# Patient Record
Sex: FEMALE | Race: WHITE | NOT HISPANIC OR LATINO | Employment: OTHER | URBAN - METROPOLITAN AREA
[De-identification: names, ages, dates, MRNs, and addresses within clinical notes are randomized per-mention and may not be internally consistent; named-entity substitution may affect disease eponyms.]

---

## 2017-01-12 ENCOUNTER — ALLSCRIPTS OFFICE VISIT (OUTPATIENT)
Dept: OTHER | Facility: OTHER | Age: 72
End: 2017-01-12

## 2017-01-27 ENCOUNTER — ALLSCRIPTS OFFICE VISIT (OUTPATIENT)
Dept: OTHER | Facility: OTHER | Age: 72
End: 2017-01-27

## 2017-01-27 LAB
FLUAV AG SPEC QL IA: POSITIVE
INFLUENZA B AG (HISTORICAL): NEGATIVE

## 2017-05-07 DIAGNOSIS — Z12.31 ENCOUNTER FOR SCREENING MAMMOGRAM FOR MALIGNANT NEOPLASM OF BREAST: ICD-10-CM

## 2017-05-07 DIAGNOSIS — I10 ESSENTIAL (PRIMARY) HYPERTENSION: ICD-10-CM

## 2017-05-07 DIAGNOSIS — E11.9 TYPE 2 DIABETES MELLITUS WITHOUT COMPLICATIONS (HCC): ICD-10-CM

## 2017-05-17 ENCOUNTER — ALLSCRIPTS OFFICE VISIT (OUTPATIENT)
Dept: OTHER | Facility: OTHER | Age: 72
End: 2017-05-17

## 2017-05-23 ENCOUNTER — GENERIC CONVERSION - ENCOUNTER (OUTPATIENT)
Dept: OTHER | Facility: OTHER | Age: 72
End: 2017-05-23

## 2017-06-06 ENCOUNTER — ALLSCRIPTS OFFICE VISIT (OUTPATIENT)
Dept: OTHER | Facility: OTHER | Age: 72
End: 2017-06-06

## 2017-09-08 ENCOUNTER — GENERIC CONVERSION - ENCOUNTER (OUTPATIENT)
Dept: OTHER | Facility: OTHER | Age: 72
End: 2017-09-08

## 2017-09-21 ENCOUNTER — HOSPITAL ENCOUNTER (EMERGENCY)
Facility: HOSPITAL | Age: 72
Discharge: HOME | End: 2017-09-21
Admitting: EMERGENCY MEDICINE
Payer: COMMERCIAL

## 2017-09-21 VITALS
SYSTOLIC BLOOD PRESSURE: 184 MMHG | TEMPERATURE: 98.3 F | HEART RATE: 85 BPM | RESPIRATION RATE: 16 BRPM | OXYGEN SATURATION: 98 % | DIASTOLIC BLOOD PRESSURE: 67 MMHG | WEIGHT: 150 LBS

## 2017-09-21 DIAGNOSIS — S61.219A LACERATION OF FINGER OF RIGHT HAND, INITIAL ENCOUNTER: Primary | ICD-10-CM

## 2017-09-21 PROCEDURE — 90471 IMMUNIZATION ADMIN: CPT

## 2017-09-21 PROCEDURE — 99282 EMERGENCY DEPT VISIT SF MDM: CPT

## 2017-09-21 PROCEDURE — 90715 TDAP VACCINE 7 YRS/> IM: CPT | Performed by: PHYSICIAN ASSISTANT

## 2017-09-21 RX ORDER — CEPHALEXIN 500 MG/1
500 CAPSULE ORAL EVERY 6 HOURS SCHEDULED
Qty: 39 CAPSULE | Refills: 0 | Status: SHIPPED | OUTPATIENT
Start: 2017-09-21 | End: 2017-09-21

## 2017-09-21 RX ORDER — LIDOCAINE HYDROCHLORIDE 10 MG/ML
10 INJECTION, SOLUTION EPIDURAL; INFILTRATION; INTRACAUDAL; PERINEURAL ONCE
Status: COMPLETED | OUTPATIENT
Start: 2017-09-21 | End: 2017-09-21

## 2017-09-21 RX ORDER — CEPHALEXIN 250 MG/1
500 CAPSULE ORAL ONCE
Status: COMPLETED | OUTPATIENT
Start: 2017-09-21 | End: 2017-09-21

## 2017-09-21 RX ORDER — CEPHALEXIN 500 MG/1
500 CAPSULE ORAL 4 TIMES DAILY
Qty: 39 CAPSULE | Refills: 0 | Status: SHIPPED | OUTPATIENT
Start: 2017-09-21 | End: 2017-10-01

## 2017-09-21 RX ORDER — BACITRACIN, NEOMYCIN, POLYMYXIN B 400; 3.5; 5 [USP'U]/G; MG/G; [USP'U]/G
1 OINTMENT TOPICAL ONCE
Status: COMPLETED | OUTPATIENT
Start: 2017-09-21 | End: 2017-09-21

## 2017-09-21 RX ADMIN — TETANUS TOXOID, REDUCED DIPHTHERIA TOXOID AND ACELLULAR PERTUSSIS VACCINE, ADSORBED 0.5 ML: 5; 2.5; 8; 8; 2.5 SUSPENSION INTRAMUSCULAR at 18:17

## 2017-09-21 RX ADMIN — LIDOCAINE HYDROCHLORIDE 10 ML: 10 INJECTION, SOLUTION EPIDURAL; INFILTRATION; INTRACAUDAL; PERINEURAL at 18:01

## 2017-09-21 RX ADMIN — CEPHALEXIN 500 MG: 250 CAPSULE ORAL at 19:23

## 2017-09-21 RX ADMIN — BACITRACIN ZINC, NEOMYCIN, POLYMYXIN B 1 SMALL APPLICATION: 400; 3.5; 5 OINTMENT TOPICAL at 18:02

## 2017-10-02 ENCOUNTER — ALLSCRIPTS OFFICE VISIT (OUTPATIENT)
Dept: OTHER | Facility: OTHER | Age: 72
End: 2017-10-02
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
## 2017-11-09 DIAGNOSIS — E11.9 TYPE 2 DIABETES MELLITUS WITHOUT COMPLICATIONS (HCC): ICD-10-CM

## 2017-11-09 DIAGNOSIS — I10 ESSENTIAL (PRIMARY) HYPERTENSION: ICD-10-CM

## 2017-11-09 DIAGNOSIS — C19 MALIGNANT NEOPLASM OF RECTOSIGMOID JUNCTION (HCC): ICD-10-CM

## 2017-11-09 DIAGNOSIS — K21.9 GASTRO-ESOPHAGEAL REFLUX DISEASE WITHOUT ESOPHAGITIS: ICD-10-CM

## 2017-11-09 DIAGNOSIS — D18.02 HEMANGIOMA OF INTRACRANIAL STRUCTURES (HCC): ICD-10-CM

## 2017-11-10 ENCOUNTER — ALLSCRIPTS OFFICE VISIT (OUTPATIENT)
Dept: OTHER | Facility: OTHER | Age: 72
End: 2017-11-10
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
## 2017-11-21 ENCOUNTER — GENERIC CONVERSION - ENCOUNTER (OUTPATIENT)
Dept: OTHER | Facility: OTHER | Age: 72
End: 2017-11-21
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
## 2018-01-11 NOTE — RESULT NOTES
Verified Results  * DXA BONE DENSITY SPINE HIP AND PELVIS 12Sep2016 03:11PM Gloria Drilling Order Number: [id number]    - Patient Instructions: To schedule this appointment, please contact Central Scheduling at [phone]   Order Number: [id number]    - Patient Instructions: To schedule this appointment, please contact Central Scheduling at [phone]  Test Name Result Flag Reference   DXA BONE DENSITY SPINE HIP AND PELVIS (Report)     CENTRAL DXA SCAN     CLINICAL HISTORY:  66year old post-menopausal  female risk factors include postmenopausal, estrogen deficiency  Personal history breast cancer 1989 with chemotherapy  Colon cancer 2001  Pancreatic cancer 2010  TECHNIQUE: Bone densitometry was performed using a [employer] W bone densitometer  Regions of interest appear properly placed  There are no obvious fractures or other confounding variables which could limit the study  L1 is excluded from    evaluation due to the presence of degenerative or osteoarthritic changes  COMPARISON: Baseline     RESULTS:    LUMBAR SPINE: L2-L4:   BMD 0 889 gm/cm2   T-score below normal, -1 7   Z-score 0 6     LEFT TOTAL HIP:   BMD 0 777 gm/cm2   T-score below normal, -1 4   Z-score 0 3     LEFT FEMORAL NECK:   BMD 0 682 gm/cm2   T-score below normal, -1 5   Z-score 0 4     The Frax score in this patient identifies the risk of a major osteoporotic fracture in the next 10 years at 11% and a hip fracture risk of 1 8%  IMPRESSION:   1  Based on the [facility] classification, this study identifies moderate osteopenia at the spine and total hip and femoral neck areas and the patient is considered at low risk for fracture  2  A daily intake of calcium of at least 1200 mg and vitamin D, 800-1000 IU, as well as weight bearing and muscle strengthening exercise, fall prevention and avoidance of tobacco and excessive alcohol intake as basic preventive measures are recommended       3  Repeat DXA scan on the same equipment in 18-24 months as clinically indicated  WHO CLASSIFICATION:   Normal (a T-score of -1 0 or higher)   Low bone mineral density (a T-score of less than -1 0 but higher than -2 5)   Osteoporosis (a T-score of -2 5 or less)   Severe osteoporosis (a T-score of -2 5 or less with a fragility fracture)      Thank you for allowing us the opportunity to participate in your patient care  The expanded DEXA report will no longer be arriving in your mail  If you desire to view the full report please contact [address] [place] [address] or access the PACS system         Workstation performed: [id number]     Signed by:   Rachele Arellano MD   9/13/16

## 2018-01-12 VITALS
TEMPERATURE: 103.6 F | SYSTOLIC BLOOD PRESSURE: 132 MMHG | DIASTOLIC BLOOD PRESSURE: 84 MMHG | RESPIRATION RATE: 16 BRPM | BODY MASS INDEX: 26.73 KG/M2 | OXYGEN SATURATION: 95 % | HEART RATE: 102 BPM | HEIGHT: 64 IN | WEIGHT: 156.56 LBS

## 2018-01-13 VITALS
DIASTOLIC BLOOD PRESSURE: 64 MMHG | WEIGHT: 150.19 LBS | RESPIRATION RATE: 17 BRPM | OXYGEN SATURATION: 97 % | SYSTOLIC BLOOD PRESSURE: 118 MMHG | HEIGHT: 64 IN | BODY MASS INDEX: 25.64 KG/M2 | TEMPERATURE: 96.9 F | HEART RATE: 73 BPM

## 2018-01-13 VITALS
DIASTOLIC BLOOD PRESSURE: 78 MMHG | HEART RATE: 74 BPM | TEMPERATURE: 98.6 F | WEIGHT: 155.25 LBS | RESPIRATION RATE: 16 BRPM | SYSTOLIC BLOOD PRESSURE: 128 MMHG | OXYGEN SATURATION: 98 % | BODY MASS INDEX: 26.5 KG/M2 | HEIGHT: 64 IN

## 2018-01-13 VITALS
DIASTOLIC BLOOD PRESSURE: 72 MMHG | BODY MASS INDEX: 24.07 KG/M2 | HEART RATE: 102 BPM | OXYGEN SATURATION: 99 % | TEMPERATURE: 98.5 F | SYSTOLIC BLOOD PRESSURE: 128 MMHG | HEIGHT: 65 IN | WEIGHT: 144.44 LBS | RESPIRATION RATE: 17 BRPM

## 2018-01-14 VITALS
DIASTOLIC BLOOD PRESSURE: 68 MMHG | HEIGHT: 64 IN | WEIGHT: 160.19 LBS | TEMPERATURE: 98.6 F | RESPIRATION RATE: 16 BRPM | SYSTOLIC BLOOD PRESSURE: 124 MMHG | BODY MASS INDEX: 27.35 KG/M2 | OXYGEN SATURATION: 96 % | HEART RATE: 91 BPM

## 2018-01-14 VITALS
HEIGHT: 64 IN | HEART RATE: 69 BPM | OXYGEN SATURATION: 98 % | TEMPERATURE: 99 F | BODY MASS INDEX: 26.46 KG/M2 | DIASTOLIC BLOOD PRESSURE: 62 MMHG | SYSTOLIC BLOOD PRESSURE: 110 MMHG | WEIGHT: 155 LBS

## 2018-02-13 NOTE — MISCELLANEOUS
Message  Message Free Text Note Form: Patient called last night around 10:30 p m  stating that she was scratched by her cat  She is concerned because she had breast cancer and does not have lymph nodes and is concerned about infection  Plan    1  Doxycycline Hyclate 100 MG Oral Capsule; Take 1 capsule twice daily    Discussion/Summary  Discussion Summary:   Doxycycline course to be called in to [facility] in [place] where she is visiting her daughter currently  Task out to a call in the prescription by medical assistant and call patient to notify her once that has been done  She was advised to wash the wound twice daily and monitor it  If it becomes swollen she may apply ice  If at any time she is concerned she needs to seek medical attention at an urgent care or ER in [place]        Signatures   Electronically signed by : Mitchell Holland, [facility]; Nov 21 2017  7:54AM EST                       (Author)

## 2018-03-09 DIAGNOSIS — R52 ACUTE PAIN: Primary | ICD-10-CM

## 2018-03-09 DIAGNOSIS — C19 CARCINOMA OF RECTOSIGMOID JUNCTION (HCC): ICD-10-CM

## 2018-03-09 DIAGNOSIS — C25.9 MALIGNANT NEOPLASM OF PANCREAS, UNSPECIFIED LOCATION OF MALIGNANCY (HCC): ICD-10-CM

## 2018-03-09 RX ORDER — OXYCODONE HYDROCHLORIDE 5 MG/1
CAPSULE ORAL
COMMUNITY
Start: 2014-02-11 | End: 2018-03-09 | Stop reason: SDUPTHER

## 2018-03-09 RX ORDER — OXYCODONE HYDROCHLORIDE 5 MG/1
5 CAPSULE ORAL 2 TIMES DAILY PRN
Qty: 180 CAPSULE | Refills: 0 | Status: SHIPPED | OUTPATIENT
Start: 2018-03-09 | End: 2018-08-01 | Stop reason: SDUPTHER

## 2018-03-15 DIAGNOSIS — E11.9 TYPE 2 DIABETES MELLITUS WITHOUT COMPLICATION, WITH LONG-TERM CURRENT USE OF INSULIN (HCC): Primary | ICD-10-CM

## 2018-03-15 DIAGNOSIS — Z79.4 TYPE 2 DIABETES MELLITUS WITHOUT COMPLICATION, WITH LONG-TERM CURRENT USE OF INSULIN (HCC): Primary | ICD-10-CM

## 2018-03-20 ENCOUNTER — TELEPHONE (OUTPATIENT)
Dept: FAMILY MEDICINE CLINIC | Facility: CLINIC | Age: 73
End: 2018-03-20

## 2018-03-20 NOTE — TELEPHONE ENCOUNTER
Pharmacist needed a diagnosis code for pt'a rx oxycodone I read exactly under the mediation that Dr Solis approved what the diagnosis association were and I also confirmed them with the most recent office note which was  An office visit from 11/10/17

## 2018-04-17 ENCOUNTER — DOCUMENTATION (OUTPATIENT)
Dept: FAMILY MEDICINE CLINIC | Facility: CLINIC | Age: 73
End: 2018-04-17

## 2018-04-17 ENCOUNTER — TELEPHONE (OUTPATIENT)
Dept: FAMILY MEDICINE CLINIC | Facility: CLINIC | Age: 73
End: 2018-04-17

## 2018-04-17 DIAGNOSIS — Z79.4 TYPE 2 DIABETES MELLITUS WITH COMPLICATION, WITH LONG-TERM CURRENT USE OF INSULIN (HCC): Primary | ICD-10-CM

## 2018-04-17 DIAGNOSIS — E11.8 TYPE 2 DIABETES MELLITUS WITH COMPLICATION, WITH LONG-TERM CURRENT USE OF INSULIN (HCC): Primary | ICD-10-CM

## 2018-04-17 NOTE — TELEPHONE ENCOUNTER
I SPOKE TO PT ABOUT THE LANTUS  SHE WOULD LIKE YOU TO CALL HER  SHE WANTS TO STAY ON THIS MED  SHE WOULD LIKE TO KNOW IF THERE IS ANYTHING YOU CAN Do  I TRIED TO EXPLAIN HER FORMULARY MUST HAVE CHANGED FOR THE NEW YEAR  I ALSO TOLD HER IF THERE IS ANY REASON SHE CAN'T CHANGE MEDS WE TRY AN APPEAL  PLEASE CALL Pt    THANKS

## 2018-04-18 NOTE — TELEPHONE ENCOUNTER
I spoke with patient  She is okay with switching from Lantus to [place]    Will send Rx to [facility]

## 2018-05-03 ENCOUNTER — OFFICE VISIT (OUTPATIENT)
Dept: FAMILY MEDICINE CLINIC | Facility: CLINIC | Age: 73
End: 2018-05-03
Payer: COMMERCIAL

## 2018-05-03 VITALS
OXYGEN SATURATION: 98 % | BODY MASS INDEX: 24.5 KG/M2 | DIASTOLIC BLOOD PRESSURE: 70 MMHG | HEART RATE: 73 BPM | TEMPERATURE: 100 F | SYSTOLIC BLOOD PRESSURE: 130 MMHG | WEIGHT: 147.2 LBS

## 2018-05-03 DIAGNOSIS — J01.00 ACUTE NON-RECURRENT MAXILLARY SINUSITIS: Primary | ICD-10-CM

## 2018-05-03 PROCEDURE — 99213 OFFICE O/P EST LOW 20 MIN: CPT | Performed by: FAMILY MEDICINE

## 2018-05-03 RX ORDER — HYDROCHLOROTHIAZIDE 12.5 MG/1
12.5 TABLET ORAL
COMMUNITY
End: 2018-05-11 | Stop reason: ALTCHOICE

## 2018-05-03 RX ORDER — METOPROLOL TARTRATE 100 MG/1
100 TABLET ORAL
COMMUNITY
Start: 2014-10-28 | End: 2018-05-03 | Stop reason: SDUPTHER

## 2018-05-03 RX ORDER — OXYCODONE HYDROCHLORIDE 5 MG/1
5 CAPSULE ORAL EVERY 4 HOURS PRN
COMMUNITY
End: 2018-05-03 | Stop reason: SDUPTHER

## 2018-05-03 RX ORDER — HYDROXYZINE HYDROCHLORIDE 25 MG/1
1 TABLET, FILM COATED ORAL 3 TIMES DAILY PRN
COMMUNITY
Start: 2017-10-06 | End: 2018-11-25

## 2018-05-03 RX ORDER — RANITIDINE 150 MG/1
150 TABLET ORAL
COMMUNITY
End: 2019-02-04 | Stop reason: ALTCHOICE

## 2018-05-03 RX ORDER — LISINOPRIL 40 MG/1
40 TABLET ORAL
COMMUNITY
Start: 2014-10-28 | End: 2019-02-04 | Stop reason: ALTCHOICE

## 2018-05-03 RX ORDER — DOXYCYCLINE HYCLATE 100 MG/1
100 CAPSULE ORAL EVERY 12 HOURS SCHEDULED
Qty: 20 CAPSULE | Refills: 0 | Status: SHIPPED | OUTPATIENT
Start: 2018-05-03 | End: 2018-05-11 | Stop reason: ALTCHOICE

## 2018-05-03 RX ORDER — LISINOPRIL 20 MG/1
20 TABLET ORAL
COMMUNITY
End: 2019-01-10 | Stop reason: SDUPTHER

## 2018-05-03 RX ORDER — OXYCODONE AND ACETAMINOPHEN TABLETS 5; 300 MG/1; MG/1
5 TABLET ORAL
COMMUNITY
Start: 2014-10-28 | End: 2018-08-01 | Stop reason: SDUPTHER

## 2018-05-03 RX ORDER — METOPROLOL SUCCINATE 100 MG/1
TABLET, EXTENDED RELEASE ORAL
COMMUNITY
Start: 2012-04-23 | End: 2019-01-10 | Stop reason: SDUPTHER

## 2018-05-03 RX ORDER — HYDROCHLOROTHIAZIDE 25 MG/1
25 TABLET ORAL
COMMUNITY
Start: 2014-03-24 | End: 2019-02-04 | Stop reason: ALTCHOICE

## 2018-05-03 RX ORDER — METOPROLOL SUCCINATE 25 MG/1
25 TABLET, EXTENDED RELEASE ORAL
COMMUNITY
End: 2018-05-11 | Stop reason: ALTCHOICE

## 2018-05-03 RX ORDER — NICOTINE POLACRILEX 4 MG/1
20 GUM, CHEWING ORAL
COMMUNITY
Start: 2014-03-24 | End: 2018-05-11 | Stop reason: ALTCHOICE

## 2018-05-03 RX ORDER — DOXYCYCLINE HYCLATE 100 MG/1
1 CAPSULE ORAL 2 TIMES DAILY
COMMUNITY
Start: 2014-11-17 | End: 2018-05-11 | Stop reason: ALTCHOICE

## 2018-05-03 RX ORDER — INSULIN GLARGINE 100 [IU]/ML
100 INJECTION, SOLUTION SUBCUTANEOUS
COMMUNITY
Start: 2014-10-28 | End: 2018-05-11 | Stop reason: ALTCHOICE

## 2018-05-03 NOTE — PROGRESS NOTES
Assessment/Plan:         Diagnoses and all orders for this visit:    Acute non-recurrent maxillary sinusitis  Comments:  Rx given for doxycycline  Call further problems possible need for Medrol Dosepak if symptoms persist  Orders:  -     doxycycline hyclate (VIBRAMYCIN) 100 mg capsule; Take 1 capsule (100 mg total) by mouth every 12 (twelve) hours for 10 days          Subjective:      Patient ID: Arline Ly is a 75 y o  female  4 day hx of sinus congestion/pressure  tmax 100        The following portions of the patient's history were reviewed and updated as appropriate: allergies, current medications, past family history, past medical history, past social history, past surgical history and problem list     Review of Systems   Constitutional: Negative for chills and fever  HENT: Positive for congestion and postnasal drip  Respiratory: Positive for cough  Negative for shortness of breath  Cardiovascular: Negative  Objective:      /70 (BP Location: Left arm, Patient Position: Sitting)   Pulse 73   Temp 100 °F (37 8 °C) (Tympanic)   Wt 66 8 kg (147 lb 3 2 oz)   LMP  (Exact Date)   SpO2 98%   BMI 24 50 kg/m²          Physical Exam   Constitutional: She appears well-developed and well-nourished  HENT:   Turbinates inflamed   Neck: Normal range of motion  Neck supple     Pulmonary/Chest: Effort normal and breath sounds normal

## 2018-05-08 LAB
ALP SERPL-CCNC: 138 U/L (ref 46–116)
ALT SERPL W P-5'-P-CCNC: 24 U/L (ref 12–78)
AST SERPL W P-5'-P-CCNC: 16 U/L (ref 5–45)
BILIRUB SERPL-MCNC: 0.3 MG/DL
BUN SERPL-MCNC: 36 MG/DL (ref 5–25)
CHOLEST SERPL-MCNC: 95 MG/DL (ref 50–200)
CREAT SERPL-MCNC: 0.83 MG/DL (ref 0.6–1.3)
GLUCOSE SERPL-MCNC: 90 MG/DL
HBA1C MFR BLD: 5.4 % (ref 4.2–6.3)
HDLC SERPL-MCNC: 33 MG/DL (ref 40–60)
LDLC SERPL DIRECT ASSAY-MCNC: 31 MG/DL
LDLC/HDLC SERPL: 2.88 {RATIO}
POTASSIUM SERPL-SCNC: 4.4 MMOL/L (ref 3.5–5.3)
SODIUM SERPL-SCNC: 141 MMOL/L (ref 136–145)
TRIGL SERPL-MCNC: 155 MG/DL (ref ?–150)
TSH SERPL DL<=0.05 MIU/L-ACNC: 0.62 UIU/ML (ref 0.34–4.82)

## 2018-05-10 LAB
CREAT ?TM UR-SCNC: 38.3 UMOL/L
MICROALBUMIN UR-MCNC: 0.9 MG/L (ref 0–20)
PROT SERPL-MCNC: 5.8 G/DL (ref 6.4–8.2)

## 2018-05-11 ENCOUNTER — OFFICE VISIT (OUTPATIENT)
Dept: FAMILY MEDICINE CLINIC | Facility: CLINIC | Age: 73
End: 2018-05-11
Payer: COMMERCIAL

## 2018-05-11 VITALS
OXYGEN SATURATION: 97 % | SYSTOLIC BLOOD PRESSURE: 122 MMHG | HEART RATE: 56 BPM | BODY MASS INDEX: 24.58 KG/M2 | TEMPERATURE: 98.4 F | DIASTOLIC BLOOD PRESSURE: 64 MMHG | WEIGHT: 147.7 LBS

## 2018-05-11 DIAGNOSIS — I10 BENIGN ESSENTIAL HYPERTENSION: ICD-10-CM

## 2018-05-11 DIAGNOSIS — Z79.4 TYPE 2 DIABETES MELLITUS WITH COMPLICATION, WITH LONG-TERM CURRENT USE OF INSULIN (HCC): Primary | ICD-10-CM

## 2018-05-11 DIAGNOSIS — E11.8 TYPE 2 DIABETES MELLITUS WITH COMPLICATION, WITH LONG-TERM CURRENT USE OF INSULIN (HCC): Primary | ICD-10-CM

## 2018-05-11 DIAGNOSIS — C19 CARCINOMA OF RECTOSIGMOID JUNCTION (HCC): ICD-10-CM

## 2018-05-11 DIAGNOSIS — K21.9 GASTROESOPHAGEAL REFLUX DISEASE WITHOUT ESOPHAGITIS: ICD-10-CM

## 2018-05-11 PROCEDURE — 3725F SCREEN DEPRESSION PERFORMED: CPT | Performed by: FAMILY MEDICINE

## 2018-05-11 PROCEDURE — 3074F SYST BP LT 130 MM HG: CPT | Performed by: FAMILY MEDICINE

## 2018-05-11 PROCEDURE — 99214 OFFICE O/P EST MOD 30 MIN: CPT | Performed by: FAMILY MEDICINE

## 2018-05-11 PROCEDURE — 3078F DIAST BP <80 MM HG: CPT | Performed by: FAMILY MEDICINE

## 2018-05-11 NOTE — ASSESSMENT & PLAN NOTE
Stable without signs of recurrence    Continue yearly follow up at [facility]

## 2018-05-11 NOTE — ASSESSMENT & PLAN NOTE
Stable without signs of recurrence    Continue yearly follow up at [facility]

## 2018-05-11 NOTE — ASSESSMENT & PLAN NOTE
A1c 5 4%, was 6 0%  Home sugar readings have been good  Patient recently changed Lantus to [place] and has noticed that she is had to reduce her dosage due to some he hypoglycemia  Current receive a dose is 22 units daily along with NovoLog sliding scale  Recommend discontinue metformin  Continue to monitor home sugars  And call if significant hypoglycemia occurs    Will continue to monitor

## 2018-05-11 NOTE — PROGRESS NOTES
Assessment/Plan:    Benign essential hypertension   Controlled on lisinopril 20, metoprolol 50, and hydrochlorothiazide 25    Pancreatic cancer (HCC)   Stable without signs of recurrence  Continue yearly follow up at [facility])   Stable without signs of recurrence  Continue yearly follow up at [address] [facility] of head of pancreas ([facility] [address] )   Stable without signs of recurrence  Continue yearly follow up at [address] [facility] of rectosigmoid junction ([facility] )   Stable without signs of recurrence  Continue yearly follow up at [facility]    Acid reflux   Doing well on OTC ranitidine 150 mg at HS without breakthrough symptoms    Type 2 diabetes mellitus (HCC)   A1c 5 4%, was 6 0%  Home sugar readings have been good  Patient recently changed Lantus to [place] and has noticed that she is had to reduce her dosage due to some he hypoglycemia  Current receive a dose is 22 units daily along with NovoLog sliding scale  Recommend discontinue metformin  Continue to monitor home sugars  And call if significant hypoglycemia occurs  Will continue to monitor       Diagnoses and all orders for this visit:    Type 2 diabetes mellitus with complication, with long-term current use of insulin (HCC)  -     CBC and differential; Future  -     Comprehensive metabolic panel; Future  -     HEMOGLOBIN A1C W/ EAG ESTIMATION; Future  -     Lipid Panel with Direct LDL reflex; Future    Benign essential hypertension  -     CBC and differential; Future  -     Comprehensive metabolic panel; Future  -     HEMOGLOBIN A1C W/ EAG ESTIMATION; Future  -     Lipid Panel with Direct LDL reflex; Future    Gastroesophageal reflux disease without esophagitis    Carcinoma of rectosigmoid junction (HCC)      6 months, AWV,  Fasting blood work at [facility] prior     Subjective:      Patient ID: Adrienne Lujan is a 75 y o  female  Patient presents for recheck of chronic medical problems today    Overall she feels well without any complaints  She is switched to Lantus to [place] and doing well  She still uses NovoLog for sliding scale  Doing well on blood pressure medications without side effects  Doing well on OTC Zantac for GERD  Patient had labs drawn on May 8th        The following portions of the patient's history were reviewed and updated as appropriate: allergies, current medications, past family history, past medical history, past social history, past surgical history and problem list     Review of Systems   Respiratory: Negative  Cardiovascular: Negative  Gastrointestinal: Negative  Genitourinary: Negative  Objective:      /64 (BP Location: Left arm, Patient Position: Sitting)   Pulse 56   Temp 98 4 °F (36 9 °C) (Tympanic)   Wt 67 kg (147 lb 11 2 oz)   SpO2 97%   BMI 24 58 kg/m²          Physical Exam   Cardiovascular: Normal rate, regular rhythm, normal heart sounds and intact distal pulses  Carotids: no bruits  Ext: no edema   Pulmonary/Chest: Effort normal  No respiratory distress  She has no wheezes  She has no rales  Psychiatric: She has a normal mood and affect   Her behavior is normal  Thought content normal

## 2018-06-27 ENCOUNTER — TELEPHONE (OUTPATIENT)
Dept: FAMILY MEDICINE CLINIC | Facility: CLINIC | Age: 73
End: 2018-06-27

## 2018-06-27 NOTE — TELEPHONE ENCOUNTER
Pt would like if you could call her back she would like to speak to you regarding meds and her diabetes

## 2018-06-28 NOTE — TELEPHONE ENCOUNTER
I spoke w/ pt  She had a question regarding tresiba and lantus  Pt may convert on a unit/unit conversion

## 2018-08-01 ENCOUNTER — TELEPHONE (OUTPATIENT)
Dept: FAMILY MEDICINE CLINIC | Facility: CLINIC | Age: 73
End: 2018-08-01

## 2018-08-01 DIAGNOSIS — R52 ACUTE PAIN: ICD-10-CM

## 2018-08-01 DIAGNOSIS — M19.90 ARTHRITIS: Primary | ICD-10-CM

## 2018-08-01 DIAGNOSIS — C19 CARCINOMA OF RECTOSIGMOID JUNCTION (HCC): ICD-10-CM

## 2018-08-01 DIAGNOSIS — C25.9 MALIGNANT NEOPLASM OF PANCREAS, UNSPECIFIED LOCATION OF MALIGNANCY (HCC): ICD-10-CM

## 2018-08-01 DIAGNOSIS — G89.29 OTHER CHRONIC PAIN: Primary | ICD-10-CM

## 2018-08-01 RX ORDER — OXYCODONE AND ACETAMINOPHEN TABLETS 5; 300 MG/1; MG/1
1 TABLET ORAL 2 TIMES DAILY
Qty: 180 TABLET | Refills: 0 | Status: SHIPPED | OUTPATIENT
Start: 2018-08-01 | End: 2018-08-01 | Stop reason: SDUPTHER

## 2018-08-01 RX ORDER — OXYCODONE HYDROCHLORIDE 5 MG/1
5 CAPSULE ORAL 2 TIMES DAILY PRN
Qty: 180 CAPSULE | Refills: 0 | Status: SHIPPED | OUTPATIENT
Start: 2018-08-01 | End: 2018-08-01 | Stop reason: SDUPTHER

## 2018-08-01 NOTE — TELEPHONE ENCOUNTER
Jarvis Ochoa called from [address] [facility]  They received Pt's rx for OxyCodone Capsules  She said they don't have capsules only tablets  Also, Pt has requested her scripts be sent to [facility] not [facility]      Please send new Rx script to [facility]   Original script with capsules should be ok since now going to [facility]

## 2018-08-01 NOTE — TELEPHONE ENCOUNTER
Patient called - RX was sent to [facility], patient would like it sent to [address] [place] [address]

## 2018-08-01 NOTE — TELEPHONE ENCOUNTER
Pharmacy called stating that the Oxycodone acetaminophen 5-300 is $1800 00 so they recommend that you dispense 5-235 or just plain Oxycodone- they also stated that she has been on the Oxycodone before so they need a new diagnostic code

## 2018-08-02 DIAGNOSIS — C25.9 MALIGNANT NEOPLASM OF PANCREAS, UNSPECIFIED LOCATION OF MALIGNANCY (HCC): ICD-10-CM

## 2018-08-02 DIAGNOSIS — R52 ACUTE PAIN: ICD-10-CM

## 2018-08-02 DIAGNOSIS — C19 CARCINOMA OF RECTOSIGMOID JUNCTION (HCC): ICD-10-CM

## 2018-08-02 RX ORDER — OXYCODONE HYDROCHLORIDE 5 MG/1
5 CAPSULE ORAL 2 TIMES DAILY PRN
Qty: 180 CAPSULE | Refills: 0 | Status: SHIPPED | OUTPATIENT
Start: 2018-08-02 | End: 2018-08-02 | Stop reason: SDUPTHER

## 2018-08-02 RX ORDER — OXYCODONE HYDROCHLORIDE 5 MG/1
5 CAPSULE ORAL 2 TIMES DAILY PRN
Qty: 180 CAPSULE | Refills: 0 | Status: SHIPPED | OUTPATIENT
Start: 2018-08-02 | End: 2018-08-06 | Stop reason: SDUPTHER

## 2018-08-06 DIAGNOSIS — C25.9 MALIGNANT NEOPLASM OF PANCREAS, UNSPECIFIED LOCATION OF MALIGNANCY (HCC): ICD-10-CM

## 2018-08-06 DIAGNOSIS — C19 CARCINOMA OF RECTOSIGMOID JUNCTION (HCC): ICD-10-CM

## 2018-08-06 DIAGNOSIS — R52 ACUTE PAIN: ICD-10-CM

## 2018-08-06 RX ORDER — OXYCODONE HYDROCHLORIDE 5 MG/1
5 CAPSULE ORAL 2 TIMES DAILY PRN
Qty: 180 CAPSULE | Refills: 0 | Status: SHIPPED | OUTPATIENT
Start: 2018-08-06 | End: 2018-10-29 | Stop reason: SDUPTHER

## 2018-08-06 NOTE — TELEPHONE ENCOUNTER
Pt called stating she just got off the phone with [facility] and we sent the wrong oxyCodone to the Pharmacy  Informed pt it appears that we tried to send the crorrect rx over right after the wrong one went through, however the script didn't go through to thepharmacy  Informed her we would resend over to them      Please resend   oxyCodone 5mg Capsules  Take 2 times daily PRN for moderate pain  Qty 180 capsules    To [facility] [address] [facility]

## 2018-08-14 DIAGNOSIS — C25.9 MALIGNANT NEOPLASM OF PANCREAS, UNSPECIFIED LOCATION OF MALIGNANCY (HCC): Primary | ICD-10-CM

## 2018-08-14 NOTE — TELEPHONE ENCOUNTER
Pt called requesting a refill to be sent to [facility]    Please check dosing instructions    Pt will like a call when this is approved

## 2018-10-15 DIAGNOSIS — C25.9 MALIGNANT NEOPLASM OF PANCREAS, UNSPECIFIED LOCATION OF MALIGNANCY (HCC): ICD-10-CM

## 2018-10-15 RX ORDER — PANCRELIPASE 24000; 76000; 120000 [USP'U]/1; [USP'U]/1; [USP'U]/1
CAPSULE, DELAYED RELEASE PELLETS ORAL
Qty: 180 CAPSULE | Refills: 0 | Status: SHIPPED | OUTPATIENT
Start: 2018-10-15 | End: 2018-10-29 | Stop reason: SDUPTHER

## 2018-10-29 DIAGNOSIS — C25.9 MALIGNANT NEOPLASM OF PANCREAS, UNSPECIFIED LOCATION OF MALIGNANCY (HCC): ICD-10-CM

## 2018-10-29 DIAGNOSIS — R52 ACUTE PAIN: ICD-10-CM

## 2018-10-29 DIAGNOSIS — C19 CARCINOMA OF RECTOSIGMOID JUNCTION (HCC): ICD-10-CM

## 2018-10-29 RX ORDER — OXYCODONE HYDROCHLORIDE 5 MG/1
5 CAPSULE ORAL 2 TIMES DAILY PRN
Qty: 180 CAPSULE | Refills: 0 | Status: SHIPPED | OUTPATIENT
Start: 2018-10-29 | End: 2018-10-31 | Stop reason: SDUPTHER

## 2018-10-30 RX ORDER — PANCRELIPASE 24000; 76000; 120000 [USP'U]/1; [USP'U]/1; [USP'U]/1
CAPSULE, DELAYED RELEASE PELLETS ORAL
Qty: 1080 CAPSULE | Refills: 1 | Status: SHIPPED | OUTPATIENT
Start: 2018-10-30 | End: 2019-03-02

## 2018-10-31 DIAGNOSIS — C19 CARCINOMA OF RECTOSIGMOID JUNCTION (HCC): ICD-10-CM

## 2018-10-31 DIAGNOSIS — R52 ACUTE PAIN: ICD-10-CM

## 2018-10-31 DIAGNOSIS — C25.9 MALIGNANT NEOPLASM OF PANCREAS, UNSPECIFIED LOCATION OF MALIGNANCY (HCC): ICD-10-CM

## 2018-10-31 RX ORDER — OXYCODONE HYDROCHLORIDE 5 MG/1
5 CAPSULE ORAL 2 TIMES DAILY PRN
Qty: 180 CAPSULE | Refills: 0 | Status: SHIPPED | OUTPATIENT
Start: 2018-10-31 | End: 2019-01-25 | Stop reason: SDUPTHER

## 2018-10-31 NOTE — TELEPHONE ENCOUNTER
Pt called asking about her oxycodone refill we sent it to [facility] and she would like it sent to [facility] [address]    Oxycodone 5mg cap  Take 1 cap by mouth 2 times a day as needed for moderate pain  [address]    If we can pls get this there today she has access to a car today

## 2018-11-13 DIAGNOSIS — E11.8 TYPE 2 DIABETES MELLITUS WITH COMPLICATION, WITH LONG-TERM CURRENT USE OF INSULIN (HCC): ICD-10-CM

## 2018-11-13 DIAGNOSIS — Z79.4 TYPE 2 DIABETES MELLITUS WITH COMPLICATION, WITH LONG-TERM CURRENT USE OF INSULIN (HCC): ICD-10-CM

## 2018-11-13 LAB
CREAT ?TM UR-SCNC: 48.5 UMOL/L
HBA1C MFR BLD HPLC: 5.7 %
MICROALBUMIN UR-MCNC: 1.8 MG/L (ref 0–20)
MICROALBUMIN/CREAT UR: 37.1 MG/G{CREAT}

## 2018-11-13 PROCEDURE — 3060F POS MICROALBUMINURIA REV: CPT | Performed by: FAMILY MEDICINE

## 2018-11-15 ENCOUNTER — OFFICE VISIT (OUTPATIENT)
Dept: FAMILY MEDICINE CLINIC | Facility: CLINIC | Age: 73
End: 2018-11-15
Payer: COMMERCIAL

## 2018-11-15 VITALS
WEIGHT: 149.1 LBS | DIASTOLIC BLOOD PRESSURE: 60 MMHG | BODY MASS INDEX: 24.81 KG/M2 | TEMPERATURE: 98.6 F | HEART RATE: 90 BPM | SYSTOLIC BLOOD PRESSURE: 140 MMHG | OXYGEN SATURATION: 99 % | RESPIRATION RATE: 16 BRPM

## 2018-11-15 DIAGNOSIS — K21.9 GASTROESOPHAGEAL REFLUX DISEASE WITHOUT ESOPHAGITIS: ICD-10-CM

## 2018-11-15 DIAGNOSIS — C25.0 CARCINOMA OF HEAD OF PANCREAS (HCC): ICD-10-CM

## 2018-11-15 DIAGNOSIS — E11.8 TYPE 2 DIABETES MELLITUS WITH COMPLICATION, WITH LONG-TERM CURRENT USE OF INSULIN (HCC): ICD-10-CM

## 2018-11-15 DIAGNOSIS — Z00.00 ENCOUNTER FOR MEDICARE ANNUAL WELLNESS EXAM: Primary | ICD-10-CM

## 2018-11-15 DIAGNOSIS — Z79.4 TYPE 2 DIABETES MELLITUS WITH COMPLICATION, WITH LONG-TERM CURRENT USE OF INSULIN (HCC): ICD-10-CM

## 2018-11-15 DIAGNOSIS — C50.911 BILATERAL MALIGNANT NEOPLASM OF BREAST IN FEMALE, UNSPECIFIED ESTROGEN RECEPTOR STATUS, UNSPECIFIED SITE OF BREAST (HCC): ICD-10-CM

## 2018-11-15 DIAGNOSIS — I10 BENIGN ESSENTIAL HYPERTENSION: ICD-10-CM

## 2018-11-15 DIAGNOSIS — C50.912 BILATERAL MALIGNANT NEOPLASM OF BREAST IN FEMALE, UNSPECIFIED ESTROGEN RECEPTOR STATUS, UNSPECIFIED SITE OF BREAST (HCC): ICD-10-CM

## 2018-11-15 DIAGNOSIS — C19 CARCINOMA OF RECTOSIGMOID JUNCTION (HCC): ICD-10-CM

## 2018-11-15 LAB
LEFT EYE IMAGE QUALITY: NORMAL
RIGHT EYE DIABETIC RETINOPATHY: NORMAL
RIGHT EYE IMAGE QUALITY: NORMAL
SEVERITY (EYE EXAM): NORMAL

## 2018-11-15 PROCEDURE — 4040F PNEUMOC VAC/ADMIN/RCVD: CPT | Performed by: FAMILY MEDICINE

## 2018-11-15 PROCEDURE — 1170F FXNL STATUS ASSESSED: CPT | Performed by: FAMILY MEDICINE

## 2018-11-15 PROCEDURE — 1125F AMNT PAIN NOTED PAIN PRSNT: CPT | Performed by: FAMILY MEDICINE

## 2018-11-15 PROCEDURE — 3072F LOW RISK FOR RETINOPATHY: CPT | Performed by: FAMILY MEDICINE

## 2018-11-15 PROCEDURE — 99214 OFFICE O/P EST MOD 30 MIN: CPT | Performed by: FAMILY MEDICINE

## 2018-11-15 PROCEDURE — G0439 PPPS, SUBSEQ VISIT: HCPCS | Performed by: FAMILY MEDICINE

## 2018-11-15 PROCEDURE — 92250 FUNDUS PHOTOGRAPHY W/I&R: CPT | Performed by: FAMILY MEDICINE

## 2018-11-15 NOTE — PROGRESS NOTES
Assessment and Plan:  Problem List Items Addressed This Visit     Benign essential hypertension    Type 2 diabetes mellitus [facility])        Health Maintenance Due   Topic Date Due    SLP PLAN OF CARE  1945    Fall Risk  03/25/2009    Urinary Incontinence Screening  03/25/2009    DM Eye Exam  08/16/2011    Diabetic Foot Exam  01/12/2018    INFLUENZA VACCINE  07/01/2018    HEMOGLOBIN A1C  11/08/2018      MEDICARE WELLNESS VISIT  PATIENT DOES NOT HAVE A LIVING WILL AND HEALTHCARE POWER OF   THESE WERE DISCUSSED TODAY  DEPRESSION SCREEN, FALL RISK, URINARY INCONTINENCE SCREENS WERE NEGATIVE  PATIENT REFUSES AGE APPROPRIATE VACCINATIONS EXCEPT FOR PNEUMONIA VACCINE SERIES  PATIENT IS CURRENT WITH COLONOSCOPY, DEXA, MAMMOGRAPHY      HPI:  Patient Active Problem List   Diagnosis    Benign essential hypertension    Vitamin D deficiency    Type 2 diabetes mellitus ([facility] )    Breast cancer ([facility] )    Carcinoma of head of pancreas ([facility] )    Carcinoma of rectosigmoid junction (HCC)    Acid reflux    Arthritis     Past Medical History:   Diagnosis Date    Cancer [facility])     Breast; Last Assessed: 8/29/2016    Carpal tunnel syndrome     unspecified laterality;  Onset: 4/23/2012    Cellulitis     Last Assessesd: 8/30/2012    Diabetes mellitus out of control ([facility] )     Last Assessed: 11/3/2014    Fatigue     Last Assessed: 2/11/014    Fracture of toe     Last Assessed: 11/17/2014    Ovarian cancer ([facility] )     Last Assessed: 1/7/2016    Rectal cancer [facility])      Past Surgical History:   Procedure Laterality Date    COLON SURGERY      MASTECTOMY      bilateral    PANCREATECTOMY  2010    Proximal Subtotal (whipple procedure)     Family History   Problem Relation Age of Onset    Melanoma Mother     Stroke Father      History   Smoking Status    Never Smoker   Smokeless Tobacco    Never Used     History   Alcohol Use No      History   Drug Use No         Current Outpatient Prescriptions   Medication Sig Dispense Refill    CREON 22666-99638 units TAKE 3 WITH BREAKFAST, 3 WITH LUNCH, 3 WITH DINNER AND 3 WITH SNACKS 1080 capsule 1    Glucose Blood (ONETOUCH ULTRA BLUE VI) by In Vitro route      hydrochlorothiazide (HYDRODIURIL) 25 mg tablet Take 25 mg by mouth      hydrOXYzine HCL (ATARAX) 25 mg tablet Take 1 tablet by mouth 3 (three) times a day as needed      Insulin Aspart (NOVOLOG SC) Inject under the skin      insulin degludec (TRESIBA) 100 units/mL injection pen Inject 30 Units under the skin daily 5 pen 2    lisinopril (ZESTRIL) 20 mg tablet Take 20 mg by mouth      lisinopril (ZESTRIL) 40 mg tablet Take 40 mg by mouth      metoprolol succinate (TOPROL-XL) 100 mg 24 hr tablet Take by mouth      oxyCODONE (OXY-IR) 5 MG capsule Take 1 capsule (5 mg total) by mouth 2 (two) times a day as needed for moderate pain Max Daily Amount: 10 mg 180 capsule 0    Pancrelipase, Lip-Prot-Amyl, (CREON PO) Take by mouth      ranitidine (ZANTAC) 150 mg tablet Take 150 mg by mouth       No current facility-administered medications for this visit  Allergies   Allergen Reactions    Gadolinium Derivatives Anaphylaxis     MRI dye- hot flashes/ flushing    Iodine Anaphylaxis     ? ??SHORTNESS OF BREATH    Acetaminophen-Codeine Edema    Aspirin Hives     RASH    Cephalexin      Annotation - [id number]: rash    Nickel Hives    Penicillins Hives    Sulfa Antibiotics Hives    Sulfamethoxazole-Trimethoprim Diarrhea     Immunization History   Administered Date(s) Administered    Pneumococcal Conjugate 13-Valent 11/23/2015    Pneumococcal Polysaccharide PPV23 11/10/2014    Td (adult), adsorbed 10/05/2010    Tdap 09/29/2014, 09/21/2017       Patient Care Team:  Stew Bautista DO as PCP - General    Medicare Screening Tests and Risk Assessments:  Duyen Hall is here for her Subsequent Wellness visit  Health Risk Assessment:  Patient rates overall health as good  Patient feels that their physical health rating is Same  Eyesight was rated as Same  Hearing was rated as Same  Patient feels that their emotional and mental health rating is Same  Pain experienced by patient in the last 7 days has been None  Patient states that she has experienced no weight loss or gain in last 6 months  Emotional/Mental Health:  Patient has been feeling nervous/anxious  PHQ-9 Depression Screening:    Frequency of the following problems over the past two weeks:      1  Little interest or pleasure in doing things: 0 - not at all      2  Feeling down, depressed, or hopeless: 0 - not at all  PHQ-2 Score: 0          Broken Bones/Falls: Fall Risk Assessment:    In the past year, patient has experienced: No history of falling in past year          Bladder/Bowel:  Patient has not leaked urine accidently in the last six months  Patient reports loss of bowel control  Immunizations:  Patient has not had a flu vaccination within the last year  Patient has received a pneumonia shot  Patient has not received a shingles shot  Patient has received tetanus/diphtheria shot  Home Safety:  Patient has trouble with stairs inside or outside of their home  Patient currently reports that there are no safety hazards present in home, working smoke alarms, no working carbon monoxide detectors  Preventative Screenings:   No breast cancer screening performed, colon cancer screen completed, cholesterol screen completed, no glaucoma eye exam completed    Nutrition:  Current diet: Diabetic, Low Saturated Fat, Low Carb and No Added Salt with servings of the following:    Medications:  Patient is currently taking over-the-counter supplements  Patient is able to manage medications  Lifestyle Choices:  Patient reports no tobacco use  Patient has not smoked or used tobacco in the past   Patient reports no alcohol use  Patient drives a vehicle  Patient wears seat belt      Current level of exercise of physical activity described by patient as: no          Activities of Daily Living:  Can get out of bed by his or her self, able to dress self, able to make own meals, unable to do own shopping, able to bathe self, can do own laundry/housekeeping, can manage own money, pay bills and track expenses    Previous Hospitalizations:  No hospitalization or ED visit in past 12 months        Advanced Directives:  Patient has not decided on power of   Patient has not completed advanced directive  Preventative Screening/Counseling:      Cardiovascular:      General: Risks and Benefits Discussed          Diabetes:      General: Risks and Benefits Discussed          Colorectal Cancer:      General: Risks and Benefits Discussed          Breast Cancer:      General: Risks and Benefits Discussed          Cervical Cancer:      General: Risks and Benefits Discussed          Osteoporosis:      General: Risks and Benefits Discussed          AAA:      General: Risks and Benefits Discussed          Glaucoma:      General: Risks and Benefits Discussed          HIV:      General: Risks and Benefits Discussed          Hepatitis C:      General: Risks and Benefits Discussed        Advanced Directives:   Patient has no living will for healthcare, Information on ACP and/or AD not provided  End of life assessment reviewed with patient  Immunizations:      Influenza: Risks & Benefits Discussed and Patient Declines      Pneumococcal: Risks & Benefits Discussed and Lifetime Vaccine Completed      Shingrix: Risks & Benefits Discussed      TD: Risks & Benefits Discussed      Other Preventative Counseling (Non-Medicare):  Nutrition Counseling          No exam data present    Physical Exam:  Review of Systems   Gastrointestinal: Positive for bowel incontinence  Psychiatric/Behavioral: The patient is not nervous/anxious          Vitals:    11/15/18 1124   BP: 140/60   BP Location: Left arm   Patient Position: Sitting   Cuff Size: Adult   Pulse: 90   Resp: 16   Temp: 98 6 °F (37 °C)   TempSrc: Tympanic   SpO2: 99%   Weight: 67 6 kg (149 lb 1 6 oz)   Body mass index is 24 81 kg/m²      Physical Exam

## 2018-11-15 NOTE — ASSESSMENT & PLAN NOTE
Status post bilateral mastectomies in 1988     Patient was released by her oncologist and breast surgeon

## 2018-11-15 NOTE — PROGRESS NOTES
Assessment/Plan:    Benign essential hypertension   Controlled on lisinopril 20, hydrochlorothiazide 25, and metoprolol 50    Breast cancer (HCC)   Status post bilateral mastectomies in 1988  Patient was released by her oncologist and breast surgeon    Carcinoma of head of pancreas ([facility] )   Stage IIB  Attempted Whipple  Has been stable  Still being followed at [address] [place] [address] of rectosigmoid junction [facility])   Status post resection  No evidence recurrence  Still sees call rectal surgeon regularly/yearly (Dr Jesika Enrique)    Type 2 diabetes mellitus ([place] [address] )  Lab Results   Component Value Date    HGBA1C 5 7 11/13/2018       No results for input(s): POCGLU in the last 72 hours  Blood Sugar Average: Last 72 hrs:   A1c 5 7%  Doing well on Tresiba   And NovoLog Sliding scale, 10-26 units daily  No hypoglycemia  IRIS exam today       Diagnoses and all orders for this visit:    Encounter for Medicare annual wellness exam    Type 2 diabetes mellitus with complication, with long-term current use of insulin ([facility])  -     Microalbumin / creatinine urine ratio  -     CBC and differential  -     Comprehensive metabolic panel  -     HEMOGLOBIN A1C W/ EAG ESTIMATION  -     Lipid Panel with Direct LDL reflex  -     POCT diabetic eye exam  -     Comprehensive metabolic panel; Future  -     Hemoglobin A1C; Future  -     Lipid Panel with Direct LDL reflex; Future  -     TSH, 3rd generation with Free T4 reflex;  Future    Benign essential hypertension  -     CBC and differential  -     Comprehensive metabolic panel  -     HEMOGLOBIN A1C W/ EAG ESTIMATION  -     Lipid Panel with Direct LDL reflex  -     CBC and differential; Future    Bilateral malignant neoplasm of breast in female, unspecified estrogen receptor status, unspecified site of breast ([place] [address] )    Carcinoma of head of pancreas (HCC)    Carcinoma of rectosigmoid junction (HCC)    Gastroesophageal reflux disease without esophagitis      PT REFUSES FLU SHOT AND SHINGRIX  CURRENT W/ PNEUMO    6 MOS, FBW PRIOR [facility] [place]    Subjective:      Patient ID: Abdelrahman Brewster is a 75 y o  female  Patient presents for recheck of chronic medical problems today  Overall she is feeling well  Doing well on current insulin without hypoglycemia  Doing well on blood pressure medications without side effects  Still see specialist at [facility] for pancreatic cancer  Patient had labs done on November 13th        The following portions of the patient's history were reviewed and updated as appropriate: allergies, current medications, past family history, past medical history, past social history, past surgical history and problem list     Review of Systems   Respiratory: Negative  Cardiovascular: Negative  Gastrointestinal: Negative  Genitourinary: Negative  Objective:      /60 (BP Location: Left arm, Patient Position: Sitting, Cuff Size: Adult)   Pulse 90   Temp 98 6 °F (37 °C) (Tympanic)   Resp 16   Wt 67 6 kg (149 lb 1 6 oz)   SpO2 99%   BMI 24 81 kg/m²          Physical Exam   Cardiovascular: Normal rate, regular rhythm, normal heart sounds and intact distal pulses  Carotids: no bruits  Ext: no edema   Pulmonary/Chest: Effort normal  No respiratory distress  She has no wheezes  She has no rales  Psychiatric: She has a normal mood and affect   Her behavior is normal  Thought content normal

## 2018-11-15 NOTE — ASSESSMENT & PLAN NOTE
Lab Results   Component Value Date    HGBA1C 5 7 11/13/2018       No results for input(s): POCGLU in the last 72 hours  Blood Sugar Average: Last 72 hrs:   A1c 5 7%  Doing well on Tresiba   And NovoLog Sliding scale, 10-26 units daily  No hypoglycemia   IRIS exam today

## 2018-11-15 NOTE — ASSESSMENT & PLAN NOTE
Status post resection  No evidence recurrence    Still sees call rectal surgeon regularly/yearly (Dr Benedicto Villa)

## 2018-11-25 ENCOUNTER — HOSPITAL ENCOUNTER (EMERGENCY)
Facility: HOSPITAL | Age: 73
Discharge: HOME | End: 2018-11-25
Attending: EMERGENCY MEDICINE
Payer: COMMERCIAL

## 2018-11-25 VITALS
TEMPERATURE: 99.3 F | SYSTOLIC BLOOD PRESSURE: 140 MMHG | HEART RATE: 68 BPM | WEIGHT: 147 LBS | BODY MASS INDEX: 24.46 KG/M2 | DIASTOLIC BLOOD PRESSURE: 62 MMHG | OXYGEN SATURATION: 98 % | RESPIRATION RATE: 18 BRPM

## 2018-11-25 DIAGNOSIS — K62.5 RECTAL BLEEDING: Primary | ICD-10-CM

## 2018-11-25 LAB
ABO GROUP BLD: NORMAL
ALBUMIN SERPL BCP-MCNC: 3 G/DL (ref 3.5–5)
ALP SERPL-CCNC: 278 U/L (ref 46–116)
ALT SERPL W P-5'-P-CCNC: 48 U/L (ref 12–78)
ANION GAP SERPL CALCULATED.3IONS-SCNC: 13 MMOL/L (ref 4–13)
APTT PPP: 33 SECONDS (ref 26–38)
AST SERPL W P-5'-P-CCNC: 34 U/L (ref 5–45)
BASOPHILS # BLD AUTO: 0.02 THOUSANDS/ΜL (ref 0–0.1)
BASOPHILS NFR BLD AUTO: 0 % (ref 0–1)
BILIRUB SERPL-MCNC: 0.33 MG/DL (ref 0.2–1)
BLD GP AB SCN SERPL QL: NEGATIVE
BUN SERPL-MCNC: 35 MG/DL (ref 5–25)
CALCIUM SERPL-MCNC: 8.8 MG/DL (ref 8.3–10.1)
CHLORIDE SERPL-SCNC: 109 MMOL/L (ref 100–108)
CO2 SERPL-SCNC: 21 MMOL/L (ref 21–32)
CREAT SERPL-MCNC: 1.05 MG/DL (ref 0.6–1.3)
EOSINOPHIL # BLD AUTO: 0.08 THOUSAND/ΜL (ref 0–0.61)
EOSINOPHIL NFR BLD AUTO: 1 % (ref 0–6)
ERYTHROCYTE [DISTWIDTH] IN BLOOD BY AUTOMATED COUNT: 15.3 % (ref 11.6–15.1)
GFR SERPL CREATININE-BSD FRML MDRD: 52 ML/MIN/1.73SQ M
GLUCOSE SERPL-MCNC: 142 MG/DL (ref 65–140)
GLUCOSE SERPL-MCNC: 92 MG/DL (ref 65–140)
HCT VFR BLD AUTO: 32.5 % (ref 34.8–46.1)
HGB BLD-MCNC: 9.8 G/DL (ref 11.5–15.4)
IMM GRANULOCYTES # BLD AUTO: 0.03 THOUSAND/UL (ref 0–0.2)
IMM GRANULOCYTES NFR BLD AUTO: 0 % (ref 0–2)
INR PPP: 1.19 (ref 0.86–1.17)
LYMPHOCYTES # BLD AUTO: 1.08 THOUSANDS/ΜL (ref 0.6–4.47)
LYMPHOCYTES NFR BLD AUTO: 11 % (ref 14–44)
MCH RBC QN AUTO: 29.3 PG (ref 26.8–34.3)
MCHC RBC AUTO-ENTMCNC: 30.2 G/DL (ref 31.4–37.4)
MCV RBC AUTO: 97 FL (ref 82–98)
MONOCYTES # BLD AUTO: 0.59 THOUSAND/ΜL (ref 0.17–1.22)
MONOCYTES NFR BLD AUTO: 6 % (ref 4–12)
NEUTROPHILS # BLD AUTO: 8.06 THOUSANDS/ΜL (ref 1.85–7.62)
NEUTS SEG NFR BLD AUTO: 82 % (ref 43–75)
NRBC BLD AUTO-RTO: 0 /100 WBCS
PLATELET # BLD AUTO: 231 THOUSANDS/UL (ref 149–390)
PMV BLD AUTO: 13.2 FL (ref 8.9–12.7)
POTASSIUM SERPL-SCNC: 3.7 MMOL/L (ref 3.5–5.3)
PROT SERPL-MCNC: 6.5 G/DL (ref 6.4–8.2)
PROTHROMBIN TIME: 15.2 SECONDS (ref 11.8–14.2)
RBC # BLD AUTO: 3.34 MILLION/UL (ref 3.81–5.12)
RH BLD: POSITIVE
SODIUM SERPL-SCNC: 143 MMOL/L (ref 136–145)
SPECIMEN EXPIRATION DATE: NORMAL
WBC # BLD AUTO: 9.86 THOUSAND/UL (ref 4.31–10.16)

## 2018-11-25 PROCEDURE — 86900 BLOOD TYPING SEROLOGIC ABO: CPT | Performed by: EMERGENCY MEDICINE

## 2018-11-25 PROCEDURE — 82948 REAGENT STRIP/BLOOD GLUCOSE: CPT

## 2018-11-25 PROCEDURE — 80053 COMPREHEN METABOLIC PANEL: CPT | Performed by: EMERGENCY MEDICINE

## 2018-11-25 PROCEDURE — 36415 COLL VENOUS BLD VENIPUNCTURE: CPT | Performed by: EMERGENCY MEDICINE

## 2018-11-25 PROCEDURE — 85610 PROTHROMBIN TIME: CPT | Performed by: EMERGENCY MEDICINE

## 2018-11-25 PROCEDURE — 86901 BLOOD TYPING SEROLOGIC RH(D): CPT | Performed by: EMERGENCY MEDICINE

## 2018-11-25 PROCEDURE — 86850 RBC ANTIBODY SCREEN: CPT | Performed by: EMERGENCY MEDICINE

## 2018-11-25 PROCEDURE — 85025 COMPLETE CBC W/AUTO DIFF WBC: CPT | Performed by: EMERGENCY MEDICINE

## 2018-11-25 PROCEDURE — 85730 THROMBOPLASTIN TIME PARTIAL: CPT | Performed by: EMERGENCY MEDICINE

## 2018-11-25 PROCEDURE — 99285 EMERGENCY DEPT VISIT HI MDM: CPT

## 2018-11-25 NOTE — ED PROVIDER NOTES
History  Chief Complaint   Patient presents with    Rectal Bleeding     Patient reports dark blood with bowel movements  +frequent bowel movements  Started last night  Denies abdominal pain  69-year-old female presents for evaluation of approximately 24 hours of nonpainful rectal bleeding  The patient has had bloody loose stools  The patient is concerned that she may have recurrent cancer given her history of colon and pancreatic cancer  The patient denies any associated chest pain, pressure, shortness of breath  The patient denies any abdominal pain, nausea, vomiting, diarrhea  Prior to Admission Medications   Prescriptions Last Dose Informant Patient Reported? Taking? CREON 97611-67102 units   No Yes   Sig: TAKE 3 WITH BREAKFAST, 3 WITH LUNCH, 3 WITH DINNER AND 3 WITH SNACKS   Glucose Blood (ONETOUCH ULTRA BLUE VI)   Yes No   Sig: by In Vitro route   Insulin Aspart (NOVOLOG SC)   Yes Yes   Sig: Inject under the skin   Pancrelipase, Lip-Prot-Amyl, (CREON PO)   Yes Yes   Sig: Take by mouth   hydrochlorothiazide (HYDRODIURIL) 25 mg tablet 11/25/2018 at Unknown time  Yes Yes   Sig: Take 25 mg by mouth   insulin degludec (TRESIBA) 100 units/mL injection pen   No Yes   Sig: Inject 30 Units under the skin daily   lisinopril (ZESTRIL) 20 mg tablet 11/25/2018 at Unknown time  Yes Yes   Sig: Take 20 mg by mouth   lisinopril (ZESTRIL) 40 mg tablet   Yes No   Sig: Take 40 mg by mouth   metoprolol succinate (TOPROL-XL) 100 mg 24 hr tablet   Yes Yes   Sig: Take by mouth   oxyCODONE (OXY-IR) 5 MG capsule   No Yes   Sig: Take 1 capsule (5 mg total) by mouth 2 (two) times a day as needed for moderate pain Max Daily Amount: 10 mg   ranitidine (ZANTAC) 150 mg tablet   Yes Yes   Sig: Take 150 mg by mouth      Facility-Administered Medications: None       Past Medical History:   Diagnosis Date    Cancer [facility])     Breast; Last Assessed: 8/29/2016    Carpal tunnel syndrome     unspecified laterality;  Onset: 4/23/2012    Cellulitis     Last Assessesd: 8/30/2012    Diabetes mellitus out of control ([facility] )     Last Assessed: 11/3/2014    Fatigue     Last Assessed: 2/11/014    Fracture of toe     Last Assessed: 11/17/2014    Ovarian cancer ([facility] )     Last Assessed: 1/7/2016    Rectal cancer [facility])        Past Surgical History:   Procedure Laterality Date    COLON SURGERY      MASTECTOMY      bilateral    PANCREATECTOMY  2010    Proximal Subtotal (whipple procedure)       Family History   Problem Relation Age of Onset    Melanoma Mother     Stroke Father      I have reviewed and agree with the history as documented  Social History   Substance Use Topics    Smoking status: Never Smoker    Smokeless tobacco: Never Used    Alcohol use No        Review of Systems   Constitutional: Negative for chills, fatigue and fever  HENT: Negative for sore throat  Eyes: Negative for visual disturbance  Respiratory: Negative for shortness of breath  Cardiovascular: Negative for chest pain  Gastrointestinal: Positive for blood in stool  Negative for abdominal pain, diarrhea, nausea and vomiting  Genitourinary: Negative for difficulty urinating, dysuria and pelvic pain  Musculoskeletal: Negative for back pain  Skin: Negative for rash  Neurological: Negative for syncope, weakness and headaches  All other systems reviewed and are negative  Physical Exam  Physical Exam   Constitutional: She is oriented to person, place, and time  She appears well-developed and well-nourished  No distress  HENT:   Head: Normocephalic and atraumatic  Right Ear: External ear normal    Left Ear: External ear normal    Eyes: Pupils are equal, round, and reactive to light  Conjunctivae and EOM are normal  No scleral icterus  Neck: Normal range of motion  Cardiovascular: Normal rate, regular rhythm and normal heart sounds  Pulmonary/Chest: Effort normal and breath sounds normal  No respiratory distress     Abdominal: Soft  Bowel sounds are increased  There is no tenderness  There is no rebound and no guarding  Genitourinary: Rectal exam shows guaiac positive stool  Musculoskeletal: Normal range of motion  She exhibits no edema  Neurological: She is alert and oriented to person, place, and time  Skin: Skin is warm and dry  No rash noted  Psychiatric: She has a normal mood and affect  Nursing note and vitals reviewed  Vital Signs  ED Triage Vitals [11/25/18 1115]   Temperature Pulse Respirations Blood Pressure SpO2   99 3 °F (37 4 °C) 79 16 142/78 99 %      Temp Source Heart Rate Source Patient Position - Orthostatic VS BP Location FiO2 (%)   Temporal Monitor Sitting Right arm --      Pain Score       No Pain           Vitals:    11/25/18 1115 11/25/18 1330   BP: 142/78 140/62   Pulse: 79 68   Patient Position - Orthostatic VS: Sitting Lying       Visual Acuity      ED Medications  Medications - No data to display    Diagnostic Studies  Results Reviewed     Procedure Component Value Units Date/Time    Comprehensive metabolic panel [[id number]]  (Abnormal) Collected:  11/25/18 1318    Lab Status:  Final result Specimen:  Blood from Arm, Left Updated:  11/25/18 1348     Sodium 143 mmol/L      Potassium 3 7 mmol/L      Chloride 109 (H) mmol/L      CO2 21 mmol/L      ANION GAP 13 mmol/L      BUN 35 (H) mg/dL      Creatinine 1 05 mg/dL      Glucose 92 mg/dL      Calcium 8 8 mg/dL      AST 34 U/L      ALT 48 U/L      Alkaline Phosphatase 278 (H) U/L      Total Protein 6 5 g/dL      Albumin 3 0 (L) g/dL      Total Bilirubin 0 33 mg/dL      eGFR 52 ml/min/1 73sq m     Narrative:         National Kidney Disease Education Program recommendations are as follows:  GFR calculation is accurate only with a steady state creatinine  Chronic Kidney disease less than 60 ml/min/1 73 sq  meters  Kidney failure less than 15 ml/min/1 73 sq  meters      Protime-INR [[id number]]  (Abnormal) Collected:  11/25/18 1318    Lab Status:  Final result Specimen:  Blood from Arm, Left Updated:  11/25/18 1348     Protime 15 2 (H) seconds      INR 1 19 (H)    APTT [[id number]]  (Normal) Collected:  11/25/18 1318    Lab Status:  Final result Specimen:  Blood from Arm, Left Updated:  11/25/18 1348     PTT 33 seconds     CBC and differential [[id number]]  (Abnormal) Collected:  11/25/18 1318    Lab Status:  Final result Specimen:  Blood from Arm, Left Updated:  11/25/18 1328     WBC 9 86 Thousand/uL      RBC 3 34 (L) Million/uL      Hemoglobin 9 8 (L) g/dL      Hematocrit 32 5 (L) %      MCV 97 fL      MCH 29 3 pg      MCHC 30 2 (L) g/dL      RDW 15 3 (H) %      MPV 13 2 (H) fL      Platelets 662 Thousands/uL      nRBC 0 /100 WBCs      Neutrophils Relative 82 (H) %      Immat GRANS % 0 %      Lymphocytes Relative 11 (L) %      Monocytes Relative 6 %      Eosinophils Relative 1 %      Basophils Relative 0 %      Neutrophils Absolute 8 06 (H) Thousands/µL      Immature Grans Absolute 0 03 Thousand/uL      Lymphocytes Absolute 1 08 Thousands/µL      Monocytes Absolute 0 59 Thousand/µL      Eosinophils Absolute 0 08 Thousand/µL      Basophils Absolute 0 02 Thousands/µL     Fingerstick Glucose (POCT) [[id number]]  (Abnormal) Collected:  11/25/18 1324    Lab Status:  Final result Updated:  11/25/18 1328     POC Glucose 142 (H) mg/dl                  No orders to display              Procedures  Procedures       Phone Contacts  ED Phone Contact    ED Course  ED Course as of Nov 25 1436   Sun Nov 25, 2018   1424 Patient is stable upon re-evaluation  I discussed the results of the laboratories and the need for close outpatient follow-up  The patient understands the need to call colorectal tomorrow  She is also aware and understanding of the return precautions regarding increased bleeding, abdominal pain, fevers, or vomiting                                  MDM  Number of Diagnoses or Management Options  Rectal bleeding: new and requires workup  Diagnosis management comments: 79-year-old female with history of multiple cancers presents for evaluation of rectal bleeding  The patient is hemodynamically stable, afebrile, and without pain  She is in no distress upon my examination her symptoms been ongoing for approximately 24 hours  I discussed the results of the laboratories with a mildly worsened hemoglobin  I feel the patient is stable for discharge with close outpatient follow-up  The patient is concerned about the possibility of cancer as stated that this may be a possibility however her 1st best step be to follow up and have a colonoscopy  The patient (and any family present) verbalized understanding of the discharge instructions and warnings that would necessitate return to the Emergency Department  All questions were answered prior to discharge  Amount and/or Complexity of Data Reviewed  Clinical lab tests: ordered and reviewed  Review and summarize past medical records: yes [facility] records reviewed)      CritCare Time    Disposition  Final diagnoses:   Rectal bleeding     Time reflects when diagnosis was documented in both MDM as applicable and the Disposition within this note     Time User Action Codes Description Comment    11/25/2018  2:26 PM Viktor Solorzano Add [K62 5] Rectal bleeding       ED Disposition     ED Disposition Condition Comment    Discharge  Donnamarie Host discharge to home/self care  Condition at discharge: Stable        Follow-up Information     Follow up With Specialties Details Why Contact Info Additional Information    Silvestre Chakraborty MD Colon and Rectal Surgery Schedule an appointment as soon as possible for a visit For further evaluation [phone] Emergency Department Emergency Medicine Go to For further evaluation, If symptoms worsen [address] [facility] ED, [address] , \[facility] [place] [facility]\"", [place], [address]          Patient's Medications   Discharge Prescriptions    No medications on file     No discharge procedures on file      ED Provider  Electronically Signed by           Nuria Fernández DO  11/25/18 1028

## 2018-11-25 NOTE — DISCHARGE INSTRUCTIONS
Rectal Bleeding   WHAT YOU NEED TO KNOW:   Rectal bleeding can be caused by constipation, hemorrhoids, or anal fissures  It may also be caused by polyps, tumors, or medical conditions, such as colitis or diverticulitis  DISCHARGE INSTRUCTIONS:   Medicines:   · Pain medicine: You may be given medicine to take away or decrease pain  Do not wait until the pain is severe before you take your medicine  · Iron supplement:  Iron helps your body make more red blood cells  · Steroids: This medicine decreases inflammation in your rectum  It may be applied as a cream, ointment, or lotion  · Take your medicine as directed  Contact your healthcare provider if you think your medicine is not helping or if you have side effects  Tell him of her if you are allergic to any medicine  Keep a list of the medicines, vitamins, and herbs you take  Include the amounts, and when and why you take them  Bring the list or the pill bottles to follow-up visits  Carry your medicine list with you in case of an emergency  Follow up with your healthcare provider as directed:  Write down your questions so you remember to ask them during your visits  Drink liquids as directed:  Ask your healthcare provider how much liquid to drink each day and which liquids are best for you  This will help prevent dehydration and constipation  Contact your healthcare provider if:   · You have a fever  · Your rectal bleeding stopped for a time, but has started again  · You have nausea  · You have cold, sweaty, pale skin  · You have changes in your bowel movements, such as diarrhea  · You have questions or concerns about your condition or care  Return to the emergency department if:   · You are breathing faster than usual     · You are dizzy, lightheaded, or feel faint  · You are confused or cannot think clearly  · You urinate less than usual or not at all  · Your rectal bleeding is constant or heavy      · You have severe abdominal pain or cramping  © 2017 [employer] Information is for End User's use only and may not be sold, redistributed or otherwise used for commercial purposes  All illustrations and images included in CareNotes® are the copyrighted property of [employer]  or [employer]  The above information is an  only  It is not intended as medical advice for individual conditions or treatments  Talk to your doctor, nurse or pharmacist before following any medical regimen to see if it is safe and effective for you  Gastrointestinal Bleeding   WHAT YOU NEED TO KNOW:   Gastrointestinal (GI) bleeding may occur in any part of your digestive tract  This includes your esophagus, stomach, intestines, rectum, or anus  Bleeding may be mild to severe  Your bleeding may begin suddenly, or start slowly and last for a longer period of time  Bleeding that lasts for a longer period of time is called chronic GI bleeding  DISCHARGE INSTRUCTIONS:   Call 911 for any of the following:   · You have shortness of breath or trouble breathing  · You faint or lose consciousness  · You have chest pain  Return to the emergency department if:   · You feel dizzy or are too weak to stand  · Your heart is beating faster than usual      · You vomit blood, or your vomit looks like coffee grounds  · You have blood in your bowel movement  · You have abdominal pain or swelling  Contact your healthcare provider if:   · You have bowel movements that are tarry or black  · You have nausea or are vomiting  · You have heartburn  · You have questions or concerns about your condition or care  Activity:  Rest as directed  Ask when you can return to your usual activities, such as work  Slowly do more each day  Nutrition:  Ask if you need to be on a special diet  A special diet can help treat GI conditions and prevent problems such as GI bleeding   Eat small meals more often while your digestive system heals  Avoid or limit caffeine and spicy foods  Also avoid foods that cause heartburn, nausea, or diarrhea  Prevent GI bleeding:   · Manage GI conditions as directed  Examples of GI conditions include gastroesophageal reflux, peptic ulcer disease, and ulcerative colitis  Take all medicines for these conditions as directed  · Limit or do not take NSAIDs  Ask your healthcare provider if it is safe for you to take NSAIDs  NSAIDs can increase your risk for ulcers and GI bleeding  · Do not drink alcohol  Alcohol can cause ulcers and esophageal varices  Esophageal varices are swollen blood vessels in your esophagus  Over time the blood vessels become weak and may bleed  · Do not smoke  Nicotine and other chemicals in cigarettes and cigars can increase your risk for ulcers  Ask your healthcare provider for information if you currently smoke and need help to quit  E-cigarettes or smokeless tobacco still contain nicotine  Talk to your healthcare provider before you use these products  Follow up with your healthcare provider as directed: You may need to return for a colonoscopy, endoscopy, or other tests  These tests can make sure you do not have more bleeding  Write down your questions so you remember to ask them during your visits  © 2017 [address] [facility] Information is for End User's use only and may not be sold, redistributed or otherwise used for commercial purposes  All illustrations and images included in CareNotes® are the copyrighted property of A [employer] M [employer]  or [employer]  The above information is an  only  It is not intended as medical advice for individual conditions or treatments  Talk to your doctor, nurse or pharmacist before following any medical regimen to see if it is safe and effective for you

## 2018-11-25 NOTE — ED NOTES
Patient ambulated to the bathroom with cane and minimal assistance        Steven Randall, EDITH  11/25/18 3692

## 2018-11-26 ENCOUNTER — TELEPHONE (OUTPATIENT)
Dept: FAMILY MEDICINE CLINIC | Facility: CLINIC | Age: 73
End: 2018-11-26

## 2018-11-26 DIAGNOSIS — K64.8 OTHER HEMORRHOIDS: Primary | ICD-10-CM

## 2018-11-26 NOTE — TELEPHONE ENCOUNTER
Patient was seen in emergency room yesterday for bleeding hemorrhoids  She will be seeing surgeon next week  She is requesting Rx to help with inflammation  Will give Rx for Proctocort b i d    Keep follow-up with

## 2018-12-10 ENCOUNTER — TELEPHONE (OUTPATIENT)
Dept: FAMILY MEDICINE CLINIC | Facility: CLINIC | Age: 73
End: 2018-12-10

## 2018-12-10 NOTE — TELEPHONE ENCOUNTER
Garrett Fermin from [address], called stating she has faxed twice a form for Diabetic Instructions for the day before of her  surgery and the morning of procedure, Patient is having her surgery on 12/18/18  She needs to know what the instructions are please fax to [phone]    Please advise

## 2018-12-10 NOTE — TELEPHONE ENCOUNTER
Will fax back instructions  Recommend cutting Tresiba dosage in half on day prior and day of procedure    She can continue her same sliding scale dosage of NovoLog

## 2018-12-10 NOTE — TELEPHONE ENCOUNTER
Called Katie back from [facility], left her a message to call us  She faxed me a request for you to look at, I put it in your follow up folder

## 2018-12-11 ENCOUNTER — TELEPHONE (OUTPATIENT)
Dept: FAMILY MEDICINE CLINIC | Facility: CLINIC | Age: 73
End: 2018-12-11

## 2018-12-11 NOTE — TELEPHONE ENCOUNTER
Patient called the office, she is requesting a call from you  Patient has a couple question in regard to her colonoscopy  Patient would appreciate a call back from you  Patient is aware you will not be in the office until tomorrow 12/12

## 2018-12-12 NOTE — TELEPHONE ENCOUNTER
I spoke with patient to confirm her colonoscopy insulin instructions Veverly Starch 1/2 dose day prior to procedure and day of procedure, then resume normal dosing )    She is to  continue her normal sliding scale dosing of NovoLog

## 2019-01-07 ENCOUNTER — TELEPHONE (OUTPATIENT)
Dept: FAMILY MEDICINE CLINIC | Facility: CLINIC | Age: 74
End: 2019-01-07

## 2019-01-07 DIAGNOSIS — R26.9 GAIT ABNORMALITY: ICD-10-CM

## 2019-01-07 DIAGNOSIS — R19.5 LOOSE STOOLS: Primary | ICD-10-CM

## 2019-01-07 DIAGNOSIS — R60.0 LOCALIZED EDEMA: ICD-10-CM

## 2019-01-07 RX ORDER — FUROSEMIDE 20 MG/1
TABLET ORAL
Qty: 30 TABLET | Refills: 2 | Status: SHIPPED | OUTPATIENT
Start: 2019-01-07 | End: 2019-01-16 | Stop reason: SDUPTHER

## 2019-01-07 NOTE — TELEPHONE ENCOUNTER
Pt called into the office and asked if you would please give her a call at your convenience  She would like to discuss 3 issues with you, but did not go into detail with me about what  Thank you!

## 2019-01-10 DIAGNOSIS — I10 ESSENTIAL HYPERTENSION: Primary | ICD-10-CM

## 2019-01-10 RX ORDER — METOPROLOL SUCCINATE 100 MG/1
100 TABLET, EXTENDED RELEASE ORAL DAILY
Qty: 90 TABLET | Refills: 1 | Status: SHIPPED | OUTPATIENT
Start: 2019-01-10 | End: 2019-03-02

## 2019-01-10 RX ORDER — LISINOPRIL 20 MG/1
20 TABLET ORAL DAILY
Qty: 90 TABLET | Refills: 1 | Status: SHIPPED | OUTPATIENT
Start: 2019-01-10 | End: 2019-03-02

## 2019-01-14 ENCOUNTER — TELEPHONE (OUTPATIENT)
Dept: FAMILY MEDICINE CLINIC | Facility: CLINIC | Age: 74
End: 2019-01-14

## 2019-01-14 NOTE — TELEPHONE ENCOUNTER
Nola Greene called from [facility] stating that she needs a different diagnosis code for the walker prescription, she states that they can not use [facility])  Please advice      Please fax new order to [phone]  Phone# [phone]

## 2019-01-15 DIAGNOSIS — R29.818 DIFFICULTY BALANCING: Primary | ICD-10-CM

## 2019-01-16 ENCOUNTER — TELEPHONE (OUTPATIENT)
Dept: FAMILY MEDICINE CLINIC | Facility: CLINIC | Age: 74
End: 2019-01-16

## 2019-01-16 DIAGNOSIS — R60.0 LOCALIZED EDEMA: Primary | ICD-10-CM

## 2019-01-16 RX ORDER — FUROSEMIDE 40 MG/1
TABLET ORAL
Qty: 30 TABLET | Refills: 2 | Status: SHIPPED | OUTPATIENT
Start: 2019-01-16 | End: 2019-01-25 | Stop reason: SDUPTHER

## 2019-01-16 NOTE — TELEPHONE ENCOUNTER
I spoke with patient she is noticing considerable improvement in her edema with 40 mg of furosemide  She would like a refill of the 40 mg tablets [facility]  She has a follow-up appointment scheduled for next week

## 2019-01-16 NOTE — TELEPHONE ENCOUNTER
Pt called requesting a call from Dr Дмитрий Rodriguez to discuss some questions she has about lasix  She stated she never took it before and would like to ask some questions before taking it     Pt can be reached at [phone]

## 2019-01-25 ENCOUNTER — OFFICE VISIT (OUTPATIENT)
Dept: FAMILY MEDICINE CLINIC | Facility: CLINIC | Age: 74
End: 2019-01-25
Payer: COMMERCIAL

## 2019-01-25 VITALS
SYSTOLIC BLOOD PRESSURE: 130 MMHG | WEIGHT: 159.7 LBS | HEIGHT: 62 IN | BODY MASS INDEX: 29.39 KG/M2 | TEMPERATURE: 98.4 F | OXYGEN SATURATION: 98 % | DIASTOLIC BLOOD PRESSURE: 58 MMHG | HEART RATE: 85 BPM | RESPIRATION RATE: 17 BRPM

## 2019-01-25 DIAGNOSIS — R52 ACUTE PAIN: ICD-10-CM

## 2019-01-25 DIAGNOSIS — C25.9 MALIGNANT NEOPLASM OF PANCREAS, UNSPECIFIED LOCATION OF MALIGNANCY (HCC): ICD-10-CM

## 2019-01-25 DIAGNOSIS — C19 CARCINOMA OF RECTOSIGMOID JUNCTION (HCC): ICD-10-CM

## 2019-01-25 DIAGNOSIS — R60.0 LOCALIZED EDEMA: Primary | ICD-10-CM

## 2019-01-25 DIAGNOSIS — C25.0 CARCINOMA OF HEAD OF PANCREAS (HCC): ICD-10-CM

## 2019-01-25 PROCEDURE — 99214 OFFICE O/P EST MOD 30 MIN: CPT | Performed by: FAMILY MEDICINE

## 2019-01-25 RX ORDER — FUROSEMIDE 40 MG/1
TABLET ORAL
Qty: 90 TABLET | Refills: 1 | Status: SHIPPED | OUTPATIENT
Start: 2019-01-25 | End: 2019-03-02

## 2019-01-25 RX ORDER — POTASSIUM CHLORIDE 750 MG/1
20 CAPSULE, EXTENDED RELEASE ORAL DAILY
Qty: 180 CAPSULE | Refills: 1 | Status: SHIPPED | OUTPATIENT
Start: 2019-01-25 | End: 2019-03-02

## 2019-01-25 RX ORDER — OXYCODONE HYDROCHLORIDE 5 MG/1
5 CAPSULE ORAL 2 TIMES DAILY PRN
Qty: 180 CAPSULE | Refills: 0 | Status: SHIPPED | OUTPATIENT
Start: 2019-01-25 | End: 2019-03-02

## 2019-01-25 RX ORDER — METOLAZONE 5 MG/1
TABLET ORAL
Qty: 15 TABLET | Refills: 2 | Status: SHIPPED | OUTPATIENT
Start: 2019-01-25 | End: 2019-03-02

## 2019-01-25 NOTE — ASSESSMENT & PLAN NOTE
Patient presents for recheck of ongoing lower extremity edema  She has been taking furosemide 40 mg x1 week with minimal improvement  She denies any chest pain or shortness of breath  Edema is from the knees down  Denies any redness or fevers  Her examination today reveals 2+ pretibial edema bilaterally  No erythema, warmth, redness  No fevers  No calf tenderness  Distal pulses intact  Recommend continue furosemide 40 mg daily  Will add Zaroxolyn 5 mg every other day  Rx given for compression stockings  Rx given for potassium supplement  Rx given for chest x-ray  Patient instructed to elevate lower extremities when possible and reduce sodium intake  Will call with results    If symptoms persist, will check echocardiogram

## 2019-01-25 NOTE — PATIENT INSTRUCTIONS
1) will add another water pill to take every other day  2) continue same dosage of furosemide 40 mg daily  3) will add rx potassium pill (2 tabs daily)  4) rx given for compression stockings  5) elevate lower extremities when possible  6) will send for chest xray

## 2019-01-25 NOTE — PROGRESS NOTES
[facility]      NAME: Faraz Reed  AGE: 75 y o  SEX: female  : 1945   MRN: [id number]    DATE: 2019  TIME: 12:12 PM    Assessment and Plan     Problem List Items Addressed This Visit     Carcinoma of head of pancreas ([address] )    Carcinoma of rectosigmoid junction (HCC)    Relevant Medications    oxyCODONE (OXY-IR) 5 MG capsule    Localized edema - Primary     Patient presents for recheck of ongoing lower extremity edema  She has been taking furosemide 40 mg x1 week with minimal improvement  She denies any chest pain or shortness of breath  Edema is from the knees down  Denies any redness or fevers  Her examination today reveals 2+ pretibial edema bilaterally  No erythema, warmth, redness  No fevers  No calf tenderness  Distal pulses intact  Recommend continue furosemide 40 mg daily  Will add Zaroxolyn 5 mg every other day  Rx given for compression stockings  Rx given for potassium supplement  Rx given for chest x-ray  Patient instructed to elevate lower extremities when possible and reduce sodium intake  Will call with results  If symptoms persist, will check echocardiogram           Relevant Medications    metolazone (ZAROXOLYN) 5 mg tablet    potassium chloride (MICRO-K) 10 MEQ CR capsule    furosemide (LASIX) 40 mg tablet    Other Relevant Orders    XR chest pa & lateral    Compression Stocking      Other Visit Diagnoses     Acute pain        Relevant Medications    oxyCODONE (OXY-IR) 5 MG capsule    Malignant neoplasm of pancreas, unspecified location of malignancy (HCC)        Relevant Medications    oxyCODONE (OXY-IR) 5 MG capsule              Return to office in:  Keep next regularly scheduled appointment    Chief Complaint     Chief Complaint   Patient presents with    Leg Swelling     x4wks    Foot Swelling     x4wks       History of Present Illness     Patient presents for recheck of ongoing lower extremity edema    She has been taking furosemide 40 mg x1 week with minimal improvement  She denies any chest pain or shortness of breath  Edema is from the knees down  Denies any redness or fevers  The following portions of the patient's history were reviewed and updated as appropriate: allergies, current medications, past family history, past medical history, past social history, past surgical history and problem list     Review of Systems   Review of Systems   Respiratory: Negative  Cardiovascular: Positive for leg swelling  Negative for chest pain and palpitations  Gastrointestinal: Negative  Genitourinary: Negative  Active Problem List     Patient Active Problem List   Diagnosis    Benign essential hypertension    Vitamin D deficiency    Type 2 diabetes mellitus ([facility] )    Breast cancer ([facility] )    Carcinoma of head of pancreas ([facility] )    Carcinoma of rectosigmoid junction (HCC)    Acid reflux    Arthritis    Other hemorrhoids    Loose stools    Gait abnormality    Localized edema       Objective   /58 (BP Location: Left arm, Patient Position: Sitting, Cuff Size: Standard)   Pulse 85   Temp 98 4 °F (36 9 °C) (Tympanic)   Resp 17   Ht 5' 2 4" (1 585 m)   Wt 72 4 kg (159 lb 11 2 oz)   SpO2 98%   BMI 28 84 kg/m²     Physical Exam   Cardiovascular: Normal rate, regular rhythm, normal heart sounds and intact distal pulses  Carotids: no bruits  Ext: no edema   Pulmonary/Chest: Effort normal  No respiratory distress  She has no wheezes  She has no rales  Musculoskeletal: She exhibits edema  Psychiatric: She has a normal mood and affect   Her behavior is normal  Thought content normal        Pertinent Laboratory/Diagnostic Studies:  Recent labs    Current Medications     Current Outpatient Prescriptions:     CREON 27063-60407 units, TAKE 3 WITH BREAKFAST, 3 WITH LUNCH, 3 WITH DINNER AND 3 WITH SNACKS, Disp: 1080 capsule, Rfl: 1    furosemide (LASIX) 40 mg tablet, 1 Tab daily p r n  edema, Disp: 90 tablet, Rfl: 1   Glucose Blood (ONETOUCH ULTRA BLUE VI), by In Vitro route, Disp: , Rfl:     Insulin Aspart (NOVOLOG SC), Inject under the skin, Disp: , Rfl:     insulin degludec (TRESIBA) 100 units/mL injection pen, Inject 30 Units under the skin daily, Disp: 5 pen, Rfl: 2    lisinopril (ZESTRIL) 20 mg tablet, Take 1 tablet (20 mg total) by mouth daily, Disp: 90 tablet, Rfl: 1    lisinopril (ZESTRIL) 40 mg tablet, Take 40 mg by mouth, Disp: , Rfl:     metoprolol succinate (TOPROL-XL) 100 mg 24 hr tablet, Take 1 tablet (100 mg total) by mouth daily, Disp: 90 tablet, Rfl: 1    oxyCODONE (OXY-IR) 5 MG capsule, Take 1 capsule (5 mg total) by mouth 2 (two) times a day as needed for moderate pain Max Daily Amount: 10 mg, Disp: 180 capsule, Rfl: 0    hydrochlorothiazide (HYDRODIURIL) 25 mg tablet, Take 25 mg by mouth, Disp: , Rfl:     hydrocortisone (CORTIFOAM) 10 % rectal foam, Insert 1 applicator into the rectum 2 (two) times a day (Patient not taking: Reported on 1/25/2019 ), Disp: 15 g, Rfl: 2    metolazone (ZAROXOLYN) 5 mg tablet, Take 1 tab qod, Disp: 15 tablet, Rfl: 2    potassium chloride (MICRO-K) 10 MEQ CR capsule, Take 2 capsules (20 mEq total) by mouth daily, Disp: 180 capsule, Rfl: 1    ranitidine (ZANTAC) 150 mg tablet, Take 150 mg by mouth, Disp: , Rfl:     Health Maintenance     Health Maintenance   Topic Date Due    SLP PLAN OF CARE  1945    Diabetic Foot Exam  02/25/2019 (Originally 1/12/2018)    INFLUENZA VACCINE  01/25/2020 (Originally 7/1/2018)    HEMOGLOBIN A1C  05/13/2019    URINE MICROALBUMIN  11/13/2019    Fall Risk  11/15/2019    Depression Screening PHQ  11/15/2019    Urinary Incontinence Screening  11/15/2019    Medicare Annual Wellness Visit (AWV)  11/15/2019    DM Eye Exam  11/15/2019    DTaP,Tdap,and Td Vaccines (3 - Td) 09/21/2027    CRC Screening: Colonoscopy  12/18/2028    Pneumococcal PPSV23/PCV13 65+ Years / High and Highest Risk  Completed     Immunization History Administered Date(s) Administered    Pneumococcal Conjugate 13-Valent 11/23/2015    Pneumococcal Polysaccharide PPV23 11/10/2014    Td (adult), adsorbed 10/05/2010    Tdap 09/29/2014, 09/21/2017       DO Rowdy Me[facility]

## 2019-01-30 ENCOUNTER — TELEPHONE (OUTPATIENT)
Dept: FAMILY MEDICINE CLINIC | Facility: CLINIC | Age: 74
End: 2019-01-30

## 2019-01-30 NOTE — TELEPHONE ENCOUNTER
Pt wants to talk to you in regards to lasix and edema, she is aware you are out of the office till 2/4/19   Call [phone]

## 2019-02-04 ENCOUNTER — APPOINTMENT (EMERGENCY)
Dept: RADIOLOGY | Facility: HOSPITAL | Age: 74
DRG: 871 | End: 2019-02-04
Payer: COMMERCIAL

## 2019-02-04 ENCOUNTER — APPOINTMENT (INPATIENT)
Dept: CT IMAGING | Facility: HOSPITAL | Age: 74
DRG: 871 | End: 2019-02-04
Payer: COMMERCIAL

## 2019-02-04 ENCOUNTER — TELEPHONE (OUTPATIENT)
Dept: FAMILY MEDICINE CLINIC | Facility: CLINIC | Age: 74
End: 2019-02-04

## 2019-02-04 ENCOUNTER — HOSPITAL ENCOUNTER (INPATIENT)
Facility: HOSPITAL | Age: 74
LOS: 16 days | Discharge: NURSING, REHAB OR LONG-TERM CARE | DRG: 871 | End: 2019-02-20
Attending: EMERGENCY MEDICINE | Admitting: INTERNAL MEDICINE
Payer: COMMERCIAL

## 2019-02-04 DIAGNOSIS — N17.9 ACUTE KIDNEY INJURY (HCC): ICD-10-CM

## 2019-02-04 DIAGNOSIS — D64.9 ANEMIA: ICD-10-CM

## 2019-02-04 DIAGNOSIS — M19.90 ARTHRITIS: ICD-10-CM

## 2019-02-04 DIAGNOSIS — K75.0 LIVER ABSCESS: ICD-10-CM

## 2019-02-04 DIAGNOSIS — C25.0 CARCINOMA OF HEAD OF PANCREAS (HCC): ICD-10-CM

## 2019-02-04 DIAGNOSIS — A41.9 SEPSIS (HCC): Primary | ICD-10-CM

## 2019-02-04 PROBLEM — D72.829 LEUKOCYTOSIS: Status: ACTIVE | Noted: 2019-02-04

## 2019-02-04 PROBLEM — E87.2 LACTIC ACIDOSIS: Status: ACTIVE | Noted: 2019-02-04

## 2019-02-04 LAB
ABO GROUP BLD: NORMAL
ALBUMIN SERPL BCP-MCNC: 2.5 G/DL (ref 3.5–5)
ALP SERPL-CCNC: 557 U/L (ref 46–116)
ALT SERPL W P-5'-P-CCNC: 70 U/L (ref 12–78)
ANION GAP SERPL CALCULATED.3IONS-SCNC: 16 MMOL/L (ref 4–13)
ANISOCYTOSIS BLD QL SMEAR: PRESENT
APTT PPP: 36 SECONDS (ref 26–38)
AST SERPL W P-5'-P-CCNC: 102 U/L (ref 5–45)
BASOPHILS # BLD MANUAL: 0 THOUSAND/UL (ref 0–0.1)
BASOPHILS NFR MAR MANUAL: 0 % (ref 0–1)
BILIRUB SERPL-MCNC: 0.56 MG/DL (ref 0.2–1)
BLD GP AB SCN SERPL QL: NEGATIVE
BUN SERPL-MCNC: 45 MG/DL (ref 5–25)
CALCIUM SERPL-MCNC: 8.6 MG/DL (ref 8.3–10.1)
CHLORIDE SERPL-SCNC: 100 MMOL/L (ref 100–108)
CO2 SERPL-SCNC: 17 MMOL/L (ref 21–32)
CREAT SERPL-MCNC: 1.78 MG/DL (ref 0.6–1.3)
EOSINOPHIL # BLD MANUAL: 0 THOUSAND/UL (ref 0–0.4)
EOSINOPHIL NFR BLD MANUAL: 0 % (ref 0–6)
ERYTHROCYTE [DISTWIDTH] IN BLOOD BY AUTOMATED COUNT: 20.2 % (ref 11.6–15.1)
GFR SERPL CREATININE-BSD FRML MDRD: 28 ML/MIN/1.73SQ M
GLUCOSE SERPL-MCNC: 62 MG/DL (ref 65–140)
GLUCOSE SERPL-MCNC: 68 MG/DL (ref 65–140)
HCT VFR BLD AUTO: 25 % (ref 34.8–46.1)
HGB BLD-MCNC: 7.2 G/DL (ref 11.5–15.4)
HYPERCHROMIA BLD QL SMEAR: PRESENT
INR PPP: 1.67 (ref 0.86–1.17)
LACTATE SERPL-SCNC: 4.5 MMOL/L (ref 0.5–2)
LACTATE SERPL-SCNC: 6.8 MMOL/L (ref 0.5–2)
LYMPHOCYTES # BLD AUTO: 0.44 THOUSAND/UL (ref 0.6–4.47)
LYMPHOCYTES # BLD AUTO: 2 % (ref 14–44)
MCH RBC QN AUTO: 22.4 PG (ref 26.8–34.3)
MCHC RBC AUTO-ENTMCNC: 28.8 G/DL (ref 31.4–37.4)
MCV RBC AUTO: 78 FL (ref 82–98)
MONOCYTES # BLD AUTO: 0.22 THOUSAND/UL (ref 0–1.22)
MONOCYTES NFR BLD: 1 % (ref 4–12)
NEUTROPHILS # BLD MANUAL: 21.24 THOUSAND/UL (ref 1.85–7.62)
NEUTS BAND NFR BLD MANUAL: 11 % (ref 0–8)
NEUTS SEG NFR BLD AUTO: 86 % (ref 43–75)
NRBC BLD AUTO-RTO: 0 /100 WBCS
PLATELET # BLD AUTO: 318 THOUSANDS/UL (ref 149–390)
PLATELET BLD QL SMEAR: ADEQUATE
PMV BLD AUTO: 11.6 FL (ref 8.9–12.7)
POIKILOCYTOSIS BLD QL SMEAR: PRESENT
POTASSIUM SERPL-SCNC: 4.2 MMOL/L (ref 3.5–5.3)
PROCALCITONIN SERPL-MCNC: 32.74 NG/ML
PROT SERPL-MCNC: 6.1 G/DL (ref 6.4–8.2)
PROTHROMBIN TIME: 19.8 SECONDS (ref 11.8–14.2)
RBC # BLD AUTO: 3.22 MILLION/UL (ref 3.81–5.12)
RH BLD: POSITIVE
SODIUM SERPL-SCNC: 133 MMOL/L (ref 136–145)
SPECIMEN EXPIRATION DATE: NORMAL
TOTAL CELLS COUNTED SPEC: 100
WBC # BLD AUTO: 21.9 THOUSAND/UL (ref 4.31–10.16)

## 2019-02-04 PROCEDURE — 85007 BL SMEAR W/DIFF WBC COUNT: CPT

## 2019-02-04 PROCEDURE — 96374 THER/PROPH/DIAG INJ IV PUSH: CPT

## 2019-02-04 PROCEDURE — 86923 COMPATIBILITY TEST ELECTRIC: CPT

## 2019-02-04 PROCEDURE — 87040 BLOOD CULTURE FOR BACTERIA: CPT

## 2019-02-04 PROCEDURE — 87077 CULTURE AEROBIC IDENTIFY: CPT

## 2019-02-04 PROCEDURE — 93005 ELECTROCARDIOGRAM TRACING: CPT

## 2019-02-04 PROCEDURE — 84145 PROCALCITONIN (PCT): CPT | Performed by: EMERGENCY MEDICINE

## 2019-02-04 PROCEDURE — 36415 COLL VENOUS BLD VENIPUNCTURE: CPT

## 2019-02-04 PROCEDURE — 86900 BLOOD TYPING SEROLOGIC ABO: CPT | Performed by: EMERGENCY MEDICINE

## 2019-02-04 PROCEDURE — 85610 PROTHROMBIN TIME: CPT

## 2019-02-04 PROCEDURE — 86901 BLOOD TYPING SEROLOGIC RH(D): CPT | Performed by: EMERGENCY MEDICINE

## 2019-02-04 PROCEDURE — 80053 COMPREHEN METABOLIC PANEL: CPT

## 2019-02-04 PROCEDURE — 86850 RBC ANTIBODY SCREEN: CPT | Performed by: EMERGENCY MEDICINE

## 2019-02-04 PROCEDURE — 96361 HYDRATE IV INFUSION ADD-ON: CPT

## 2019-02-04 PROCEDURE — 87147 CULTURE TYPE IMMUNOLOGIC: CPT

## 2019-02-04 PROCEDURE — 96375 TX/PRO/DX INJ NEW DRUG ADDON: CPT

## 2019-02-04 PROCEDURE — 71045 X-RAY EXAM CHEST 1 VIEW: CPT

## 2019-02-04 PROCEDURE — 74176 CT ABD & PELVIS W/O CONTRAST: CPT

## 2019-02-04 PROCEDURE — 85027 COMPLETE CBC AUTOMATED: CPT

## 2019-02-04 PROCEDURE — 83605 ASSAY OF LACTIC ACID: CPT

## 2019-02-04 PROCEDURE — 99285 EMERGENCY DEPT VISIT HI MDM: CPT

## 2019-02-04 PROCEDURE — 85730 THROMBOPLASTIN TIME PARTIAL: CPT

## 2019-02-04 PROCEDURE — 87186 SC STD MICRODIL/AGAR DIL: CPT

## 2019-02-04 PROCEDURE — 82948 REAGENT STRIP/BLOOD GLUCOSE: CPT

## 2019-02-04 RX ORDER — DEXTROSE MONOHYDRATE 25 G/50ML
25 INJECTION, SOLUTION INTRAVENOUS ONCE
Status: COMPLETED | OUTPATIENT
Start: 2019-02-04 | End: 2019-02-04

## 2019-02-04 RX ORDER — VANCOMYCIN HYDROCHLORIDE 1 G/200ML
15 INJECTION, SOLUTION INTRAVENOUS ONCE
Status: COMPLETED | OUTPATIENT
Start: 2019-02-04 | End: 2019-02-05

## 2019-02-04 RX ADMIN — SODIUM CHLORIDE 1000 ML: 0.9 INJECTION, SOLUTION INTRAVENOUS at 21:33

## 2019-02-04 RX ADMIN — IOHEXOL 50 ML: 240 INJECTION, SOLUTION INTRATHECAL; INTRAVASCULAR; INTRAVENOUS; ORAL at 23:23

## 2019-02-04 RX ADMIN — AZTREONAM 2000 MG: 1 INJECTION, POWDER, LYOPHILIZED, FOR SOLUTION INTRAMUSCULAR; INTRAVENOUS at 21:59

## 2019-02-04 RX ADMIN — SODIUM CHLORIDE 1000 ML: 0.9 INJECTION, SOLUTION INTRAVENOUS at 21:30

## 2019-02-04 RX ADMIN — VANCOMYCIN HYDROCHLORIDE 1000 MG: 1 INJECTION, SOLUTION INTRAVENOUS at 23:54

## 2019-02-04 RX ADMIN — DEXTROSE MONOHYDRATE 25 ML: 25 INJECTION, SOLUTION INTRAVENOUS at 21:02

## 2019-02-04 RX ADMIN — SODIUM CHLORIDE 1000 ML: 0.9 INJECTION, SOLUTION INTRAVENOUS at 21:28

## 2019-02-04 NOTE — TELEPHONE ENCOUNTER
MARY, Patient's son called the office  He states that patient had a low blood sugar attack and he gave her a banana  He wanted to schedule patient for an appt, she is scheduled for tomorrow 2/5/2019  He also states that patient refused to take her blood sugar until after she eat the banana  I did explain to [place] sign to look for if patient has another low blood sugar attack which include: Fatigue, Pale skin, anxiety, sweating, hunger, irritability, nausea, vomiting  I did advise adriana if anything gets worse I would recommend ER

## 2019-02-04 NOTE — TELEPHONE ENCOUNTER
I spoke with patient  She states that her edema has improved since adding Zaroxolyn 5 mg every other day (to furosemide 40)  She is also taking daily potassium supplement  She can attempt to slowly wean down off med as long as edema is stable    Call further problems

## 2019-02-05 PROBLEM — R18.8 ASCITES: Status: ACTIVE | Noted: 2019-02-05

## 2019-02-05 PROBLEM — N12 PYELONEPHRITIS: Status: ACTIVE | Noted: 2019-02-05

## 2019-02-05 LAB
ALBUMIN SERPL BCP-MCNC: 2 G/DL (ref 3.5–5)
ALP SERPL-CCNC: 387 U/L (ref 46–116)
ALT SERPL W P-5'-P-CCNC: 149 U/L (ref 12–78)
ANION GAP SERPL CALCULATED.3IONS-SCNC: 13 MMOL/L (ref 4–13)
ANISOCYTOSIS BLD QL SMEAR: PRESENT
AST SERPL W P-5'-P-CCNC: 266 U/L (ref 5–45)
BACTERIA UR QL AUTO: ABNORMAL /HPF
BASOPHILS # BLD MANUAL: 0 THOUSAND/UL (ref 0–0.1)
BASOPHILS NFR MAR MANUAL: 0 % (ref 0–1)
BILIRUB SERPL-MCNC: 0.52 MG/DL (ref 0.2–1)
BILIRUB UR QL STRIP: NEGATIVE
BUN SERPL-MCNC: 46 MG/DL (ref 5–25)
BURR CELLS BLD QL SMEAR: PRESENT
CA-I BLD-SCNC: 1.02 MMOL/L (ref 1.12–1.32)
CALCIUM SERPL-MCNC: 7.4 MG/DL (ref 8.3–10.1)
CHLORIDE SERPL-SCNC: 102 MMOL/L (ref 100–108)
CLARITY UR: ABNORMAL
CO2 SERPL-SCNC: 17 MMOL/L (ref 21–32)
COARSE GRAN CASTS URNS QL MICRO: ABNORMAL /LPF
COLOR UR: YELLOW
CORTIS SERPL-MCNC: 24.2 UG/DL
CREAT SERPL-MCNC: 1.87 MG/DL (ref 0.6–1.3)
DACRYOCYTES BLD QL SMEAR: PRESENT
EOSINOPHIL # BLD MANUAL: 0 THOUSAND/UL (ref 0–0.4)
EOSINOPHIL NFR BLD MANUAL: 0 % (ref 0–6)
ERYTHROCYTE [DISTWIDTH] IN BLOOD BY AUTOMATED COUNT: 20.5 % (ref 11.6–15.1)
FERRITIN SERPL-MCNC: 56 NG/ML (ref 8–388)
FOLATE SERPL-MCNC: 12.2 NG/ML (ref 3.1–17.5)
GFR SERPL CREATININE-BSD FRML MDRD: 26 ML/MIN/1.73SQ M
GLUCOSE SERPL-MCNC: 130 MG/DL (ref 65–140)
GLUCOSE SERPL-MCNC: 154 MG/DL (ref 65–140)
GLUCOSE SERPL-MCNC: 205 MG/DL (ref 65–140)
GLUCOSE SERPL-MCNC: 238 MG/DL (ref 65–140)
GLUCOSE SERPL-MCNC: 50 MG/DL (ref 65–140)
GLUCOSE SERPL-MCNC: 70 MG/DL (ref 65–140)
GLUCOSE SERPL-MCNC: 76 MG/DL (ref 65–140)
GLUCOSE SERPL-MCNC: 76 MG/DL (ref 65–140)
GLUCOSE UR STRIP-MCNC: NEGATIVE MG/DL
HCT VFR BLD AUTO: 19.9 % (ref 34.8–46.1)
HEMOCCULT STL QL: POSITIVE
HGB BLD-MCNC: 5.9 G/DL (ref 11.5–15.4)
HGB BLD-MCNC: 9.6 G/DL (ref 11.5–15.4)
HGB UR QL STRIP.AUTO: ABNORMAL
HYPERCHROMIA BLD QL SMEAR: PRESENT
IRON SERPL-MCNC: 9 UG/DL (ref 50–170)
KETONES UR STRIP-MCNC: ABNORMAL MG/DL
LDH SERPL-CCNC: 351 U/L (ref 81–234)
LEUKOCYTE ESTERASE UR QL STRIP: NEGATIVE
LYMPHOCYTES # BLD AUTO: 0 % (ref 14–44)
LYMPHOCYTES # BLD AUTO: 0 THOUSAND/UL (ref 0.6–4.47)
MAGNESIUM SERPL-MCNC: 1.8 MG/DL (ref 1.6–2.6)
MCH RBC QN AUTO: 22.9 PG (ref 26.8–34.3)
MCHC RBC AUTO-ENTMCNC: 29.6 G/DL (ref 31.4–37.4)
MCV RBC AUTO: 77 FL (ref 82–98)
METAMYELOCYTES NFR BLD MANUAL: 3 % (ref 0–1)
MONOCYTES # BLD AUTO: 0.47 THOUSAND/UL (ref 0–1.22)
MONOCYTES NFR BLD: 1 % (ref 4–12)
NEUTROPHILS # BLD MANUAL: 45.43 THOUSAND/UL (ref 1.85–7.62)
NEUTS BAND NFR BLD MANUAL: 42 % (ref 0–8)
NEUTS SEG NFR BLD AUTO: 54 % (ref 43–75)
NITRITE UR QL STRIP: NEGATIVE
NON-SQ EPI CELLS URNS QL MICRO: ABNORMAL /HPF
NRBC BLD AUTO-RTO: 0 /100 WBCS
OTHER CASTS: ABNORMAL
OVALOCYTES BLD QL SMEAR: PRESENT
PH UR STRIP.AUTO: 5 [PH] (ref 4.5–8)
PLATELET # BLD AUTO: 189 THOUSANDS/UL (ref 149–390)
PLATELET # BLD AUTO: 220 THOUSANDS/UL (ref 149–390)
PLATELET BLD QL SMEAR: ADEQUATE
POIKILOCYTOSIS BLD QL SMEAR: PRESENT
POLYCHROMASIA BLD QL SMEAR: PRESENT
POTASSIUM SERPL-SCNC: 3.5 MMOL/L (ref 3.5–5.3)
PROCALCITONIN SERPL-MCNC: 177.98 NG/ML
PROT SERPL-MCNC: 5.1 G/DL (ref 6.4–8.2)
PROT UR STRIP-MCNC: ABNORMAL MG/DL
RBC # BLD AUTO: 2.58 MILLION/UL (ref 3.81–5.12)
RBC #/AREA URNS AUTO: ABNORMAL /HPF
SODIUM SERPL-SCNC: 132 MMOL/L (ref 136–145)
SP GR UR STRIP.AUTO: 1.02 (ref 1–1.03)
TOTAL CELLS COUNTED SPEC: 100
UROBILINOGEN UR QL STRIP.AUTO: 0.2 E.U./DL
VIT B12 SERPL-MCNC: 615 PG/ML (ref 100–900)
WBC # BLD AUTO: 47.32 THOUSAND/UL (ref 4.31–10.16)
WBC #/AREA URNS AUTO: ABNORMAL /HPF

## 2019-02-05 PROCEDURE — 82533 TOTAL CORTISOL: CPT | Performed by: NURSE PRACTITIONER

## 2019-02-05 PROCEDURE — 80053 COMPREHEN METABOLIC PANEL: CPT | Performed by: NURSE PRACTITIONER

## 2019-02-05 PROCEDURE — 83540 ASSAY OF IRON: CPT | Performed by: NURSE PRACTITIONER

## 2019-02-05 PROCEDURE — 85007 BL SMEAR W/DIFF WBC COUNT: CPT | Performed by: NURSE PRACTITIONER

## 2019-02-05 PROCEDURE — 82948 REAGENT STRIP/BLOOD GLUCOSE: CPT

## 2019-02-05 PROCEDURE — 87493 C DIFF AMPLIFIED PROBE: CPT | Performed by: NURSE PRACTITIONER

## 2019-02-05 PROCEDURE — P9021 RED BLOOD CELLS UNIT: HCPCS

## 2019-02-05 PROCEDURE — 87086 URINE CULTURE/COLONY COUNT: CPT | Performed by: INTERNAL MEDICINE

## 2019-02-05 PROCEDURE — 83615 LACTATE (LD) (LDH) ENZYME: CPT | Performed by: NURSE PRACTITIONER

## 2019-02-05 PROCEDURE — 30233N1 TRANSFUSION OF NONAUTOLOGOUS RED BLOOD CELLS INTO PERIPHERAL VEIN, PERCUTANEOUS APPROACH: ICD-10-PCS | Performed by: FAMILY MEDICINE

## 2019-02-05 PROCEDURE — 83735 ASSAY OF MAGNESIUM: CPT | Performed by: NURSE PRACTITIONER

## 2019-02-05 PROCEDURE — 85049 AUTOMATED PLATELET COUNT: CPT | Performed by: NURSE PRACTITIONER

## 2019-02-05 PROCEDURE — 82728 ASSAY OF FERRITIN: CPT | Performed by: NURSE PRACTITIONER

## 2019-02-05 PROCEDURE — 82272 OCCULT BLD FECES 1-3 TESTS: CPT | Performed by: NURSE PRACTITIONER

## 2019-02-05 PROCEDURE — 82330 ASSAY OF CALCIUM: CPT | Performed by: NURSE PRACTITIONER

## 2019-02-05 PROCEDURE — 82746 ASSAY OF FOLIC ACID SERUM: CPT | Performed by: NURSE PRACTITIONER

## 2019-02-05 PROCEDURE — 99223 1ST HOSP IP/OBS HIGH 75: CPT | Performed by: INTERNAL MEDICINE

## 2019-02-05 PROCEDURE — 82607 VITAMIN B-12: CPT | Performed by: NURSE PRACTITIONER

## 2019-02-05 PROCEDURE — 87505 NFCT AGENT DETECTION GI: CPT | Performed by: INTERNAL MEDICINE

## 2019-02-05 PROCEDURE — 85027 COMPLETE CBC AUTOMATED: CPT | Performed by: NURSE PRACTITIONER

## 2019-02-05 PROCEDURE — 85018 HEMOGLOBIN: CPT | Performed by: NURSE PRACTITIONER

## 2019-02-05 PROCEDURE — 84145 PROCALCITONIN (PCT): CPT | Performed by: NURSE PRACTITIONER

## 2019-02-05 PROCEDURE — 81001 URINALYSIS AUTO W/SCOPE: CPT

## 2019-02-05 PROCEDURE — 99291 CRITICAL CARE FIRST HOUR: CPT | Performed by: INTERNAL MEDICINE

## 2019-02-05 PROCEDURE — P9016 RBC LEUKOCYTES REDUCED: HCPCS

## 2019-02-05 RX ORDER — OXYCODONE HYDROCHLORIDE 5 MG/1
5 TABLET ORAL EVERY 4 HOURS PRN
Status: DISCONTINUED | OUTPATIENT
Start: 2019-02-05 | End: 2019-02-20 | Stop reason: HOSPADM

## 2019-02-05 RX ORDER — METRONIDAZOLE 500 MG/1
500 TABLET ORAL EVERY 8 HOURS SCHEDULED
Status: DISCONTINUED | OUTPATIENT
Start: 2019-02-05 | End: 2019-02-07

## 2019-02-05 RX ORDER — VANCOMYCIN HYDROCHLORIDE 1 G/200ML
15 INJECTION, SOLUTION INTRAVENOUS EVERY 24 HOURS
Status: DISCONTINUED | OUTPATIENT
Start: 2019-02-06 | End: 2019-02-14

## 2019-02-05 RX ORDER — FUROSEMIDE 10 MG/ML
40 INJECTION INTRAMUSCULAR; INTRAVENOUS ONCE
Status: COMPLETED | OUTPATIENT
Start: 2019-02-05 | End: 2019-02-05

## 2019-02-05 RX ORDER — CEFEPIME HYDROCHLORIDE 1 G/1
1000 INJECTION, POWDER, FOR SOLUTION INTRAMUSCULAR; INTRAVENOUS EVERY 24 HOURS
Status: DISCONTINUED | OUTPATIENT
Start: 2019-02-05 | End: 2019-02-05

## 2019-02-05 RX ORDER — HEPARIN SODIUM 5000 [USP'U]/ML
5000 INJECTION, SOLUTION INTRAVENOUS; SUBCUTANEOUS EVERY 8 HOURS SCHEDULED
Status: DISCONTINUED | OUTPATIENT
Start: 2019-02-05 | End: 2019-02-20 | Stop reason: HOSPADM

## 2019-02-05 RX ORDER — ACETAMINOPHEN 325 MG/1
650 TABLET ORAL 4 TIMES DAILY PRN
Status: DISCONTINUED | OUTPATIENT
Start: 2019-02-05 | End: 2019-02-20 | Stop reason: HOSPADM

## 2019-02-05 RX ADMIN — PANCRELIPASE 24000 UNITS: 24000; 76000; 120000 CAPSULE, DELAYED RELEASE PELLETS ORAL at 12:45

## 2019-02-05 RX ADMIN — FUROSEMIDE 40 MG: 10 INJECTION, SOLUTION INTRAMUSCULAR; INTRAVENOUS at 16:58

## 2019-02-05 RX ADMIN — CEFEPIME HYDROCHLORIDE 1000 MG: 1 INJECTION, POWDER, FOR SOLUTION INTRAMUSCULAR; INTRAVENOUS at 01:35

## 2019-02-05 RX ADMIN — HEPARIN SODIUM 5000 UNITS: 5000 INJECTION INTRAVENOUS; SUBCUTANEOUS at 01:35

## 2019-02-05 RX ADMIN — METRONIDAZOLE 500 MG: 500 INJECTION, SOLUTION INTRAVENOUS at 13:36

## 2019-02-05 RX ADMIN — ACETAMINOPHEN 650 MG: 325 TABLET ORAL at 03:39

## 2019-02-05 RX ADMIN — HEPARIN SODIUM 5000 UNITS: 5000 INJECTION INTRAVENOUS; SUBCUTANEOUS at 13:37

## 2019-02-05 RX ADMIN — VANCOMYCIN HYDROCHLORIDE 1000 MG: 1 INJECTION, SOLUTION INTRAVENOUS at 23:58

## 2019-02-05 RX ADMIN — CEFEPIME HYDROCHLORIDE 1000 MG: 1 INJECTION, POWDER, FOR SOLUTION INTRAMUSCULAR; INTRAVENOUS at 16:31

## 2019-02-05 RX ADMIN — METRONIDAZOLE 500 MG: 500 TABLET ORAL at 19:06

## 2019-02-05 RX ADMIN — PANCRELIPASE 24000 UNITS: 24000; 76000; 120000 CAPSULE, DELAYED RELEASE PELLETS ORAL at 18:13

## 2019-02-05 RX ADMIN — HEPARIN SODIUM 5000 UNITS: 5000 INJECTION INTRAVENOUS; SUBCUTANEOUS at 22:58

## 2019-02-05 NOTE — SEPSIS NOTE
Sepsis Note   Baltazar Herrera 75 y o  female MRN: [id number]  Unit/Bed#: ED 18 Encounter: [id number]            Initial Sepsis Screening     Row Name 02/04/19 2120 02/04/19 2102             Is the patient's history suggestive of a new or worsening infection?  (!)  Yes (Proceed)  -RM       Suspected source of infection   suspect infection, source unknown  -RM       Are two or more of the following signs & symptoms of infection both present and new to the patient?  (!)  Yes (Proceed)  -RM       Indicate SIRS criteria   Hyperthemia > 38 3C (100 9F); Leukocytosis (WBC > 71105 IJL)  -RM       If the answer is yes to both questions, suspicion of sepsis is present  Bettyjane Merlin         If severe sepsis is present AND tissue hypoperfusion perists in the hour after fluid resuscitation or lactate > 4, the patient meets criteria for SEPTIC SHOCK           Are any of the following organ dysfunction criteria present within 6 hours of suspected infection and SIRS criteria that are NOT considered to be chronic conditions?  (!)  Yes  -RM       Organ dysfunction Lactate > 2 0 mmol/L  -RM         Date of presentation of severe sepsis 02/04/19  -RM         Time of presentation of severe sepsis 2120  -RM         Tissue hypoperfusion persists in the hour after crystalloid fluid administration, evidenced, by either:           Was hypotension present within one hour of the conclusion of crystalloid fluid administration?   Kaylee Keenan       Date of presentation of septic shock           Time of presentation of septic shock             User Key  (r) = Recorded By, (t) = Taken By, (c) = Cosigned By    [address] Name Provider Type    Leno Bourgeois MD Physician

## 2019-02-05 NOTE — H&P
History & Physical Exam - Critical Care   Wendy Todd 75 y o  female MRN: [id number]  Unit/Bed#: [facility] Encounter: [id number]      Assessment/Plan:  1  Sepsis, etiology not entirely known, possibly related to pyelonephritis  · Given the patient's tachycardia and metabolic derangements she will be admitted to the step-down unit for evaluation and monitoring  This will likely require greater than a 2 midnight stay therefore the patient will be placed in inpatient status  · Will continue the patient on gentle fluid resuscitation  · We will continue the patient on cefepime pending her culture results  · We will obtain a ultrasound of the left kidney to evaluate for thrombus however she may require percutaneous drainage of the kidney  2  Lactic acidosis, likely secondary to 1  · This improved with fluid resuscitation  · We will continue with gentle fluid resuscitation as noted above  It should be noted that the patient has a congestive heart failure history and was having some respiratory difficulty in the emergency department  We will need to monitor her respiratory status closely now that she has received her fluid boluses  3  Acute respiratory failure likely multifactorial related to 1  And 2    · Will continue her on nasal cannula oxygen for now with a goal to maintain her saturations greater than 90%  4  Left hydronephrosis with some fat stranding, possibly related to pyelonephritis  · Will continue antibiotics as noted above  She may need to have a percutaneous drainage of the kidney  5  Anemia, etiology not entirely known possibly related to recurrent cancer, chronic disease or acute blood loss  · We will heme test her stools  · We will order iron studies to evaluate for iron deficiency  · Will monitor her hemoglobin closely with a goal to transfuse for hemoglobins less than 7 or hemodynamic instability  6   Extensive cancer history including breast, pancreatic, colon, with possible recurrent disease  · She may require an MRI to fully evaluate some of her intra-abdominal findings particularly the lesion in the liver  · May consider adding tumor markers once her acute issues have resolved  7  Acute kidney injury on unknown chronic kidney disease status  · Will monitor her renal indices and urine output closely     Critical Care Time:   Documented critical care time excludes any procedures documented elsewhere  It also excludes any family updates    _____________________________________________________________________      HPI:    Tian Pineda is a 75 y o  female who presents with a complaint of not feeling well  The patient states that she developed lower extremity edema in her legs in December of 2018  She did not seek care however she has been in contact with her family position who told her this does occur for in patients  She is on Lasix as well as Zaroxolyn as an outpatient which she has been taking  She then developed worsening symptoms on Monday which included generalized weakness and an inability to perform her normal daily activities  She called her family physician who advised her to maintain her current medication regimen and that there was nothing more to be done  Today she was so weak that she was unable to ambulate and had to be carried down the stairs  When she arrived in the emergency department she was found to be tachycardic and with a low-grade temperature of a 100 9°  Additionally she was found to have a lactic acid of 6 8  The patient denies fever or chills at home  She denies cough or cold symptoms  She denies nausea or vomiting  She denies constipation  She states that she has had chronic diarrhea for more than a year  She denies any urinary symptoms  The patient states that she has an extensive cancer history which was last treated 9 years ago    She has breast cancer for which she has had bilateral mastectomies, colon cancer and pancreatic cancer in which she had a Whipple procedure done       Review of Systems:    Full 12 point review of systems was performed  Aside from what was mentioned in the HPI, it is otherwise negative  Historical Information   Past Medical History:   Diagnosis Date    Cancer [facility])     Breast; Last Assessed: 8/29/2016    Carpal tunnel syndrome     unspecified laterality; Onset: 4/23/2012    Cellulitis     Last Assessesd: 8/30/2012    Diabetes mellitus out of control ([place] [id number] )     Last Assessed: 11/3/2014    Fatigue     Last Assessed: 2/11/014    Fracture of toe     Last Assessed: 11/17/2014    Ovarian cancer ([place] [id number] )     Last Assessed: 1/7/2016    Rectal cancer [facility])      Past Surgical History:   Procedure Laterality Date    COLON SURGERY      MASTECTOMY      bilateral    PANCREATECTOMY  2010    Proximal Subtotal (whipple procedure)     Social History   History   Alcohol Use No     History   Drug Use No     History   Smoking Status    Never Smoker   Smokeless Tobacco    Never Used       Family History:   Family History   Problem Relation Age of Onset    Melanoma Mother     Stroke Father        Medications:  Pertinent medications were reviewed    Current Facility-Administered Medications:  vancomycin 15 mg/kg Intravenous Once Todd Conklin MD Last Rate: 1,000 mg (02/04/19 3943)         Allergies   Allergen Reactions    Gadolinium Derivatives Anaphylaxis     MRI dye- hot flashes/ flushing    Iodine Anaphylaxis     ? ??SHORTNESS OF BREATH    Acetaminophen-Codeine Edema    Aspirin Hives     RASH    Cephalexin      Annotation - [id number]: rash    Nickel Hives    Penicillins Hives    Sulfa Antibiotics Hives    Sulfamethoxazole-Trimethoprim Diarrhea         Vitals:   /53   Pulse (!) 118   Temp (!) 100 9 °F (38 3 °C) (Oral)   Resp (!) 25   Wt 69 3 kg (152 lb 12 5 oz)   SpO2 97%   BMI 27 59 kg/m²   Body mass index is 27 59 kg/m²    SpO2: 97 %,   SpO2 Activity: At Rest,   O2 Device: Nasal cannula      Intake/Output Summary (Last 24 hours) at 02/05/19 0021  Last data filed at 02/04/19 2354   Gross per 24 hour   Intake          3138 33 ml   Output                0 ml   Net          3138 33 ml     Invasive Devices     Peripheral Intravenous Line            Peripheral IV 02/04/19 Left Antecubital less than 1 day                Physical Exam:  Gen:  Awake and alert  HEENT:  Pupils are equal round and reactive to light  The oropharynx is dry and without erythema  Neck:  Supple negative for lymphadenopathy  Chest:  Diminished with some crackles in the bases bilaterally  Cor:  Tachycardic but regular  Abd:  Obese, soft, there are multiple incisions, there is some abdominal tenderness in the mid abdomen  Ext:  There is chronic lower extremity edema bilaterally  Neuro:  Awake and alert, able to move her extremities but weak  Skin:  Warm and dry      Diagnostic Data:  Lab: I have personally reviewed pertinent lab results  ,   CBC:    Results from last 7 days  Lab Units 02/04/19 2027   WBC Thousand/uL 21 90*   HEMOGLOBIN g/dL 7 2*   HEMATOCRIT % 25 0*   PLATELETS Thousands/uL 318      CMP: Lab Results   Component Value Date    SODIUM 133 (L) 02/04/2019    K 4 2 02/04/2019     02/04/2019    CO2 17 (L) 02/04/2019    BUN 45 (H) 02/04/2019    CREATININE 1 78 (H) 02/04/2019    CALCIUM 8 6 02/04/2019     (H) 02/04/2019    ALT 70 02/04/2019    ALKPHOS 557 (H) 02/04/2019    EGFR 28 02/04/2019   ,   PT/INR:   Lab Results   Component Value Date    INR 1 67 (H) 02/04/2019   ,   Magnesium: No components found for: MAG,  Phosphorous: No results found for: PHOS    ABG: No results found for: PHART, DRL1GSW, PO2ART, EBK7FVC, B3YDZPHL, BEART, SOURCE,     Microbiology:      Imaging: I have personally reviewed the pertinent imaging studies on the PACS system  CT scan of the abdomen and pelvis was performed and reveals the following  1    Hepatic lesion, bilateral adrenal nodules and retroperitoneal lymphadenopathy, compatible with metastatic disease  Suboptimal evaluation in the absence of intravenous contrast   2   Postsurgical change from Whipple procedure  Suggestion of soft tissue density in the peripancreatic and right retroperitoneal region could represent lymphadenopathy or tumor  3   Enlarged left kidney and perinephric fat stranding, without evidence of obstructive uropathy  Possible pyelonephritis or renal vein thrombosis  Ultrasound may be helpful for further evaluation  4   Small hiatal hernia  Contrast distends the proximal small bowel  Remainder of the small bowel and colon are not well visualized  Bilateral small ventral abdominal hernias containing portions of bowel loops  Significant amount stool in the sigmoid   colon may indicate constipation  Follow-up exam in one to 2 hours, when contrast has reached the distal colon, may be helpful  4   Mild to moderate ascites  5   If intravenous contrast is contraindicated, MRI of the abdomen and pelvis may be helpful  Cardiac/EKG/telemetry/Echo:             VTE Prophylaxis: Heparin    Code Status: No Order    IsadorESE Christiansen    Portions of the record may have been created with voice recognition software  Occasional wrong word or "sound a like" substitutions may have occurred due to the inherent limitations of voice recognition software  Read the chart carefully and recognize, using context, where substitutions have occurred

## 2019-02-05 NOTE — CONSULTS
Consultation - Infectious Disease   Jacob Vizcarra 75 y o  female MRN: [id number]  Unit/Bed#: ICU 15 Encounter: [id number]      IMPRESSION & RECOMMENDATIONS:   1  Severe sepsis-POA  Fever, leukocytosis, tachycardia, lactic acidosis  Appears to be secondary to polymicrobial bacteremia of non defined source  She remains quite ill, however fortunately she has remained hemodynamically stable despite the systemic illness  She seems to be tolerating the antibiotics without difficulty  The white cell count is rising, and the procalcitonin level remains quite high   -continue vancomycin for now at current dose  -pharmacy consult for vancomycin trough manage  -increase the cefepime to 1 g IV q 12 hours to make sure Pseudomonas equivalent dosing is available and dose adjusted for renal failure   -will change the Flagyl to 500 mg p o  Q 8 hours as the patient is not hypotensive or requiring vasopressor support  -recheck procalcitonin level tomorrow a m   -follow up cultures and sensitivities and adjust antibiotics as needed  -monitor CBC with diff and creatinine  -supportive care     2  Polymicrobial bacteremia-suspect intra-abdominal or enteric source  No definitive source has been appreciated despite extensive imaging although CT of the abdomen and pelvis does suggest the possibility of pyelonephritis  Urinalysis is nondiagnostic although was collected after antibiotics were already given  The patient remains hemodynamically stable despite the bacteremia     -antibiotics as above  -follow up ID and sensitivities and adjust antibiotics as needed  -recheck blood cultures x2 sets tomorrow a m  To confirm clearance  -no additional ID workup for now    3  Acute kidney injury-suspect this is a pre renal issue  Perhaps sepsis is playing a role    The renal function has modestly worsened since admission   -dose adjusted antibiotics as above  -monitor creatinine clearance of further dose adjust antibiotics as needed  -volume management  -no additional ID workup for now    4  Anemia-quite severe  Elevated LDH suggest the possibility of hemolysis  No definitive evidence of acute blood loss  -workup as per the primary  -transfusion support  -monitor CBC with diff      HISTORY OF PRESENT ILLNESS:  Reason for Consult:  Sepsis  HPI: Marta Maria is a 75y o  year old female with a remote history of pancreatic carcinoma status post Whipple procedure, the well as a history of breast and colon cancer who I am asked to assist with management of sepsis  Patient has been struggling with ongoing lower extremity edema over the last few months  Her symptoms seemed to worsen significantly at the beginning of last week and she developed worsening generalized weakness  Symptoms became so severe by the time the day of admission yesterday she was unable to ambulate and had to be carried down the stairs  She was brought to the emergency department for further evaluation and found to be tachycardic and febrile  She was also found to have a lactic acidosis  Because of these findings, the patient had blood cultures obtained and was started on vancomycin and cefepime and admitted to the intensive care unit for further management  The patient has baseline chronic diarrhea for more than a year with no change in her recent symptoms  She denies any nausea or vomiting  She denies having outpatient fever chills or sweats, denies any cough or shortness of breath although when she did come to the hospital she felt short of breath  She denies any chest pain or abdominal pain, denies any dysuria hematuria, denies any new rash or skin lesions, denies any new joint or muscle pains  The patient has undergone IV fluid resuscitation with her blood pressure remaining stable off vasopressor support  Her lactate level has also been decreasing    Today because the rising white count and concern about intra-abdominal infection, the patient had Flagyl added to her antibiotic regimen  REVIEW OF SYSTEMS:  A complete 12 point system-based review of systems is negative other than that noted in the HPI  PAST MEDICAL HISTORY:  Past Medical History:   Diagnosis Date    Cancer [facility])     Breast; Last Assessed: 2016    Carpal tunnel syndrome     unspecified laterality; Onset: 2012    Cellulitis     Last Assessesd: 2012    [facility] mellitus out of control ([facility] )     Last Assessed: 11/3/2014    Fatigue     Last Assessed:     Fracture of toe     Last Assessed: 2014    Ovarian cancer ([facility] )     Last Assessed: 2016    Rectal cancer [facility])      Past Surgical History:   Procedure Laterality Date    COLON SURGERY      MASTECTOMY      bilateral    PANCREATECTOMY  2010    Proximal Subtotal (whipple procedure)       FAMILY HISTORY:  Non-contributory    SOCIAL HISTORY:  Social History   History   Alcohol Use No     History   Drug Use No     History   Smoking Status    Never Smoker   Smokeless Tobacco    Never Used       ALLERGIES:  Allergies   Allergen Reactions    Gadolinium Derivatives Anaphylaxis     MRI dye- hot flashes/ flushing    Iodine Anaphylaxis     ? ??SHORTNESS OF BREATH    Acetaminophen-Codeine Edema    Aspirin Hives     RASH    Cephalexin      [facility] - [id number]: rash    Nickel Hives    Penicillins Hives    Sulfa Antibiotics Hives    Sulfamethoxazole-Trimethoprim Diarrhea       MEDICATIONS:  All current active medications have been reviewed    Antibiotics:  Vancomycin, cefepime, Flagyl 2    PHYSICAL EXAM:  Temp:  [97 5 °F (36 4 °C)-101 5 °F (38 6 °C)] 97 5 °F (36 4 °C)  HR:  [] 78  Resp:  [16-30] 17  BP: ()/(38-59) 87/47  SpO2:  [91 %-98 %] 95 %  Temp (24hrs), Av 8 °F (37 1 °C), Min:97 5 °F (36 4 °C), Max:101 5 °F (38 6 °C)  Current: Temperature: 97 5 °F (36 4 °C)    Intake/Output Summary (Last 24 hours) at 19 1431  Last data filed at 19 1406   Gross per 24 hour   Intake          4078 33 ml Output              125 ml   Net          3953 33 ml       General Appearance:  Appearing well, nontoxic, and in no distress   Head:  Normocephalic, without obvious abnormality, atraumatic   Eyes:  Conjunctiva pale and sclera anicteric, both eyes   Nose: Nares normal, mucosa normal, no drainage   Throat: Oropharynx dry without lesions   Neck: Supple, symmetrical, no adenopathy, no tenderness/mass/nodules   Back:   Symmetric, no curvature, ROM normal, no CVA tenderness   Lungs:   Clear to auscultation bilaterally, respirations unlabored   Chest Wall:  No tenderness or deformity   Heart:  RRR; no murmur, rub or gallop   Abdomen:   Soft, non-tender, non-distended, positive bowel sounds    Extremities: No cyanosis, clubbing or edema   Skin: No rashes or lesions  No draining wounds noted  Lymph nodes: Cervical, supraclavicular nodes normal   Neurologic: Alert and oriented times 3, extremity strength 5/5 and symmetric       LABS, IMAGING, & OTHER STUDIES:  Lab Results:  I have personally reviewed pertinent labs  Results from last 7 days  Lab Units 02/05/19  0504 02/04/19 2027   WBC Thousand/uL 47 32* 21 90*   HEMOGLOBIN g/dL 5 9* 7 2*   PLATELETS Thousands/uL 220 318       Results from last 7 days  Lab Units 02/05/19  0504 02/04/19 2027   SODIUM mmol/L 132* 133*   POTASSIUM mmol/L 3 5 4 2   CHLORIDE mmol/L 102 100   CO2 mmol/L 17* 17*   BUN mg/dL 46* 45*   CREATININE mg/dL 1 87* 1 78*   EGFR ml/min/1 73sq m 26 28   CALCIUM mg/dL 7 4* 8 6   AST U/L 266* 102*   ALT U/L 149* 70   ALK PHOS U/L 387* 557*       Results from last 7 days  Lab Units 02/04/19 2029 02/04/19 2027   GRAM STAIN RESULT  Gram negative rods  Gram positive cocci in pairs Gram negative rods       Imaging Studies:   I have personally reviewed pertinent imaging study reports and images in PACS      CT abdomen pelvis-1   Hepatic lesion, bilateral adrenal nodules and retroperitoneal lymphadenopathy, compatible with metastatic disease   Suboptimal evaluation in the absence of intravenous contrast   2   Postsurgical change from Whipple procedure   Suggestion of soft tissue density in the peripancreatic and right retroperitoneal region could represent lymphadenopathy or tumor  3   Enlarged left kidney and perinephric fat stranding, without evidence of obstructive uropathy   Possible pyelonephritis or renal vein thrombosis   Ultrasound may be helpful for further evaluation  4   Small hiatal hernia   Contrast distends the proximal small bowel   Remainder of the small bowel and colon are not well visualized   Bilateral small ventral abdominal hernias containing portions of bowel loops   Significant amount stool in the sigmoid   colon may indicate constipation   Follow-up exam in one to 2 hours, when contrast has reached the distal colon, may be helpful  4   Mild to moderate ascites  5   If intravenous contrast is contraindicated, MRI of the abdomen and pelvis may be helpful

## 2019-02-05 NOTE — UTILIZATION REVIEW
Initial Clinical Review    Admission: Date/Time/Statement: 2/4/19 @ 2200   Orders Placed This Encounter   Procedures    Inpatient Admission (expected length of stay for this patient Order details is greater than two midnights)     Standing Status:   Standing     Number of Occurrences:   1     Order Specific Question:   Admitting Physician     Answer:   Zeenat Jorgensen [[id number]]     Order Specific Question:   Level of Care     Answer:   Level 1 Stepdown [13]     Order Specific Question:   Estimated length of stay     Answer:   More than 2 Midnights     Order Specific Question:   Certification     Answer:   I certify that inpatient services are medically necessary for this patient for a duration of greater than two midnights  See H&P and MD Progress Notes for additional information about the patient's course of treatment  ED: Date/Time/Mode of Arrival:   ED Arrival Information     Expected Arrival Acuity Means of Arrival Escorted By Service Admission Type    - 2/4/2019 20:16 Emergent Wheelchair Family Member Critical Care/ICU Emergency    Arrival Complaint    weakness        Chief Complaint:   Chief Complaint   Patient presents with    Weakness - Generalized     pt reports feeling weak starting at 4pm today, pt reports her legs feel weak, denies numbness and tingling, pt reports she felt like her BS was low, checked it and it was 100, pt alert and oriented x3     History of Illness: Sabrina Kamara is a 75 y o  female who presents with a complaint of not feeling well  The patient states that she developed lower extremity edema in her legs in December of 2018  She did not seek care however she has been in contact with her family position who told her this does occur for in patients  She is on Lasix as well as Zaroxolyn as an outpatient which she has been taking  She then developed worsening symptoms on Monday which included generalized weakness and an inability to perform her normal daily activities    She called her family physician who advised her to maintain her current medication regimen and that there was nothing more to be done  Today she was so weak that she was unable to ambulate and had to be carried down the stairs  When she arrived in the emergency department she was found to be tachycardic and with a low-grade temperature of a 100 9°  Additionally she was found to have a lactic acid of 6 8  The patient denies fever or chills at home  She denies cough or cold symptoms  She denies nausea or vomiting  She denies constipation  She states that she has had chronic diarrhea for more than a year  She denies any urinary symptoms  The patient states that she has an extensive cancer history which was last treated 9 years ago  She has breast cancer for which she has had bilateral mastectomies, colon cancer and pancreatic cancer in which she had a Whipple procedure done       ED Vital Signs:   ED Triage Vitals   Temperature Pulse Respirations Blood Pressure SpO2   02/04/19 2025 02/04/19 2025 02/04/19 2025 02/04/19 2025 02/04/19 2025   (!) 100 9 °F (38 3 °C) (!) 114 (!) 26 110/57 96 %      Temp Source Heart Rate Source Patient Position - Orthostatic VS BP Location FiO2 (%)   02/04/19 2025 02/04/19 2025 02/04/19 2025 02/04/19 2025 --   Oral Monitor Lying Right arm       Pain Score       02/04/19 2019       No Pain        Wt Readings from Last 1 Encounters:   02/05/19 69 6 kg (153 lb 7 oz)     Vital Signs (abnormal):      Temp  Max  101 5  RR  Sustained   >  22  HR  Sustained   >  100    Pertinent Labs/Diagnostic Test Results:   Lactic  Acid     6 8  WBC   21 90  H/H  7 2/25   (  2/4)           5 9/19 9  (  2/5)  PT  19 8      INR  1 67  Na  133  CO2  17  AG  16  BUN/Creat     45/1 78  AST   102  Alk phos    557  Albumin   2 5  Ct  Abd/pelvis:    Hepatic lesion, bilateral adrenal nodules and retroperitoneal lymphadenopathy, compatible with metastatic disease   Suboptimal evaluation in the absence of intravenous contrast   2   Postsurgical change from Whipple procedure   Suggestion of soft tissue density in the peripancreatic and right retroperitoneal region could represent lymphadenopathy or tumor  3   Enlarged left kidney and perinephric fat stranding, without evidence of obstructive uropathy   Possible pyelonephritis or renal vein thrombosis   Ultrasound may be helpful for further evaluation  4   Small hiatal hernia   Contrast distends the proximal small bowel   Remainder of the small bowel and colon are not well visualized   Bilateral small ventral abdominal hernias containing portions of bowel loops   Significant amount stool in the sigmoid   colon may indicate constipation   Follow-up exam in one to 2 hours, when contrast has reached the distal colon, may be helpful  4   Mild to moderate ascites    CXR:  NAD    ED Treatment:   Medication Administration from 02/04/2019 2009 to 02/05/2019 0023       Date/Time Order Dose Route Action Action by Comments     02/04/2019 2102 dextrose 50 % IV solution 25 mL 25 mL Intravenous Given Giancarlo Dickey RN      02/04/2019 2354 sodium chloride 0 9 % bolus 1,000 mL 0 mL Intravenous Stopped Marcello Lehman RN      02/04/2019 2128 sodium chloride 0 9 % bolus 1,000 mL 1,000 mL Intravenous [address] Sotero Maradiaga RN      02/04/2019 2354 sodium chloride 0 9 % bolus 1,000 mL 0 mL Intravenous Stopped Marcello Lehman RN      02/04/2019 2130 sodium chloride 0 9 % bolus 1,000 mL 1,000 mL Intravenous [address] Sotero Maradiaga, EDITH      02/04/2019 2354 sodium chloride 0 9 % bolus 1,000 mL 0 mL Intravenous Stopped Marcello Lehman RN      02/04/2019 2133 sodium chloride 0 9 % bolus 1,000 mL 1,000 mL Intravenous [address] Sotero Maradiaga RN      02/04/2019 2354 vancomycin (VANCOCIN) IVPB (premix) 1,000 mg 1,000 mg Intravenous [address] Marcello Lehman, [address] [place] [address]      02/04/2019 2322 aztreonam (AZACTAM) 2,000 mg in sodium chloride 0 9 % 100 mL IVPB 0 mg Intravenous Kaylee CID Nav Lewis RN      02/04/2019 2159 aztreonam (AZACTAM) 2,000 mg in sodium chloride 0 9 % 100 mL IVPB 2,000 mg Intravenous [address] [facility] [place]      02/04/2019 9167 iohexol (OMNIPAQUE) 240 MG/ML solution 50 mL 50 mL Oral Given Milagro Barnhart         Past Medical/Surgical History: Active Ambulatory Problems     Diagnosis Date Noted    Benign essential hypertension 04/27/2012    Vitamin D deficiency 03/23/2015    Type 2 diabetes mellitus ([facility] ) 04/27/2012    Breast cancer ([facility] ) 05/02/2013    Car[facility] pancreas [facility] 05/02/2013    Carcinoma of rectosigmoid junction ([facility] ) 04/27/2012    Acid reflux 03/23/2015    Arthritis 08/01/2018    Other hemorrhoids 11/26/2018    Loose stools 01/07/2019    Gait abnormality 01/07/2019    Localized edema 01/07/2019     Resolved Ambulatory Problems     Diagnosis Date Noted    Pancreatic cancer ([facility] ) 08/25/2016     Past Medical History:   Diagnosis Date    Cancer [facility])     Carpal tunnel syndrome     Cellulitis     Diabetes mellitus out of control ([place] [address] )     Fatigue     Fracture of toe     Ovarian cancer ([facility] )     Rectal cancer ([facility] )      Admitting Diagnosis: Weakness [R53 1]  Anemia [D64 9]  Acute kidney injury ([place] [address] ) [N17 9]  Sepsis ([place] [address] ) [A41 9]     1  Assessment/Plan: Sepsis, etiology not entirely known, possibly related to pyelonephritis  · Given the patient's tachycardia and metabolic derangements she will be admitted to the step-down unit for evaluation and monitoring  This will likely require greater than a 2 midnight stay therefore the patient will be placed in inpatient status  · Will continue the patient on gentle fluid resuscitation  · We will continue the patient on cefepime pending her culture results  · We will obtain a ultrasound of the left kidney to evaluate for thrombus however she may require percutaneous drainage of the kidney  2  Lactic acidosis, likely secondary to 1  · This improved with fluid resuscitation    · We will continue with gentle fluid resuscitation as noted above  It should be noted that the patient has a congestive heart failure history and was having some respiratory difficulty in the emergency department  We will need to monitor her respiratory status closely now that she has received her fluid boluses  3  Acute respiratory failure likely multifactorial related to 1  And 2    · Will continue her on nasal cannula oxygen for now with a goal to maintain her saturations greater than 90%  4  Left hydronephrosis with some fat stranding, possibly related to pyelonephritis  ? Will continue antibiotics as noted above  She may need to have a percutaneous drainage of the kidney  5  Anemia, etiology not entirely known possibly related to recurrent cancer, chronic disease or acute blood loss  · We will heme test her stools  · We will order iron studies to evaluate for iron deficiency  · Will monitor her hemoglobin closely with a goal to transfuse for hemoglobins less than 7 or hemodynamic instability  6  Extensive cancer history including breast, pancreatic, colon, with possible recurrent disease  ? She may require an MRI to fully evaluate some of her intra-abdominal findings particularly the lesion in the liver  May consider adding tumor markers once her acute issues have resolved  7  Acute kidney injury on unknown chronic kidney disease status  ?  Will monitor her renal indices and urine output closely       Admission Orders:   IP   2/4  @   2200  Scheduled Meds:   Current Facility-Administered Medications:  acetaminophen 650 mg Oral 4x Daily PRN Azzie Lucio, CRNP    cefepime 1,000 mg Intravenous Q24H Azzie Lucio, CRNP Last Rate: Stopped (02/05/19 0301)   heparin (porcine) 5,000 Units Subcutaneous [facility] Azzie Lucio, CRNP    oxyCODONE 5 mg Oral Q4H PRN Azzie Lucio, CRNP    pancrelipase (Lip-Prot-Amyl) 24,000 Units Oral TID With Meals Azzie Lucio, CRNP    [START ON 2/6/2019] vancomycin 15 mg/kg Intravenous Q24H [facility]      Continuous Infusions:    PRN Meds:   acetaminophen    oxyCODONE     Neuro  Checks  Q 4 hrs  2  U  PRBC  O2  2L      Network Utilization Review Department  Phone: [phone]; Fax [phone]  [email or web address]  ATTENTION: Please call with any questions or concerns to [phone]  and carefully listen to the prompts so that you are directed to the right person  Send all requests for admission clinical reviews, approved or denied determinations and any other requests to fax [phone]   All voicemails are confidential

## 2019-02-05 NOTE — ED NOTES
Patient attempted to provide urine sample, unsuccessful  Spoke with Dr Ree Henry, start antibiotics without urine sample  Primary RN, Joselin Matias, made aware        Rhona Amor RN  02/04/19 1663

## 2019-02-05 NOTE — ED PROVIDER NOTES
History  Chief Complaint   Patient presents with    Weakness - Generalized     pt reports feeling weak starting at 4pm today, pt reports her legs feel weak, denies numbness and tingling, pt reports she felt like her BS was low, checked it and it was 100, pt alert and oriented x3     76 yo female with IDDM, CHF, past h/o pancreatitic cancer, S/P Whipple, brought to ED by her son with c/o weakness today that she describes as "I can't walk", she denies an abrupt onset  She is not otherwise c/o pain, N/V, shortness of breath, nor was she aware of being febrile  She c/o feeling very thirsty, wants ice chips  History provided by:  Patient and relative      Prior to Admission Medications   Prescriptions Last Dose Informant Patient Reported? Taking?    CREON 38157-49228 units 2019 at Unknown time Self No Yes   Sig: TAKE 3 WITH BREAKFAST, 3 WITH LUNCH, 3 WITH DINNER AND 3 WITH SNACKS   Patient taking differently: 2 a day   Glucose Blood (ONETOUCH ULTRA BLUE VI) 2019 at Unknown time  Yes Yes   Sig: by In Vitro route   Insulin Aspart (NOVOLOG SC) 2019 at Unknown time  Yes Yes   Sig: Inject under the skin   furosemide (LASIX) 40 mg tablet 2019 at Unknown time  No Yes   Si Tab daily p r n  edema   insulin degludec (TRESIBA) 100 units/mL injection pen 2019 at Unknown time  No Yes   Sig: Inject 30 Units under the skin daily   lisinopril (ZESTRIL) 20 mg tablet 2019 at Unknown time  No Yes   Sig: Take 1 tablet (20 mg total) by mouth daily   metolazone (ZAROXOLYN) 5 mg tablet Unknown at Unknown time  No No   Sig: Take 1 tab qod   metoprolol succinate (TOPROL-XL) 100 mg 24 hr tablet 2019 at Unknown time  No Yes   Sig: Take 1 tablet (100 mg total) by mouth daily   oxyCODONE (OXY-IR) 5 MG capsule 2019 at Unknown time  No Yes   Sig: Take 1 capsule (5 mg total) by mouth 2 (two) times a day as needed for moderate pain Max Daily Amount: 10 mg   potassium chloride (MICRO-K) 10 MEQ CR capsule 2/5/2019 at Unknown time  No Yes   Sig: Take 2 capsules (20 mEq total) by mouth daily      Facility-Administered Medications: None       Past Medical History:   Diagnosis Date    Cancer [facility])     Breast; Last Assessed: 8/29/2016    Carpal tunnel syndrome     unspecified laterality; Onset: 4/23/2012    Cellulitis     Last Assessesd: 8/30/2012    Diabetes mellitus out of control ([facility] )     Last Assessed: 11/3/2014    Fatigue     Last Assessed: 2/11/014    Fracture of toe     Last Assessed: 11/17/2014    Ovarian cancer ([facility] )     Last Assessed: 1/7/2016    Rectal cancer [facility])        Past Surgical History:   Procedure Laterality Date    COLON SURGERY      MASTECTOMY      bilateral    PANCREATECTOMY  2010    Proximal Subtotal (whipple procedure)       Family History   Problem Relation Age of Onset    Melanoma Mother     Stroke Father      I have reviewed and agree with the history as documented  Social History   Substance Use Topics    Smoking status: Never Smoker    Smokeless tobacco: Never Used    Alcohol use No        Review of Systems    Physical Exam  Physical Exam   Constitutional: She appears listless  She has a sickly appearance  Frail, listless appearing   HENT:   Head: Normocephalic and atraumatic  Right Ear: External ear normal    Left Ear: External ear normal    Eyes: Pupils are equal, round, and reactive to light  Cardiovascular: Regular rhythm  Tachycardia present  Exam reveals no decreased pulses  Pulmonary/Chest: Effort normal and breath sounds normal    Abdominal: Soft  There is no tenderness  Musculoskeletal:        Right lower leg: She exhibits edema  Left lower leg: She exhibits edema  Neurological: She appears listless  Skin: Capillary refill takes more than 3 seconds  Nursing note and vitals reviewed        Vital Signs  ED Triage Vitals   Temperature Pulse Respirations Blood Pressure SpO2   02/04/19 2025 02/04/19 2025 02/04/19 2025 02/04/19 2025 02/04/19 2025   (!) 100 9 °F (38 3 °C) (!) 114 (!) 26 110/57 96 %      Temp Source Heart Rate Source Patient Position - Orthostatic VS BP Location FiO2 (%)   02/04/19 2025 02/04/19 2025 02/04/19 2025 02/04/19 2025 --   Oral Monitor Lying Right arm       Pain Score       02/04/19 2019       No Pain           Vitals:    02/07/19 0900 02/07/19 1000 02/07/19 1100 02/07/19 1200   BP: 113/56 108/55 91/55 (!) 86/53   Pulse: 86 82 80 76   Patient Position - Orthostatic VS:           Visual Acuity  Visual Acuity      Most Recent Value   L Pupil Size (mm)  4   R Pupil Size (mm)  4   L Pupil Shape  Round   R Pupil Shape  Round          ED Medications  Medications   oxyCODONE (ROXICODONE) IR tablet 5 mg (5 mg Oral Given 2/7/19 0116)   heparin (porcine) subcutaneous injection 5,000 Units (5,000 Units Subcutaneous Given 2/7/19 0627)   acetaminophen (TYLENOL) tablet 650 mg (650 mg Oral Given 2/5/19 0339)   vancomycin (VANCOCIN) IVPB (premix) 1,000 mg (1,000 mg Intravenous New Bag 2/7/19 0020)   pancrelipase (Lip-Prot-Amyl) (CREON) delayed release capsule 48,000 Units (48,000 Units Oral Given 2/7/19 0750)   ferrous sulfate tablet 325 mg (325 mg Oral Given 2/7/19 0630)   insulin lispro (HumaLOG) 100 units/mL subcutaneous injection 5 Units (5 Units Subcutaneous Given 2/7/19 0749)   insulin glargine (LANTUS) subcutaneous injection 25 Units 0 25 mL (25 Units Subcutaneous Given 2/6/19 2210)   insulin lispro (HumaLOG) 100 units/mL subcutaneous injection 4-20 Units (4 Units Subcutaneous Given 2/7/19 0749)   cefTRIAXone (ROCEPHIN) 2,000 mg in dextrose 5 % 50 mL IVPB (2,000 mg Intravenous New Bag 2/7/19 1247)   dextrose 50 % IV solution 25 mL (25 mL Intravenous Given 2/4/19 2102)   sodium chloride 0 9 % bolus 1,000 mL (0 mL Intravenous Stopped 2/4/19 2354)     Followed by   sodium chloride 0 9 % bolus 1,000 mL (0 mL Intravenous Stopped 2/4/19 2354)     Followed by   sodium chloride 0 9 % bolus 1,000 mL (0 mL Intravenous Stopped 2/4/19 2354) vancomycin (VANCOCIN) IVPB (premix) 1,000 mg (0 mg Intravenous Stopped 2/5/19 0101)     And   aztreonam (AZACTAM) 2,000 mg in sodium chloride 0 9 % 100 mL IVPB (0 mg Intravenous Stopped 2/4/19 2322)   iohexol (OMNIPAQUE) 240 MG/ML solution 50 mL (50 mL Oral Given 2/4/19 2323)   furosemide (LASIX) injection 40 mg (40 mg Intravenous Given 2/5/19 1658)   potassium chloride (K-DUR,KLOR-CON) CR tablet 40 mEq (40 mEq Oral Given 2/6/19 1026)   furosemide (LASIX) injection 80 mg (20 mg Intravenous Given 2/6/19 1030)   insulin lispro (HumaLOG) 100 units/mL subcutaneous injection 4-20 Units (12 Units Subcutaneous Given 2/6/19 1838)       Diagnostic Studies  Results Reviewed     Procedure Component Value Units Date/Time    Blood culture #1 [[id number]]  (Abnormal)  (Susceptibility) Collected:  02/04/19 2029    Lab Status:  Final result Specimen:  Blood from Arm, Left Updated:  02/07/19 1301     Blood Culture Enterococcus faecalis (A)      Escherichia coli (A)      Klebsiella oxytoca (A)     Gram Stain Result Gram negative rods      Gram positive cocci in pairs    Susceptibility      Enterococcus faecalis     EDWIN    Ampicillin ($$) <2 00 ug/ml Susceptible    Gentamicin Synergy <500 ug/ml Susceptible    Streptomycin Synergy <1,000 ug/ml Susceptible    Vancomycin ($) 1 00 ug/ml Susceptible    ZID Performed Yes                 Susceptibility      Escherichia coli     EDWIN    Amoxicillin + Clavulanate <4/2 ug/ml Susceptible    Ampicillin ($$) >16 00 ug/ml Resistant    Ampicillin + Sulbactam ($) 16/8 ug/ml Intermediate    Aztreonam ($$$)  <4 ug/ml Susceptible    Cefazolin ($) <2 00 ug/ml Susceptible    Ciprofloxacin ($)  <1 00 ug/ml Susceptible    Ertapenem ($$$) <0 5 ug/ml Susceptible    Gentamicin ($$) <1 ug/ml Susceptible    Levofloxacin ($) <0 25 ug/ml Susceptible    Tetracycline <4 ug/ml Susceptible    Tobramycin ($) <1 ug/ml Susceptible    Trimethoprim + Sulfamethoxazole ($$$) <2/38 ug/ml Susceptible    ZID Performed Yes Susceptibility      Klebsiella oxytoca     EDWIN    ZID Performed Yes                    Blood culture #2 [[id number]]  (Abnormal)  (Susceptibility) Collected:  02/04/19 2027    Lab Status:  Final result Specimen:  Blood from Hand, Left Updated:  02/07/19 0830     Blood Culture Escherichia coli (A)      Klebsiella oxytoca (A)     Gram Stain Result Gram negative rods    Susceptibility      Escherichia coli     EDWIN    ZID Performed Yes                Susceptibility      Klebsiella oxytoca     EDWIN    Amoxicillin + Clavulanate <4/2 ug/ml Susceptible    Ampicillin ($$) >16 00 ug/ml Resistant    Ampicillin + Sulbactam ($) 8/4 ug/ml Susceptible    Aztreonam ($$$)  <4 ug/ml Susceptible    Cefazolin ($) >16 00 ug/ml Resistant    Cefuroxime ($$) <4 ug/ml Susceptible    Ciprofloxacin ($)  <1 00 ug/ml Susceptible    Gentamicin ($$) <1 ug/ml Susceptible    Levofloxacin ($) <0 25 ug/ml Susceptible    Tetracycline <4 ug/ml Susceptible    Tobramycin ($) <1 ug/ml Susceptible    Trimethoprim + Sulfamethoxazole ($$$) <2/38 ug/ml Susceptible    ZID Performed Yes                     UA w Reflex to Microscopic w Reflex to Culture [[id number]]  (Abnormal) Collected:  02/05/19 1037    Lab Status:  Final result Specimen:  Urine from Urine, Straight Cath Updated:  02/05/19 1054     Color, UA Yellow     Clarity, UA Slightly Cloudy     Specific Gravity, UA 1 025     pH, UA 5 0     Leukocytes, UA Negative     Nitrite, UA Negative     Protein,  (2+) (A) mg/dl      Glucose, UA Negative mg/dl      Ketones, UA Trace (A) mg/dl      Urobilinogen, UA 0 2 E U /dl      Bilirubin, UA Negative     Blood, UA Moderate (A)    Procalcitonin [[id number]]  (Abnormal) Collected:  02/04/19 2120    Lab Status:  Final result Specimen:  Blood from Arm, Left Updated:  02/04/19 2316     Procalcitonin 32 74 (H) ng/ml     Lactic Acid x2 [[id number]]  (Abnormal) Collected:  02/04/19 2228    Lab Status:  Final result Specimen:  Blood from Arm, Left Updated: 02/04/19 2314     LACTIC ACID 4 5 (HH) mmol/L     Narrative:         Result may be elevated if tourniquet was used during collection  CBC and differential [[id number]]  (Abnormal) Collected:  02/04/19 2027    Lab Status:  Final result Specimen:  Blood from Arm, Left Updated:  02/04/19 2122     WBC 21 90 (H) Thousand/uL      RBC 3 22 (L) Million/uL      Hemoglobin 7 2 (L) g/dL      Hematocrit 25 0 (L) %      MCV 78 (L) fL      MCH 22 4 (L) pg      MCHC 28 8 (L) g/dL      RDW 20 2 (H) %      MPV 11 6 fL      Platelets 548 Thousands/uL      nRBC 0 /100 WBCs     Narrative: This is an appended report  These results have been appended to a previously verified report  Lactic Acid x2 [[id number]]  (Abnormal) Collected:  02/04/19 2027    Lab Status:  Final result Specimen:  Blood from Arm, Left Updated:  02/04/19 2118     LACTIC ACID 6 8 (HH) mmol/L     Narrative:         Result may be elevated if tourniquet was used during collection      APTT [[id number]]  (Normal) Collected:  02/04/19 2027    Lab Status:  Final result Specimen:  Blood from Arm, Left Updated:  02/04/19 2112     PTT 36 seconds     Protime-INR [[id number]]  (Abnormal) Collected:  02/04/19 2027    Lab Status:  Final result Specimen:  Blood from Arm, Left Updated:  02/04/19 2112     Protime 19 8 (H) seconds      INR 1 67 (H)    Comprehensive metabolic panel [[id number]]  (Abnormal) Collected:  02/04/19 2027    Lab Status:  Final result Specimen:  Blood from Arm, Left Updated:  02/04/19 2108     Sodium 133 (L) mmol/L      Potassium 4 2 mmol/L      Chloride 100 mmol/L      CO2 17 (L) mmol/L      ANION GAP 16 (H) mmol/L      BUN 45 (H) mg/dL      Creatinine 1 78 (H) mg/dL      Glucose 68 mg/dL      Calcium 8 6 mg/dL       (H) U/L      ALT 70 U/L      Alkaline Phosphatase 557 (H) U/L      Total Protein 6 1 (L) g/dL      Albumin 2 5 (L) g/dL      Total Bilirubin 0 56 mg/dL      eGFR 28 ml/min/1 73sq m     Narrative:         National Kidney Disease Education Program recommendations are as follows:  GFR calculation is accurate only with a steady state creatinine  Chronic Kidney disease less than 60 ml/min/1 73 sq  meters  Kidney failure less than 15 ml/min/1 73 sq  meters  Fingerstick Glucose (POCT) [[id number]]  (Abnormal) Collected:  02/04/19 2022    Lab Status:  Final result Updated:  02/04/19 2022     POC Glucose 62 (L) mg/dl                  XR chest 1 view portable   Final Result by Darren Osullivan MD (02/05 7882)      No acute cardiopulmonary disease  Workstation performed: [id number]         CT abdomen pelvis wo contrast   Final Result by Lorin Shaver MD (02/05 0014)         1  Hepatic lesion, bilateral adrenal nodules and retroperitoneal lymphadenopathy, compatible with metastatic disease  Suboptimal evaluation in the absence of intravenous contrast    2   Postsurgical change from Whipple procedure  Suggestion of soft tissue density in the peripancreatic and right retroperitoneal region could represent lymphadenopathy or tumor  3   Enlarged left kidney and perinephric fat stranding, without evidence of obstructive uropathy  Possible pyelonephritis or renal vein thrombosis  Ultrasound may be helpful for further evaluation  4   Small hiatal hernia  Contrast distends the proximal small bowel  Remainder of the small bowel and colon are not well visualized  Bilateral small ventral abdominal hernias containing portions of bowel loops  Significant amount stool in the sigmoid    colon may indicate constipation  Follow-up exam in one to 2 hours, when contrast has reached the distal colon, may be helpful  4   Mild to moderate ascites  5   If intravenous contrast is contraindicated, MRI of the abdomen and pelvis may be helpful                 Workstation performed: [id number]         US right upper quadrant    (Results Pending)              Procedures  ECG 12 Lead Documentation  Date/Time: 2/4/2019 9:48 PM  Performed by: Collette Cowden  Authorized by: Collette Cowden     Rate:     ECG rate:  114  Rhythm:     Rhythm: atrial fibrillation    CriticalCare Time  Performed by: Collette Cowden  Authorized by: Collette Cowden     Critical care provider statement:     Critical care time (minutes):  30    Critical care time was exclusive of:  Separately billable procedures and treating other patients and teaching time    Critical care was necessary to treat or prevent imminent or life-threatening deterioration of the following conditions:  Sepsis    Critical care was time spent personally by me on the following activities:  Blood draw for specimens, obtaining history from patient or surrogate, development of treatment plan with patient or surrogate, discussions with consultants, evaluation of patient's response to treatment, examination of patient, interpretation of cardiac output measurements, ordering and performing treatments and interventions, ordering and review of laboratory studies, ordering and review of radiographic studies, re-evaluation of patient's condition and review of old charts    I assumed direction of critical care for this patient from another provider in my specialty: no             Phone Contacts  ED Phone Contact    ED Course  ED Course as of Feb 07 1327   [facility] Feb 04, 2019   2201 No infiltrate XR chest 1 view portable   2201 Creatinine: (!) 1 78   2201 Hemoglobin: (!) 7 2                         Initial Sepsis Screening     Row Name 02/04/19 2120 02/04/19 2102             Is the patient's history suggestive of a new or worsening infection?  (!)  Yes (Proceed)  -RM       Suspected source of infection   suspect infection, source unknown  -RM       Are two or more of the following signs & symptoms of infection both present and new to the patient?  (!)  Yes (Proceed)  -RM       Indicate SIRS criteria   Hyperthemia > 38 3C (100 9F); Leukocytosis (WBC > 83707 IJL)  -RM       If the answer is yes to both questions, suspicion of sepsis is present  [de-identified]         If severe sepsis is present AND tissue hypoperfusion perists in the hour after fluid resuscitation or lactate > 4, the patient meets criteria for SEPTIC SHOCK           Are any of the following organ dysfunction criteria present within 6 hours of suspected infection and SIRS criteria that are NOT considered to be chronic conditions?  (!)  Yes  -RM       Organ dysfunction Lactate > 2 0 mmol/L  -RM         Date of presentation of severe sepsis 02/04/19  -RM         Time of presentation of severe sepsis 2120  -RM         Tissue hypoperfusion persists in the hour after crystalloid fluid administration, evidenced, by either:           Was hypotension present within one hour of the conclusion of crystalloid fluid administration?   Mickie Felty       Date of presentation of septic shock           Time of presentation of septic shock             User Key  (r) = Recorded By, (t) = Taken By, (c) = Cosigned By    [address] Name Provider Type    Sherri Bustos MD Physician           Default Flowsheet Data (last 720 hours)      Sepsis Reassess     Row Name 02/04/19 2340                   Repeat Volume Status and Tissue Perfusion Assessment Performed    Repeat Volume Status and Tissue Perfusion Assessment Performed Yes  -RM           Volume Status and Tissue Perfusion Post Fluid Resuscitation * Must Document All *    Vital Signs Reviewed (HR, RR, BP, T) Yes  -RM        Shock Index Reviewed Yes  -RM        Arterial Oxygen Saturation Reviewed (POx, SaO2 or SpO2)          Cardio (!)  Tachycardia  -RM        Pulmonary Normal effort  -RM        Capillary Refill Brisk  -RM        Peripheral Pulses Radial  -RM        Peripheral Pulse +2  -RM        Skin Pale  -RM        Urine output assessed Decreased  -RM           *OR*   Intensive Monitoring- Must Document One of the Following Four *:    Vital Signs Reviewed Yes  -RM        * Central Venous Pressure (CVP or RAP)   * Central Venous Oxygen (SVO2, ScvO2 or Oxygen saturation via central catheter)          * Bedside Cardiovascular US in IVC diameter and % collapse          * Passive Leg Raise OR Crystalloid Challenge            User Key  (r) = Recorded By, (t) = Taken By, (c) = Cosigned By    Initials Name Provider Type    Loco Chase MD Physician                MDM    Disposition  Final diagnoses:   Sepsis ([facility] [place] [address] )   Anemia   Acute kidney injury ([facility] [place] [address] )     Time reflects when diagnosis was documented in both MDM as applicable and the Disposition within this note     Time User Action Codes Description Comment    2/4/2019  9:56 PM Asia Abdulaziz [A41 [id number]] Sepsis ([facility] [place] [address] )     2/4/2019  9:56 PM Lee Don Add [[id number]] Anemia     2/4/2019  9:56 PM Lee Don Add [N17 [id number]] Acute kidney injury [facility])       ED Disposition     ED Disposition Condition Date/Time Comment    Admit  Mon Feb 4, 2019  9:59 PM Case was discussed with Kiet Carcamo and the patient's admission status was agreed to be Admission Status: inpatient status to the service of Dr Debbie Quevedo          Follow-up Information    None         Current Discharge Medication List      CONTINUE these medications which have NOT CHANGED    Details   CREON 52091-52693 units TAKE 3 WITH BREAKFAST, 3 WITH LUNCH, 3 WITH DINNER AND 3 WITH SNACKS  Qty: 1080 capsule, Refills: 1    Associated Diagnoses: Malignant neoplasm of pancreas, unspecified location of malignancy ([facility])      furosemide (LASIX) 40 mg tablet 1 Tab daily p r n  edema  Qty: 90 tablet, Refills: 1    Associated Diagnoses: Localized edema      Glucose Blood (ONETOUCH ULTRA BLUE VI) by In Vitro route      Insulin Aspart (NOVOLOG SC) Inject under the skin      insulin degludec (TRESIBA) 100 units/mL injection pen Inject 30 Units under the skin daily  Qty: 5 pen, Refills: 2    Associated Diagnoses: Type 2 diabetes mellitus with complication, with long-term current use of insulin ([facility])      lisinopril (ZESTRIL) 20 mg tablet Take 1 tablet (20 mg total) by mouth daily  Qty: 90 tablet, Refills: 1    Associated Diagnoses: Essential hypertension      metoprolol succinate (TOPROL-XL) 100 mg 24 hr tablet Take 1 tablet (100 mg total) by mouth daily  Qty: 90 tablet, Refills: 1    Associated Diagnoses: Essential hypertension      oxyCODONE (OXY-IR) 5 MG capsule Take 1 capsule (5 mg total) by mouth 2 (two) times a day as needed for moderate pain Max Daily Amount: 10 mg  Qty: 180 capsule, Refills: 0    Associated Diagnoses: Acute pain; Carcinoma of rectosigmoid junction ([address] ); Malignant neoplasm of pancreas, unspecified location of malignancy ([facility])      potassium chloride (MICRO-K) 10 MEQ CR capsule Take 2 capsules (20 mEq total) by mouth daily  Qty: 180 capsule, Refills: 1    Associated Diagnoses: Localized edema      metolazone (ZAROXOLYN) 5 mg tablet Take 1 tab qod  Qty: 15 tablet, Refills: 2    Associated Diagnoses: Localized edema           No discharge procedures on file      ED Provider  Electronically Signed by           Jie Rivas MD  02/07/19 2503

## 2019-02-06 ENCOUNTER — APPOINTMENT (INPATIENT)
Dept: NON INVASIVE DIAGNOSTICS | Facility: HOSPITAL | Age: 74
DRG: 871 | End: 2019-02-06
Payer: COMMERCIAL

## 2019-02-06 LAB
ABO GROUP BLD BPU: NORMAL
ABO GROUP BLD BPU: NORMAL
ALBUMIN SERPL BCP-MCNC: 1.7 G/DL (ref 3.5–5)
ALP SERPL-CCNC: 276 U/L (ref 46–116)
ALT SERPL W P-5'-P-CCNC: 164 U/L (ref 12–78)
ANION GAP SERPL CALCULATED.3IONS-SCNC: 13 MMOL/L (ref 4–13)
ANISOCYTOSIS BLD QL SMEAR: PRESENT
AST SERPL W P-5'-P-CCNC: 204 U/L (ref 5–45)
BACTERIA UR CULT: NORMAL
BASE EXCESS BLDA CALC-SCNC: -8 MMOL/L
BASOPHILS # BLD MANUAL: 0 THOUSAND/UL (ref 0–0.1)
BASOPHILS NFR MAR MANUAL: 0 % (ref 0–1)
BILIRUB SERPL-MCNC: 0.59 MG/DL (ref 0.2–1)
BPU ID: NORMAL
BPU ID: NORMAL
BUN SERPL-MCNC: 65 MG/DL (ref 5–25)
BURR CELLS BLD QL SMEAR: PRESENT
C DIFF TOX GENS STL QL NAA+PROBE: NORMAL
CALCIUM SERPL-MCNC: 7.4 MG/DL (ref 8.3–10.1)
CAMPYLOBACTER DNA SPEC NAA+PROBE: NORMAL
CHLORIDE SERPL-SCNC: 99 MMOL/L (ref 100–108)
CO2 SERPL-SCNC: 16 MMOL/L (ref 21–32)
CREAT SERPL-MCNC: 2.33 MG/DL (ref 0.6–1.3)
EOSINOPHIL # BLD MANUAL: 0 THOUSAND/UL (ref 0–0.4)
EOSINOPHIL NFR BLD MANUAL: 0 % (ref 0–6)
ERYTHROCYTE [DISTWIDTH] IN BLOOD BY AUTOMATED COUNT: 21.2 % (ref 11.6–15.1)
GFR SERPL CREATININE-BSD FRML MDRD: 20 ML/MIN/1.73SQ M
GLUCOSE SERPL-MCNC: 188 MG/DL (ref 65–140)
GLUCOSE SERPL-MCNC: 195 MG/DL (ref 65–140)
GLUCOSE SERPL-MCNC: 195 MG/DL (ref 65–140)
GLUCOSE SERPL-MCNC: 210 MG/DL (ref 65–140)
GLUCOSE SERPL-MCNC: 225 MG/DL (ref 65–140)
GLUCOSE SERPL-MCNC: 245 MG/DL (ref 65–140)
GLUCOSE SERPL-MCNC: 254 MG/DL (ref 65–140)
GLUCOSE SERPL-MCNC: 290 MG/DL (ref 65–140)
GLUCOSE SERPL-MCNC: 299 MG/DL (ref 65–140)
GLUCOSE SERPL-MCNC: 343 MG/DL (ref 65–140)
HCO3 BLDA-SCNC: 15.4 MMOL/L (ref 22–28)
HCT VFR BLD AUTO: 24.7 % (ref 34.8–46.1)
HGB BLD-MCNC: 7.8 G/DL (ref 11.5–15.4)
LYMPHOCYTES # BLD AUTO: 0.88 THOUSAND/UL (ref 0.6–4.47)
LYMPHOCYTES # BLD AUTO: 2 % (ref 14–44)
MCH RBC QN AUTO: 24.8 PG (ref 26.8–34.3)
MCHC RBC AUTO-ENTMCNC: 31.6 G/DL (ref 31.4–37.4)
MCV RBC AUTO: 79 FL (ref 82–98)
MONOCYTES # BLD AUTO: 1.77 THOUSAND/UL (ref 0–1.22)
MONOCYTES NFR BLD: 4 % (ref 4–12)
NEUTROPHILS # BLD MANUAL: 41.56 THOUSAND/UL (ref 1.85–7.62)
NEUTS BAND NFR BLD MANUAL: 36 % (ref 0–8)
NEUTS SEG NFR BLD AUTO: 58 % (ref 43–75)
NON VENT ROOM AIR: 21 %
NRBC BLD AUTO-RTO: 0 /100 WBCS
O2 CT BLDA-SCNC: 12.2 ML/DL (ref 16–23)
OVALOCYTES BLD QL SMEAR: PRESENT
OXYHGB MFR BLDA: 94 % (ref 94–97)
PCO2 BLDA: 24.7 MM HG (ref 36–44)
PH BLDA: 7.41 [PH] (ref 7.35–7.45)
PLATELET # BLD AUTO: 178 THOUSANDS/UL (ref 149–390)
PLATELET BLD QL SMEAR: ADEQUATE
PO2 BLDA: 100 MM HG (ref 75–129)
POTASSIUM SERPL-SCNC: 3.5 MMOL/L (ref 3.5–5.3)
PROCALCITONIN SERPL-MCNC: 229.57 NG/ML
PROT SERPL-MCNC: 4.9 G/DL (ref 6.4–8.2)
RBC # BLD AUTO: 3.14 MILLION/UL (ref 3.81–5.12)
SALMONELLA DNA SPEC QL NAA+PROBE: NORMAL
SHIGA TOXIN STX GENE SPEC NAA+PROBE: NORMAL
SHIGELLA DNA SPEC QL NAA+PROBE: NORMAL
SODIUM SERPL-SCNC: 128 MMOL/L (ref 136–145)
SPECIMEN SOURCE: ABNORMAL
TOTAL CELLS COUNTED SPEC: 100
UNIT DISPENSE STATUS: NORMAL
UNIT DISPENSE STATUS: NORMAL
UNIT PRODUCT CODE: NORMAL
UNIT PRODUCT CODE: NORMAL
UNIT RH: NORMAL
UNIT RH: NORMAL
WBC # BLD AUTO: 44.21 THOUSAND/UL (ref 4.31–10.16)

## 2019-02-06 PROCEDURE — 97163 PT EVAL HIGH COMPLEX 45 MIN: CPT

## 2019-02-06 PROCEDURE — 93306 TTE W/DOPPLER COMPLETE: CPT

## 2019-02-06 PROCEDURE — 84145 PROCALCITONIN (PCT): CPT | Performed by: NURSE PRACTITIONER

## 2019-02-06 PROCEDURE — G8987 SELF CARE CURRENT STATUS: HCPCS

## 2019-02-06 PROCEDURE — 80053 COMPREHEN METABOLIC PANEL: CPT | Performed by: NURSE PRACTITIONER

## 2019-02-06 PROCEDURE — 36600 WITHDRAWAL OF ARTERIAL BLOOD: CPT

## 2019-02-06 PROCEDURE — 87040 BLOOD CULTURE FOR BACTERIA: CPT | Performed by: INTERNAL MEDICINE

## 2019-02-06 PROCEDURE — 97167 OT EVAL HIGH COMPLEX 60 MIN: CPT

## 2019-02-06 PROCEDURE — 85027 COMPLETE CBC AUTOMATED: CPT | Performed by: NURSE PRACTITIONER

## 2019-02-06 PROCEDURE — 93306 TTE W/DOPPLER COMPLETE: CPT | Performed by: INTERNAL MEDICINE

## 2019-02-06 PROCEDURE — 99233 SBSQ HOSP IP/OBS HIGH 50: CPT | Performed by: INTERNAL MEDICINE

## 2019-02-06 PROCEDURE — G8988 SELF CARE GOAL STATUS: HCPCS

## 2019-02-06 PROCEDURE — 82948 REAGENT STRIP/BLOOD GLUCOSE: CPT

## 2019-02-06 PROCEDURE — 82805 BLOOD GASES W/O2 SATURATION: CPT | Performed by: NURSE PRACTITIONER

## 2019-02-06 PROCEDURE — G8979 MOBILITY GOAL STATUS: HCPCS

## 2019-02-06 PROCEDURE — 85007 BL SMEAR W/DIFF WBC COUNT: CPT | Performed by: NURSE PRACTITIONER

## 2019-02-06 PROCEDURE — G8978 MOBILITY CURRENT STATUS: HCPCS

## 2019-02-06 RX ORDER — FUROSEMIDE 10 MG/ML
60 INJECTION INTRAMUSCULAR; INTRAVENOUS ONCE
Status: DISCONTINUED | OUTPATIENT
Start: 2019-02-06 | End: 2019-02-06

## 2019-02-06 RX ORDER — INSULIN GLARGINE 100 [IU]/ML
25 INJECTION, SOLUTION SUBCUTANEOUS
Status: DISCONTINUED | OUTPATIENT
Start: 2019-02-06 | End: 2019-02-17

## 2019-02-06 RX ORDER — FUROSEMIDE 10 MG/ML
80 INJECTION INTRAMUSCULAR; INTRAVENOUS ONCE
Status: COMPLETED | OUTPATIENT
Start: 2019-02-06 | End: 2019-02-06

## 2019-02-06 RX ORDER — FERROUS SULFATE 325(65) MG
325 TABLET ORAL
Status: DISCONTINUED | OUTPATIENT
Start: 2019-02-06 | End: 2019-02-20 | Stop reason: HOSPADM

## 2019-02-06 RX ORDER — POTASSIUM CHLORIDE 20 MEQ/1
40 TABLET, EXTENDED RELEASE ORAL ONCE
Status: COMPLETED | OUTPATIENT
Start: 2019-02-06 | End: 2019-02-06

## 2019-02-06 RX ADMIN — INSULIN GLARGINE 25 UNITS: 100 INJECTION, SOLUTION SUBCUTANEOUS at 22:10

## 2019-02-06 RX ADMIN — POTASSIUM CHLORIDE 40 MEQ: 1500 TABLET, EXTENDED RELEASE ORAL at 10:26

## 2019-02-06 RX ADMIN — HEPARIN SODIUM 5000 UNITS: 5000 INJECTION INTRAVENOUS; SUBCUTANEOUS at 06:08

## 2019-02-06 RX ADMIN — PANCRELIPASE 48000 UNITS: 24000; 76000; 120000 CAPSULE, DELAYED RELEASE PELLETS ORAL at 09:01

## 2019-02-06 RX ADMIN — CEFEPIME HYDROCHLORIDE 1000 MG: 1 INJECTION, POWDER, FOR SOLUTION INTRAMUSCULAR; INTRAVENOUS at 16:32

## 2019-02-06 RX ADMIN — INSULIN LISPRO 5 UNITS: 100 INJECTION, SOLUTION INTRAVENOUS; SUBCUTANEOUS at 13:45

## 2019-02-06 RX ADMIN — HEPARIN SODIUM 5000 UNITS: 5000 INJECTION INTRAVENOUS; SUBCUTANEOUS at 22:09

## 2019-02-06 RX ADMIN — METRONIDAZOLE 500 MG: 500 TABLET ORAL at 13:43

## 2019-02-06 RX ADMIN — FUROSEMIDE 20 MG: 10 INJECTION, SOLUTION INTRAMUSCULAR; INTRAVENOUS at 10:30

## 2019-02-06 RX ADMIN — CEFEPIME HYDROCHLORIDE 1000 MG: 1 INJECTION, POWDER, FOR SOLUTION INTRAMUSCULAR; INTRAVENOUS at 03:18

## 2019-02-06 RX ADMIN — INSULIN LISPRO 5 UNITS: 100 INJECTION, SOLUTION INTRAVENOUS; SUBCUTANEOUS at 18:38

## 2019-02-06 RX ADMIN — OXYCODONE HYDROCHLORIDE 5 MG: 5 TABLET ORAL at 20:42

## 2019-02-06 RX ADMIN — INSULIN LISPRO 1 UNITS: 100 INJECTION, SOLUTION INTRAVENOUS; SUBCUTANEOUS at 09:00

## 2019-02-06 RX ADMIN — PANCRELIPASE 48000 UNITS: 24000; 76000; 120000 CAPSULE, DELAYED RELEASE PELLETS ORAL at 13:31

## 2019-02-06 RX ADMIN — METRONIDAZOLE 500 MG: 500 TABLET ORAL at 06:08

## 2019-02-06 RX ADMIN — INSULIN LISPRO 5 UNITS: 100 INJECTION, SOLUTION INTRAVENOUS; SUBCUTANEOUS at 13:32

## 2019-02-06 RX ADMIN — FUROSEMIDE 60 MG: 10 INJECTION, SOLUTION INTRAMUSCULAR; INTRAVENOUS at 10:19

## 2019-02-06 RX ADMIN — HEPARIN SODIUM 5000 UNITS: 5000 INJECTION INTRAVENOUS; SUBCUTANEOUS at 13:45

## 2019-02-06 RX ADMIN — METRONIDAZOLE 500 MG: 500 TABLET ORAL at 22:09

## 2019-02-06 RX ADMIN — PANCRELIPASE 48000 UNITS: 24000; 76000; 120000 CAPSULE, DELAYED RELEASE PELLETS ORAL at 18:11

## 2019-02-06 RX ADMIN — FERROUS SULFATE TAB 325 MG (65 MG ELEMENTAL FE) 325 MG: 325 (65 FE) TAB at 10:26

## 2019-02-06 NOTE — PHYSICAL THERAPY NOTE
PT EVALUATION  Time-In: 1216  Time-Out: 1235  Total Time: 19 minutes    75 y o     [id number]    Weakness [R53 1]  Anemia [D64 9]  Acute kidney injury ([place] [address] ) [N17 9]  Sepsis ([place] [address] ) [A41 9]    Length of Stay: 2    Past Medical History:   Diagnosis Date    Cancer [facility])     Breast; Last Assessed: 8/29/2016    Carpal tunnel syndrome     unspecified laterality; Onset: 4/23/2012    Cellulitis     Last Assessesd: 8/30/2012    Diabetes mellitus out of control ([place] [address] )     Last Assessed: 11/3/2014    Fatigue     Last Assessed: 2/11/014    Fracture of toe     Last Assessed: 11/17/2014    Ovarian cancer ([place] [address] )     Last Assessed: 1/7/2016    Rectal cancer [facility])          Past Surgical History:   Procedure Laterality Date    COLON SURGERY      MASTECTOMY      bilateral    PANCREATECTOMY  2010    Proximal Subtotal (whipple procedure)      02/06/19 1235   Note Type   Note type Eval only   Pain Assessment   Pain Assessment No/denies pain   Pain Score No Pain   Home Living   Type of [address] [place] [address] Two level; Able to live on main level with bedroom/bathroom;Stairs to enter with rails  (2 MAU w/ railing)   Bathroom Shower/Tub Walk-in shower   Bathroom Equipment Built-in shower seat;Grab bars in shower;Grab bars around toilet   [address]  (rollator)   Prior Function   Level of [place] Independent with ADLs and functional mobility   Lives With Son   Receives Help From Family   ADL Assistance Independent   IADLs Independent   Falls in the last 6 months 0   Vocational Retired  (retired bilingual psycho therapist)   Comments Pt ambulates with rollator, (+)  just to doctors appointments, (I) IADLS, (I) ADLS   Restrictions/Precautions   Weight Bearing Precautions Per Order No   Other Precautions Fall Risk;Telemetry;Multiple lines;Limb alert   General   Family/Caregiver Present Yes  (son)   Cognition   Overall Cognitive Status WFL   Arousal/Participation Alert   Orientation Level Oriented X4   Memory Within functional limits   Following Commands Follows one step commands without difficulty   RUE Assessment   RUE Assessment WFL  (see OT eval for details)   LUE Assessment   LUE Assessment WFL  (see OT eval for details)   RLE Assessment   RLE Assessment WFL  (3+/5)   LLE Assessment   LLE Assessment WFL  (3+/5)   Coordination   Movements are Fluid and Coordinated 1   Sensation WFL   Light Touch   RLE Light Touch Grossly intact  (noted edema in LE)   LLE Light Touch Grossly intact  (noted edema in BLE)   Proprioception   RLE Proprioception Grossly intact   LLE Proprioception Grossly Intact   Bed Mobility   Additional Comments Pt received sitting OOB in chair upon arrival; bed mobility not observed  Transfers   Sit to Stand 4  Minimal assistance   Additional items Assist x 2;Armrests; Increased time required;Verbal cues   Stand to Sit 4  Minimal assistance   Additional items Assist x 2; Increased time required;Verbal cues;Armrests   Additional Comments Verbal cueing for hand placement, safety, and technique  Ambulation/Elevation   Gait pattern Short stride; Forward Flexion;Decreased foot clearance   Gait Assistance 4  Minimal assist   Additional items Assist x 2; Tactile cues; Verbal cues   Assistive Device Rolling walker   Distance Pt ambulated 4ft forwards and backwards  Pt provided with cueing for sequencing, safety, and step length  Pt noted to be unsteady and ?intermittent leg buckling)  Balance   Static Sitting Good   Dynamic Sitting Fair +   Static Standing Fair   Dynamic Standing Fair -   Ambulatory Fair -   Endurance Deficit   Endurance Deficit Yes   Endurance Deficit Description fatigue and deconditioning   Activity Tolerance   Activity Tolerance Patient limited by fatigue   Medical Staff [facility], OT   Nurse Made Aware yes; EDITH Tam   Assessment   Prognosis Fair   Problem List Decreased strength;Decreased endurance; Impaired balance;Decreased mobility; Decreased safety awareness   Assessment PT consult received and evaluation complete  Pt is 75 y o  Female admitted from home w/ son for pyelonephritis  Pt agreeable to participate w/ therapy and identified by 2 patient identifiers: name and birth date  Per Dr Mamie Fu and Juan hilliard for activity with patient  Precautions include: fall risk, multiple lines and telemetry  Pt presents sitting in bedside chair with B SCDs donned and son present  PTA pt was independent w/ all functional mobility w/ rollator, lived in multi-level home and had 2 MAU w/ railing (son is going to set patient up with first floor setup following this admission), independent ADLS, and  independent w/ IADLS, yes driving, no recent falls 0, and retired  Pt presents w/ RLE MMT 3+/5, LLE MMT 3+/5, sit<>stand minAx1 w/ verbal cueing, gait training minAx2 w/ RW and verbal cueing, and denies pain just reports fatigue  At end of PT evaluation pt left sitting in bedside chair w/ lines intact, son present, all needs in reach, call bell and phone in hand, and RN aware of patient status  Pt would benefit from continued skilled PT for deficits in strength, balance, locomotion, stair negotiation, functional endurance, functional mobility and safety awareness with mobility At this time recommend d/c short term rehab   History: co-morbidities, fall risk, mult-level home, MAU and multiple lines Exam: strength, balance, locomotion, stair negotiation, functional endurance, functional mobility and safety awareness with mobility Barthel Index:50 Modified [place]:4Clinical: unstable/unpredictable: fall risk, multiple lines, ongoing management of medical status, decreased safety awareness, use of AD and 2 person assist Complexity: high   Barriers to Discharge Inaccessible home environment   Barriers to Discharge Comments 2 MAU   Goals   Patient Goals "to get stronger and be able to walk"   LTG Expiration Date 02/20/19   Long Term Goal #1 In 14 days pt will demonstrate: bed mobility mod (I) to promote OOB, sit<>stand and functional transfers mod (I) w/ RW to promote return to walking, gait training 150ft mod (I) w/ RW to promote return to ambulation/gait, 2 steps mod (I) w/ railing for home entrance, improve BLE by 1/2 grade strength to optimize functional mobility, improve balance by 1 grade to decrease fall risk, improve activity tolerance to >45 minutes w/o rest to optimize participation with therapy to progress towards IP PT goals and pt goals, pt and family education on PT risk, role, benefits, POC, goals, and recommendations to optimize patient outcomes, patient functional, optimize LOS and promote discharge to least restrictive environment  Treatment Day 0   Plan   Treatment/Interventions Functional transfer training;LE strengthening/ROM; Therapeutic exercise; Endurance training;Patient/family training;Equipment eval/education; Bed mobility;Gait training;Spoke to nursing;OT;Family   PT Frequency (4-5x/wk)   Recommendation   Recommendation Short-term skilled PT   Equipment Recommended Walker   PT - OK to Discharge Yes  (yes to rehab)   Modified [place] Scale   Modified Leigh Scale 4   Barthel Index   Feeding 10   Bathing 0   Grooming Score 5   Dressing Score 5   Bladder Score 10   Bowels Score 10   Toilet Use Score 5   Transfers (Bed/Chair) Score 5   Mobility (Level Surface) Score 0   Stairs Score 0   Barthel Index Score 50       Nelly Mendoza, PT ,DPT

## 2019-02-06 NOTE — SOCIAL WORK
CM met with the patient and her son to do a general SW assessment  The patient lives in a two story home with her son  He plans to set up a first floor set up for her at discharge  She uses a cane or RW for ambulatory needs  The patients son transports to all appointments  Hx of VNA after her wipple surgery about 9yrs ago  No hx of STR needs  Son works part time jobs and is not always home with the patient  The [facility] for rx needs  PCP is Brynn Montoya Cm following for discharge needs

## 2019-02-06 NOTE — PLAN OF CARE
Problem: OCCUPATIONAL THERAPY ADULT  Goal: Performs self-care activities at highest level of function for planned discharge setting  See evaluation for individualized goals  Treatment Interventions: ADL retraining, Functional transfer training, UE strengthening/ROM, Endurance training, Patient/family training, Equipment evaluation/education, Compensatory technique education          See flowsheet documentation for full assessment, interventions and recommendations  Limitation: Decreased ADL status, Decreased UE strength, Decreased endurance  Prognosis: Good  Assessment: Pt is a 72y/o female admitted to the hospital 2* symptoms of "not feeling well", generalized weakness, and inability to walk  Pt noted with lactic acidosis, severe sepsis, anemia, and possible pyelonephritis  PTA pt states independence with all aspects of her ADLs, transfers, ambulation--with RW--limited distances; +, +home alone  During inital eval, pt demonstrated deficits with her functional balance, functional mobility, ADL status, activity tolerance(currently fair=15-20mins), b/l UE strength, and transfers safety  Pt would benefit from continued OT tx for the above deficits  3-5xwk/1-2wks        OT Discharge Recommendation: Short Term Rehab

## 2019-02-06 NOTE — Clinical Note
Pt will demonstrate proper walker/transfer safety 100% of the time  Pt will demonstrate g/g- balance with all functional activities  Pt will demonstrate mod I with their UE and LE bathing/dresssing  Pt will tolerate continued cognitive/home-safety assessment and appropriate d/c recommendations will be provided  Pt will demonstrate mod I with their bed mobility to facilitate EOB ADLs  Pt will demonstrate mod I with their sit-stand transfers to assist with completion of their LE dressing  Pt will independently demonstrate knowledge and application of proper energy conservation techniques 100% of the time  Pt will demonstrate improved b/l UE strength by 1/2 MM grade to assist with ADLs/transfers  Pt will independently verbalize 2-3 potential fall risks/transfer safety hazards and their appropriate compensation techniques  Pt will independently demonstrate knowledge and application of proper THP's 100% of the time  Pt will demonstrate improved activity tolerance to good(20-30mins) and standing tolerance to 3-5mins to assist with ADLs  Pt will independently verbalize 2-3 personal cognitive deficits and their appropriate compensation techniques  Pt will independently demonstrate knowledge and application of proper energy conservation techniques 100% of the time  Pt will independently demonstrate knowledge and application of proper body mechanics/back safety 100% of the time

## 2019-02-06 NOTE — PROGRESS NOTES
Progress Note - Critical Care   Jamia Vasquez 75 y o  female MRN: [id number]  Unit/Bed#: ICU 15 Encounter: [id number]    Assessment/Plan:  1  Severe sepsis with polymicrobial bacteremia, most likely from a gastrointestinal tract infection  · Antibiotics per Infectious Disease recommendations, currently on cefepime, Vanco, Flagyl, day 3  · Repeat procalcitonin level is pending  · Will continue to follow her abdominal exam closely  2  Acute hypoxic respiratory failure likely multifactorial related to her chronic heart failure as well as 1    · Continue oxygen therapy for now with a goal to maintain her saturations greater than 90%  · Encouragement for cough, deep breathing and incentive spirometer  3  Severe anemia status post blood transfusion, possibly related to iron deficiency and chronic disease  · Will continue to monitor the patient's hemoglobin closely  · Threshold for transfusion will be hemoglobin of less than 7  4  Acute kidney injury  · Will continue to monitor her urine output and renal indices closely  5  Extensive he answer history including breath, pancreatic, colon with possible recurrent disease  · She will need outpatient follow-up to determine if she has recurrent disease given her extensive lymphadenopathy and the lesions on the liver that were identified on her recent CT scan  6  Left enlarged kidney with possible pyelonephritis  · Antibiotics as noted above    Critical Care Time:   Documented critical care time excludes any procedures documented elsewhere   It also excludes any family updates    _____________________________________________________________________    HPI/24hr events:   Feels better this morning    Medications:    Current Facility-Administered Medications:  acetaminophen 650 mg Oral 4x Daily PRN ESE Lynch    cefepime 1,000 mg Intravenous Q12H Shaan Howe MD Last Rate: Stopped (02/06/19 0401)   heparin (porcine) 5,000 Units Subcutaneous [facility] ESE Lynch insulin lispro 1-6 Units Subcutaneous Q6H [facility] ESE Ross    metroNIDAZOLE 500 mg Oral [facility] Angel Ramirez MD    oxyCODONE 5 mg Oral Q4H PRN ESE Ross    pancrelipase (Lip-Prot-Amyl) 24,000 Units Oral TID With Meals ESE Ross    vancomycin 15 mg/kg Intravenous Q24H ESE Ross Last Rate: Stopped (02/06/19 0036)              Physical exam:  Vitals: Body mass index is 26 3 kg/m²  Blood pressure 90/50, pulse 64, temperature 98 °F (36 7 °C), temperature source Oral, resp  rate 20, height 5' 5" (1 651 m), weight 71 7 kg (158 lb 1 1 oz), SpO2 98 % ,  Temp  Min: 97 5 °F (36 4 °C)  Max: 101 5 °F (38 6 °C)  IBW: 57 kg    SpO2: 98 %  SpO2 Activity: At Rest  O2 Device: Nasal cannula (On for pt's comfort, per pt request )      Intake/Output Summary (Last 24 hours) at 02/06/19 0750  Last data filed at 02/06/19 0401   Gross per 24 hour   Intake             1240 ml   Output              325 ml   Net              915 ml       Invasive/non-invasive ventilation settings:   Respiratory    Lab Data (Last 4 hours)    None         O2/Vent Data (Last 4 hours)    None              Invasive Devices     Peripheral Intravenous Line            Peripheral IV 02/05/19 Left Wrist less than 1 day    Peripheral IV 02/05/19 Left;Upper Arm less than 1 day                  Physical Exam:  Gen:  Awake and alert  HEENT:  Pupils are equal round and reactive to light  The oropharynx is dry but without erythema  Neck:  Supple negative for lymphadenopathy  Chest:  Essentially clear to auscultation bilaterally  Cor:  Regular rate and rhythm  Abd:  Soft and nontender  Ext:  There is edema in her bilateral lower extremities  Neuro:  Awake and alert, able to move her extremities without difficulty  Skin:  Warm and dry      Diagnostic Data:  Lab: I have personally reviewed pertinent lab results     CBC:     Results from last 7 days  Lab Units 02/06/19  0602 02/05/19  1619 02/05/19  0504 02/04/19 2027   WBC Thousand/uL 44 21*  --  47 32* 21 90*   HEMOGLOBIN g/dL 7 8* 9 6* 5 9* 7 2*   HEMATOCRIT % 24 7*  --  19 9* 25 0*   PLATELETS Thousands/uL 178 189 220 318       CMP:     Results from last 7 days  Lab Units 02/06/19  0602 02/05/19  0504 02/04/19 2027   SODIUM mmol/L 128* 132* 133*   POTASSIUM mmol/L 3 5 3 5 4 2   CHLORIDE mmol/L 99* 102 100   CO2 mmol/L 16* 17* 17*   BUN mg/dL 65* 46* 45*   CREATININE mg/dL 2 33* 1 87* 1 78*   CALCIUM mg/dL 7 4* 7 4* 8 6   ALK PHOS U/L 276* 387* 557*   ALT U/L 164* 149* 70   AST U/L 204* 266* 102*     PT/INR:   No results found for: PT, INR,   Magnesium:     Results from last 7 days  Lab Units 02/05/19  0504   MAGNESIUM mg/dL 1 8     Phosphorous:       Microbiology:    Results from last 7 days  Lab Units 02/05/19  1418 02/04/19 2029 02/04/19 2027   GRAM STAIN RESULT   --  Gram negative rods  Gram positive cocci in pairs Gram negative rods   C DIFF TOXIN B  NEGATIVE for C difficle toxin by PCR    --   --        Imaging:      Cardiac lab/EKG/telemetry/ECHO:       VTE Prophylaxis:  Heparin  Code Status: Level 1 - Full Code    ESE Patel    Portions of the record may have been created with voice recognition software  Occasional wrong word or "sound a like" substitutions may have occurred due to the inherent limitations of voice recognition software  Read the chart carefully and recognize, using context, where substitutions have occurred

## 2019-02-06 NOTE — PROGRESS NOTES
Vancomycin Assessment    Christin Galvan is a 75 y o  female with a PMH significant for breast cancer, rectal cancer, ovarian cancer, pancreatic cancer s/p Whipple procedure, and diabetes mellitus who is currently receiving vancomycin 1000 mg q24h for enterococcal bacteremia  She presented to the emergency department on the evening of 2/4/19 with profound weakness and inability to ambulate  At that time, she was found to be febrile with a temperature of 100 9 F and have a lactic acidosis with a lactate of 6 8  Her blood cultures are now positive for E  coli and Enterococcus  Sensitivities are pending  Relevant clinical data and objective history reviewed:  Creatinine   Date Value Ref Range Status   02/06/2019 2 33 (H) 0 60 - 1 30 mg/dL Final     Comment:     Standardized to IDMS reference method   02/05/2019 1 87 (H) 0 60 - 1 30 mg/dL Final     Comment:     Standardized to IDMS reference method   02/04/2019 1 78 (H) 0 60 - 1 30 mg/dL Final     Comment:     Standardized to IDMS reference method     /51   Pulse 78   Temp 98 °F (36 7 °C) (Oral)   Resp 20   Ht 5' 5" (1 651 m)   Wt 71 7 kg (158 lb 1 1 oz)   SpO2 96%   BMI 26 30 kg/m²   I/O last 3 completed shifts: In: 4628 3 [P O :240; Blood:700; IV Piggyback:3688 3]  Out: 325 [Urine:325]  Lab Results   Component Value Date/Time    BUN 65 (H) 02/06/2019 06:02 AM    WBC 44 21 (HH) 02/06/2019 06:02 AM    HGB 7 8 (L) 02/06/2019 06:02 AM    HCT 24 7 (L) 02/06/2019 06:02 AM    MCV 79 (L) 02/06/2019 06:02 AM     02/06/2019 06:02 AM     Temp Readings from Last 3 Encounters:   02/06/19 98 °F (36 7 °C) (Oral)   01/25/19 98 4 °F (36 9 °C) (Tympanic)   11/25/18 99 3 °F (37 4 °C) (Temporal)     Vancomycin Days of Therapy: 3    Assessment/Plan  The patient is currently on vancomycin utilizing scheduled dosing based on actual body weight  Baseline risks associated with therapy include: dehydration and ANNE     The patient is currently receiving 1000 mg q24h and is clinically appropriate and dose will be continued  Pharmacy will also follow closely for s/sx of nephrotoxicity, infusion reactions and appropriateness of therapy  BMP and CBC will be ordered per protocol  The patient has not been urinating on her own requiring multiple straight caths  She had a significant increase in Scr this morning, so a dose of Lasix was administered this morning  The patient still did not urinate following this dose, so a Mendoza catheter was placed this afternoon, with an output of 500 ml upon insertion  Plan for trough as patient approaches steady state, prior to the 4th  dose at approximately 2330 on 2/7/19 with a target trough of 15-20 due to infection severity  Pharmacy will continue to follow the patients culture results and clinical progress daily      Lissy Marques, PharmD, [address] [facility] and Internal Medicine Clinical Pharmacist

## 2019-02-06 NOTE — OCCUPATIONAL THERAPY NOTE
OccupationalTherapy Evaluation(time=3093-3386)     Patient Name: Jacob Vizcarra  HVEKC'L Date: 2/6/2019  Problem List  Patient Active Problem List   Diagnosis    Benign essential hypertension    Vitamin D deficiency    Type 2 diabetes mellitus ([place] [address] )    Breast cancer ([place] [address] )    Carcinoma of head of pancreas ([place] [address] )    Carcinoma of rectosigmoid junction (HCC)    Acid reflux    Arthritis    Other hemorrhoids    Loose stools    Gait abnormality    Localized edema    Sepsis ([place] [address] )    ANNE (acute kidney injury) ([place] [address] )    Lactic acidosis    Anemia    Leukocytosis    Pyelonephritis    Ascites     Past Medical History  Past Medical History:   Diagnosis Date    Cancer [facility])     Breast; Last Assessed: 8/29/2016    Carpal tunnel syndrome     unspecified laterality; Onset: 4/23/2012    Cellulitis     Last Assessesd: 8/30/2012    Diabetes mellitus out of control ([place] [address] )     Last Assessed: 11/3/2014    Fatigue     Last Assessed: 2/11/014    Fracture of toe     Last Assessed: 11/17/2014    Ovarian cancer ([place] [address] )     Last Assessed: 1/7/2016    Rectal cancer [facility])      Past Surgical History  Past Surgical History:   Procedure Laterality Date    COLON SURGERY      MASTECTOMY      bilateral    PANCREATECTOMY  2010    Proximal Subtotal (whipple procedure)         02/06/19 1236   Note Type   Note type Eval only   Restrictions/Precautions   Other Precautions Fall Risk;Pain;Telemetry;Multiple lines   Pain Assessment   Pain Assessment No/denies pain   Home Living   Type of Home House   Home Layout Multi-level; Able to live on main level with bedroom/bathroom  (2 griffin with railing)   [address] chair   Home Equipment Walker;Cane   Prior Function   Lives With [address] in the last 6 months 0   Lifestyle   Autonomy PTA pt states independence with all aspects of her ADLs, transfers, ambulation--with RW--limited distances; +, +home alone   Reciprocal Relationships 2 children   Service to Others retired bilingual psychotherapist   Intrinsic Gratification watching [address] ([place]) [address] "I am feeling better today "   ADL   Where Assessed Chair   Eating Assistance 5  Supervision/Setup   Grooming Assistance 5  Supervision/Setup   [address] 4  Minimal Assistance   LB Bathing Assistance 4  Minimal Assistance   UB Dressing Assistance 4  [address] 3  Moderate Assistance   Transfers   Sit to Stand 4  Minimal assistance   Additional items Assist x 2; Increased time required;Verbal cues   Stand to Sit 4  Minimal assistance   Additional items Assist x 2; Increased time required;Verbal cues   Functional Mobility   Functional Mobility 4  Minimal assistance   Additional Comments x2   Additional items Rolling walker   Balance   Static Sitting Good   Dynamic Sitting Fair +   Static Standing Fair   Dynamic Standing Fair -   Activity Tolerance   Activity Tolerance Patient limited by fatigue   Medical Staff Made Aware nsg, P T     RUE Assessment   RUE Assessment WFL   RUE Strength   RUE Overall Strength Within Functional Limits - able to perform ADL tasks with strength  (4/5 throughout)   LUE Assessment   LUE Assessment WFL   LUE Strength   LUE Overall Strength Within Functional Limits - able to perform ADL tasks with strength  (4/5 throughout)   Hand Function   Gross Motor Coordination Functional   Fine Motor Coordination Functional   Sensation   Light Touch No apparent deficits   Proprioception   Proprioception No apparent deficits   Vision-Basic Assessment   Current Vision Wears glasses all the time   Vision - Complex Assessment   Acuity Able to read clock/calendar on wall without difficulty   Perception   Inattention/Neglect Appears intact   Cognition   Overall Cognitive Status WFL   Arousal/Participation Alert   Attention Within functional limits   Orientation Level Oriented X4   Memory Within functional limits   Following Commands Follows all commands and directions without difficulty   Assessment   Limitation Decreased ADL status; Decreased UE strength;Decreased endurance   Prognosis Good   Assessment Pt is a 74y/o female admitted to the hospital 2* symptoms of "not feeling well", generalized weakness, and inability to walk  Pt noted with lactic acidosis, severe sepsis, anemia, and possible pyelonephritis  PTA pt states independence with all aspects of her ADLs, transfers, ambulation--with RW--limited distances; +, +home alone  During inital eval, pt demonstrated deficits with her functional balance, functional mobility, ADL status, activity tolerance(currently fair=15-20mins), b/l UE strength, and transfers safety  Pt would benefit from continued OT tx for the above deficits  3-5xwk/1-2wks  Goals   Patient Goals "to be able to get stronger "   STG Time Frame 3-5   Short Term Goal #1 Pt will demonstrate improved activity tolerance to good(20-30mins) and standing tolerance to 3-5mins to assist with ADLs  Short Term Goal #2 Pt will demonstrate mod I with their sit-stand transfers to assist with completion of their LE dressing  Short Term Goal  Pt will demonstrate proper walker/transfer safety 100% of the time  LTG Time Frame (5-10days)   Long Term Goal #1 Pt will demonstrate g/g- balance with all functional activities  Long Term Goal #2 Pt will demonstrate mod I with their UE and LE bathing/dresssing  Long Term Goal Pt will demonstrate improved b/l UE strength by 1/2 MM grade to assist with ADLs/transfers  Plan   Treatment Interventions ADL retraining;Functional transfer training;UE strengthening/ROM; Endurance training;Patient/family training;Equipment evaluation/education; Compensatory technique education   Goal Expiration Date 02/17/19   Treatment Day 0   OT Frequency 3-5x/wk   Recommendation   OT Discharge Recommendation Short Term Rehab   Barthel Index   Feeding 10   Bathing 0   Grooming Score 5   Dressing Score 5   Bladder Score 10   Bowels Score 10   Toilet Use Score 5   Transfers (Bed/Chair) Score 10   Mobility (Level Surface) Score 0   Stairs Score 0   Barthel Index Score 55   Stef Hoang, OT

## 2019-02-06 NOTE — PROGRESS NOTES
Progress Note - Infectious Disease   Wendy Todd 75 y o  female MRN: [id number]  Unit/Bed#: ICU 15 Encounter: [id number]      Impression/Plan:  1  Severe sepsis-POA  Fever, leukocytosis, tachycardia, lactic acidosis  Appears to be secondary to polymicrobial bacteremia of non defined source  She remains quite ill, however fortunately she has remained hemodynamically stable despite the systemic illness  She seems to be tolerating the antibiotics without difficulty  The white cell count is rising, and the procalcitonin level remains quite high   -continue vancomycin for now at current dose  -pharmacy consult for vancomycin trough manage  -continue cefepime Flagyl for now  -recheck procalcitonin level   -follow up cultures and sensitivities and adjust antibiotics as needed  -monitor CBC with diff and creatinine  -if liver function tests remain elevated, recommend right upper quadrant ultrasound   -supportive care     2  Polymicrobial bacteremia-suspect intra-abdominal or enteric source  No definitive source has been appreciated despite extensive imaging although CT of the abdomen and pelvis does suggest the possibility of pyelonephritis  Urinalysis is nondiagnostic although was collected after antibiotics were already given  The patient remains hemodynamically stable despite the bacteremia     -antibiotics as above  -follow up ID and sensitivities and adjust antibiotics as needed  -follow up repeat blood cultures  -check echocardiogram  -follow-up urine culture  -no additional ID workup for now     3  Acute kidney injury-suspect this is a pre renal issue  Perhaps sepsis is playing a role  The renal function has continued to worsen   -dose adjusted antibiotics as above  -recheck creatinine clearance and if the GFR gets below 20, decrease the cefepime to 1 g IV Q 24 hr  -volume management  -no additional ID workup for now     4  Anemia-quite severe  Elevated LDH suggest the possibility of hemolysis    No definitive evidence of acute blood loss  -workup as per the primary  -transfusion support  -monitor CBC with diff      5  Liver function test abnormalities-possibly secondary to sepsis  Possibly another etiology  CT the abdomen and pelvis does not reveal a definitive source  -recheck LFTs  -if the liver function test remain elevated, recommend right upper quadrant ultrasound  Antibiotics:  Vancomycin 3  Cefepime 3  Flagyl 3    Subjective:  Patient has no fever, chills, sweats; no nausea, vomiting, diarrhea; she has a poor appetite; no cough, shortness of breath; no pain  No new symptoms  No dysuria  Objective:  Vitals:  Temp:  [97 5 °F (36 4 °C)-99 6 °F (37 6 °C)] 98 °F (36 7 °C)  HR:  [] 78  Resp:  [16-28] 20  BP: ()/(34-72) 104/51  SpO2:  [95 %-98 %] 96 %  Temp (24hrs), Av 4 °F (36 9 °C), Min:97 5 °F (36 4 °C), Max:99 6 °F (37 6 °C)  Current: Temperature: 98 °F (36 7 °C)    Physical Exam:   General Appearance:  Alert, interactive, nontoxic, no acute distress  Throat: Oropharynx dry without lesions  Lungs:   Clear to auscultation bilaterally; no wheezes, rhonchi or rales; respirations unlabored   Heart:  RRR; no murmur, rub or gallop   Abdomen:   Soft, non-tender, non-distended, positive bowel sounds  Extremities: No clubbing, cyanosis or edema   Skin: No new rashes or lesions  No draining wounds noted         Labs, Imaging, & Other studies:   All pertinent labs and imaging studies were personally reviewed    Results from last 7 days  Lab Units 19  0602 19  1619 19  0504 19  2027   WBC Thousand/uL 44 21*  --  47 32* 21 90*   HEMOGLOBIN g/dL 7 8* 9 6* 5 9* 7 2*   PLATELETS Thousands/uL 178 189 220 318       Results from last 7 days  Lab Units 19  0602 19  0504 19  2027   SODIUM mmol/L 128* 132* 133*   POTASSIUM mmol/L 3 5 3 5 4 2   CHLORIDE mmol/L 99* 102 100   CO2 mmol/L 16* 17* 17*   BUN mg/dL 65* 46* 45*   CREATININE mg/dL 2 33* 1 87* 1 78*   EGFR ml/min/1 73sq m 20 26 28   CALCIUM mg/dL 7 4* 7 4* 8 6   AST U/L 204* 266* 102*   ALT U/L 164* 149* 70   ALK PHOS U/L 276* 387* 557*       Results from last 7 days  Lab Units 02/05/19  1418 02/04/19 2029 02/04/19 2027   BLOOD CULTURE   --  Enterococcus*  Gram Negative Rashard resembling Escherichia coli*  --    GRAM STAIN RESULT   --  Gram negative rods  Gram positive cocci in pairs Gram negative rods   C DIFF TOXIN B  NEGATIVE for C difficle toxin by PCR    --   --

## 2019-02-07 ENCOUNTER — APPOINTMENT (INPATIENT)
Dept: ULTRASOUND IMAGING | Facility: HOSPITAL | Age: 74
DRG: 871 | End: 2019-02-07
Payer: COMMERCIAL

## 2019-02-07 LAB
ALBUMIN SERPL BCP-MCNC: 1.7 G/DL (ref 3.5–5)
ALP SERPL-CCNC: 276 U/L (ref 46–116)
ALT SERPL W P-5'-P-CCNC: 122 U/L (ref 12–78)
ANION GAP SERPL CALCULATED.3IONS-SCNC: 10 MMOL/L (ref 4–13)
AST SERPL W P-5'-P-CCNC: 72 U/L (ref 5–45)
BACTERIA BLD CULT: ABNORMAL
BILIRUB SERPL-MCNC: 0.45 MG/DL (ref 0.2–1)
BUN SERPL-MCNC: 77 MG/DL (ref 5–25)
CALCIUM SERPL-MCNC: 7.8 MG/DL (ref 8.3–10.1)
CHLORIDE SERPL-SCNC: 104 MMOL/L (ref 100–108)
CO2 SERPL-SCNC: 16 MMOL/L (ref 21–32)
CREAT SERPL-MCNC: 1.92 MG/DL (ref 0.6–1.3)
ERYTHROCYTE [DISTWIDTH] IN BLOOD BY AUTOMATED COUNT: 21.2 % (ref 11.6–15.1)
GFR SERPL CREATININE-BSD FRML MDRD: 25 ML/MIN/1.73SQ M
GLUCOSE SERPL-MCNC: 140 MG/DL (ref 65–140)
GLUCOSE SERPL-MCNC: 151 MG/DL (ref 65–140)
GLUCOSE SERPL-MCNC: 176 MG/DL (ref 65–140)
GLUCOSE SERPL-MCNC: 180 MG/DL (ref 65–140)
GLUCOSE SERPL-MCNC: 186 MG/DL (ref 65–140)
GLUCOSE SERPL-MCNC: 193 MG/DL (ref 65–140)
GLUCOSE SERPL-MCNC: 195 MG/DL (ref 65–140)
GLUCOSE SERPL-MCNC: 196 MG/DL (ref 65–140)
GLUCOSE SERPL-MCNC: 231 MG/DL (ref 65–140)
GLUCOSE SERPL-MCNC: 248 MG/DL (ref 65–140)
GRAM STN SPEC: ABNORMAL
HCT VFR BLD AUTO: 25.8 % (ref 34.8–46.1)
HGB BLD-MCNC: 8.2 G/DL (ref 11.5–15.4)
MCH RBC QN AUTO: 24.6 PG (ref 26.8–34.3)
MCHC RBC AUTO-ENTMCNC: 31.8 G/DL (ref 31.4–37.4)
MCV RBC AUTO: 77 FL (ref 82–98)
PLATELET # BLD AUTO: 208 THOUSANDS/UL (ref 149–390)
POTASSIUM SERPL-SCNC: 3.7 MMOL/L (ref 3.5–5.3)
PROCALCITONIN SERPL-MCNC: 132.14 NG/ML
PROT SERPL-MCNC: 5 G/DL (ref 6.4–8.2)
RBC # BLD AUTO: 3.34 MILLION/UL (ref 3.81–5.12)
SODIUM SERPL-SCNC: 130 MMOL/L (ref 136–145)
WBC # BLD AUTO: 26.51 THOUSAND/UL (ref 4.31–10.16)

## 2019-02-07 PROCEDURE — 99233 SBSQ HOSP IP/OBS HIGH 50: CPT | Performed by: INTERNAL MEDICINE

## 2019-02-07 PROCEDURE — 82948 REAGENT STRIP/BLOOD GLUCOSE: CPT

## 2019-02-07 PROCEDURE — 80053 COMPREHEN METABOLIC PANEL: CPT | Performed by: INTERNAL MEDICINE

## 2019-02-07 PROCEDURE — 76705 ECHO EXAM OF ABDOMEN: CPT

## 2019-02-07 PROCEDURE — 80202 ASSAY OF VANCOMYCIN: CPT | Performed by: NURSE PRACTITIONER

## 2019-02-07 PROCEDURE — 84145 PROCALCITONIN (PCT): CPT | Performed by: INTERNAL MEDICINE

## 2019-02-07 PROCEDURE — 85027 COMPLETE CBC AUTOMATED: CPT | Performed by: NURSE PRACTITIONER

## 2019-02-07 RX ADMIN — INSULIN LISPRO 4 UNITS: 100 INJECTION, SOLUTION INTRAVENOUS; SUBCUTANEOUS at 15:05

## 2019-02-07 RX ADMIN — VANCOMYCIN HYDROCHLORIDE 1000 MG: 1 INJECTION, SOLUTION INTRAVENOUS at 00:20

## 2019-02-07 RX ADMIN — HEPARIN SODIUM 5000 UNITS: 5000 INJECTION INTRAVENOUS; SUBCUTANEOUS at 21:27

## 2019-02-07 RX ADMIN — OXYCODONE HYDROCHLORIDE 5 MG: 5 TABLET ORAL at 16:15

## 2019-02-07 RX ADMIN — INSULIN GLARGINE 25 UNITS: 100 INJECTION, SOLUTION SUBCUTANEOUS at 22:11

## 2019-02-07 RX ADMIN — OXYCODONE HYDROCHLORIDE 5 MG: 5 TABLET ORAL at 01:16

## 2019-02-07 RX ADMIN — HEPARIN SODIUM 5000 UNITS: 5000 INJECTION INTRAVENOUS; SUBCUTANEOUS at 06:27

## 2019-02-07 RX ADMIN — FERROUS SULFATE TAB 325 MG (65 MG ELEMENTAL FE) 325 MG: 325 (65 FE) TAB at 06:30

## 2019-02-07 RX ADMIN — METRONIDAZOLE 500 MG: 500 TABLET ORAL at 06:27

## 2019-02-07 RX ADMIN — INSULIN LISPRO 4 UNITS: 100 INJECTION, SOLUTION INTRAVENOUS; SUBCUTANEOUS at 07:49

## 2019-02-07 RX ADMIN — PANCRELIPASE 48000 UNITS: 24000; 76000; 120000 CAPSULE, DELAYED RELEASE PELLETS ORAL at 07:50

## 2019-02-07 RX ADMIN — CEFEPIME HYDROCHLORIDE 1000 MG: 1 INJECTION, POWDER, FOR SOLUTION INTRAMUSCULAR; INTRAVENOUS at 04:31

## 2019-02-07 RX ADMIN — PANCRELIPASE 48000 UNITS: 24000; 76000; 120000 CAPSULE, DELAYED RELEASE PELLETS ORAL at 15:06

## 2019-02-07 RX ADMIN — CEFTRIAXONE SODIUM 2000 MG: 10 INJECTION, POWDER, FOR SOLUTION INTRAVENOUS at 12:47

## 2019-02-07 RX ADMIN — PANCRELIPASE 48000 UNITS: 24000; 76000; 120000 CAPSULE, DELAYED RELEASE PELLETS ORAL at 22:11

## 2019-02-07 RX ADMIN — INSULIN LISPRO 5 UNITS: 100 INJECTION, SOLUTION INTRAVENOUS; SUBCUTANEOUS at 15:05

## 2019-02-07 RX ADMIN — HEPARIN SODIUM 5000 UNITS: 5000 INJECTION INTRAVENOUS; SUBCUTANEOUS at 15:06

## 2019-02-07 RX ADMIN — INSULIN LISPRO 5 UNITS: 100 INJECTION, SOLUTION INTRAVENOUS; SUBCUTANEOUS at 07:49

## 2019-02-07 NOTE — PROGRESS NOTES
Progress Note - Critical Care   Cara Lopez 75 y o  female MRN: [id number]  Unit/Bed#: ICU 15 Encounter: [id number]    Assessment/Plan:  1  Severe sepsis secondary to polymicrobial bacteremia with likely abdominal or enteric source  · Initial blood cultures preliminarily positive for Enterococcus and E coli  Awaiting sensitivities  Repeat blood cultures are pending  Infectious Disease is following  Continue antibiotics per their recommendation  · Hemodynamics remain stable  · Trend WBCs and procalcitonin  · Consider Abd U/S  2  Anemia, multifactorial secondary to iron deficiency, critical illness and chronic disease  · Hemoglobin initially improved to 9 6 after transfusion of 2 units packed red blood cells then was down to 7 8 yesterday and remained stable overnight  Hgb is 8 2 this am   · Continue daily iron supplementation  3  ANNE  · Urine output has improved, now averaging 30-40mL/hr  Creatinine is improved this am   Continue to trend renal indices and monitor intake and output  4  Extensive cancer history including breast, pancreatic and colon with concern for recurrent disease  · CT scan from this admission shows extensive lymphadenopathy and liver lesions  Will need to follow up as an outpatient  5  DM Type II  · Blood sugar has been elevated  Continue Lantus/SSI regimen  May need to increase coverage algorithm      _____________________________________________________________________    HPI/24hr events:   Afebrile  No acute events overnight      Medications:    Current Facility-Administered Medications:  acetaminophen 650 mg Oral 4x Daily PRN ESE [place]    cefepime 1,000 mg Intravenous Q12H Raysa Montez MD Last Rate: Stopped (02/07/19 0501)   ferrous sulfate 325 mg Oral Daily With Breakfast ESE Gale    heparin (porcine) 5,000 Units Subcutaneous Q8H [facility] ESE Gracia    insulin glargine 25 Units Subcutaneous HS ESE Gale    insulin lispro 4-20 Units Subcutaneous TID AC Shala K Meggan, ESE    insulin lispro 5 Units Subcutaneous TID With Meals ESE [place]    metroNIDAZOLE 500 mg Oral Q8H [facility] Henry Paez MD    oxyCODONE 5 mg Oral Q4H PRN Tasia Session, CRNP    pancrelipase (Lip-Prot-Amyl) 48,000 Units Oral TID With Meals ESE [place]    vancomycin 15 mg/kg Intravenous Q24H Tasia Session, CRNP Last Rate: 1,000 mg (02/07/19 0020)              Physical exam:  Vitals: Body mass index is 25 31 kg/m²  Blood pressure (!) 92/47, pulse 82, temperature (!) 97 4 °F (36 3 °C), temperature source Temporal, resp  rate (!) 25, height 5' 5" (1 651 m), weight 69 kg (152 lb 1 9 oz), SpO2 96 %  ,  Temp  Min: 97 4 °F (36 3 °C)  Max: 101 5 °F (38 6 °C)  IBW: 57 kg    SpO2: 96 %  SpO2 Activity: At Rest  O2 Device: None (Room air)      Intake/Output Summary (Last 24 hours) at 02/07/19 0658  Last data filed at 02/07/19 0501   Gross per 24 hour   Intake             1430 ml   Output             1425 ml   Net                5 ml       Invasive/non-invasive ventilation settings:   Respiratory    Lab Data (Last 4 hours)    None         O2/Vent Data (Last 4 hours)    None              Invasive Devices     Peripheral Intravenous Line            Peripheral IV 02/05/19 Left Wrist 1 day    Peripheral IV 02/05/19 Left;Upper Arm 1 day          Drain            Urethral Catheter Temperature probe 16 Fr  less than 1 day                  Physical Exam:  Gen:  Sleeping but easily arousable, appropriate, no acute distress  HEENT:  Atraumatic, normocephalic, extraocular movements intact, pupils 3 mm equal and reactive, oropharynx clear  Neck:  Supple, trachea midline, no JVD, no lymphadenopathy  Chest:  Lungs slightly diminished otherwise clear to auscultation  Cor:  Single S1/S2, no murmurs, rubs, gallops, sinus arrhythmia  Abd:  Soft, nontender, nondistended some mild intermittent left upper quadrant pain  Ext:  Trace bilateral lower extremity edema, no clubbing or cyanosis  Neuro:  Oriented x3, cranial nerves 2-12 grossly intact, no focal deficits  Skin:  Warm, dry      Diagnostic Data:  Lab: I have personally reviewed pertinent lab results  CBC:     Results from last 7 days  Lab Units 02/07/19  0624 02/06/19  0602 02/05/19  1619 02/05/19  0504   WBC Thousand/uL 26 51* 44 21*  --  47 32*   HEMOGLOBIN g/dL 8 2* 7 8* 9 6* 5 9*   HEMATOCRIT % 25 8* 24 7*  --  19 9*   PLATELETS Thousands/uL 208 178 189 220       CMP:     Results from last 7 days  Lab Units 02/07/19  0603 02/06/19  0602 02/05/19  0504   SODIUM mmol/L 130* 128* 132*   POTASSIUM mmol/L 3 7 3 5 3 5   CHLORIDE mmol/L 104 99* 102   CO2 mmol/L 16* 16* 17*   BUN mg/dL 77* 65* 46*   CREATININE mg/dL 1 92* 2 33* 1 87*   CALCIUM mg/dL 7 8* 7 4* 7 4*   ALK PHOS U/L 276* 276* 387*   ALT U/L 122* 164* 149*   AST U/L 72* 204* 266*     PT/INR:   No results found for: PT, INR,   Magnesium:     Results from last 7 days  Lab Units 02/05/19  0504   MAGNESIUM mg/dL 1 8     Phosphorous:       Microbiology:    Results from last 7 days  Lab Units 02/05/19  1418 02/05/19  1037 02/04/19 2029 02/04/19 2027   BLOOD CULTURE   --   --  Enterococcus faecalis*  Escherichia coli*  --    GRAM STAIN RESULT   --   --  Gram negative rods  Gram positive cocci in pairs Gram negative rods   URINE CULTURE   --  No Growth <1000 cfu/mL  --   --    C DIFF TOXIN B  NEGATIVE for C difficle toxin by PCR    --   --   --        Imaging:  No new imaging    Cardiac lab/EKG/telemetry/ECHO:   Sinus arrhythmia on telemetry    VTE Prophylaxis:  Heparin, SCDs    Code Status: Level 1 - Full Code    Virginia Mars Spatzer, CRNP    Portions of the record may have been created with voice recognition software  Occasional wrong word or "sound a like" substitutions may have occurred due to the inherent limitations of voice recognition software  Read the chart carefully and recognize, using context, where substitutions have occurred

## 2019-02-07 NOTE — PROGRESS NOTES
Progress Note - ICU Transfer to SD/MS [facility]/MS   Jamia Bastian 75 y o  female MRN: [id number]  [address]   Unit/Bed#: ICU 62 Encounter: [id number]    Code Status: Level 1 - Full Code  POA:    Referring Physician:  Dr Ortiz Person  Accepting Physician:  Dr Rinku Robb  _____________________________________________________________________    Reason for ICU/SD admission:  Sepsis    History of Present Illness:  75year old female with a past medical history significant for breast cancer with bilateral mastectomies, colon cancer and pancreatic cancer in which she had a Whipple procedure  She presented to the emergency department with complaints of not feeling well  She complained of generalized weakness and the inability to perform activities  In the ER was found to be tachycardic with a temperature of 100 9 and lactic acid of 6 8  Due to this a sepsis alert was called  She was found to be anemic on presentation, acute kidney injury, and possible hydronephrosis  Summary of clinical course:  She was admitted to the stepdown unit for closer monitoring  During her hospitalization stay she was found to be bacteremic with E  Coli, Enterococcus, and Klebsiella and started on IV antibiotics  Due to her bacteremia ID was consulted for further management  She was noted to be anemic and received 2 units of PRBC with an improvement of hemoglobin to 8 2 with no active signs of bleeding  Had an occult blood that was positive on admission but has since resolved  From a respiratory status patient has been stable, for a period of time required 2 liters nasal cannula for her own comfort  The patient's symptoms continued to improve except her creatinine continued to worsen and had decreased urine output  She was given 80 mg of IV lasix and had improvement in urine output  She does take PRN Lasix at home but not on a daily basis    Urine output has been adequate    ID ordered ultrasound of abdomen due to elevated LFT's  Will need follow up with abnormal CT findings and lymphadenopathy with oncologist  Need to discuss findings with sergio rodriguez whether they are knew or not  Consultants: Infectious disease  _____________________________________________________________________    Diagnostic Data:  CBC:    Results from last 7 days  Lab Units 02/07/19  0624   WBC Thousand/uL 26 51*   HEMOGLOBIN g/dL 8 2*   HEMATOCRIT % 25 8*   PLATELETS Thousands/uL 208      CMP: Lab Results   Component Value Date    SODIUM 130 (L) 02/07/2019    K 3 7 02/07/2019     02/07/2019    CO2 16 (L) 02/07/2019    BUN 77 (H) 02/07/2019    CREATININE 1 92 (H) 02/07/2019    CALCIUM 7 8 (L) 02/07/2019    AST 72 (H) 02/07/2019     (H) 02/07/2019    ALKPHOS 276 (H) 02/07/2019    EGFR 25 02/07/2019   ,   PT/INR: No results found for: PT, INR,   Magnesium: No components found for: MAG,  Phosphorous: No results found for: PHOS    ABG: No results found for: PHART, FYR0WFD, PO2ART, YNW5FDY, W8WOVFNA, BEART, SOURCE,     Microbiology:    Results from last 7 days  Lab Units 02/06/19  0608 02/06/19  0603 02/05/19  1418 02/05/19  1037 02/04/19 2029 02/04/19 2027   BLOOD CULTURE  No Growth at 24 hrs  No Growth at 24 hrs   --   --  Enterococcus faecalis*  Escherichia coli*  Klebsiella oxytoca* Escherichia coli*  Klebsiella oxytoca*   GRAM STAIN RESULT   --   --   --   --  Gram negative rods  Gram positive cocci in pairs Gram negative rods   URINE CULTURE   --   --   --  No Growth <1000 cfu/mL  --   --    C DIFF TOXIN B   --   --  NEGATIVE for C difficle toxin by PCR    --   --   --        Imaging: I have personally reviewed the pertinent imaging studies on the PACS system  No new imaging    Cardiac/EKG/telemetry/Echo:       NSR  _____________________________________________________________________    Recent or scheduled procedures: Abdominal ultrasound pending    Outstanding/pending diagnostics:       Mobilization Plan:  Activity as tolerated, PT    Home medications that are not reordered and reason why:  Metoprolol due to BP in the 100's and Lasix    Spoke with Dr Phi Li  regarding transfer  Please call [phone] with any questions or concerns  Portions of the record may have been created with voice recognition software  Occasional wrong word or "sound a like" substitutions may have occurred due to the inherent limitations of voice recognition software  Read the chart carefully and recognize, using context, where substitutions have occurred      Aleida Wood

## 2019-02-07 NOTE — PROGRESS NOTES
Progress Note - Infectious Disease   Cara Lopez 75 y o  female MRN: [id number]  Unit/Bed#: ICU 15 Encounter: [id number]      Impression/Plan:  1  Severe sepsis-POA  Fever, leukocytosis, tachycardia, lactic acidosis   Appears to be secondary to polymicrobial bacteremia of non defined source   She remains quite ill, however fortunately she has remained hemodynamically stable despite the systemic illness   She seems to be tolerating the antibiotics without difficulty   The white cell count is rising, and the procalcitonin level remains quite high   -continue vancomycin for now at current dose  -check vancomycin trough later today  -discontinue cefepime and Flagyl  -ceftriaxone 2 g IV Q 24 hours  -recheck procalcitonin level   -follow up final cultures and sensitivities and adjust antibiotics as needed  -monitor CBC with diff and creatinine  -supportive care     2  Polymicrobial bacteremia-suspect intra-abdominal or enteric source   No definitive source has been appreciated despite extensive imaging although CT of the abdomen and pelvis does suggest the possibility of pyelonephritis but the urine cultures negative although was collected after antibiotics were given  Urinalysis is nondiagnostic although was collected after antibiotics were already given  The patient remains hemodynamically stable despite the bacteremia  Transthoracic echocardiogram without valvular vegetation   -antibiotics as above  -follow up final ID and sensitivities and adjust antibiotics as needed  -follow up repeat blood cultures  -no additional ID workup for now     3  Acute kidney injury-suspect this is a pre renal issue   Perhaps sepsis is playing a role   The renal function has started to improve  -dose adjusted antibiotics as above  -volume management  -no additional ID workup for now     4  Anemia-quite severe   Elevated LDH suggest the possibility of hemolysis   No definitive evidence of acute blood loss    -workup as per the primary  -transfusion support  -monitor CBC with diff       5  Liver function test abnormalities-possibly secondary to sepsis  Possibly another etiology  CT the abdomen and pelvis does not reveal a definitive source  Liver function tests have modestly decreased  -recheck LFTs  -check right upper quadrant ultrasound    Antibiotics:  Vancomycin 4  Cefepime 4  Flagyl 4  Subjective:  Patient has no fever, chills, sweats; no nausea, vomiting, diarrhea; no cough, shortness of breath; no pain  No new symptoms  She seems in better spirits  Objective:  Vitals:  Temp:  [97 4 °F (36 3 °C)-97 9 °F (36 6 °C)] 97 9 °F (36 6 °C)  HR:  [74-94] 82  Resp:  [15-28] 21  BP: ()/(47-60) 108/55  SpO2:  [95 %-100 %] 99 %  Temp (24hrs), Av 8 °F (36 6 °C), Min:97 4 °F (36 3 °C), Max:97 9 °F (36 6 °C)  Current: Temperature: 97 9 °F (36 6 °C)    Physical Exam:   General Appearance:  Alert, interactive, nontoxic, no acute distress  Throat: Oropharynx dry without lesions  Lungs:   Clear to auscultation bilaterally; no wheezes, rhonchi or rales; respirations unlabored   Heart:  RRR; no murmur, rub or gallop   Abdomen:   Soft, non-tender, non-distended, positive bowel sounds  Extremities: No clubbing, cyanosis or edema   Skin: No new rashes or lesions  No draining wounds noted         Labs, Imaging, & Other studies:   All pertinent labs and imaging studies were personally reviewed    Results from last 7 days  Lab Units 19  0624 19  0602 19  1619 19  0504   WBC Thousand/uL 26 51* 44 21*  --  47 32*   HEMOGLOBIN g/dL 8 2* 7 8* 9 6* 5 9*   PLATELETS Thousands/uL 208 178 189 220       Results from last 7 days  Lab Units 19  0603 19  0602 19  0504   SODIUM mmol/L 130* 128* 132*   POTASSIUM mmol/L 3 7 3 5 3 5   CHLORIDE mmol/L 104 99* 102   CO2 mmol/L 16* 16* 17*   BUN mg/dL 77* 65* 46*   CREATININE mg/dL 1 92* 2 33* 1 87*   EGFR ml/min/1 73sq m 25 20 26   CALCIUM mg/dL 7 8* 7  4* 7 4*   AST U/L 72* 204* 266*   ALT U/L 122* 164* 149*   ALK PHOS U/L 276* 276* 387*       Results from last 7 days  Lab Units 02/05/19  1418 02/05/19  1037 02/04/19 2029 02/04/19 2027   BLOOD CULTURE   --   --  Enterococcus faecalis*  Escherichia coli*  Klebsiella-Enterobacter  group* Escherichia coli*  Klebsiella oxytoca*   GRAM STAIN RESULT   --   --  Gram negative rods  Gram positive cocci in pairs Gram negative rods   URINE CULTURE   --  No Growth <1000 cfu/mL  --   --    C DIFF TOXIN B  NEGATIVE for C difficle toxin by PCR    --   --   --

## 2019-02-08 ENCOUNTER — APPOINTMENT (INPATIENT)
Dept: RADIOLOGY | Facility: HOSPITAL | Age: 74
DRG: 871 | End: 2019-02-08
Attending: HOSPITALIST
Payer: COMMERCIAL

## 2019-02-08 LAB
ANION GAP SERPL CALCULATED.3IONS-SCNC: 16 MMOL/L (ref 4–13)
BUN SERPL-MCNC: 74 MG/DL (ref 5–25)
CALCIUM SERPL-MCNC: 8.2 MG/DL (ref 8.3–10.1)
CHLORIDE SERPL-SCNC: 100 MMOL/L (ref 100–108)
CO2 SERPL-SCNC: 16 MMOL/L (ref 21–32)
CREAT SERPL-MCNC: 1.86 MG/DL (ref 0.6–1.3)
ERYTHROCYTE [DISTWIDTH] IN BLOOD BY AUTOMATED COUNT: 21.8 % (ref 11.6–15.1)
GFR SERPL CREATININE-BSD FRML MDRD: 26 ML/MIN/1.73SQ M
GLUCOSE SERPL-MCNC: 119 MG/DL (ref 65–140)
GLUCOSE SERPL-MCNC: 158 MG/DL (ref 65–140)
GLUCOSE SERPL-MCNC: 160 MG/DL (ref 65–140)
GLUCOSE SERPL-MCNC: 227 MG/DL (ref 65–140)
GLUCOSE SERPL-MCNC: 78 MG/DL (ref 65–140)
GLUCOSE SERPL-MCNC: 96 MG/DL (ref 65–140)
GLUCOSE SERPL-MCNC: 98 MG/DL (ref 65–140)
HCT VFR BLD AUTO: 26.8 % (ref 34.8–46.1)
HGB BLD-MCNC: 8.5 G/DL (ref 11.5–15.4)
INR PPP: 2.1 (ref 0.86–1.17)
MCH RBC QN AUTO: 24.1 PG (ref 26.8–34.3)
MCHC RBC AUTO-ENTMCNC: 31.7 G/DL (ref 31.4–37.4)
MCV RBC AUTO: 76 FL (ref 82–98)
PLATELET # BLD AUTO: 190 THOUSANDS/UL (ref 149–390)
POTASSIUM SERPL-SCNC: 3.2 MMOL/L (ref 3.5–5.3)
PROCALCITONIN SERPL-MCNC: 106.94 NG/ML
PROTHROMBIN TIME: 23.6 SECONDS (ref 11.8–14.2)
QRS AXIS: 79 DEGREES
QRSD INTERVAL: 70 MS
QT INTERVAL: 300 MS
QTC INTERVAL: 417 MS
RBC # BLD AUTO: 3.52 MILLION/UL (ref 3.81–5.12)
SODIUM SERPL-SCNC: 132 MMOL/L (ref 136–145)
T WAVE AXIS: -15 DEGREES
VANCOMYCIN TROUGH SERPL-MCNC: 19.5 UG/ML (ref 10–20)
VENTRICULAR RATE: 116 BPM
WBC # BLD AUTO: 36.52 THOUSAND/UL (ref 4.31–10.16)

## 2019-02-08 PROCEDURE — 99233 SBSQ HOSP IP/OBS HIGH 50: CPT | Performed by: INTERNAL MEDICINE

## 2019-02-08 PROCEDURE — 93010 ELECTROCARDIOGRAM REPORT: CPT | Performed by: INTERNAL MEDICINE

## 2019-02-08 PROCEDURE — 85027 COMPLETE CBC AUTOMATED: CPT | Performed by: NURSE PRACTITIONER

## 2019-02-08 PROCEDURE — 84145 PROCALCITONIN (PCT): CPT | Performed by: INTERNAL MEDICINE

## 2019-02-08 PROCEDURE — 97530 THERAPEUTIC ACTIVITIES: CPT

## 2019-02-08 PROCEDURE — 97116 GAIT TRAINING THERAPY: CPT

## 2019-02-08 PROCEDURE — 0F9130Z DRAINAGE OF RIGHT LOBE LIVER WITH DRAINAGE DEVICE, PERCUTANEOUS APPROACH: ICD-10-PCS | Performed by: RADIOLOGY

## 2019-02-08 PROCEDURE — 80048 BASIC METABOLIC PNL TOTAL CA: CPT | Performed by: NURSE PRACTITIONER

## 2019-02-08 PROCEDURE — 97535 SELF CARE MNGMENT TRAINING: CPT

## 2019-02-08 PROCEDURE — 85610 PROTHROMBIN TIME: CPT | Performed by: HOSPITALIST

## 2019-02-08 PROCEDURE — 99233 SBSQ HOSP IP/OBS HIGH 50: CPT | Performed by: HOSPITALIST

## 2019-02-08 PROCEDURE — 99222 1ST HOSP IP/OBS MODERATE 55: CPT | Performed by: INTERNAL MEDICINE

## 2019-02-08 PROCEDURE — 97110 THERAPEUTIC EXERCISES: CPT

## 2019-02-08 PROCEDURE — 82948 REAGENT STRIP/BLOOD GLUCOSE: CPT

## 2019-02-08 RX ORDER — POTASSIUM CHLORIDE 20MEQ/15ML
40 LIQUID (ML) ORAL ONCE
Status: COMPLETED | OUTPATIENT
Start: 2019-02-08 | End: 2019-02-08

## 2019-02-08 RX ORDER — PHYTONADIONE 5 MG/1
5 TABLET ORAL ONCE
Status: COMPLETED | OUTPATIENT
Start: 2019-02-08 | End: 2019-02-08

## 2019-02-08 RX ORDER — METRONIDAZOLE 500 MG/1
500 TABLET ORAL EVERY 8 HOURS SCHEDULED
Status: DISCONTINUED | OUTPATIENT
Start: 2019-02-08 | End: 2019-02-12

## 2019-02-08 RX ADMIN — INSULIN LISPRO 5 UNITS: 100 INJECTION, SOLUTION INTRAVENOUS; SUBCUTANEOUS at 09:15

## 2019-02-08 RX ADMIN — METRONIDAZOLE 500 MG: 500 TABLET ORAL at 21:05

## 2019-02-08 RX ADMIN — HEPARIN SODIUM 5000 UNITS: 5000 INJECTION INTRAVENOUS; SUBCUTANEOUS at 21:06

## 2019-02-08 RX ADMIN — CEFTRIAXONE SODIUM 2000 MG: 10 INJECTION, POWDER, FOR SOLUTION INTRAVENOUS at 11:18

## 2019-02-08 RX ADMIN — INSULIN LISPRO 8 UNITS: 100 INJECTION, SOLUTION INTRAVENOUS; SUBCUTANEOUS at 11:08

## 2019-02-08 RX ADMIN — OXYCODONE HYDROCHLORIDE 5 MG: 5 TABLET ORAL at 00:04

## 2019-02-08 RX ADMIN — PHYTONADIONE 5 MG: 5 TABLET ORAL at 15:24

## 2019-02-08 RX ADMIN — PANCRELIPASE 48000 UNITS: 24000; 76000; 120000 CAPSULE, DELAYED RELEASE PELLETS ORAL at 11:19

## 2019-02-08 RX ADMIN — PANCRELIPASE 48000 UNITS: 24000; 76000; 120000 CAPSULE, DELAYED RELEASE PELLETS ORAL at 16:21

## 2019-02-08 RX ADMIN — INSULIN LISPRO 5 UNITS: 100 INJECTION, SOLUTION INTRAVENOUS; SUBCUTANEOUS at 11:19

## 2019-02-08 RX ADMIN — HEPARIN SODIUM 5000 UNITS: 5000 INJECTION INTRAVENOUS; SUBCUTANEOUS at 15:24

## 2019-02-08 RX ADMIN — VANCOMYCIN HYDROCHLORIDE 1000 MG: 1 INJECTION, SOLUTION INTRAVENOUS at 00:16

## 2019-02-08 RX ADMIN — OXYCODONE HYDROCHLORIDE 5 MG: 5 TABLET ORAL at 18:35

## 2019-02-08 RX ADMIN — OXYCODONE HYDROCHLORIDE 5 MG: 5 TABLET ORAL at 03:51

## 2019-02-08 RX ADMIN — POTASSIUM CHLORIDE 40 MEQ: 20 SOLUTION ORAL at 09:11

## 2019-02-08 RX ADMIN — HEPARIN SODIUM 5000 UNITS: 5000 INJECTION INTRAVENOUS; SUBCUTANEOUS at 06:22

## 2019-02-08 RX ADMIN — FERROUS SULFATE TAB 325 MG (65 MG ELEMENTAL FE) 325 MG: 325 (65 FE) TAB at 09:13

## 2019-02-08 RX ADMIN — PHYTONADIONE 5 MG: 10 INJECTION, EMULSION INTRAMUSCULAR; INTRAVENOUS; SUBCUTANEOUS at 16:21

## 2019-02-08 RX ADMIN — INSULIN LISPRO 5 UNITS: 100 INJECTION, SOLUTION INTRAVENOUS; SUBCUTANEOUS at 18:34

## 2019-02-08 RX ADMIN — ACETAMINOPHEN 650 MG: 325 TABLET ORAL at 01:31

## 2019-02-08 RX ADMIN — INSULIN GLARGINE 25 UNITS: 100 INJECTION, SOLUTION SUBCUTANEOUS at 22:36

## 2019-02-08 RX ADMIN — METRONIDAZOLE 500 MG: 500 TABLET ORAL at 15:23

## 2019-02-08 NOTE — PROGRESS NOTES
Vancomycin IV Pharmacy-to-Dose Consultation    Teresita Figueroa is a 75 y o  female who is currently receiving Vancomycin IV with management by the Pharmacy Consult service  Assessment/Plan:  The patient was reviewed  Renal function is stable and no signs or symptoms of nephrotoxicity and/or infusion reactions were documented in the chart  Based on todays assessment, Plan for next trough at approximately 2330 on 2/14/19  Pharmacy will continue to follow the patients culture results and clinical progress daily        Carlos Flores, Pharmacist

## 2019-02-08 NOTE — PROGRESS NOTES
Progress Note - Infectious Disease   Tian Pineda 75 y o  female MRN: [id number]  Unit/Bed#: [place] [address] -01 Encounter: [id number]      Impression/Plan:  1  Severe sepsis-POA  Fever, leukocytosis, tachycardia, lactic acidosis   Appears to be secondary to polymicrobial bacteremia of non defined source   She remains quite ill, however fortunately she has remained hemodynamically stable despite the systemic illness   She seems to be tolerating the antibiotics without difficulty   The white cell count is rising, and the procalcitonin level remains quite high although it has decreased  -continue vancomycin, ceftriaxone, for now at current dose  -will add back to Flagyl in case the patient has an undrained liver abscess  -recheck procalcitonin level   -follow up repeat blood cultures  -monitor CBC with diff and creatinine  -IR aspiration/drainage of the cystic liver collection and less confirmed as chronic   -supportive care     2  Polymicrobial bacteremia-suspect intra-abdominal or enteric source   No definitive source has been appreciated despite extensive imaging although CT of the abdomen and pelvis does suggest the possibility of pyelonephritis but the urinalysis and culture nondiagnostic  Consideration for the possibility of a hepatobiliary source with the cystic mass in the liver  The patient remains hemodynamically stable despite the bacteremia  Transthoracic echocardiogram without valvular vegetation  The white cell count has risen in the patient is still spiking fevers   -antibiotics as above  -recommend IR aspiration of the cystic lesion in the liver  -follow up repeat blood cultures  -no additional ID workup for now     3  Acute kidney injury-suspect this is a pre renal issue   Perhaps sepsis is playing a role   The renal function has started to improve  -dose adjusted antibiotics as above  -recheck creatinine clearance  -volume management  -no additional ID workup for now     4  Anemia-quite severe   Elevated LDH suggest the possibility of hemolysis   No definitive evidence of acute blood loss  -workup as per the primary  -transfusion support  -monitor CBC with diff       5  Liver function test abnormalities-possibly secondary to sepsis   Possibly another etiology   CT the abdomen and pelvis does not reveal a definitive source  Liver function tests have modestly decreased  Right upper quadrant ultrasound was cystic lesion in the right lobe of the liver  Consideration for the possibility of liver abscess formation  -recheck LFTs  -recommend IR aspiration of cystic mass if cannot confirm that this is chronic on old images    Discussed in detail with Dr Almita Mata and Dr Jose Andrew    Antibiotics:  Vancomycin 5  Ceftriaxone 2    Subjective:  Patient spiked another high fever yesterday; no nausea, vomiting, diarrhea; no cough, shortness of breath; no pain  No new symptoms  She was moved out of the ICU and has remained hemodynamically stable  Objective:  Vitals:  Temp:  [97 7 °F (36 5 °C)-103 1 °F (39 5 °C)] 98 9 °F (37 2 °C)  HR:  [] 82  Resp:  [13-23] 18  BP: ()/(48-93) 82/53  SpO2:  [87 %-99 %] 98 %  Temp (24hrs), Av 3 °F (37 4 °C), Min:97 7 °F (36 5 °C), Max:103 1 °F (39 5 °C)  Current: Temperature: 98 9 °F (37 2 °C)    Physical Exam:   General Appearance:  Alert, interactive, nontoxic, no acute distress  Throat: Oropharynx moist without lesions  Lungs:   Decreased breath sounds bilaterally; no wheezes, rhonchi or rales; respirations unlabored   Heart:  RRR; no murmur, rub or gallop   Abdomen:   Soft, non-tender, non-distended, positive bowel sounds  Extremities: No clubbing, cyanosis or edema   Skin: No new rashes or lesions  No draining wounds noted         Labs, Imaging, & Other studies:   All pertinent labs and imaging studies were personally reviewed    Results from last 7 days  Lab Units 19  0438 19  0624 19  0602   WBC Thousand/uL 36 52* 26 51* 44 21* HEMOGLOBIN g/dL 8 5* 8 2* 7 8*   PLATELETS Thousands/uL 190 208 178       Results from last 7 days  Lab Units 02/08/19  0438 02/07/19  0603 02/06/19  0602 02/05/19  0504   SODIUM mmol/L 132* 130* 128* 132*   POTASSIUM mmol/L 3 2* 3 7 3 5 3 5   CHLORIDE mmol/L 100 104 99* 102   CO2 mmol/L 16* 16* 16* 17*   BUN mg/dL 74* 77* 65* 46*   CREATININE mg/dL 1 86* 1 92* 2 33* 1 87*   EGFR ml/min/1 73sq m 26 25 20 26   CALCIUM mg/dL 8 2* 7 8* 7 4* 7 4*   AST U/L  --  72* 204* 266*   ALT U/L  --  122* 164* 149*   ALK PHOS U/L  --  276* 276* 387*       Results from last 7 days  Lab Units 02/06/19  0608 02/06/19  0603 02/05/19  1418 02/05/19  1037 02/04/19 2029 02/04/19 2027   BLOOD CULTURE  No Growth at 24 hrs  No Growth at 24 hrs   --   --  Enterococcus faecalis*  Escherichia coli*  Klebsiella oxytoca* Escherichia coli*  Klebsiella oxytoca*   GRAM STAIN RESULT   --   --   --   --  Gram negative rods  Gram positive cocci in pairs Gram negative rods   URINE CULTURE   --   --   --  No Growth <1000 cfu/mL  --   --    C DIFF TOXIN B   --   --  NEGATIVE for C difficle toxin by PCR    --   --   --      Right upper quadrant ultrasound-complex cystic lesion posterior margin the right hepatic lobe      Images reviewed by me in PACS

## 2019-02-08 NOTE — PROGRESS NOTES
This is a 57-year-old woman with history of multiple malignancies presenting with severe sepsis  She has now transferred out of the ICU though continues to have progressively increasing leukocytosis on antibiotics  She has noncontrast imaging demonstrating posterior right lobe liver collection appearing cystic on ultrasound  This could be a chronic complex liver cyst or potentially abscess  Unfortunately she has anaphylactic allergy to gadolinium and iodinated contrast as well as renal insufficiency which precludes contrast enhanced imaging which would be highly informative  Referred for percutaneous diagnostic aspiration to lose source of bacteremia  She was brought to interventional radiology for image guided aspiration however her repeat INR drawn earlier today is 2 1 and was previously 1 6  Etiology of this is unknown  She is not on any anticoagulants aside from prophylactic heparin  I did ultrasound her liver to ensure that the collection is assessable percutaneously by ultrasound guidance and it appears to be  I discussed the case with Dr Nicholas Edge who will work on getting her INR down hopefully to less than 1 5  Will make her NPO after midnight in case were able to do this procedure tomorrow      Marium Gibson MD

## 2019-02-08 NOTE — OCCUPATIONAL THERAPY NOTE
OccupationalTherapy Progress Note(time=7462-5843)     Patient Name: Cara Lopez  EFTHM'N Date: 2/8/2019  Problem List  Patient Active Problem List   Diagnosis    Benign essential hypertension    Vitamin D deficiency    Type 2 diabetes mellitus ([facility] )    Breast cancer ([facility] )    Carcinoma of head of pancreas ([facility] )    Carcinoma of rectosigmoid junction (HCC)    Acid reflux    Arthritis    Other hemorrhoids    Loose stools    Gait abnormality    Localized edema    Sepsis ([facility] )    ANNE (acute kidney injury) ([place] [address] )    Lactic acidosis    Anemia    Leukocytosis    Pyelonephritis    Ascites            Subjective:     02/08/19 1415   Restrictions/Precautions   Other Precautions Fall Risk;Pain   Pain Assessment   Pain Assessment 0-10   Pain Score 1   Pain Type Chronic pain   Pain Location Abdomen   ADL   Where Assessed Edge of bed   UB Dressing Assistance 4  Minimal Assistance   UB Dressing Deficit Pull around back;Pull down in back; Thread RUE; Thread LUE   LB Dressing Assistance 4  Minimal Assistance   LB Dressing Deficit Thread RLE into pants; Thread LLE into pants; Increased time to complete;Supervision/safety;Verbal cueing   Functional Standing Tolerance   Time 1-2mins   Activity LE ADLs   Bed Mobility   Rolling R 4  Minimal assistance   Additional items Assist x 1; Increased time required;Verbal cues;LE management   Rolling L 4  Minimal assistance   Additional items Assist x 1; Increased time required;Verbal cues;LE management   Supine to Sit 4  Minimal assistance   Additional items Assist x 2; Increased time required;Verbal cues;LE management   Sit to Supine 4  Minimal assistance   Additional items Assist x 2; Increased time required;Verbal cues;LE management   Transfers   Sit to Stand 4  Minimal assistance   Additional items Assist x 2; Increased time required;Verbal cues   Stand to Sit 4  Minimal assistance   Additional items Assist x 2; Increased time required;Verbal cues   Functional Mobility Functional Mobility 3  Moderate assistance   Additional Comments x1   Additional items Rolling walker   Cognition   Overall Cognitive Status WFL   Arousal/Participation Alert   Attention Within functional limits   Following Commands Follows one step commands without difficulty   Activity Tolerance   Activity Tolerance Patient limited by fatigue;Patient limited by pain   Medical Staff Made Aware nsg, P T  Assessment   Assessment Pt seen for 38 min OT tx session with focus on functional balance, functional mobility, ADL status, and education  Pt able to tolerate OOB mobility; sitting balance=f/f+, standing balance=f-/p+  Pt able to demonstrate Marg[place] with her UE and LE ADLs  Required verbal/physical cues to maintain transfer safety  Pt would benefit from inpt rehab, but prefers to go directly home  Tx tolerated well  Pt pleasant and cooperative with tx session  Will continue  Plan   Treatment Interventions ADL retraining;Functional transfer training; Endurance training;Patient/family training;Equipment evaluation/education; Compensatory technique education   Goal Expiration Date 02/17/19   Treatment Day 1   OT Frequency 3-5x/wk   Recommendation   OT Discharge Recommendation Short Term Rehab  (pt prefers home with therapy sevices )   Barthel Index   Feeding 10   Bathing 0   Grooming Score 5   Dressing Score 5   Bladder Score 10   Bowels Score 10   Toilet Use Score 5   Transfers (Bed/Chair) Score 10   Mobility (Level Surface) Score 0   Stairs Score 0   Barthel Index Score 55   Blaze Medina, OT

## 2019-02-08 NOTE — PROGRESS NOTES
Vancomycin Assessment    Faraz Reed is a 75 y o  female who is currently receiving vancomycin 1000 mg IV q24h for bacteremia     Relevant clinical data and objective history reviewed:  Creatinine   Date Value Ref Range Status   02/07/2019 1 92 (H) 0 60 - 1 30 mg/dL Final     Comment:     Standardized to IDMS reference method   02/06/2019 2 33 (H) 0 60 - 1 30 mg/dL Final     Comment:     Standardized to IDMS reference method   02/05/2019 1 87 (H) 0 60 - 1 30 mg/dL Final     Comment:     Standardized to IDMS reference method     /93 (BP Location: Left arm)   Pulse (!) 112   Temp (!) 103 1 °F (39 5 °C) (Tympanic)   Resp 20   Ht 5' 5" (1 651 m)   Wt 69 kg (152 lb 1 9 oz)   SpO2 99%   BMI 25 31 kg/m²   I/O last 3 completed shifts: In: 1480 [P O :1380; IV Piggyback:100]  Out: 2160 [Urine:2160]  Lab Results   Component Value Date/Time    BUN 77 (H) 02/07/2019 06:03 AM    WBC 26 51 (H) 02/07/2019 06:24 AM    HGB 8 2 (L) 02/07/2019 06:24 AM    HCT 25 8 (L) 02/07/2019 06:24 AM    MCV 77 (L) 02/07/2019 06:24 AM     02/07/2019 06:24 AM     Temp Readings from Last 3 Encounters:   02/08/19 (!) 103 1 °F (39 5 °C) (Tympanic)   01/25/19 98 4 °F (36 9 °C) (Tympanic)   11/25/18 99 3 °F (37 4 °C) (Temporal)     Vancomycin Days of Therapy: 5    Assessment/Plan  The patient is currently on vancomycin utilizing scheduled dosing  The patient is receiving 1000 mg IV q24h with the most recent vancomycin level being at steady-state and therapeutic based on a goal of 15-20 (appropriate for most indications) ; therefore, is clinically appropriate and dose will be continued   Pharmacy will continue to follow closely for s/sx of nephrotoxicity, infusion reactions and appropriateness of therapy  BMP and CBC will be ordered per protocol  Plan for next trough at approximately 2330 on 2/14/19  Pharmacy will continue to follow the patients culture results and clinical progress daily      Amber Ortiz Pharmacist

## 2019-02-08 NOTE — CONSULTS
Patient: Jori Ormond  Patient MRN: [id number]  Service date: 2/8/2019  Attending Physician:       CHIEF COMPLAIN  Chief Complaint   Patient presents with    Weakness - Generalized     pt reports feeling weak starting at 4pm today, pt reports her legs feel weak, denies numbness and tingling, pt reports she felt like her BS was low, checked it and it was 100, pt alert and oriented x3       Heme / Oncology history:  Jori Ormond is a 75 y o  female     Extensive oncology history summarized as below, currently managed at [facility]  1,  Breast cancer, bilateral 05/02/2013   age of 39 with bilateral mastectomies in 1988, followed by six cycles of CMF and tamoxifen hormonal therapy      2,  Pancreatic cancer 08/25/2016   Attempted Whipple converted to cholecystojejunostomy and  biopsy of the celiac and caval lymph nodes in January 2010 at [facility]     [facility] cancer 05/02/2013   Stage 2, resected on 7/13/01    4  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)  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, MUTYH-associated familial colonic polyposis   Monoallelic mutation of CHEK2 gene 09/08/2017    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 09/08/2017    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 09/08/2017    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    Genetic counseling and testing 08/25/2016    Bilateral breast cancer 08/25/2016    Family history of malignant neoplasm of gastrointestinal tract 08/25/2016    Brain benign neoplasm 06/22/2015       CURRENT THERAPY: Surveillance          HISTORY OF PRESENT ILLNESS:  Jori Ormond is a 75 y o  female who presents with 2 month history of profound fatigue and lower extremity swelling, admitted for possible sepsis, occult bacteremia, imaging showed diffuse hepatic lesion and retroperitoneal lymphadenopathy, Oncology was consulted for comanagement  Patient confirmed above history  liver biopsy to be done  lives with a son and grandchild  PROBLEM LIST:  Patient Active Problem List   Diagnosis    Benign essential hypertension    Vitamin D deficiency    Type 2 diabetes mellitus ([facility]    Breast cancer ([facility]    Carcinoma of head of pancreas ([facility]    Carcinoma of rectosigmoid junction (HCC)    Acid reflux    Arthritis    Other hemorrhoids    Loose stools    Gait abnormality    Localized edema    Sepsis ([facility]    ANNE (acute kidney injury) ([facility] )    Lactic acidosis    Anemia    Leukocytosis    Pyelonephritis    Ascites       ASSESSMENT/PLAN:  Baltazar Herrera is a 75 y o  female with:    1) liver lesion/diffuse lymphadenopathy  - concern for metastatic carcinoma, biopsy to be done  - there is no additional workup needed as for now  Agree to proceed with biopsy  Follow result  Once available, will contact the primary oncologist in [facility], if urgent treatment need to be done, will consider transfer patient there  Since the biopsy has already been scheduled for tomorrow, we will keep her here for now  2)  Leukocytosis  - likely secondary to infection  3) anemia  - acute on chronic; microcytic  Iron panel consistent with iron deficiency  Possible GI bleeding  INR is elevated due to liver disease as suggested by low INR   - last colonoscopy in December in [facility] as screening for colon cancer   - recommend GI evaluation for possible bleeding if hemoglobin  start to decrease again  4) hemophilia  - INR 2  Not on Coumadin  Likely due to underlying liver disease  Recommend  given vitamin k 5 mg iv  And can consider  FFP 2 u after AM INR    for biopsy and for possible bleeding  60     minutes were spent face to face with patient with greater than 50% of the time spent in counseling or coordination of care including discussions of treatment instructions    All of the patient's questions were answered to their satisfactory during this discussion   Kenya Colorado MD PhD  Hematology / [facility]:   has a past medical history of Cancer [facility]); Carpal tunnel syndrome; Cellulitis; Diabetes mellitus out of control ([facility] ); Fatigue; Fracture of toe; Ovarian cancer ([facility] ); and [facility]  PAST SURGICAL HISTORY:   has a past surgical history that includes Mastectomy; Colon surgery; and Pancreatectomy (2010)  CURRENT MEDICATIONS  Scheduled Meds:  Current Facility-Administered Medications:  acetaminophen 650 mg Oral 4x Daily PRN ESE Jin    cefTRIAXone 2,000 mg Intravenous Q24H Roel King MD Last Rate: 2,000 mg (02/08/19 1118)   ferrous sulfate 325 mg Oral Daily With Breakfast ESE Gale    heparin (porcine) 5,000 Units Subcutaneous Q8H [facility] ESE Jin    insulin glargine 25 Units Subcutaneous HS Shala K ESE Broderick    insulin lispro 4-20 Units Subcutaneous TID AC Shala K [place] man    insulin lispro 5 Units Subcutaneous TID With Meals ESE Pineda    metroNIDAZOLE 500 mg Oral Q8H [facility] Roel King MD    oxyCODONE 5 mg Oral Q4H PRN ESE Jin    pancrelipase (Lip-Prot-Amyl) 48,000 Units Oral TID With Meals ESE Pineda    vancomycin 15 mg/kg Intravenous Q24H ESE Jin Last Rate: Stopped (02/08/19 0116)     Continuous Infusions:   PRN Meds:   acetaminophen    oxyCODONE    SOCIAL HISTORY:   reports that she has never smoked  She has never used smokeless tobacco  She reports that she does not drink alcohol or use drugs  FAMILY HISTORY:  family history includes Melanoma in her mother; Stroke in her father  ALLERGIES:  is allergic to gadolinium derivatives; iodine; acetaminophen-codeine; aspirin; cephalexin; nickel; penicillins; sulfa antibiotics; and sulfamethoxazole-trimethoprim      REVIEW OF SYSTEMS:  Please note that a 14-point review of systems was performed to include Constitutional, HEENT, Respiratory, CVS, GI, , Musculoskeletal, Integumentary, Neurologic, Rheumatologic, Endocrinologic, Psychiatric, Lymphatic, and Hematologic/Oncologic systems were reviewed and are negative unless otherwise stated in HPI  Positive and negative findings pertinent to this evaluation are incorporated into the history of present illness  PHYSICAL EXAMINATION:  Vital Signs: Temp:  [97 7 °F (36 5 °C)-103 1 °F (39 5 °C)] 97 8 °F (36 6 °C)  HR:  [] 73  Resp:  [13-22] 18  BP: ()/(48-93) 97/59  Body mass index is 25 57 kg/m²  Body surface area is 1 77 meters squared  Constitutional: Alert and oriented  HEENT: Anicteric, PERRLA  Chest: Decreased breathing sound bilaterally, No wheezes/rales/rhonchi  CVS: Regular rhythm  Normal rate  Abdomen: Soft, nontender, nondistended  No palpable organomegaly  Extremities: No cyanosis/clubbing/edema  Integumentary: No obvious rashes or bruises  Musculoskeletal: No obvious bony or joint deformities  Psychiatric: Appropriate affect and mood  Lymph Node Survey: No palpable preauricular, submandibular, cervical, supraclavicular, axillary, epitrochlear or inguinal lymphadenopathy  LABS:    Results from last 7 days  Lab Units 02/08/19 0438 02/07/19  0624 02/06/19  0602  02/05/19  0504 02/04/19 2027   WBC Thousand/uL 36 52* 26 51* 44 21*  --  47 32* 21 90*   HEMATOCRIT % 26 8* 25 8* 24 7*  --  19 9* 25 0*   PLATELETS Thousands/uL 190 208 178  < > 220 318   MONO PCT %  --   --  4  --  1* 1*   < > = values in this interval not displayed      Results from last 7 days  Lab Units 02/08/19  0438 02/07/19  0603 02/06/19  0602   POTASSIUM mmol/L 3 2* 3 7 3 5   CHLORIDE mmol/L 100 104 99*   CO2 mmol/L 16* 16* 16*   BUN mg/dL 74* 77* 65*       Results from last 7 days  Lab Units 02/07/19  0603 02/06/19  0602 02/05/19  0504   MAGNESIUM mg/dL  --   --  1 8   ALBUMIN g/dL 1 7* 1 7* 2 0*   ALK PHOS U/L 276* 276* 387*   ALT U/L 122* 164* 149*   AST U/L 72* 204* 266*       Results from last 7 days  Lab Units 02/08/19  1238 02/04/19 2027   INR  2 10* 1 67*   PTT seconds  --  36           Invalid input(s): TNI,  PCT      Results from last 7 days  Lab Units 02/05/19  0504   LD U/L 351*           IMAGING:  US right upper quadrant   Final Result      Limited study due to overlying bowel gas  Complex 4 2 cm cystic lesion at the posterior margin of the right hepatic lobe, corresponding with the prior CT scan  Atrophic right kidney  Small ascites  Poorly visualized pancreas  Workstation performed: [id number]         XR chest 1 view portable   Final Result      No acute cardiopulmonary disease  Workstation performed: [id number]         CT abdomen pelvis wo contrast   Final Result         1  Hepatic lesion, bilateral adrenal nodules and retroperitoneal lymphadenopathy, compatible with metastatic disease  Suboptimal evaluation in the absence of intravenous contrast    2   Postsurgical change from Whipple procedure  Suggestion of soft tissue density in the peripancreatic and right retroperitoneal region could represent lymphadenopathy or tumor  3   Enlarged left kidney and perinephric fat stranding, without evidence of obstructive uropathy  Possible pyelonephritis or renal vein thrombosis  Ultrasound may be helpful for further evaluation  4   Small hiatal hernia  Contrast distends the proximal small bowel  Remainder of the small bowel and colon are not well visualized  Bilateral small ventral abdominal hernias containing portions of bowel loops  Significant amount stool in the sigmoid    colon may indicate constipation  Follow-up exam in one to 2 hours, when contrast has reached the distal colon, may be helpful  4   Mild to moderate ascites  5   If intravenous contrast is contraindicated, MRI of the abdomen and pelvis may be helpful                 Workstation performed: [id number]         IR image guided aspiration / drainage    (Results Pending)

## 2019-02-08 NOTE — PHYSICAL THERAPY NOTE
Physical Therapy Progress Note     02/08/19 1416   Pain Assessment   Pain Assessment 0-10   Pain Score 1   Pain Type Chronic pain   Pain Location Abdomen   Hospital Pain Intervention(s) Ambulation/increased activity;Repositioned   Response to Interventions Tolerated  Restrictions/Precautions   Weight Bearing Precautions Per Order No   Other Precautions Fall Risk;Pain;Bed Alarm   General   Chart Reviewed Yes   Response to Previous Treatment Patient reporting fatigue but able to participate  Family/Caregiver Present No   Subjective   Subjective Willing to participate in therapy this PM    Bed Mobility   Supine to Sit 4  Minimal assistance   Additional items Assist x 2;HOB elevated; Bedrails;Leg ; Increased time required;Verbal cues;LE management   Sit to Supine 4  Minimal assistance   Additional items Assist x 2;Bedrails;Leg ; Increased time required;Verbal cues;LE management   Transfers   Sit to Stand 4  Minimal assistance   Additional items Assist x 2;Bedrails; Increased time required;Verbal cues;Armrests   Stand to Sit 4  Minimal assistance   Additional items Assist x 2;Bedrails; Increased time required;Verbal cues;Armrests   Ambulation/Elevation   Gait pattern Decreased foot clearance; Short stride; Excessively slow; Forward Flexion; Shuffling;R Knee Jeevan;L Knee Jeevan;Scissoring; Inconsistent sal   Gait Assistance 3  Moderate assist   Additional items Assist x 1;Verbal cues; Tactile cues  (with second present for safety/chair follow)   Assistive Device Rolling walker   Distance 15' x 1, 80' x 1 with seated resting periods x 2   Balance   Static Sitting Good   Dynamic Sitting Fair +   Static Standing Fair   Dynamic Standing Poor +   Ambulatory Poor +   Endurance Deficit   Endurance Deficit Yes   Endurance Deficit Description fatigue/pain   Activity Tolerance   Activity Tolerance Patient limited by fatigue;Patient limited by pain   Nurse Made Aware Yes   Exercises   TKR Supine;10 reps;AAROM; Bilateral   Assessment   Prognosis Fair   Problem List Decreased strength;Decreased range of motion;Decreased endurance; Impaired balance;Decreased mobility; Decreased cognition; Impaired judgement;Decreased safety awareness   Assessment Pt  supine in bed upon my arrival  Pt  reporting fatigue, however agreeable to therapeutic intervention  Performance of HEP supine in bed with cues provided for proper completion  Progressed with transfers requiring A of 2 with cues for hand placement/technique  Progressed with OOB mobility with use of RW and A of therapist with cues for LE sequencing  Noted remains at an increased risk for falls due to noted gait impairments above  Noted throughout treatment session O2 at 99% on 3L  Pt  required seated resting periods in between gait trials  Returned to room and repositioned supine in bed with alarm active at end of treatment session  PT will continue to recommend rehab upon d/c for continued improvement of noted impairments above  Barriers to Discharge Inaccessible home environment   Barriers to Discharge Comments 3 MAU   Goals   Patient Goals To go home  LTG Expiration Date 02/20/19   Treatment Day 1   Plan   Treatment/Interventions Functional transfer training;LE strengthening/ROM; Therapeutic exercise; Endurance training;Bed mobility;Gait training;Spoke to nursing;Spoke to case management   PT Frequency Other (Comment)  (4-5x/wk)   Recommendation   Recommendation Short-term skilled PT   Equipment Recommended Walker  (RW)   PT - OK to Discharge Yes  (if d/c to rehab when medically stable )     Kristi Jacobsen PTA

## 2019-02-08 NOTE — SOCIAL WORK
Therapies recommending STR on d/c   CM spoke with pt who declines STR and prefers to be d/c home for therapies +/- IV Abx if needed (not yet known)  Pt asks that a referral be made to [facility] with same done  Pt is for liver bx in a m - per oncology notes may need eventual transfer to [facility] where her oncologists are located (has h/o multiple cancer locations)  Will continue to follow to assist with dc needs and/or transportation needs

## 2019-02-08 NOTE — PROGRESS NOTES
Progress Note - Faraz Reed 75 y o  female MRN: [id number]    Unit/Bed#: [place] [address] -01 Encounter: [id number]    Principal Problem:    Pyelonephritis  Active Problems:    Sepsis ([facility] )    ANNE (acute kidney injury) ([facility] )    Lactic acidosis    Anemia    Leukocytosis    Ascites  Resolved Problems:    * No resolved hospital problems  *      Assessment/Plan:  1  Severe sepsis-patient presented with tachycardia, tachypnea, fever, leukocytosis and lactic acidosis  secondary to polymicrobial bacteremia with likely abdominal or enteric source  · Initial blood cultures preliminarily positive for Enterococcus and E coli  Repeat blood cultures are pending  WBC is increasing and procalcitonin remains high at more than 100  Infectious Disease is following  currently on IV vancomycin  · Echocardiogram negative for any vegetations  · Hemodynamics remain stable  · Trend WBCs and procalcitonin  · Abdominal ultrasound and revealing a complex cyst in the right lobe of the liver-need to rule out an abscess  Will consult IR for the same  · Attempted to get old CT scan reports from [facility] however the last CT scan report is from 2011    2  Anemia, multifactorial secondary to iron deficiency, critical illness and chronic disease  · Hemoglobin initially improved to 9 6 after transfusion of 2 units packed red blood cells then was down to 7 8 yesterday and remained stable overnight  Hgb is 8 5 this am   · Continue daily iron supplementation  ·   3  ANNE likely prerenal and secondary to sepsis  · Urine output has improved, now averaging 30-40mL/hr  Creatinine is improved this am to 1 86  Continue to trend renal indices and monitor intake and output  ·   4-Extensive cancer history including breast, pancreatic and colon with concern for recurrent disease  Pancreatic head adenocarcinoma with a prior [id number], categorically unresectable treated with neoadjuvant chemoradiotherapy     bilateral breast cancer and 2x colon cancer  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, colorectal, thyroid) and 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  Has been following up at [facility]  CT scan from this admission shows extensive lymphadenopathy and liver lesions  Will have oncology review patient  5    DM Type II-Blood sugar has been elevated  Continue Lantus/SSI regimen  Will increase increase coverage algorithm    6-complex cyst in the liver-unclear whether this was present earlier or whether this is an abscess  Have requested records from [facility] but will need an aspiration to rule out a definitive abscess  Will consult IR    7-transaminitis likely secondary to sepsis  AST ALT is slowly trending down  Plan  Monitor WBC, fever and repeat procalcitonin  Consult IR for drainage of complex cyst of the liver to rule out any abscess  Continue IV antibiotics  Attempting to obtain records from [address] [place] [address]:  Patient with a history of Pancreatic head adenocarcinoma with a prior [id number], categorically unresectable treated with neoadjuvant chemoradiotherapy   bilateral breast cancer and 2x colon cancer  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, colorectal, thyroid) and 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  Has been following up at [facility]  Patient admitted with polymicrobial sepsis with blood cultures growing E coli, Enterococcus and is Klebsiella-unknown source  Cystic lesion noted in the right hepatic lobe measuring 3 6 x 3 3 x 4 2 cm-suspected abscess  Will consult IR for diagnostic aspiration and drainage  Physical Exam:   Vitals: Blood pressure 97/59, pulse 73, temperature 97 8 °F (36 6 °C), temperature source Temporal, resp  rate 18, height 5' 5" (1 651 m), weight 69 7 kg (153 lb 10 6 oz), SpO2 100 %  ,Body mass index is 25 57 kg/m²          Gen:  Pleasant, non-tachypnic, non-dyspnic  Conversant  Heart: regular rate and rhythm, S1S2 present, no murmur, rub or gallop  Lungs: clear to ausculatation bilaterally  No wheezing, crackless, or rhonchi  No accessory muscle use or respiratory distress  Abd: soft, non-tender, non-distended  NABS, no guarding, rebound or peritoneal signs  Extremities: no clubbing, cyanosis or edema  2+pedal pulses bilaterally  Full range of motion  Neuro: awake, alert and oriented  Cranial nerves 2-12 intact  Strength and sensation grossly intact  Skin: warm and dry: no petechiae, purpura and rash      LABS:     Results from last 7 days  Lab Units 02/08/19  0438 02/07/19  0624 02/06/19  0602   WBC Thousand/uL 36 52* 26 51* 44 21*   HEMOGLOBIN g/dL 8 5* 8 2* 7 8*   HEMATOCRIT % 26 8* 25 8* 24 7*   PLATELETS Thousands/uL 190 208 178       Results from last 7 days  Lab Units 02/08/19  0438 02/07/19  0603 02/06/19  0602   POTASSIUM mmol/L 3 2* 3 7 3 5   CHLORIDE mmol/L 100 104 99*   CO2 mmol/L 16* 16* 16*   BUN mg/dL 74* 77* 65*   CREATININE mg/dL 1 86* 1 92* 2 33*   CALCIUM mg/dL 8 2* 7 8* 7 4*       Intake/Output Summary (Last 24 hours) at 02/08/19 1431  Last data filed at 02/08/19 1132   Gross per 24 hour   Intake              200 ml   Output              810 ml   Net             -610 ml           Current Facility-Administered Medications:  acetaminophen 650 mg Oral 4x Daily PRN ESE Hernandez    cefTRIAXone 2,000 mg Intravenous Q24H Itzel Jimenez MD Last Rate: 2,000 mg (02/08/19 1118)   ferrous sulfate 325 mg Oral Daily With Breakfast ESE Gale    heparin (porcine) 5,000 Units Subcutaneous Q8H [address] ESE Gracia    insulin glargine 25 Units Subcutaneous HS Shala K ESE Broderick    insulin lispro 4-20 Units Subcutaneous TID AC Shala K ESE Broderick    insulin lispro 5 Units Subcutaneous TID With Meals Artur ESE Bustos    metroNIDAZOLE 500 mg Oral Q8H [address] Itzel Jimenez MD    oxyCODONE 5 mg Oral Q4H PRN ESE Hernandez pancrelipase (Lip-Prot-Amyl) 48,000 Units Oral TID With Meals ESE Greco    vancomycin 15 mg/kg Intravenous Q24H ESE Guy Last Rate: Stopped (02/08/19 0116)       Family update- offered to update family-patient declined

## 2019-02-08 NOTE — PLAN OF CARE
Problem: PHYSICAL THERAPY ADULT  Goal: Performs mobility at highest level of function for planned discharge setting  See evaluation for individualized goals  Treatment/Interventions: Functional transfer training, LE strengthening/ROM, Therapeutic exercise, Endurance training, Patient/family training, Equipment eval/education, Bed mobility, Gait training, Spoke to nursing, OT, Family  Equipment Recommended: Nakul aCndelario       See flowsheet documentation for full assessment, interventions and recommendations  Outcome: Progressing  Prognosis: Fair  Problem List: Decreased strength, Decreased range of motion, Decreased endurance, Impaired balance, Decreased mobility, Decreased cognition, Impaired judgement, Decreased safety awareness  Assessment: Pt  supine in bed upon my arrival  Pt  reporting fatigue, however agreeable to therapeutic intervention  Performance of HEP supine in bed with cues provided for proper completion  Progressed with transfers requiring A of 2 with cues for hand placement/technique  Progressed with OOB mobility with use of RW and A of therapist with cues for LE sequencing  Noted remains at an increased risk for falls due to noted gait impairments above  Noted throughout treatment session O2 at 99% on 3L  Pt  required seated resting periods in between gait trials  Returned to room and repositioned supine in bed with alarm active at end of treatment session  PT will continue to recommend rehab upon d/c for continued improvement of noted impairments above  Barriers to Discharge: Inaccessible home environment  Barriers to Discharge Comments: 3 MAU  Recommendation: Short-term skilled PT     PT - OK to Discharge: Yes (if d/c to rehab when medically stable )    See flowsheet documentation for full assessment

## 2019-02-08 NOTE — PLAN OF CARE
Problem: OCCUPATIONAL THERAPY ADULT  Goal: Performs self-care activities at highest level of function for planned discharge setting  See evaluation for individualized goals  Treatment Interventions: ADL retraining, Functional transfer training, UE strengthening/ROM, Endurance training, Patient/family training, Equipment evaluation/education, Compensatory technique education          See flowsheet documentation for full assessment, interventions and recommendations  Limitation: Decreased ADL status, Decreased UE strength, Decreased endurance  Prognosis: Good  Assessment: Pt seen for 38 min OT tx session with focus on functional balance, functional mobility, ADL status, and education  Pt able to tolerate OOB mobility; sitting balance=f/f+, standing balance=f-/p+  Pt able to demonstrate Margarito with her UE and LE ADLs  Required verbal/physical cues to maintain transfer safety  Pt would benefit from inpt rehab, but prefers to go directly home  Tx tolerated well  Pt pleasant and cooperative with tx session  Will continue        OT Discharge Recommendation: Short Term Rehab (pt prefers home with therapy sevices )

## 2019-02-09 ENCOUNTER — APPOINTMENT (INPATIENT)
Dept: RADIOLOGY | Facility: HOSPITAL | Age: 74
DRG: 871 | End: 2019-02-09
Payer: COMMERCIAL

## 2019-02-09 LAB
ALBUMIN SERPL BCP-MCNC: 1.8 G/DL (ref 3.5–5)
ALP SERPL-CCNC: 345 U/L (ref 46–116)
ALT SERPL W P-5'-P-CCNC: 107 U/L (ref 12–78)
ANION GAP SERPL CALCULATED.3IONS-SCNC: 12 MMOL/L (ref 4–13)
APPEARANCE FLD: ABNORMAL
AST SERPL W P-5'-P-CCNC: 129 U/L (ref 5–45)
BASOPHILS NFR BLD AUTO: 0 % (ref 0–1)
BILIRUB SERPL-MCNC: 0.36 MG/DL (ref 0.2–1)
BUN SERPL-MCNC: 83 MG/DL (ref 5–25)
CALCIUM SERPL-MCNC: 7.9 MG/DL (ref 8.3–10.1)
CHLORIDE SERPL-SCNC: 100 MMOL/L (ref 100–108)
CO2 SERPL-SCNC: 18 MMOL/L (ref 21–32)
COLOR FLD: ABNORMAL
CREAT SERPL-MCNC: 1.91 MG/DL (ref 0.6–1.3)
EOSINOPHIL NFR BLD AUTO: 0 % (ref 0–6)
ERYTHROCYTE [DISTWIDTH] IN BLOOD BY AUTOMATED COUNT: 22.1 % (ref 11.6–15.1)
GFR SERPL CREATININE-BSD FRML MDRD: 25 ML/MIN/1.73SQ M
GLUCOSE SERPL-MCNC: 131 MG/DL (ref 65–140)
GLUCOSE SERPL-MCNC: 136 MG/DL (ref 65–140)
GLUCOSE SERPL-MCNC: 145 MG/DL (ref 65–140)
GLUCOSE SERPL-MCNC: 198 MG/DL (ref 65–140)
GLUCOSE SERPL-MCNC: 226 MG/DL (ref 65–140)
HCT VFR BLD AUTO: 24.8 % (ref 34.8–46.1)
HGB BLD-MCNC: 7.8 G/DL (ref 11.5–15.4)
IMM GRANULOCYTES NFR BLD AUTO: 2 % (ref 0–2)
INR PPP: 1.47 (ref 0.86–1.17)
LYMPHOCYTES NFR BLD AUTO: 8 % (ref 14–44)
MCH RBC QN AUTO: 24.1 PG (ref 26.8–34.3)
MCHC RBC AUTO-ENTMCNC: 31.5 G/DL (ref 31.4–37.4)
MCV RBC AUTO: 77 FL (ref 82–98)
MONOCYTES NFR BLD AUTO: 5 % (ref 4–12)
NEUTS SEG NFR BLD AUTO: 85 % (ref 43–75)
NRBC BLD AUTO-RTO: 0 /100 WBCS
PLATELET # BLD AUTO: 143 THOUSANDS/UL (ref 149–390)
POTASSIUM SERPL-SCNC: 4.1 MMOL/L (ref 3.5–5.3)
PROCALCITONIN SERPL-MCNC: 101.66 NG/ML
PROT SERPL-MCNC: 5 G/DL (ref 6.4–8.2)
PROTHROMBIN TIME: 17.9 SECONDS (ref 11.8–14.2)
RBC # BLD AUTO: 3.23 MILLION/UL (ref 3.81–5.12)
RETICS # AUTO: ABNORMAL 10*3/UL (ref 14097–95744)
RETICS # CALC: 0.4 % (ref 0.37–1.87)
SITE: ABNORMAL
SODIUM SERPL-SCNC: 130 MMOL/L (ref 136–145)
WBC # BLD AUTO: 23.8 THOUSAND/UL (ref 4.31–10.16)
WBC # FLD MANUAL: 10 /UL

## 2019-02-09 PROCEDURE — 82948 REAGENT STRIP/BLOOD GLUCOSE: CPT

## 2019-02-09 PROCEDURE — 87205 SMEAR GRAM STAIN: CPT | Performed by: HOSPITALIST

## 2019-02-09 PROCEDURE — 49405 IMAGE CATH FLUID COLXN VISC: CPT

## 2019-02-09 PROCEDURE — 80053 COMPREHEN METABOLIC PANEL: CPT | Performed by: INTERNAL MEDICINE

## 2019-02-09 PROCEDURE — 99232 SBSQ HOSP IP/OBS MODERATE 35: CPT | Performed by: HOSPITALIST

## 2019-02-09 PROCEDURE — 87070 CULTURE OTHR SPECIMN AEROBIC: CPT | Performed by: HOSPITALIST

## 2019-02-09 PROCEDURE — 87186 SC STD MICRODIL/AGAR DIL: CPT | Performed by: HOSPITALIST

## 2019-02-09 PROCEDURE — 99152 MOD SED SAME PHYS/QHP 5/>YRS: CPT

## 2019-02-09 PROCEDURE — 99153 MOD SED SAME PHYS/QHP EA: CPT

## 2019-02-09 PROCEDURE — C1769 GUIDE WIRE: HCPCS

## 2019-02-09 PROCEDURE — 85610 PROTHROMBIN TIME: CPT | Performed by: HOSPITALIST

## 2019-02-09 PROCEDURE — 83010 ASSAY OF HAPTOGLOBIN QUANT: CPT | Performed by: INTERNAL MEDICINE

## 2019-02-09 PROCEDURE — 89051 BODY FLUID CELL COUNT: CPT | Performed by: HOSPITALIST

## 2019-02-09 PROCEDURE — 88112 CYTOPATH CELL ENHANCE TECH: CPT | Performed by: PATHOLOGY

## 2019-02-09 PROCEDURE — 87077 CULTURE AEROBIC IDENTIFY: CPT | Performed by: HOSPITALIST

## 2019-02-09 PROCEDURE — 99233 SBSQ HOSP IP/OBS HIGH 50: CPT | Performed by: INTERNAL MEDICINE

## 2019-02-09 PROCEDURE — 85025 COMPLETE CBC W/AUTO DIFF WBC: CPT | Performed by: INTERNAL MEDICINE

## 2019-02-09 PROCEDURE — 99152 MOD SED SAME PHYS/QHP 5/>YRS: CPT | Performed by: RADIOLOGY

## 2019-02-09 PROCEDURE — C1729 CATH, DRAINAGE: HCPCS

## 2019-02-09 PROCEDURE — 49405 IMAGE CATH FLUID COLXN VISC: CPT | Performed by: RADIOLOGY

## 2019-02-09 PROCEDURE — 85045 AUTOMATED RETICULOCYTE COUNT: CPT | Performed by: INTERNAL MEDICINE

## 2019-02-09 PROCEDURE — 84145 PROCALCITONIN (PCT): CPT | Performed by: INTERNAL MEDICINE

## 2019-02-09 RX ORDER — 0.9 % SODIUM CHLORIDE 0.9 %
10 VIAL (ML) INJECTION DAILY
Qty: 300 ML | Refills: 5 | Status: SHIPPED | OUTPATIENT
Start: 2019-02-09 | End: 2019-02-20 | Stop reason: HOSPADM

## 2019-02-09 RX ORDER — FENTANYL CITRATE 50 UG/ML
INJECTION, SOLUTION INTRAMUSCULAR; INTRAVENOUS CODE/TRAUMA/SEDATION MEDICATION
Status: COMPLETED | OUTPATIENT
Start: 2019-02-09 | End: 2019-02-09

## 2019-02-09 RX ORDER — MIDAZOLAM HYDROCHLORIDE 1 MG/ML
INJECTION INTRAMUSCULAR; INTRAVENOUS CODE/TRAUMA/SEDATION MEDICATION
Status: COMPLETED | OUTPATIENT
Start: 2019-02-09 | End: 2019-02-09

## 2019-02-09 RX ADMIN — VANCOMYCIN HYDROCHLORIDE 1000 MG: 1 INJECTION, SOLUTION INTRAVENOUS at 23:30

## 2019-02-09 RX ADMIN — METRONIDAZOLE 500 MG: 500 TABLET ORAL at 14:51

## 2019-02-09 RX ADMIN — INSULIN GLARGINE 25 UNITS: 100 INJECTION, SOLUTION SUBCUTANEOUS at 22:14

## 2019-02-09 RX ADMIN — OXYCODONE HYDROCHLORIDE 5 MG: 5 TABLET ORAL at 00:53

## 2019-02-09 RX ADMIN — METRONIDAZOLE 500 MG: 500 TABLET ORAL at 05:37

## 2019-02-09 RX ADMIN — PANCRELIPASE 48000 UNITS: 24000; 76000; 120000 CAPSULE, DELAYED RELEASE PELLETS ORAL at 14:33

## 2019-02-09 RX ADMIN — HEPARIN SODIUM 5000 UNITS: 5000 INJECTION INTRAVENOUS; SUBCUTANEOUS at 22:14

## 2019-02-09 RX ADMIN — INSULIN LISPRO 5 UNITS: 100 INJECTION, SOLUTION INTRAVENOUS; SUBCUTANEOUS at 18:01

## 2019-02-09 RX ADMIN — CEFTRIAXONE SODIUM 2000 MG: 10 INJECTION, POWDER, FOR SOLUTION INTRAVENOUS at 10:29

## 2019-02-09 RX ADMIN — FERROUS SULFATE TAB 325 MG (65 MG ELEMENTAL FE) 325 MG: 325 (65 FE) TAB at 09:34

## 2019-02-09 RX ADMIN — MIDAZOLAM 1 MG: 1 INJECTION INTRAMUSCULAR; INTRAVENOUS at 12:41

## 2019-02-09 RX ADMIN — FENTANYL CITRATE 50 MCG: 50 INJECTION, SOLUTION INTRAMUSCULAR; INTRAVENOUS at 12:35

## 2019-02-09 RX ADMIN — INSULIN LISPRO 8 UNITS: 100 INJECTION, SOLUTION INTRAVENOUS; SUBCUTANEOUS at 18:01

## 2019-02-09 RX ADMIN — VANCOMYCIN HYDROCHLORIDE 1000 MG: 1 INJECTION, SOLUTION INTRAVENOUS at 00:02

## 2019-02-09 RX ADMIN — METRONIDAZOLE 500 MG: 500 TABLET ORAL at 22:14

## 2019-02-09 RX ADMIN — FENTANYL CITRATE 50 MCG: 50 INJECTION, SOLUTION INTRAMUSCULAR; INTRAVENOUS at 12:42

## 2019-02-09 RX ADMIN — PANCRELIPASE 48000 UNITS: 24000; 76000; 120000 CAPSULE, DELAYED RELEASE PELLETS ORAL at 18:00

## 2019-02-09 RX ADMIN — OXYCODONE HYDROCHLORIDE 5 MG: 5 TABLET ORAL at 17:05

## 2019-02-09 RX ADMIN — OXYCODONE HYDROCHLORIDE 5 MG: 5 TABLET ORAL at 22:17

## 2019-02-09 NOTE — PROGRESS NOTES
Progress Note - Tian Pineda 75 y o  female MRN: [id number]    Unit/Bed#: [place] [address] -01 Encounter: [id number]    Principal Problem:    Pyelonephritis  Active Problems:    Sepsis ([facility] [address] )    ANNE (acute kidney injury) ([facility] )    Lactic acidosis    Anemia    Leukocytosis    Ascites  Resolved Problems:    * No resolved hospital problems  *      Assessment/Plan:  1  Severe sepsis-patient presented with tachycardia, tachypnea, fever, leukocytosis and lactic acidosis  secondary to polymicrobial bacteremia with likely liver abscess  · Initial blood cultures preliminarily positive for Enterococcus and E coli     Repeat blood cultures are pending  WBC is still elevated at 23,000 three thousand although it is decreased from [address] and procalcitonin remains high at more than 100   Infectious Disease input appreciated    currently on IV vancomycin  · Echocardiogram negative for any vegetations  · Hemodynamics remain stable  · Trend WBCs and procalcitonin  · Abdominal ultrasound and revealing a complex cyst in the right lobe of the liver  · Patient had an aspiration of the cyst by IR -45 mL of cloudy fluid was obtained  A drain has been placed  Will treat as liver abscess for now till cultures are back  · Attempted to get old CT scan reports from [facility] however the last CT scan report is from 2011     2  Anemia, multifactorial secondary to iron deficiency, critical illness and chronic disease  · Hemoglobin initially improved to 9 6 after transfusion of 2 units packed red blood cells then was down to 7 8 yesterday and remained stable overnight   Hgb is 7 8  · Continue daily iron supplementation  ·    3  ANNE likely prerenal and secondary to sepsis  · Urine output has improved, now averaging 30-40mL/hr   Creatinine is around 1 9   Continue to trend renal indices and monitor intake and output  4-Extensive cancer history including breast, pancreatic and colon with concern for recurrent disease  Pancreatic head adenocarcinoma with a prior [id number], categorically unresectable treated with neoadjuvant chemoradiotherapy   bilateral breast cancer and 2x colon cancer  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, colorectal, thyroid) and 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  Has been following up at [facility]  CT scan from this admission shows extensive lymphadenopathy and liver lesions   Will have oncology review patient      5- DM Type II-Blood sugar has been elevated  Continue Lantus/SSI regimen  Will increase increase coverage algorithm     6-complex cyst in the liver-concerning for an abscess versus a stasis  patient at 45 mL of cloudy fluid drained from the cyst and had drain has been placed  Will treat as abscess for now till definitive results available       7-transaminitis likely secondary to sepsis  AST ALT is slowly trending down      Discuss with infectious disease specialist Dr Kennedy Lo    Subjective:  Patient taken to the OR today and had 45 mL of cloudy fluid drain  8 5 Samoan drain has been placed in the right upper quadrant  Blood sent for analysis and culture  Patient remains clinically well  WBCs down to 23,000  Will continue IV antibiotics and monitor  patient closely    Physical Exam:   Vitals: Blood pressure 99/51, pulse 92, temperature 98 4 °F (36 9 °C), temperature source Temporal, resp  rate 18, height 5' 5" (1 651 m), weight 70 2 kg (154 lb 12 2 oz), SpO2 100 %  ,Body mass index is 25 75 kg/m²  Gen:  Pleasant, non-tachypnic, non-dyspnic  Conversant  Heart: regular rate and rhythm, S1S2 present, no murmur, rub or gallop  Lungs: clear to ausculatation bilaterally  No wheezing, crackless, or rhonchi  No accessory muscle use or respiratory distress  Abd: soft, non-tender, non-distended  Right upper quadrant drain draining cloudy fluid  Nena [place] Extremities: no clubbing, cyanosis or edema  2+pedal pulses bilaterally   Full range of motion  Neuro: awake, alert and oriented  Cranial nerves 2-12 intact  Strength and sensation grossly intact  Skin: warm and dry: no petechiae, purpura and rash      LABS:     Results from last 7 days  Lab Units 02/09/19  0514 02/08/19  0438 02/07/19  0624   WBC Thousand/uL 23 80* 36 52* 26 51*   HEMOGLOBIN g/dL 7 8* 8 5* 8 2*   HEMATOCRIT % 24 8* 26 8* 25 8*   PLATELETS Thousands/uL 143* 190 208       Results from last 7 days  Lab Units 02/09/19  0514 02/08/19  0438 02/07/19  0603   POTASSIUM mmol/L 4 1 3 2* 3 7   CHLORIDE mmol/L 100 100 104   CO2 mmol/L 18* 16* 16*   BUN mg/dL 83* 74* 77*   CREATININE mg/dL 1 91* 1 86* 1 92*   CALCIUM mg/dL 7 9* 8 2* 7 8*       Intake/Output Summary (Last 24 hours) at 02/09/19 1340  Last data filed at 02/09/19 1246   Gross per 24 hour   Intake              750 ml   Output              300 ml   Net              450 ml           Current Facility-Administered Medications:  acetaminophen 650 mg Oral 4x Daily PRN ESE Lyle    cefTRIAXone 2,000 mg Intravenous Q24H Truong Cr MD Last Rate: Stopped (02/09/19 1059)   ferrous sulfate 325 mg Oral Daily With Breakfast ESE Gale    heparin (porcine) 5,000 Units Subcutaneous Q8H [address] ESE Gracia    insulin glargine 25 Units Subcutaneous HS Shala K ESE Broderick    insulin lispro 4-20 Units Subcutaneous TID AC Shala K SEE Broderick    insulin lispro 5 Units Subcutaneous TID With Meals Jewelaun Kumar ESE Broderick    metroNIDAZOLE 500 mg Oral Q8H [address] Truong Cr MD    oxyCODONE 5 mg Oral Q4H PRN ESE Lyle    pancrelipase (Lip-Prot-Amyl) 48,000 Units Oral TID With Meals Lavaun ESE Ortega    vancomycin 15 mg/kg Intravenous Q24H ESE Lyle Last Rate: Stopped (02/09/19 0102)       Family update- offered to update family-patient declined

## 2019-02-09 NOTE — SEDATION DOCUMENTATION
IR for abscess drain placement  8 5 F drain placed RUQ   Patient tolerated well   Report called to Koffi Renee

## 2019-02-09 NOTE — BRIEF OP NOTE (RAD/CATH)
Liver Drainage Procedure Note    PATIENT NAME: Joselito Flanagan  : 1945  MRN: [id number]     Pre-op Diagnosis:   1  Sepsis ([facility] [address] )    2  Anemia    3  Acute kidney injury ([facility] [address] )    4  Carcinoma of head of pancreas ([facility] [address] )      Post-op Diagnosis:   1  Sepsis ([facility] [address] )    2  Anemia    3  [facility] ([facility] )    4  Carcinoma of head of pancreas [facility])        Surgeon:   Asha Nagy MD  Assistants:     No qualified resident was available, Resident is only observing    Estimated Blood Loss: None  Findings: Ultrasound guided placement of an 8 5 Fr drainage catheter into the right hepatic fluid collection yielded 45 ml of cloudy, yellow fluid      Specimens: Hepatic fluid collection for labs including culture and cytology    Complications:  none    Anesthesia: Conscious sedation and Local    Asha Nagy MD     Date: 2019  Time: 1:00 PM

## 2019-02-09 NOTE — PROGRESS NOTES
Vancomycin IV Pharmacy-to-Dose Consultation    Fortunato Dunn is a 75 y o  female who is currently receiving Vancomycin IV with management by the Pharmacy Consult service  Assessment/Plan:  The patient was reviewed  Renal function is stable and no signs or symptoms of nephrotoxicity and/or infusion reactions were documented in the chart  Based on todays assessment, Plan for next trough at approximately 2330 on 2/10/19  Pharmacy will continue to follow the patients culture results and clinical progress daily        Laila Ragsdale, Pharmacist

## 2019-02-09 NOTE — PROGRESS NOTES
Progress Note - Infectious Disease   Angelo Marrow 75 y o  female MRN: [id number]  Unit/Bed#: [place] [address] -01 Encounter: [id number]      Impression/Plan:  1  Severe sepsis-POA  Fever, leukocytosis, tachycardia, lactic acidosis   Appears to be secondary to polymicrobial bacteremia  Possibly in the setting of liver abscess   She remains quite ill, however fortunately she has remained hemodynamically stable despite the systemic illness   She seems to be tolerating the antibiotics without difficulty   The white cell count is stable, and the procalcitonin level remains quite high although it has decreased  Repeat blood cultures are negative   -continue vancomycin, ceftriaxone, Flagyl for now at current doses  -recheck procalcitonin level in a m   -follow up repeat blood cultures  -monitor CBC with diff and creatinine  -supportive care     2  Polymicrobial bacteremia-suspect intra-abdominal or enteric source   No definitive source has been appreciated despite extensive imaging although CT of the abdomen and pelvis does suggest the possibility of pyelonephritis but the urinalysis and culture nondiagnostic  Consideration for the possibility of liver abscess  Transthoracic echocardiogram without valvular vegetation     -antibiotics as above  -follow-up IR cultures  -follow up repeat blood cultures  -no additional ID workup for now     3  Complex liver cyst   Concerning for abscess  Now status post IR drainage with removal of 45 cc of cloudy fluid  Culture is pending    -continue antibiotics as above   -follow-up IR culture   -monitor drain output    4  Acute kidney injury-suspect this is a pre renal issue   Perhaps sepsis is playing a role   The renal function has started to improve  -dose adjusted antibiotics as above  -recheck creatinine clearance  -volume management  -no additional ID workup for now     4   Anemia-quite severe   Elevated LDH suggest the possibility of hemolysis   No definitive evidence of acute blood loss  -workup as per the primary  -transfusion support  -monitor CBC with diff       6  Liver function test abnormalities-possibly secondary to sepsis   Possibly another etiology   CT the abdomen and pelvis does not reveal a definitive source   Liver function tests have modestly decreased  Right upper quadrant ultrasound was cystic lesion in the right lobe of the liver  Consideration for the possibility of liver abscess formation  -recheck LFTs  -recommend IR aspiration of cystic mass if cannot confirm that this is chronic on old images     Discussed with Dr Troy Suazo     Antibiotics:  Vancomycin 6  Ceftriaxone 3  Flagyl restart 2    Subjective:  Patient had drain placed in hepatic fluid collection with removal of 45 cc of cloudy yellow fluid  She is quite somnolent currently  Complains of some pain at the drain site  No fevers or chills  Objective:  Vitals:  Temp:  [97 5 °F (36 4 °C)-98 5 °F (36 9 °C)] 97 9 °F (36 6 °C)  HR:  [60-92] 82  Resp:  [18-20] 18  BP: ()/(50-68) 124/68  SpO2:  [85 %-100 %] 100 %  Temp (24hrs), Av 1 °F (36 7 °C), Min:97 5 °F (36 4 °C), Max:98 5 °F (36 9 °C)  Current: Temperature: 97 9 °F (36 6 °C)    Physical Exam:   General:  No acute distress  Eyes:  Conjunctive clear with no hemorrhages or effusions  Oropharynx:  No ulcers, no lesions  Neck:  Supple, no lymphadenopathy  Lungs:  Clear to auscultation bilaterally, no accessory muscle use  Cardiac:  Regular rate and rhythm, no murmurs  Abdomen:  Soft, mild right upper quadrant tenderness, right upper quadrant drain in place with minimal drainage  Extremities:  No peripheral cyanosis, clubbing, or edema  Skin:  No rashes, no ulcers  Neurological:  Moves all four extremities spontaneously, sensation grossly intact    Lab Results:  I have personally reviewed pertinent labs      Results from last 7 days  Lab Units 19  0514 19  0438 19  0603 19  0602   POTASSIUM mmol/L 4 1 3 2* 3 7 3 5   CHLORIDE mmol/L 100 100 104 99*   CO2 mmol/L 18* 16* 16* 16*   BUN mg/dL 83* 74* 77* 65*   CREATININE mg/dL 1 91* 1 86* 1 92* 2 33*   EGFR ml/min/1 73sq m 25 26 25 20   CALCIUM mg/dL 7 9* 8 2* 7 8* 7 4*   AST U/L 129*  --  72* 204*   ALT U/L 107*  --  122* 164*   ALK PHOS U/L 345*  --  276* 276*       Results from last 7 days  Lab Units 02/09/19  0514 02/08/19  0438 02/07/19  0624   WBC Thousand/uL 23 80* 36 52* 26 51*   HEMOGLOBIN g/dL 7 8* 8 5* 8 2*   PLATELETS Thousands/uL 143* 190 208       Results from last 7 days  Lab Units 02/06/19  0608 02/06/19  0603 02/05/19  1418 02/05/19  1037 02/04/19 2029 02/04/19 2027   BLOOD CULTURE  No Growth at 72 hrs  No Growth at 72 hrs   --   --  Enterococcus faecalis*  Escherichia coli*  Klebsiella oxytoca* Escherichia coli*  Klebsiella oxytoca*   GRAM STAIN RESULT   --   --   --   --  Gram negative rods  Gram positive cocci in pairs Gram negative rods   URINE CULTURE   --   --   --  No Growth <1000 cfu/mL  --   --    C DIFF TOXIN B   --   --  NEGATIVE for C difficle toxin by PCR    --   --   --        Imaging Studies:   I have personally reviewed pertinent imaging study reports and images in PACS  EKG, Pathology, and Other Studies:   I have personally reviewed pertinent reports  IR procedure report reviewed

## 2019-02-10 LAB
ANION GAP SERPL CALCULATED.3IONS-SCNC: 10 MMOL/L (ref 4–13)
ANISOCYTOSIS BLD QL SMEAR: PRESENT
BASOPHILS # BLD MANUAL: 0 THOUSAND/UL (ref 0–0.1)
BASOPHILS NFR MAR MANUAL: 0 % (ref 0–1)
BUN SERPL-MCNC: 80 MG/DL (ref 5–25)
CALCIUM SERPL-MCNC: 8.1 MG/DL (ref 8.3–10.1)
CHLORIDE SERPL-SCNC: 102 MMOL/L (ref 100–108)
CO2 SERPL-SCNC: 18 MMOL/L (ref 21–32)
CREAT SERPL-MCNC: 1.78 MG/DL (ref 0.6–1.3)
EOSINOPHIL # BLD MANUAL: 0 THOUSAND/UL (ref 0–0.4)
EOSINOPHIL NFR BLD MANUAL: 0 % (ref 0–6)
ERYTHROCYTE [DISTWIDTH] IN BLOOD BY AUTOMATED COUNT: 22.5 % (ref 11.6–15.1)
GFR SERPL CREATININE-BSD FRML MDRD: 28 ML/MIN/1.73SQ M
GLUCOSE SERPL-MCNC: 135 MG/DL (ref 65–140)
GLUCOSE SERPL-MCNC: 154 MG/DL (ref 65–140)
GLUCOSE SERPL-MCNC: 158 MG/DL (ref 65–140)
GLUCOSE SERPL-MCNC: 224 MG/DL (ref 65–140)
GLUCOSE SERPL-MCNC: 274 MG/DL (ref 65–140)
HAPTOGLOB SERPL-MCNC: 226 MG/DL (ref 34–200)
HCT VFR BLD AUTO: 25.4 % (ref 34.8–46.1)
HGB BLD-MCNC: 8 G/DL (ref 11.5–15.4)
LG PLATELETS BLD QL SMEAR: PRESENT
LYMPHOCYTES # BLD AUTO: 14 % (ref 14–44)
LYMPHOCYTES # BLD AUTO: 2.74 THOUSAND/UL (ref 0.6–4.47)
MCH RBC QN AUTO: 24 PG (ref 26.8–34.3)
MCHC RBC AUTO-ENTMCNC: 31.5 G/DL (ref 31.4–37.4)
MCV RBC AUTO: 76 FL (ref 82–98)
MONOCYTES # BLD AUTO: 1.18 THOUSAND/UL (ref 0–1.22)
MONOCYTES NFR BLD: 6 % (ref 4–12)
MYELOCYTES NFR BLD MANUAL: 1 % (ref 0–1)
NEUTROPHILS # BLD MANUAL: 15.48 THOUSAND/UL (ref 1.85–7.62)
NEUTS SEG NFR BLD AUTO: 79 % (ref 43–75)
NRBC BLD AUTO-RTO: 0 /100 WBCS
PLATELET # BLD AUTO: 189 THOUSANDS/UL (ref 149–390)
PLATELET BLD QL SMEAR: ADEQUATE
POTASSIUM SERPL-SCNC: 4.4 MMOL/L (ref 3.5–5.3)
PROCALCITONIN SERPL-MCNC: 53.09 NG/ML
RBC # BLD AUTO: 3.33 MILLION/UL (ref 3.81–5.12)
SODIUM SERPL-SCNC: 130 MMOL/L (ref 136–145)
TOTAL CELLS COUNTED SPEC: 100
WBC # BLD AUTO: 19.6 THOUSAND/UL (ref 4.31–10.16)

## 2019-02-10 PROCEDURE — 84145 PROCALCITONIN (PCT): CPT | Performed by: HOSPITALIST

## 2019-02-10 PROCEDURE — 85007 BL SMEAR W/DIFF WBC COUNT: CPT | Performed by: HOSPITALIST

## 2019-02-10 PROCEDURE — 80048 BASIC METABOLIC PNL TOTAL CA: CPT | Performed by: HOSPITALIST

## 2019-02-10 PROCEDURE — 85027 COMPLETE CBC AUTOMATED: CPT | Performed by: HOSPITALIST

## 2019-02-10 PROCEDURE — 99232 SBSQ HOSP IP/OBS MODERATE 35: CPT | Performed by: HOSPITALIST

## 2019-02-10 PROCEDURE — 82948 REAGENT STRIP/BLOOD GLUCOSE: CPT

## 2019-02-10 PROCEDURE — 99232 SBSQ HOSP IP/OBS MODERATE 35: CPT | Performed by: INTERNAL MEDICINE

## 2019-02-10 PROCEDURE — 80202 ASSAY OF VANCOMYCIN: CPT | Performed by: INTERNAL MEDICINE

## 2019-02-10 RX ADMIN — OXYCODONE HYDROCHLORIDE 5 MG: 5 TABLET ORAL at 16:44

## 2019-02-10 RX ADMIN — HEPARIN SODIUM 5000 UNITS: 5000 INJECTION INTRAVENOUS; SUBCUTANEOUS at 22:37

## 2019-02-10 RX ADMIN — FERROUS SULFATE TAB 325 MG (65 MG ELEMENTAL FE) 325 MG: 325 (65 FE) TAB at 08:32

## 2019-02-10 RX ADMIN — METRONIDAZOLE 500 MG: 500 TABLET ORAL at 13:57

## 2019-02-10 RX ADMIN — PANCRELIPASE 48000 UNITS: 24000; 76000; 120000 CAPSULE, DELAYED RELEASE PELLETS ORAL at 08:32

## 2019-02-10 RX ADMIN — INSULIN LISPRO 5 UNITS: 100 INJECTION, SOLUTION INTRAVENOUS; SUBCUTANEOUS at 08:32

## 2019-02-10 RX ADMIN — HEPARIN SODIUM 5000 UNITS: 5000 INJECTION INTRAVENOUS; SUBCUTANEOUS at 05:57

## 2019-02-10 RX ADMIN — INSULIN GLARGINE 25 UNITS: 100 INJECTION, SOLUTION SUBCUTANEOUS at 22:37

## 2019-02-10 RX ADMIN — INSULIN LISPRO 5 UNITS: 100 INJECTION, SOLUTION INTRAVENOUS; SUBCUTANEOUS at 12:27

## 2019-02-10 RX ADMIN — OXYCODONE HYDROCHLORIDE 5 MG: 5 TABLET ORAL at 10:44

## 2019-02-10 RX ADMIN — INSULIN LISPRO 8 UNITS: 100 INJECTION, SOLUTION INTRAVENOUS; SUBCUTANEOUS at 12:27

## 2019-02-10 RX ADMIN — INSULIN LISPRO 5 UNITS: 100 INJECTION, SOLUTION INTRAVENOUS; SUBCUTANEOUS at 16:48

## 2019-02-10 RX ADMIN — METRONIDAZOLE 500 MG: 500 TABLET ORAL at 05:57

## 2019-02-10 RX ADMIN — CEFTRIAXONE SODIUM 2000 MG: 10 INJECTION, POWDER, FOR SOLUTION INTRAVENOUS at 10:43

## 2019-02-10 RX ADMIN — PANCRELIPASE 48000 UNITS: 24000; 76000; 120000 CAPSULE, DELAYED RELEASE PELLETS ORAL at 12:27

## 2019-02-10 RX ADMIN — OXYCODONE HYDROCHLORIDE 5 MG: 5 TABLET ORAL at 20:55

## 2019-02-10 RX ADMIN — INSULIN LISPRO 12 UNITS: 100 INJECTION, SOLUTION INTRAVENOUS; SUBCUTANEOUS at 16:45

## 2019-02-10 RX ADMIN — PANCRELIPASE 48000 UNITS: 24000; 76000; 120000 CAPSULE, DELAYED RELEASE PELLETS ORAL at 16:44

## 2019-02-10 RX ADMIN — METRONIDAZOLE 500 MG: 500 TABLET ORAL at 22:37

## 2019-02-10 RX ADMIN — HEPARIN SODIUM 5000 UNITS: 5000 INJECTION INTRAVENOUS; SUBCUTANEOUS at 13:57

## 2019-02-10 NOTE — PLAN OF CARE
Problem: Potential for Falls  Goal: Patient will remain free of falls  Description  INTERVENTIONS:  - Assess patient frequently for physical needs  -  Identify cognitive and physical deficits and behaviors that affect risk of falls    -  [place] fall precautions as indicated by assessment   - Educate patient/family on patient safety including physical limitations  - Instruct patient to call for assistance with activity based on assessment  - Modify environment to reduce risk of injury  - Consider OT/PT consult to assist with strengthening/mobility   Outcome: Progressing     Problem: Prexisting or High Potential for Compromised Skin Integrity  Goal: Skin integrity is maintained or improved  Description  INTERVENTIONS:  - Identify patients at risk for skin breakdown  - Assess and monitor skin integrity  - Assess and monitor nutrition and hydration status  - Monitor labs (i e  albumin)  - Assess for incontinence   - Turn and reposition patient  - Assist with mobility/ambulation  - Relieve pressure over bony prominences  - Avoid friction and shearing  - Provide appropriate hygiene as needed including keeping skin clean and dry  - Evaluate need for skin moisturizer/barrier cream  - Collaborate with interdisciplinary team (i e  Nutrition, Rehabilitation, etc )   - Patient/family teaching   Outcome: Progressing     Problem: DISCHARGE PLANNING - CARE MANAGEMENT  Goal: Discharge to post-acute care or home with appropriate resources  Description  INTERVENTIONS:  - Conduct assessment to determine patient/family and health care team treatment goals, and need for post-acute services based on payer coverage, community resources, and patient preferences, and barriers to discharge  - Address psychosocial, clinical, and financial barriers to discharge as identified in assessment in conjunction with the patient/family and health care team  - Arrange appropriate level of post-acute services according to patient?s   needs and preference and payer coverage in collaboration with the physician and health care team  - Communicate with and update the patient/family, physician, and health care team regarding progress on the discharge plan  - Arrange appropriate transportation to post-acute venues   Outcome: Progressing     Problem: PAIN - ADULT  Goal: Verbalizes/displays adequate comfort level or baseline comfort level  Description  Interventions:  - Encourage patient to monitor pain and request assistance  - Assess pain using appropriate pain scale  - Administer analgesics based on type and severity of pain and evaluate response  - Implement non-pharmacological measures as appropriate and evaluate response  - Consider cultural and social influences on pain and pain management  - Notify physician/advanced practitioner if interventions unsuccessful or patient reports new pain   Outcome: Progressing     Problem: INFECTION - ADULT  Goal: Absence or prevention of progression during hospitalization  Description  INTERVENTIONS:  - Assess and monitor for signs and symptoms of infection  - Monitor lab/diagnostic results  - Monitor all insertion sites, i e  indwelling lines, tubes, and drains  - Monitor endotracheal (as able) and nasal secretions for changes in amount and color  - [place] appropriate cooling/warming therapies per order  - Administer medications as ordered  - Instruct and encourage patient and family to use good hand hygiene technique  - Identify and instruct in appropriate isolation precautions for identified infection/condition   Outcome: Progressing  Goal: Absence of fever/infection during neutropenic period  Description  INTERVENTIONS:  - Monitor WBC  - Implement neutropenic guidelines   Outcome: Progressing     Problem: SAFETY ADULT  Goal: Maintain or return to baseline ADL function  Description  INTERVENTIONS:  -  Assess patient's ability to carry out ADLs; assess patient's baseline for ADL function and identify physical deficits which impact ability to perform ADLs (bathing, care of mouth/teeth, toileting, grooming, dressing, etc )  - Assess/evaluate cause of self-care deficits   - Assess range of motion  - Assess patient's mobility; develop plan if impaired  - Assess patient's need for assistive devices and provide as appropriate  - Encourage maximum independence but intervene and supervise when necessary  ¯ Involve family in performance of ADLs  ¯ Assess for home care needs following discharge   ¯ Request OT consult to assist with ADL evaluation and planning for discharge  ¯ Provide patient education as appropriate   Outcome: Progressing  Goal: Maintain or return mobility status to optimal level  Description  INTERVENTIONS:  - Assess patient's baseline mobility status (ambulation, transfers, stairs, etc )    - Identify cognitive and physical deficits and behaviors that affect mobility  - Identify mobility aids required to assist with transfers and/or ambulation (gait belt, sit-to-stand, lift, walker, cane, etc )  - [place] fall precautions as indicated by assessment  - Record patient progress and toleration of activity level on Mobility SBAR; progress patient to next Phase/Stage  - Instruct patient to call for assistance with activity based on assessment  - Request Rehabilitation consult to assist with strengthening/weightbearing, etc    Outcome: Progressing     Problem: DISCHARGE PLANNING  Goal: Discharge to home or other facility with appropriate resources  Description  INTERVENTIONS:  - Identify barriers to discharge w/patient and caregiver  - Arrange for needed discharge resources and transportation as appropriate  - Identify discharge learning needs (meds, wound care, etc )  - Arrange for interpretive services to assist at discharge as needed  - Refer to Case Management Department for coordinating discharge planning if the patient needs post-hospital services based on physician/advanced practitioner order or complex needs related to functional status, cognitive ability, or social support system   Outcome: Progressing     Problem: Knowledge Deficit  Goal: Patient/family/caregiver demonstrates understanding of disease process, treatment plan, medications, and discharge instructions  Description  Complete learning assessment and assess knowledge base    Interventions:  - Provide teaching at level of understanding  - Provide teaching via preferred learning methods   Outcome: Progressing

## 2019-02-10 NOTE — PROGRESS NOTES
Progress Note - Nanci Brown 75 y o  female MRN: [id number]    Unit/Bed#: [place] [address] -01 Encounter: [id number]    Principal Problem:    Pyelonephritis  Active Problems:    Sepsis ([facility] [address] )    ANNE (acute kidney injury) ([facility] [address] )    Lactic acidosis    Anemia    Leukocytosis    Ascites    Assessment/Plan:  1  Severe sepsis-patient presented with tachycardia, tachypnea, fever, leukocytosis and lactic acidosis   secondary to polymicrobial bacteremia with likely liver abscess  · Initial blood cultures preliminarily positive for Enterococcus and E coli     Repeat blood cultures are pending   WBC is  trending down from 23,000 to 19,000   Infectious Disease input appreciated    currently on IV vancomycin, ceftriaxone and Flagyl     · Echocardiogram negative for any vegetations  · Hemodynamics remain stable  · Trend WBCs and procalcitonin  · Abdominal ultrasound and revealing a complex cyst in the right lobe of the liver  · Patient had an aspiration of the cyst by IR -45 mL of cloudy fluid was obtained  A drain has been placed  Will treat as liver abscess for now till cultures are back  · Attempted to get old CT scan reports [facility] however the last CT scan report is from 2011     2  Anemia, multifactorial secondary to iron deficiency, critical illness and chronic disease  · Hemoglobin initially improved to 9 6 after transfusion of 2 units packed red blood cells then was down to 7 8 yesterday and remained stable overnight   Hgb is 8  · Continue daily iron supplementation  ·    3  ANNE likely prerenal and secondary to sepsis  · Urine output has improved, now averaging 30-40mL/hr   Creatinine is around 1 78   Continue to trend renal indices and monitor intake and output      4-Extensive cancer history including breast, pancreatic and colon with concern for recurrent disease  Pancreatic head adenocarcinoma with a prior [id number], categorically unresectable treated with neoadjuvant chemoradiotherapy    bilateral breast cancer and 2x colon cancer   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, colorectal, thyroid) and 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   Has been following up at [facility]  CT scan from this admission shows extensive lymphadenopathy and liver lesions   Will have oncology review patient      5- DM Type II-Blood sugar has been elevated  Continue Lantus/SSI regimen   Will increase increase coverage algorithm     6-complex cyst in the liver-concerning for an abscess     patient had 45 mL of cloudy fluid drained from the cystin  IR and had a drain  placed  Will treat as abscess for now till definitive results available  Cultures negative so far      7-transaminitis likely secondary to sepsis   AST ALT is slowly trending down        Subjective:  Patient reports feeling better after drain was placed  Cultures no growth so far  WBC down to 19,000 from 77[address]  Continues on vancomycin, ceftriaxone and Flagyl    Physical Exam:   Vitals: Blood pressure 99/55, pulse 78, temperature 98 5 °F (36 9 °C), temperature source Temporal, resp  rate 18, height 5' 5" (1 651 m), weight 71 3 kg (157 lb 3 oz), SpO2 98 %  ,Body mass index is 26 16 kg/m²  Gen:  Pleasant, non-tachypnic, non-dyspnic  Conversant  Heart: regular rate and rhythm, S1S2 present, no murmur, rub or gallop  Lungs: clear to ausculatation bilaterally  No wheezing, crackless, or rhonchi  No accessory muscle use or respiratory distress  Abd: soft, non-tender, non-distended  NABS, no guarding, rebound or peritoneal signs  Drainage of 10 mL turbid fluid from the drain site  Extremities: no clubbing, cyanosis or edema  2+pedal pulses bilaterally  Full range of motion  Neuro: awake, alert and oriented  Cranial nerves 2-12 intact  Strength and sensation grossly intact  Skin: warm and dry: no petechiae, purpura and rash      LABS:   Results from last 7 days   Lab Units 02/10/19  9193 02/09/19  0514 02/08/19  0438   WBC Thousand/uL 19 60* 23 80* 36 52*   HEMOGLOBIN g/dL 8 0* 7 8* 8 5*   HEMATOCRIT % 25 4* 24 8* 26 8*   PLATELETS Thousands/uL 189 143* 190     Results from last 7 days   Lab Units 02/10/19  0518 02/09/19  0514 02/08/19  0438   POTASSIUM mmol/L 4 4 4 1 3 2*   CHLORIDE mmol/L 102 100 100   CO2 mmol/L 18* 18* 16*   BUN mg/dL 80* 83* 74*   CREATININE mg/dL 1 78* 1 91* 1 86*   CALCIUM mg/dL 8 1* 7 9* 8 2*       Intake/Output Summary (Last 24 hours) at 2/10/2019 1325  Last data filed at 2/10/2019 1113  Gross per 24 hour   Intake 50 ml   Output 510 ml   Net -460 ml           Current Facility-Administered Medications:  acetaminophen 650 mg Oral 4x Daily PRN Bette ESE Chaves    cefTRIAXone 2,000 mg Intravenous Q24H Amandeep Stevenson MD Last Rate: Stopped (02/10/19 1113)   ferrous sulfate 325 mg Oral Daily With Breakfast ESE Gale    heparin (porcine) 5,000 Units Subcutaneous Q8H [address] ESE Gracia    insulin glargine 25 Units Subcutaneous HS Shala K ESE Broderick    insulin lispro 4-20 Units Subcutaneous TID AC Shala K ESE Broderick    insulin lispro 5 Units Subcutaneous TID With Meals ESE Knight    metroNIDAZOLE 500 mg Oral Q8H [address] Santo York MD    morphine injection 1 mg Intravenous Q4H PRN Susan Tomas MD    oxyCODONE 5 mg Oral Q4H PRN ESE Stacy    pancrelipase (Lip-Prot-Amyl) 48,000 Units Oral TID With Meals ESE Knight    vancomycin 15 mg/kg Intravenous Q24H ESE Stacy Last Rate: 1,000 mg (02/09/19 2330)       Family update- family in the room-updated

## 2019-02-10 NOTE — PROGRESS NOTES
Progress Note - Infectious Disease   Franc Valera 75 y o  female MRN: [id number]  Unit/Bed#: [place] [address] -01 Encounter: [id number]      Impression/Plan:  1  Severe sepsis-POA  Fever, leukocytosis, tachycardia, lactic acidosis   Appears to be secondary to polymicrobial bacteremia  Possibly in the setting of liver abscess   She remains quite ill, however fortunately she has remained hemodynamically stable despite the systemic illness   She seems to be tolerating the antibiotics without difficulty  Since drainage of possible liver abscess, patient is clinically improving with downtrending fever, leukocytosis, procalcitonin   -continue vancomycin, ceftriaxone, Flagyl for now at current doses  Next vancomycin trough this evening   -follow up repeat blood cultures  -monitor CBC with diff and creatinine  -supportive care     2  Polymicrobial bacteremia-suspect intra-abdominal or enteric source   No definitive source has been appreciated despite extensive imaging although CT of the abdomen and pelvis does suggest the possibility of pyelonephritis but the urinalysis and culture nondiagnostic   Consideration for the possibility of liver abscess  Transthoracic echocardiogram without valvular vegetation     -antibiotics as above  -follow-up IR culture to guide further antibiotic recommendations  -follow up repeat blood cultures  -no additional ID workup for now     3  Complex liver cyst   Concerning for abscess  Now status post IR drainage with removal of 45 cc of cloudy fluid  Culture so far shows no growth  -continue antibiotics as above              -follow-up IR culture to guide further antibiotic recommendations  -monitor drain output     4   Acute kidney injury-suspect this is a pre renal issue   Perhaps sepsis is playing a role   The renal function continues to slowly improve   -dose adjusted antibiotics as above  -recheck creatinine clearance  -volume management  -no additional ID workup for now     4  Anemia-quite severe   Elevated LDH suggest the possibility of hemolysis   No definitive evidence of acute blood loss  -workup as per the primary  -transfusion support  -monitor CBC with diff       6  Liver function test abnormalities-possibly secondary to sepsis   Possibly another etiology   CT the abdomen and pelvis does not reveal a definitive source   Liver function tests have modestly decreased   Right upper quadrant ultrasound was cystic lesion in the right lobe of the liver   Consideration for the possibility of liver abscess formation        Antibiotics:  Vancomycin 7  Ceftriaxone 4  Flagyl restart 3      Subjective: The patient is feeling much better today  Less overall pain  Denies fevers or chills  Tolerating oral intake  Objective:  Vitals:  Temp:  [97 9 °F (36 6 °C)-98 5 °F (36 9 °C)] 98 5 °F (36 9 °C)  HR:  [78-93] 78  Resp:  [18] 18  BP: ()/(55-68) 99/55  SpO2:  [98 %-100 %] 98 %  Temp (24hrs), Av 2 °F (36 8 °C), Min:97 9 °F (36 6 °C), Max:98 5 °F (36 9 °C)  Current: Temperature: 98 5 °F (36 9 °C)    Physical Exam:   General:  No acute distress  Psychiatric:  Awake and alert  Pulmonary:  Normal respiratory excursion without accessory muscle use  Abdomen:  Soft, mild right upper quadrant tenderness, right upper quadrant drain in place with thin dark-colored fluid  Extremities:  No edema  Skin:  No rashes    Lab Results:  I have personally reviewed pertinent labs    Results from last 7 days   Lab Units 02/10/19  0518 19  0514 19  0438 19  0603 19  0602   POTASSIUM mmol/L 4 4 4 1 3 2* 3 7 3 5   CHLORIDE mmol/L 102 100 100 104 99*   CO2 mmol/L 18* 18* 16* 16* 16*   BUN mg/dL 80* 83* 74* 77* 65*   CREATININE mg/dL 1 78* 1 91* 1 86* 1 92* 2 33*   EGFR ml/min/1 73sq m 28 25 26 25 20   CALCIUM mg/dL 8 1* 7 9* 8 2* 7 8* 7 4*   AST U/L  --  129*  --  72* 204*   ALT U/L  --  107*  --  122* 164*   ALK PHOS U/L  --  345*  --  276* 276*     Results from last 7 days   Lab Units 02/10/19  0518 02/09/19  0514 02/08/19  0438   WBC Thousand/uL 19 60* 23 80* 36 52*   HEMOGLOBIN g/dL 8 0* 7 8* 8 5*   PLATELETS Thousands/uL 189 143* 190     Results from last 7 days   Lab Units 02/09/19  1325 02/06/19  0608 02/06/19  0603 02/05/19  1418 02/05/19  1037 02/04/19 2029 02/04/19 2027   BLOOD CULTURE   --  No Growth After 4 Days  No Growth After 4 Days  --   --  Enterococcus faecalis*  Escherichia coli*  Klebsiella oxytoca* Escherichia coli*  Klebsiella oxytoca*   GRAM STAIN RESULT   --   --   --   --   --  Gram negative rods  Gram positive cocci in pairs Gram negative rods   URINE CULTURE   --   --   --   --  No Growth <1000 cfu/mL  --   --    BODY FLUID CULTURE, STERILE  No growth  --   --   --   --   --   --    C DIFF TOXIN B   --   --   --  NEGATIVE for C difficle toxin by PCR    --   --   --        Imaging Studies:   I have personally reviewed pertinent imaging study reports and images in PACS  EKG, Pathology, and Other Studies:   I have personally reviewed pertinent reports

## 2019-02-11 LAB
ANION GAP SERPL CALCULATED.3IONS-SCNC: 9 MMOL/L (ref 4–13)
ANISOCYTOSIS BLD QL SMEAR: PRESENT
BACTERIA BLD CULT: NORMAL
BACTERIA BLD CULT: NORMAL
BASOPHILS # BLD MANUAL: 0 THOUSAND/UL (ref 0–0.1)
BASOPHILS NFR MAR MANUAL: 0 % (ref 0–1)
BUN SERPL-MCNC: 71 MG/DL (ref 5–25)
CALCIUM SERPL-MCNC: 8.3 MG/DL (ref 8.3–10.1)
CHLORIDE SERPL-SCNC: 101 MMOL/L (ref 100–108)
CO2 SERPL-SCNC: 20 MMOL/L (ref 21–32)
CREAT SERPL-MCNC: 1.54 MG/DL (ref 0.6–1.3)
EOSINOPHIL # BLD MANUAL: 0.22 THOUSAND/UL (ref 0–0.4)
EOSINOPHIL NFR BLD MANUAL: 1 % (ref 0–6)
ERYTHROCYTE [DISTWIDTH] IN BLOOD BY AUTOMATED COUNT: 23.4 % (ref 11.6–15.1)
GFR SERPL CREATININE-BSD FRML MDRD: 33 ML/MIN/1.73SQ M
GLUCOSE SERPL-MCNC: 130 MG/DL (ref 65–140)
GLUCOSE SERPL-MCNC: 135 MG/DL (ref 65–140)
GLUCOSE SERPL-MCNC: 172 MG/DL (ref 65–140)
GLUCOSE SERPL-MCNC: 189 MG/DL (ref 65–140)
GLUCOSE SERPL-MCNC: 200 MG/DL (ref 65–140)
HCT VFR BLD AUTO: 27.1 % (ref 34.8–46.1)
HGB BLD-MCNC: 8.5 G/DL (ref 11.5–15.4)
LG PLATELETS BLD QL SMEAR: PRESENT
LYMPHOCYTES # BLD AUTO: 17 % (ref 14–44)
LYMPHOCYTES # BLD AUTO: 3.78 THOUSAND/UL (ref 0.6–4.47)
MCH RBC QN AUTO: 23.9 PG (ref 26.8–34.3)
MCHC RBC AUTO-ENTMCNC: 31.4 G/DL (ref 31.4–37.4)
MCV RBC AUTO: 76 FL (ref 82–98)
MONOCYTES # BLD AUTO: 1.33 THOUSAND/UL (ref 0–1.22)
MONOCYTES NFR BLD: 6 % (ref 4–12)
MYELOCYTES NFR BLD MANUAL: 3 % (ref 0–1)
NEUTROPHILS # BLD MANUAL: 16.21 THOUSAND/UL (ref 1.85–7.62)
NEUTS BAND NFR BLD MANUAL: 1 % (ref 0–8)
NEUTS SEG NFR BLD AUTO: 72 % (ref 43–75)
NRBC BLD AUTO-RTO: 0 /100 WBCS
OVALOCYTES BLD QL SMEAR: PRESENT
PLATELET # BLD AUTO: 236 THOUSANDS/UL (ref 149–390)
PLATELET BLD QL SMEAR: ADEQUATE
POTASSIUM SERPL-SCNC: 4.6 MMOL/L (ref 3.5–5.3)
PROCALCITONIN SERPL-MCNC: 27.52 NG/ML
RBC # BLD AUTO: 3.56 MILLION/UL (ref 3.81–5.12)
SODIUM SERPL-SCNC: 130 MMOL/L (ref 136–145)
TARGETS BLD QL SMEAR: PRESENT
TOTAL CELLS COUNTED SPEC: 100
VANCOMYCIN TROUGH SERPL-MCNC: 19.4 UG/ML (ref 10–20)
WBC # BLD AUTO: 22.21 THOUSAND/UL (ref 4.31–10.16)

## 2019-02-11 PROCEDURE — 97530 THERAPEUTIC ACTIVITIES: CPT

## 2019-02-11 PROCEDURE — 99232 SBSQ HOSP IP/OBS MODERATE 35: CPT | Performed by: INTERNAL MEDICINE

## 2019-02-11 PROCEDURE — 80048 BASIC METABOLIC PNL TOTAL CA: CPT | Performed by: HOSPITALIST

## 2019-02-11 PROCEDURE — 82948 REAGENT STRIP/BLOOD GLUCOSE: CPT

## 2019-02-11 PROCEDURE — 99233 SBSQ HOSP IP/OBS HIGH 50: CPT | Performed by: INTERNAL MEDICINE

## 2019-02-11 PROCEDURE — 85007 BL SMEAR W/DIFF WBC COUNT: CPT | Performed by: HOSPITALIST

## 2019-02-11 PROCEDURE — 84145 PROCALCITONIN (PCT): CPT | Performed by: HOSPITALIST

## 2019-02-11 PROCEDURE — 85027 COMPLETE CBC AUTOMATED: CPT | Performed by: HOSPITALIST

## 2019-02-11 RX ORDER — FUROSEMIDE 10 MG/ML
40 INJECTION INTRAMUSCULAR; INTRAVENOUS ONCE
Status: COMPLETED | OUTPATIENT
Start: 2019-02-11 | End: 2019-02-11

## 2019-02-11 RX ADMIN — PANCRELIPASE 48000 UNITS: 24000; 76000; 120000 CAPSULE, DELAYED RELEASE PELLETS ORAL at 18:36

## 2019-02-11 RX ADMIN — INSULIN LISPRO 5 UNITS: 100 INJECTION, SOLUTION INTRAVENOUS; SUBCUTANEOUS at 18:36

## 2019-02-11 RX ADMIN — INSULIN LISPRO 5 UNITS: 100 INJECTION, SOLUTION INTRAVENOUS; SUBCUTANEOUS at 14:04

## 2019-02-11 RX ADMIN — HEPARIN SODIUM 5000 UNITS: 5000 INJECTION INTRAVENOUS; SUBCUTANEOUS at 06:04

## 2019-02-11 RX ADMIN — OXYCODONE HYDROCHLORIDE 5 MG: 5 TABLET ORAL at 21:04

## 2019-02-11 RX ADMIN — METRONIDAZOLE 500 MG: 500 TABLET ORAL at 06:04

## 2019-02-11 RX ADMIN — PANCRELIPASE 48000 UNITS: 24000; 76000; 120000 CAPSULE, DELAYED RELEASE PELLETS ORAL at 14:05

## 2019-02-11 RX ADMIN — INSULIN LISPRO 5 UNITS: 100 INJECTION, SOLUTION INTRAVENOUS; SUBCUTANEOUS at 10:00

## 2019-02-11 RX ADMIN — PANCRELIPASE 48000 UNITS: 24000; 76000; 120000 CAPSULE, DELAYED RELEASE PELLETS ORAL at 10:00

## 2019-02-11 RX ADMIN — INSULIN LISPRO 4 UNITS: 100 INJECTION, SOLUTION INTRAVENOUS; SUBCUTANEOUS at 14:03

## 2019-02-11 RX ADMIN — FUROSEMIDE 40 MG: 10 INJECTION, SOLUTION INTRAMUSCULAR; INTRAVENOUS at 14:03

## 2019-02-11 RX ADMIN — INSULIN GLARGINE 25 UNITS: 100 INJECTION, SOLUTION SUBCUTANEOUS at 21:03

## 2019-02-11 RX ADMIN — HEPARIN SODIUM 5000 UNITS: 5000 INJECTION INTRAVENOUS; SUBCUTANEOUS at 21:04

## 2019-02-11 RX ADMIN — VANCOMYCIN HYDROCHLORIDE 1000 MG: 1 INJECTION, SOLUTION INTRAVENOUS at 00:07

## 2019-02-11 RX ADMIN — CEFTRIAXONE SODIUM 2000 MG: 10 INJECTION, POWDER, FOR SOLUTION INTRAVENOUS at 11:14

## 2019-02-11 RX ADMIN — OXYCODONE HYDROCHLORIDE 5 MG: 5 TABLET ORAL at 01:01

## 2019-02-11 RX ADMIN — VANCOMYCIN HYDROCHLORIDE 1000 MG: 1 INJECTION, SOLUTION INTRAVENOUS at 23:31

## 2019-02-11 RX ADMIN — HEPARIN SODIUM 5000 UNITS: 5000 INJECTION INTRAVENOUS; SUBCUTANEOUS at 14:05

## 2019-02-11 RX ADMIN — METRONIDAZOLE 500 MG: 500 TABLET ORAL at 14:06

## 2019-02-11 RX ADMIN — INSULIN LISPRO 4 UNITS: 100 INJECTION, SOLUTION INTRAVENOUS; SUBCUTANEOUS at 18:36

## 2019-02-11 RX ADMIN — FERROUS SULFATE TAB 325 MG (65 MG ELEMENTAL FE) 325 MG: 325 (65 FE) TAB at 10:00

## 2019-02-11 RX ADMIN — METRONIDAZOLE 500 MG: 500 TABLET ORAL at 21:04

## 2019-02-11 NOTE — PLAN OF CARE
Problem: Potential for Falls  Goal: Patient will remain free of falls  Description  INTERVENTIONS:  - Assess patient frequently for physical needs  -  Identify cognitive and physical deficits and behaviors that affect risk of falls    -  [place] fall precautions as indicated by assessment   - Educate patient/family on patient safety including physical limitations  - Instruct patient to call for assistance with activity based on assessment  - Modify environment to reduce risk of injury  - Consider OT/PT consult to assist with strengthening/mobility   Outcome: Progressing     Problem: Prexisting or High Potential for Compromised Skin Integrity  Goal: Skin integrity is maintained or improved  Description  INTERVENTIONS:  - Identify patients at risk for skin breakdown  - Assess and monitor skin integrity  - Assess and monitor nutrition and hydration status  - Monitor labs (i e  albumin)  - Assess for incontinence   - Turn and reposition patient  - Assist with mobility/ambulation  - Relieve pressure over bony prominences  - Avoid friction and shearing  - Provide appropriate hygiene as needed including keeping skin clean and dry  - Evaluate need for skin moisturizer/barrier cream  - Collaborate with interdisciplinary team (i e  Nutrition, Rehabilitation, etc )   - Patient/family teaching   Outcome: Progressing     Problem: PAIN - ADULT  Goal: Verbalizes/displays adequate comfort level or baseline comfort level  Description  Interventions:  - Encourage patient to monitor pain and request assistance  - Assess pain using appropriate pain scale  - Administer analgesics based on type and severity of pain and evaluate response  - Implement non-pharmacological measures as appropriate and evaluate response  - Consider cultural and social influences on pain and pain management  - Notify physician/advanced practitioner if interventions unsuccessful or patient reports new pain   Outcome: Progressing     Problem: INFECTION - ADULT  Goal: Absence or prevention of progression during hospitalization  Description  INTERVENTIONS:  - Assess and monitor for signs and symptoms of infection  - Monitor lab/diagnostic results  - Monitor all insertion sites, i e  indwelling lines, tubes, and drains  - Monitor endotracheal (as able) and nasal secretions for changes in amount and color  - [place] appropriate cooling/warming therapies per order  - Administer medications as ordered  - Instruct and encourage patient and family to use good hand hygiene technique  - Identify and instruct in appropriate isolation precautions for identified infection/condition   Outcome: Progressing  Goal: Absence of fever/infection during neutropenic period  Description  INTERVENTIONS:  - Monitor WBC  - Implement neutropenic guidelines   Outcome: Progressing     Problem: SAFETY ADULT  Goal: Maintain or return to baseline ADL function  Description  INTERVENTIONS:  -  Assess patient's ability to carry out ADLs; assess patient's baseline for ADL function and identify physical deficits which impact ability to perform ADLs (bathing, care of mouth/teeth, toileting, grooming, dressing, etc )  - Assess/evaluate cause of self-care deficits   - Assess range of motion  - Assess patient's mobility; develop plan if impaired  - Assess patient's need for assistive devices and provide as appropriate  - Encourage maximum independence but intervene and supervise when necessary  ¯ Involve family in performance of ADLs  ¯ Assess for home care needs following discharge   ¯ Request OT consult to assist with ADL evaluation and planning for discharge  ¯ Provide patient education as appropriate   Outcome: Progressing  Goal: Maintain or return mobility status to optimal level  Description  INTERVENTIONS:  - Assess patient's baseline mobility status (ambulation, transfers, stairs, etc )    - Identify cognitive and physical deficits and behaviors that affect mobility  - Identify mobility aids required to assist with transfers and/or ambulation (gait belt, sit-to-stand, lift, walker, cane, etc )  - [place] fall precautions as indicated by assessment  - Record patient progress and toleration of activity level on Mobility SBAR; progress patient to next Phase/Stage  - Instruct patient to call for assistance with activity based on assessment  - Request Rehabilitation consult to assist with strengthening/weightbearing, etc    Outcome: Progressing     Problem: DISCHARGE PLANNING  Goal: Discharge to home or other facility with appropriate resources  Description  INTERVENTIONS:  - Identify barriers to discharge w/patient and caregiver  - Arrange for needed discharge resources and transportation as appropriate  - Identify discharge learning needs (meds, wound care, etc )  - Arrange for interpretive services to assist at discharge as needed  - Refer to Case Management Department for coordinating discharge planning if the patient needs post-hospital services based on physician/advanced practitioner order or complex needs related to functional status, cognitive ability, or social support system   Outcome: Progressing     Problem: Knowledge Deficit  Goal: Patient/family/caregiver demonstrates understanding of disease process, treatment plan, medications, and discharge instructions  Description  Complete learning assessment and assess knowledge base    Interventions:  - Provide teaching at level of understanding  - Provide teaching via preferred learning methods   Outcome: Progressing

## 2019-02-11 NOTE — PLAN OF CARE
Problem: PHYSICAL THERAPY ADULT  Goal: Performs mobility at highest level of function for planned discharge setting  See evaluation for individualized goals  Description  Treatment/Interventions: Functional transfer training, LE strengthening/ROM, Therapeutic exercise, Endurance training, Patient/family training, Equipment eval/education, Bed mobility, Gait training, Spoke to nursing, OT, Family  Equipment Recommended: Sheri Lehman       See flowsheet documentation for full assessment, interventions and recommendations  Outcome: Progressing  Note:   Prognosis: Fair  Problem List: Decreased strength, Decreased endurance, Impaired balance, Decreased mobility, Decreased safety awareness, Pain  Assessment: Pt rec'd out of bed on bedside commode  Pt incontinent of urine  Pt requires max assist for washing of lowerbody and donning of underwear, pants and slipper socks  Pt performed standing tolerance with min assist x1 with b/l ue support  X 2 minute x1  Pt performs sit to stand transfers with min assist x1 and, stand to sit with min assist x1 and stand pivot transfers with mod assist x2, and ambulation 3' x1 with mod assist x1 with verlbal cues for safe mobility  Pt requires frequent rest break t/o session  Pt will continue to benefit from skilled inpt Pt for bed mobility, transfer and gait training le strengthening and stair training  Pt is easily fatigued, poor tolerance to activity and demonstrates deifcits in balance,strength, mobility, safety and activity tolerance  STR is recommended at d/c as pt is functioning well below baseline level of mobility  Barriers to Discharge: Inaccessible home environment  Barriers to Discharge Comments: 3 MAU  Recommendation: Short-term skilled PT     PT - OK to Discharge: Yes(to rehab)    See flowsheet documentation for full assessment

## 2019-02-11 NOTE — PROGRESS NOTES
[facility] Progress Note  Patient: Franc Valera 75 y o  female   MRN: [id number]  PCP: Liz Novak,   Unit/Bed#: [place] -01 Encounter: [id number]  Date Of Visit: 02/11/19      Assessment/plan  1  Severe sepsis-poa  Due to polymicrobial bacteremia  Likely from liver abscess  Appreciate ID recommendations  Continue vanco/ceftriaxone/flagyl  Await repeat cultures  Await finalization of liver collection cultures  Echo was negative for vegetation  Re check procalcitonin  2  Anemia, multifactorial secondary to iron deficiency, critical illness and chronic disease- Hemoglobin initially improved to 9 6 after transfusion of 2 units packed red blood cells  H/h remains stable at 8 5  Will continue to monitor and will continue iron supplements       3  ANNE likely prerenal and secondary to sepsis- creatinine improved to 1 5  outpt decreased  Will dose lasix x1 and check bmp in am        4-Extensive cancer history including breast, pancreatic and colon with concern for recurrent disease    -Pancreatic head adenocarcinoma with a prior [id number], categorically unresectable treated with neoadjuvant chemoradiotherapy   -bilateral breast cancer and 2x colon cancer   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, colorectal, thyroid) and 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      -Has been following up at [facility]  -CT scan from this admission shows extensive lymphadenopathy and liver lesions   appreciate oncology recommendations  Pt will need to follow up with sergio rodriguez       5- DM Type II- Continue current Lantus/SSI regimen   will monitor and adjust as needed  6-transaminitis likely secondary to sepsis   AST ALT is slowly trending down  Check cmp in am      7  Chronic diastolic chf- lasix and zaroxolyn on hold due to anne  Lower ext with edema   Will dose lasix IV x1 and check bmp in am      dispo- pt may need str per physical notes on 2/8/19  Discussed with case management  She is set up with [facility]  Will monitor how pt progresses  Discussed in full with nurse    Subjective:   Pt seen and examined  Pt states she is breathing okay  No f/c no cp no n/v/d no abd pain  Her son did state that he was concerned about her swollen legs  Objective:     Vitals: Blood pressure 116/77, pulse 88, temperature (!) 97 1 °F (36 2 °C), temperature source Temporal, resp  rate 18, height 5' 5" (1 651 m), weight 71 3 kg (157 lb 3 oz), SpO2 97 %  ,Body mass index is 26 16 kg/m²  Lab, Imaging and other studies:  Results from last 7 days   Lab Units 02/11/19  0601  02/09/19  0515   WBC Thousand/uL 22 21*   < >  --    HEMOGLOBIN g/dL 8 5*   < >  --    HEMATOCRIT % 27 1*   < >  --    PLATELETS Thousands/uL 236   < >  --    INR   --   --  1 47*    < > = values in this interval not displayed  Results from last 7 days   Lab Units 02/11/19  0601  02/09/19  0514   POTASSIUM mmol/L 4 6   < > 4 1   CHLORIDE mmol/L 101   < > 100   CO2 mmol/L 20*   < > 18*   BUN mg/dL 71*   < > 83*   CREATININE mg/dL 1 54*   < > 1 91*   CALCIUM mg/dL 8 3   < > 7 9*   ALK PHOS U/L  --   --  345*   ALT U/L  --   --  107*   AST U/L  --   --  129*    < > = values in this interval not displayed  Lab Results   Component Value Date    BLOODCX No Growth After 5 Days  02/06/2019    BLOODCX No Growth After 5 Days   02/06/2019    BLOODCX Enterococcus faecalis (A) 02/04/2019    BLOODCX Escherichia coli (A) 02/04/2019    BLOODCX Klebsiella oxytoca (A) 02/04/2019    URINECX No Growth <1000 cfu/mL 02/05/2019     Scheduled Meds:   Current Facility-Administered Medications:  acetaminophen 650 mg Oral 4x Daily PRN ESE Ann    cefTRIAXone 2,000 mg Intravenous Q24H Courtney Guerrero MD Last Rate: 2,000 mg (02/11/19 1114)   ferrous sulfate 325 mg Oral Daily With Breakfast ESE Gale    heparin (porcine) 5,000 Units Subcutaneous [facility] Rekha Holly, CRNP insulin glargine 25 Units Subcutaneous HS Shala K ESE Broderick    insulin lispro 4-20 Units Subcutaneous TID AC Shala K ESE Broderick    insulin lispro 5 Units Subcutaneous TID With Meals ESE Borges    metroNIDAZOLE 500 mg Oral Q8H [address] Itzel Jimenez MD    morphine injection 1 mg Intravenous Q4H PRN Jostin Carbajal MD    oxyCODONE 5 mg Oral Q4H PRN ESE Hernandez    pancrelipase (Lip-Prot-Amyl) 48,000 Units Oral TID With Meals ESE Borges    vancomycin 15 mg/kg Intravenous Q24H ESE Hernandez Last Rate: 1,000 mg (02/11/19 0007)     Continuous Infusions:    PRN Meds:   acetaminophen    morphine injection    oxyCODONE      Physical exam:  Physical Exam  General appearance: alert and oriented, in no acute distress  Head: Normocephalic, without obvious abnormality, atraumatic  Eyes: conjunctivae/corneas clear  PERRL, EOM's intact  Fundi benign    Neck: no adenopathy, no carotid bruit, no JVD, supple, symmetrical, trachea midline and thyroid not enlarged, symmetric, no tenderness/mass/nodules  Lungs: clear to auscultation bilaterally  Heart: regular rate and rhythm, S1, S2 normal, no murmur, click, rub or gallop  Abdomen: soft, non-tender; bowel sounds normal; no masses,  no organomegaly  Extremities: edema +2 pitting edema bilateral lower ext  Pulses: 2+ and symmetric  Skin: Skin color, texture, turgor normal  No rashes or lesions  Neurologic: Mental status: Alert, oriented, thought content appropriate      VTE Pharmacologic Prophylaxis: Heparin  VTE Mechanical Prophylaxis: sequential compression device    Counseling / Coordination of Care  Total floor / unit time spent today 20 minutes      Current Length of Stay: 7 day(s)    Current Patient Status: Inpatient       Code Status: Level 1 - Full Code

## 2019-02-11 NOTE — PHYSICAL THERAPY NOTE
Physical Therapy Treatment Note       02/11/19 1500   Pain Assessment   Pain Assessment 0-10   Pain Score 6   Pain Location Abdomen   Pain Frequency Constant/continuous   Patient's Stated Pain Goal No pain   Hospital Pain Intervention(s) Repositioned; Ambulation/increased activity; Emotional support; Rest   Response to Interventions tolerated fair  Restrictions/Precautions   Other Precautions Pain; Fall Risk  (duc drain)   General   Chart Reviewed Yes   Family/Caregiver Present Yes  (granddaughter)   Cognition   Overall Cognitive Status WFL   Arousal/Participation Alert   Subjective   Subjective Pt out f bed on bedside commode upon enterring room  Pt incontinent of urine  Pt reporting 6/10 abdominal pain  Transfers   Sit to Stand 4  Minimal assistance   Additional items Assist x 1; Armrests; Increased time required;Verbal cues   Stand to Sit 4  Minimal assistance   Additional items Assist x 1; Armrests; Increased time required;Verbal cues   Stand pivot 3  Moderate assistance   Additional items Assist x 1; Increased time required;Verbal cues; Bedrails   Additional Comments pt performed static standing working on standing tolerance, balance and weightshifting x 2 minutes x1 and 1 minutes x1 with b/l ue support with min assist x1>  Pt requires max assist for washing of lowerbody and dressing of blower body  Pt reports not being ablet o reach to feet  at this time due to increaed abdominal pain and weakness  Ambulation/Elevation   Gait pattern Decreased foot clearance; Short stride; Excessively slow   Gait Assistance 3  Moderate assist   Additional items Assist x 1;Verbal cues   Distance 3'x1 with use of bed rails and arm ret of bedside commode and unableto get walker in small space       Balance   Static Sitting Good   Static Standing Poor +   Dynamic Standing Poor +   Ambulatory Poor   Endurance Deficit   Endurance Deficit Description fatigue, pain   Activity Tolerance   Activity Tolerance Patient limited by pain;Patient limited by fatigue   Medical Staff Made Aware pca,[place]   Exercises   Hip Flexion Sitting;5 reps;AROM; Bilateral   Knee AROM Long Arc Quad Sitting;5 reps;AROM; Bilateral   Assessment   Prognosis Fair   Problem List Decreased strength;Decreased endurance; Impaired balance;Decreased mobility; Decreased safety awareness;Pain   Assessment Pt rec'd out of bed on bedside commode  Pt incontinent of urine  Pt requires max assist for washing of lowerbody and donning of underwear, pants and slipper socks  Pt performed standing tolerance with min assist x1 with b/l ue support  X 2 minute x1  Pt performs sit to stand transfers with min assist x1 and, stand to sit with min assist x1 and stand pivot transfers with mod assist x2, and ambulation 3' x1 with mod assist x1 with verlbal cues for safe mobility  Pt requires frequent rest break t/o session  Pt will continue to benefit from skilled inpt Pt for bed mobility, transfer and gait training le strengthening and stair training  Pt is easily fatigued, poor tolerance to activity and demonstrates deifcits in balance,strength, mobility, safety and activity tolerance  STR is recommended at d/c as pt is functioning well below baseline level of mobility  Goals   Patient Goals " To get better and go home   "   LTG Expiration Date 02/20/19   Treatment Day 2   Plan   Treatment/Interventions Functional transfer training;LE strengthening/ROM; Therapeutic exercise; Endurance training;Patient/family training;Gait training;Bed mobility; Equipment eval/education;Spoke to nursing   Progress Slow progress, decreased activity tolerance   PT Frequency   (4-5x/week)   Recommendation   Recommendation Short-term skilled PT   PT - OK to Discharge Yes  (to rehab)        02/11/19 1500   Pain Assessment   Pain Assessment 0-10   Pain Score 6   Pain Location Abdomen   Pain Frequency Constant/continuous   Patient's Stated Pain Goal No pain   Hospital Pain Intervention(s) Repositioned; Ambulation/increased activity; Emotional support; Rest   Response to Interventions tolerated fair  Restrictions/Precautions   Other Precautions Pain; Fall Risk  (duc drain)   General   Chart Reviewed Yes   Family/Caregiver Present Yes  (granddaughter)   Cognition   Overall Cognitive Status WFL   Arousal/Participation Alert   Subjective   Subjective Pt out f bed on bedside commode upon enterring room  Pt incontinent of urine  Pt reporting 6/10 abdominal pain  Transfers   Sit to Stand 4  Minimal assistance   Additional items Assist x 1; Armrests; Increased time required;Verbal cues   Stand to Sit 4  Minimal assistance   Additional items Assist x 1; Armrests; Increased time required;Verbal cues   Stand pivot 3  Moderate assistance   Additional items Assist x 1; Increased time required;Verbal cues; Bedrails   Additional Comments pt performed static standing working on standing tolerance, balance and weightshifting x 2 minutes x1 and 1 minutes x1 with b/l ue support with min assist x1>  Pt requires max assist for washing of lowerbody and dressing of blower body  Pt reports not being ablet o reach to feet  at this time due to increaed abdominal pain and weakness  Ambulation/Elevation   Gait pattern Decreased foot clearance; Short stride; Excessively slow   Gait Assistance 3  Moderate assist   Additional items Assist x 1;Verbal cues   Distance 3'x1 with use of bed rails and arm ret of bedside commode and unableto get walker in small space  Balance   Static Sitting Good   Static Standing Poor +   Dynamic Standing Poor +   Ambulatory Poor   Endurance Deficit   Endurance Deficit Description fatigue, pain   Activity Tolerance   Activity Tolerance Patient limited by pain; Patient limited by fatigue   Medical Staff Made Aware pca,[place]   Exercises   Hip Flexion Sitting;5 reps;AROM; Bilateral   Knee AROM Long Arc Quad Sitting;5 reps;AROM; Bilateral   Assessment   Prognosis Fair   Problem List Decreased strength;Decreased endurance; Impaired balance;Decreased mobility; Decreased safety awareness;Pain   Assessment Pt rec'd out of bed on bedside commode  Pt incontinent of urine  Pt requires max assist for washing of lowerbody and donning of underwear, pants and slipper socks  Pt performed standing tolerance with min assist x1 with b/l ue support  X 2 minute x1  Pt performs sit to stand transfers with min assist x1 and, stand to sit with min assist x1 and stand pivot transfers with mod assist x2, and ambulation 3' x1 with mod assist x1 with verlbal cues for safe mobility  Pt requires frequent rest break t/o session  Pt will continue to benefit from skilled inpt Pt for bed mobility, transfer and gait training le strengthening and stair training  Pt is easily fatigued, poor tolerance to activity and demonstrates deifcits in balance,strength, mobility, safety and activity tolerance  STR is recommended at d/c as pt is functioning well below baseline level of mobility  Goals   Patient Goals " To get better and go home   "   LTG Expiration Date 02/20/19   Treatment Day 2   Plan   Treatment/Interventions Functional transfer training;LE strengthening/ROM; Therapeutic exercise; Endurance training;Patient/family training;Gait training;Bed mobility; Equipment eval/education;Spoke to nursing   Progress Slow progress, decreased activity tolerance   PT Frequency   (4-5x/week)   Recommendation   Recommendation Short-term skilled PT   PT - OK to Discharge Yes  (to rehab)       Antonio Hernandez PTA

## 2019-02-11 NOTE — PROGRESS NOTES
Progress Note - Infectious Disease   Vic Holland 75 y o  female MRN: [id number]  Unit/Bed#: [place] [address] -01 Encounter: [id number]      Impression/Plan:  1  Severe sepsis-POA  Fever, leukocytosis, tachycardia, lactic acidosis   Appears to be secondary to polymicrobial bacteremia   Possibly in the setting of liver abscess   She remains quite ill, however fortunately she has remained hemodynamically stable despite the systemic illness   She seems to be tolerating the antibiotics without difficulty  Since drainage of possible liver abscess, patient is clinically improving with downtrending fever, leukocytosis, procalcitonin  Vancomycin trough is therapeutic   -continue vancomycin, ceftriaxone, Flagyl for now at current doses  -follow up repeat blood cultures and liver collection cultures  -monitor CBC with diff and creatinine  -recheck procalcitonin level  -supportive care     2  Polymicrobial bacteremia-suspect intra-abdominal or enteric source   No definitive source has been appreciated despite extensive imaging although CT of the abdomen and pelvis does suggest the possibility of pyelonephritis but the urinalysis and culture nondiagnostic   Consideration for the possibility of liver abscess   Transthoracic echocardiogram without valvular vegetation     -antibiotics as above  -follow-up IR culture to guide further antibiotic recommendations  -follow up repeat blood cultures  -no additional ID workup for now     3  Complex liver cyst   Concerning for abscess   Now status post IR drainage with removal of 45 cc of cloudy fluid  Culture so far shows no growth               -continue antibiotics as above              -follow-up IR culture to guide further antibiotic recommendations               -monitor drain output     4   Acute kidney injury-suspect this is a pre renal issue   Perhaps sepsis is playing a role   The renal function continues to slowly improve   -dose adjusted antibiotics as above  -recheck creatinine clearance  -volume management  -no additional ID workup for now     4  Anemia-quite severe   Elevated LDH suggest the possibility of hemolysis   No definitive evidence of acute blood loss  The H&H has modestly improved  -workup as per the primary  -transfusion support  -monitor CBC with diff       6  Liver function test abnormalities-possibly secondary to sepsis   Possibly another etiology   CT the abdomen and pelvis does not reveal a definitive source   Liver function tests have modestly decreased   Right upper quadrant ultrasound was cystic lesion in the right lobe of the liver   Consideration for the possibility of liver abscess formation  -recheck liver function test    Antibiotics:  Vancomycin 8  Ceftriaxone 5  Flagyl restart 4  Subjective:  Patient has no fever, chills, sweats; no nausea, vomiting, diarrhea; no cough, shortness of breath; no pain  No new symptoms  She is feeling much better overall  Objective:  Vitals:  Temp:  [97 1 °F (36 2 °C)-97 8 °F (36 6 °C)] 97 1 °F (36 2 °C)  HR:  [69-88] 88  Resp:  [18] 18  BP: (111-132)/(77-88) 116/77  SpO2:  [97 %-100 %] 97 %  Temp (24hrs), Av 4 °F (36 3 °C), Min:97 1 °F (36 2 °C), Max:97 8 °F (36 6 °C)  Current: Temperature: (!) 97 1 °F (36 2 °C)    Physical Exam:   General Appearance:  Alert, interactive, nontoxic, no acute distress  Throat: Oropharynx moist without lesions  Lungs:   Clear to auscultation bilaterally; no wheezes, rhonchi or rales; respirations unlabored   Heart:  RRR; no murmur, rub or gallop   Abdomen:   Soft, non-tender, non-distended, positive bowel sounds  Right upper quadrant drain in place with ongoing drainage in the ADALBERTO drain   Extremities: No clubbing, cyanosis or edema   Skin: No new rashes or lesions  No draining wounds noted         Labs, Imaging, & Other studies:   All pertinent labs and imaging studies were personally reviewed  Results from last 7 days   Lab Units 02/11/19  0601 02/10/19  9309 02/09/19  0514   WBC Thousand/uL 22 21* 19 60* 23 80*   HEMOGLOBIN g/dL 8 5* 8 0* 7 8*   PLATELETS Thousands/uL 236 189 143*     Results from last 7 days   Lab Units 02/11/19  0601 02/10/19  0518 02/09/19  0514  02/07/19  0603 02/06/19  0602   SODIUM mmol/L 130* 130* 130*   < > 130* 128*   POTASSIUM mmol/L 4 6 4 4 4 1   < > 3 7 3 5   CHLORIDE mmol/L 101 102 100   < > 104 99*   CO2 mmol/L 20* 18* 18*   < > 16* 16*   BUN mg/dL 71* 80* 83*   < > 77* 65*   CREATININE mg/dL 1 54* 1 78* 1 91*   < > 1 92* 2 33*   EGFR ml/min/1 73sq m 33 28 25   < > 25 20   CALCIUM mg/dL 8 3 8 1* 7 9*   < > 7 8* 7 4*   AST U/L  --   --  129*  --  72* 204*   ALT U/L  --   --  107*  --  122* 164*   ALK PHOS U/L  --   --  345*  --  276* 276*    < > = values in this interval not displayed  Results from last 7 days   Lab Units 02/09/19  1325 02/06/19  0608 02/06/19  0603 02/05/19  1418 02/05/19  1037 02/04/19 2029 02/04/19 2027   BLOOD CULTURE   --  No Growth After 4 Days  No Growth After 4 Days    --   --  Enterococcus faecalis*  Escherichia coli*  Klebsiella oxytoca* Escherichia coli*  Klebsiella oxytoca*   GRAM STAIN RESULT  No Polys or Bacteria seen  --   --   --   --  Gram negative rods  Gram positive cocci in pairs Gram negative rods   URINE CULTURE   --   --   --   --  No Growth <1000 cfu/mL  --   --    BODY FLUID CULTURE, STERILE  No growth  --   --   --   --   --   --    C DIFF TOXIN B   --   --   --  NEGATIVE for C difficle toxin by PCR    --   --   --      Vancomycin 19 4

## 2019-02-11 NOTE — PLAN OF CARE
Problem: Potential for Falls  Goal: Patient will remain free of falls  Description  INTERVENTIONS:  - Assess patient frequently for physical needs  -  Identify cognitive and physical deficits and behaviors that affect risk of falls    -  [place] fall precautions as indicated by assessment   - Educate patient/family on patient safety including physical limitations  - Instruct patient to call for assistance with activity based on assessment  - Modify environment to reduce risk of injury  - Consider OT/PT consult to assist with strengthening/mobility   Outcome: Progressing     Problem: Prexisting or High Potential for Compromised Skin Integrity  Goal: Skin integrity is maintained or improved  Description  INTERVENTIONS:  - Identify patients at risk for skin breakdown  - Assess and monitor skin integrity  - Assess and monitor nutrition and hydration status  - Monitor labs (i e  albumin)  - Assess for incontinence   - Turn and reposition patient  - Assist with mobility/ambulation  - Relieve pressure over bony prominences  - Avoid friction and shearing  - Provide appropriate hygiene as needed including keeping skin clean and dry  - Evaluate need for skin moisturizer/barrier cream  - Collaborate with interdisciplinary team (i e  Nutrition, Rehabilitation, etc )   - Patient/family teaching   Outcome: Progressing     Problem: DISCHARGE PLANNING - CARE MANAGEMENT  Goal: Discharge to post-acute care or home with appropriate resources  Description  INTERVENTIONS:  - Conduct assessment to determine patient/family and health care team treatment goals, and need for post-acute services based on payer coverage, community resources, and patient preferences, and barriers to discharge  - Address psychosocial, clinical, and financial barriers to discharge as identified in assessment in conjunction with the patient/family and health care team  - Arrange appropriate level of post-acute services according to patient?s   needs and preference and payer coverage in collaboration with the physician and health care team  - Communicate with and update the patient/family, physician, and health care team regarding progress on the discharge plan  - Arrange appropriate transportation to post-acute venues   Outcome: Progressing     Problem: PAIN - ADULT  Goal: Verbalizes/displays adequate comfort level or baseline comfort level  Description  Interventions:  - Encourage patient to monitor pain and request assistance  - Assess pain using appropriate pain scale  - Administer analgesics based on type and severity of pain and evaluate response  - Implement non-pharmacological measures as appropriate and evaluate response  - Consider cultural and social influences on pain and pain management  - Notify physician/advanced practitioner if interventions unsuccessful or patient reports new pain   Outcome: Progressing     Problem: INFECTION - ADULT  Goal: Absence or prevention of progression during hospitalization  Description  INTERVENTIONS:  - Assess and monitor for signs and symptoms of infection  - Monitor lab/diagnostic results  - Monitor all insertion sites, i e  indwelling lines, tubes, and drains  - Monitor endotracheal (as able) and nasal secretions for changes in amount and color  - [place] appropriate cooling/warming therapies per order  - Administer medications as ordered  - Instruct and encourage patient and family to use good hand hygiene technique  - Identify and instruct in appropriate isolation precautions for identified infection/condition   Outcome: Progressing  Goal: Absence of fever/infection during neutropenic period  Description  INTERVENTIONS:  - Monitor WBC  - Implement neutropenic guidelines   Outcome: Progressing     Problem: SAFETY ADULT  Goal: Maintain or return to baseline ADL function  Description  INTERVENTIONS:  -  Assess patient's ability to carry out ADLs; assess patient's baseline for ADL function and identify physical deficits which impact ability to perform ADLs (bathing, care of mouth/teeth, toileting, grooming, dressing, etc )  - Assess/evaluate cause of self-care deficits   - Assess range of motion  - Assess patient's mobility; develop plan if impaired  - Assess patient's need for assistive devices and provide as appropriate  - Encourage maximum independence but intervene and supervise when necessary  ¯ Involve family in performance of ADLs  ¯ Assess for home care needs following discharge   ¯ Request OT consult to assist with ADL evaluation and planning for discharge  ¯ Provide patient education as appropriate   Outcome: Progressing  Goal: Maintain or return mobility status to optimal level  Description  INTERVENTIONS:  - Assess patient's baseline mobility status (ambulation, transfers, stairs, etc )    - Identify cognitive and physical deficits and behaviors that affect mobility  - Identify mobility aids required to assist with transfers and/or ambulation (gait belt, sit-to-stand, lift, walker, cane, etc )  - [place] fall precautions as indicated by assessment  - Record patient progress and toleration of activity level on Mobility SBAR; progress patient to next Phase/Stage  - Instruct patient to call for assistance with activity based on assessment  - Request Rehabilitation consult to assist with strengthening/weightbearing, etc    Outcome: Progressing     Problem: DISCHARGE PLANNING  Goal: Discharge to home or other facility with appropriate resources  Description  INTERVENTIONS:  - Identify barriers to discharge w/patient and caregiver  - Arrange for needed discharge resources and transportation as appropriate  - Identify discharge learning needs (meds, wound care, etc )  - Arrange for interpretive services to assist at discharge as needed  - Refer to Case Management Department for coordinating discharge planning if the patient needs post-hospital services based on physician/advanced practitioner order or complex needs related to functional status, cognitive ability, or social support system   Outcome: Progressing     Problem: Knowledge Deficit  Goal: Patient/family/caregiver demonstrates understanding of disease process, treatment plan, medications, and discharge instructions  Description  Complete learning assessment and assess knowledge base    Interventions:  - Provide teaching at level of understanding  - Provide teaching via preferred learning methods   Outcome: Progressing

## 2019-02-11 NOTE — PROGRESS NOTES
Vancomycin IV Pharmacy-to-Dose Consultation    Terry Macdonald is a 75 y o  female who is currently receiving Vancomycin IV with management by the Pharmacy Consult service  Assessment/Plan:  The patient was reviewed  Renal function is stable and no signs or symptoms of nephrotoxicity and/or infusion reactions were documented in the chart  Based on today?s assessment, continue current vancomycin (day # 8) dosing of 1 gram iv q24h with a plan for trough to be drawn at 2330 on 2-18-19  We will continue to follow the patient?s culture results and clinical progress daily      Santa Roe, Pharmacist

## 2019-02-11 NOTE — UTILIZATION REVIEW
Continued Stay Review    Date:  2/9/2019    Vital Signs:   02/09/19 0814  98 4 °F (36 9 °C)  85  20  88/65   98 %  Nasal cannula  Lying     Assessment/Plan:   Sepsis secondary to polymicrobial bacteremia possibly in setting of liver abscess  Initial blood cultures preliminarily positive for Enterococcus and E coli     Repeat blood cultures are pending   WBC's still elevated but  Trending down  Continue ABX's  ID following    Ultrasound guided placement today of an 8 5 Fr drainage catheter into the right hepatic fluid collection yielded 45 ml of cloudy, yellow fluid  Anemia - Hg initially improved to 9 6 after 2 units PC's but down to 7 8 yesterday & today  Monitor  ANNE - likely prerenal and secondary to sepsis  Urine output improving - continue to monitor renal fx      Medications:   Scheduled Meds:   Current Facility-Administered Medications:  acetaminophen 650 mg Oral 4x Daily PRN Thirza Gums, CRNP    cefTRIAXone 2,000 mg Intravenous Q24H     ferrous sulfate 325 mg Oral Daily With Breakfast             heparin (porcine) 5,000 Units Subcutaneous Q8H [address]     insulin glargine 25 Units Subcutaneous HS     insulin lispro 4-20 Units Subcutaneous TID AC     insulin lispro 5 Units Subcutaneous TID With Meals     metroNIDAZOLE 500 mg Oral Q8H JARAD     morphine injection 1 mg Intravenous Q4H PRN     oxyCODONE 5 mg Oral Q4H PRN  x 3  2/9    pancrelipase (Lip-Prot-Amyl) 48,000 Units Oral TID With Meals     vancomycin 15 mg/kg Intravenous Q24H         Pertinent Labs/Diagnostic Results:   BUN 83,   Cr 1 91,   CO2  18,  Ca 7 9,   ,  ,   Alk phos 345  WBC's 23 80,   Hg 7 8 /  Plats 143    Age/Sex: 75 y o  female   Discharge Plan:  TBD    Network Utilization Review Department  Phone: [phone]; Fax [phone]  [email or web address]  ATTENTION: Please call with any questions or concerns to [phone]  and carefully listen to the prompts so that you are directed to the right person  Send all requests for admission clinical reviews, approved or denied determinations and any other requests to fax [phone]   All voicemails are confidential

## 2019-02-12 LAB
ALBUMIN SERPL BCP-MCNC: 1.7 G/DL (ref 3.5–5)
ALP SERPL-CCNC: 524 U/L (ref 46–116)
ALT SERPL W P-5'-P-CCNC: 131 U/L (ref 12–78)
ANION GAP SERPL CALCULATED.3IONS-SCNC: 12 MMOL/L (ref 4–13)
AST SERPL W P-5'-P-CCNC: 75 U/L (ref 5–45)
BACTERIA SPEC BFLD CULT: ABNORMAL
BILIRUB SERPL-MCNC: 0.53 MG/DL (ref 0.2–1)
BUN SERPL-MCNC: 63 MG/DL (ref 5–25)
CALCIUM SERPL-MCNC: 8 MG/DL (ref 8.3–10.1)
CHLORIDE SERPL-SCNC: 102 MMOL/L (ref 100–108)
CO2 SERPL-SCNC: 20 MMOL/L (ref 21–32)
CREAT SERPL-MCNC: 1.32 MG/DL (ref 0.6–1.3)
ERYTHROCYTE [DISTWIDTH] IN BLOOD BY AUTOMATED COUNT: 23.9 % (ref 11.6–15.1)
GFR SERPL CREATININE-BSD FRML MDRD: 40 ML/MIN/1.73SQ M
GLUCOSE SERPL-MCNC: 131 MG/DL (ref 65–140)
GLUCOSE SERPL-MCNC: 133 MG/DL (ref 65–140)
GLUCOSE SERPL-MCNC: 206 MG/DL (ref 65–140)
GLUCOSE SERPL-MCNC: 238 MG/DL (ref 65–140)
GLUCOSE SERPL-MCNC: 339 MG/DL (ref 65–140)
GRAM STN SPEC: ABNORMAL
HCT VFR BLD AUTO: 24.7 % (ref 34.8–46.1)
HGB BLD-MCNC: 7.8 G/DL (ref 11.5–15.4)
MCH RBC QN AUTO: 23.7 PG (ref 26.8–34.3)
MCHC RBC AUTO-ENTMCNC: 31.6 G/DL (ref 31.4–37.4)
MCV RBC AUTO: 75 FL (ref 82–98)
PLATELET # BLD AUTO: 296 THOUSANDS/UL (ref 149–390)
POTASSIUM SERPL-SCNC: 4.5 MMOL/L (ref 3.5–5.3)
PROCALCITONIN SERPL-MCNC: 16.31 NG/ML
PROT SERPL-MCNC: 5 G/DL (ref 6.4–8.2)
RBC # BLD AUTO: 3.29 MILLION/UL (ref 3.81–5.12)
SODIUM SERPL-SCNC: 134 MMOL/L (ref 136–145)
WBC # BLD AUTO: 16.73 THOUSAND/UL (ref 4.31–10.16)

## 2019-02-12 PROCEDURE — 80053 COMPREHEN METABOLIC PANEL: CPT | Performed by: INTERNAL MEDICINE

## 2019-02-12 PROCEDURE — 84145 PROCALCITONIN (PCT): CPT | Performed by: INTERNAL MEDICINE

## 2019-02-12 PROCEDURE — 82948 REAGENT STRIP/BLOOD GLUCOSE: CPT

## 2019-02-12 PROCEDURE — 85027 COMPLETE CBC AUTOMATED: CPT | Performed by: INTERNAL MEDICINE

## 2019-02-12 PROCEDURE — 97535 SELF CARE MNGMENT TRAINING: CPT

## 2019-02-12 PROCEDURE — 97116 GAIT TRAINING THERAPY: CPT

## 2019-02-12 PROCEDURE — 99232 SBSQ HOSP IP/OBS MODERATE 35: CPT | Performed by: INTERNAL MEDICINE

## 2019-02-12 PROCEDURE — 99233 SBSQ HOSP IP/OBS HIGH 50: CPT | Performed by: INTERNAL MEDICINE

## 2019-02-12 PROCEDURE — 97530 THERAPEUTIC ACTIVITIES: CPT

## 2019-02-12 RX ORDER — FUROSEMIDE 10 MG/ML
20 INJECTION INTRAMUSCULAR; INTRAVENOUS ONCE
Status: COMPLETED | OUTPATIENT
Start: 2019-02-12 | End: 2019-02-12

## 2019-02-12 RX ADMIN — FUROSEMIDE 20 MG: 10 INJECTION, SOLUTION INTRAMUSCULAR; INTRAVENOUS at 15:20

## 2019-02-12 RX ADMIN — INSULIN GLARGINE 25 UNITS: 100 INJECTION, SOLUTION SUBCUTANEOUS at 21:03

## 2019-02-12 RX ADMIN — INSULIN LISPRO 5 UNITS: 100 INJECTION, SOLUTION INTRAVENOUS; SUBCUTANEOUS at 13:34

## 2019-02-12 RX ADMIN — HEPARIN SODIUM 5000 UNITS: 5000 INJECTION INTRAVENOUS; SUBCUTANEOUS at 21:03

## 2019-02-12 RX ADMIN — PANCRELIPASE 48000 UNITS: 24000; 76000; 120000 CAPSULE, DELAYED RELEASE PELLETS ORAL at 13:35

## 2019-02-12 RX ADMIN — OXYCODONE HYDROCHLORIDE 5 MG: 5 TABLET ORAL at 01:12

## 2019-02-12 RX ADMIN — HEPARIN SODIUM 5000 UNITS: 5000 INJECTION INTRAVENOUS; SUBCUTANEOUS at 06:32

## 2019-02-12 RX ADMIN — INSULIN LISPRO 16 UNITS: 100 INJECTION, SOLUTION INTRAVENOUS; SUBCUTANEOUS at 13:35

## 2019-02-12 RX ADMIN — INSULIN LISPRO 5 UNITS: 100 INJECTION, SOLUTION INTRAVENOUS; SUBCUTANEOUS at 10:02

## 2019-02-12 RX ADMIN — HEPARIN SODIUM 5000 UNITS: 5000 INJECTION INTRAVENOUS; SUBCUTANEOUS at 13:33

## 2019-02-12 RX ADMIN — FERROUS SULFATE TAB 325 MG (65 MG ELEMENTAL FE) 325 MG: 325 (65 FE) TAB at 10:01

## 2019-02-12 RX ADMIN — METRONIDAZOLE 500 MG: 500 TABLET ORAL at 06:32

## 2019-02-12 RX ADMIN — OXYCODONE HYDROCHLORIDE 5 MG: 5 TABLET ORAL at 21:03

## 2019-02-12 RX ADMIN — PANCRELIPASE 48000 UNITS: 24000; 76000; 120000 CAPSULE, DELAYED RELEASE PELLETS ORAL at 17:24

## 2019-02-12 RX ADMIN — CEFTRIAXONE SODIUM 2000 MG: 10 INJECTION, POWDER, FOR SOLUTION INTRAVENOUS at 10:51

## 2019-02-12 RX ADMIN — PANCRELIPASE 48000 UNITS: 24000; 76000; 120000 CAPSULE, DELAYED RELEASE PELLETS ORAL at 10:01

## 2019-02-12 RX ADMIN — INSULIN LISPRO 8 UNITS: 100 INJECTION, SOLUTION INTRAVENOUS; SUBCUTANEOUS at 17:24

## 2019-02-12 NOTE — PROGRESS NOTES
[facility] Progress Note  Patient: Tami Fitzpatrick 75 y o  female   MRN: [id number]  PCP: Itzel Ovalles DO  Unit/Bed#: [place] 2 -01 Encounter: [id number]  Date Of Visit: 02/12/19      Assessment/plan  1  Severe sepsis-poa  Due to polymicrobial bacteremia  Likely from enterococcal liver abscess  Appreciate ID recommendations  Flagyl was d/barbie  She is to conitnue ceftriaxone and vancomycin  Echo was negative for vegetation  procalcitonin is trending down  Possibly change to po antibiotics in 24 to 48 hours       2  Anemia, multifactorial secondary to iron deficiency, critical illness and chronic disease- Hemoglobin initially improved to 9 6 after transfusion of 2 units packed red blood cells  H/h decreased to 7 8  Will continue to monitor and will continue iron supplements  3  ANNE likely prerenal and secondary to sepsis- s/p lasix IV x1  Creatinine improved to 1 3  Will redose lasix again today  Check creatinine in am      4-Extensive cancer history including breast, pancreatic and colon with concern for recurrent disease               -Pancreatic head adenocarcinoma with a prior [id number], categorically unresectable treated with neoadjuvant chemoradiotherapy   -bilateral breast cancer and 2x colon cancer   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, colorectal, thyroid) and 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                  -Has been following up at [facility]  -CT scan from this admission shows extensive lymphadenopathy and liver lesions   appreciate oncology recommendations  Pt will need to follow up with sergio rodriguez       5- DM Type II- Continue current Lantus/SSI regimen   fasting blood glucose acceptable  increase pre meal insulin to 8 units       6-transaminitis likely secondary to sepsis   AST ALT is slowly trending down  Check cmp in am       7   Chronic diastolic chf- lasix and zaroxolyn on hold due to [facility]  redose IV lasix of 20mg x1 and monitor       dispo- Pt refused STR Per case management  She is set up with [facility]        Discussed in full with nurse and case management       Subjective:   Pt seen and examined  Pt states she feels better but she still feels very weak  Her legs are less swollen  No f/c no cp no sob no n/v/d no abd pain    Objective:     Vitals: Blood pressure 120/63, pulse 72, temperature (!) 97 2 °F (36 2 °C), temperature source Temporal, resp  rate 20, height 5' 5" (1 651 m), weight 74 3 kg (163 lb 12 8 oz), SpO2 97 %  ,Body mass index is 27 26 kg/m²  Lab, Imaging and other studies:  Results from last 7 days   Lab Units 02/12/19  0552  02/09/19  0515   WBC Thousand/uL 16 73*   < >  --    HEMOGLOBIN g/dL 7 8*   < >  --    HEMATOCRIT % 24 7*   < >  --    PLATELETS Thousands/uL 296   < >  --    INR   --   --  1 47*    < > = values in this interval not displayed  Results from last 7 days   Lab Units 02/12/19  0552   POTASSIUM mmol/L 4 5   CHLORIDE mmol/L 102   CO2 mmol/L 20*   BUN mg/dL 63*   CREATININE mg/dL 1 32*   CALCIUM mg/dL 8 0*   ALK PHOS U/L 524*   ALT U/L 131*   AST U/L 75*         Lab Results   Component Value Date    BLOODCX No Growth After 5 Days  02/06/2019    BLOODCX No Growth After 5 Days   02/06/2019    BLOODCX Enterococcus faecalis (A) 02/04/2019    BLOODCX Escherichia coli (A) 02/04/2019    BLOODCX Klebsiella oxytoca (A) 02/04/2019    URINECX No Growth <1000 cfu/mL 02/05/2019      Scheduled Meds:   Current Facility-Administered Medications:  acetaminophen 650 mg Oral 4x Daily PRN ESE Ross    cefTRIAXone 2,000 mg Intravenous Q24H Angel Ramirez MD Last Rate: Stopped (02/12/19 1121)   ferrous sulfate 325 mg Oral Daily With Breakfast ESE Gale    furosemide 20 mg Intravenous Once Subha Rodriguez DO    heparin (porcine) 5,000 Units Subcutaneous Q8H [address] Carrie Martonik, CRNP    insulin glargine 25 Units Subcutaneous HS KiESE Ha insulin lispro 4-20 Units Subcutaneous TID AC Shala K Meggan, ESE    insulin lispro 5 Units Subcutaneous TID With Meals ESE Greco    morphine injection 1 mg Intravenous Q4H PRN Estefani Frey MD    oxyCODONE 5 mg Oral Q4H PRN Tasia Session, ESE    pancrelipase (Lip-Prot-Amyl) 48,000 Units Oral TID With Meals ESE Greco    vancomycin 15 mg/kg Intravenous Q24H Tasia Session, CRNP Last Rate: Stopped (02/12/19 0031)     Continuous Infusions:    PRN Meds:   acetaminophen    morphine injection    oxyCODONE      Physical exam:  Physical Exam  General appearance: alert and oriented, in no acute distress  Head: Normocephalic, without obvious abnormality, atraumatic  Eyes: conjunctivae/corneas clear  PERRL, EOM's intact  Fundi benign    Neck: no adenopathy, no carotid bruit, no JVD, supple, symmetrical, trachea midline and thyroid not enlarged, symmetric, no tenderness/mass/nodules  Lungs: clear to auscultation bilaterally  Heart: regular rate and rhythm, S1, S2 normal, no murmur, click, rub or gallop  Abdomen: soft, non-tender; bowel sounds normal; no masses,  no organomegaly  Extremities: edema +1 edema bilateral lower ext  Pulses: 2+ and symmetric  Skin: Skin color, texture, turgor normal  No rashes or lesions  Neurologic: Mental status: Alert, oriented, thought content appropriate      VTE Pharmacologic Prophylaxis: Heparin  VTE Mechanical Prophylaxis: sequential compression device    Counseling / Coordination of Care  Total floor / unit time spent today 20 minutes    Current Length of Stay: 8 day(s)    Current Patient Status: Inpatient       Code Status: Level 1 - Full Code

## 2019-02-12 NOTE — PROGRESS NOTES
Progress Note - Infectious Disease   Ali Nanette 75 y o  female MRN: [id number]  Unit/Bed#: [place] [address] -01 Encounter: [id number]      Impression/Plan:  1  Severe sepsis-POA  Fever, leukocytosis, tachycardia, lactic acidosis   Appears to be secondary to polymicrobial bacteremia   Possibly in the setting of liver abscess   She remains quite ill, however fortunately she has remained hemodynamically stable despite the systemic illness   She seems to be tolerating the antibiotics without difficulty   Since drainage of possible liver abscess, patient is clinically improving with downtrending fever, leukocytosis, procalcitonin  Vancomycin trough is therapeutic  Procalcitonin level continues to decrease  -continue vancomycin, ceftriaxone, now at current dose  -discontinue Flagyl  -likely discontinue the ceftriaxone after the dose tomorrow if she will have completed 10 days of treatment for the gram-negative bacteremia  -follow up repeat blood cultures and liver collection cultures  -monitor CBC with diff and creatinine  -recheck procalcitonin level   -supportive care  -possibly to all oral antibiotics the next 24-48 hours     2  Polymicrobial bacteremia-suspect intra-abdominal or enteric source   No definitive source has been appreciated despite extensive imaging although CT of the abdomen and pelvis does suggest the possibility of pyelonephritis but the urinalysis and culture nondiagnostic   Consideration for the possibility of liver abscess   Transthoracic echocardiogram without valvular vegetation     -antibiotics as above  -follow-up IR culture to guide further antibiotic recommendations  -follow up repeat blood cultures  -no additional ID workup for now     3  Enterococcal liver abscess   Now status post IR drainage with removal of 45 cc of cloudy fluid    Still with drainage into the ADALBERTO              -continue antibiotics as above  -possibly to oral antibiotics soon if sensitivities allow              -follow-up final IR culture to guide further antibiotic recommendations               -monitor drain output     4  Acute kidney injury-suspect this is a pre renal issue   Perhaps sepsis is playing a role   The renal function continues to slowly improve   -dose adjusted antibiotics as above  -recheck creatinine clearance  -volume management  -no additional ID workup for now     4  Anemia-quite severe   Elevated LDH suggest the possibility of hemolysis   No definitive evidence of acute blood loss  The H&H has modestly improved  -workup as per the primary  -transfusion support  -monitor CBC with diff       6  Liver function test abnormalities-possibly secondary to sepsis   Possibly another etiology   CT the abdomen and pelvis does not reveal a definitive source   Liver function tests have modestly decreased   Right upper quadrant ultrasound was cystic lesion in the right lobe of the liver  The liver function tests remain quite high  -recheck liver function test  -consider GI evaluation    Antibiotics:  Vancomycin 9  Ceftriaxone 6  Flagyl restart 5  Gram-negative antibiotics 9    Subjective:  Patient has no fever, chills, sweats; no nausea, vomiting, diarrhea; no cough, shortness of breath; no increased pain  No new symptoms  Objective:  Vitals:  Temp:  [97 2 °F (36 2 °C)-98 9 °F (37 2 °C)] 97 2 °F (36 2 °C)  HR:  [72-82] 72  Resp:  [18-20] 20  BP: (120-142)/(62-63) 120/63  SpO2:  [97 %-99 %] 97 %  Temp (24hrs), Av °F (36 7 °C), Min:97 2 °F (36 2 °C), Max:98 9 °F (37 2 °C)  Current: Temperature: (!) 97 2 °F (36 2 °C)    Physical Exam:   General Appearance:  Alert, interactive, nontoxic, no acute distress  Throat: Oropharynx moist without lesions  Lungs:   Clear to auscultation bilaterally; no wheezes, rhonchi or rales; respirations unlabored   Heart:  RRR; no murmur, rub or gallop   Abdomen:   Soft, non-tender, non-distended, positive bowel sounds  Right-sided ADALBERTO drain in place  Extremities: No clubbing, cyanosis or edema   Skin: No new rashes or lesions  No draining wounds noted  Labs, Imaging, & Other studies:   All pertinent labs and imaging studies were personally reviewed  Results from last 7 days   Lab Units 02/12/19  0552 02/11/19  0601 02/10/19  0518   WBC Thousand/uL 16 73* 22 21* 19 60*   HEMOGLOBIN g/dL 7 8* 8 5* 8 0*   PLATELETS Thousands/uL 296 236 189     Results from last 7 days   Lab Units 02/12/19  0552 02/11/19  0601 02/10/19  0518 02/09/19  0514  02/07/19  0603   SODIUM mmol/L 134* 130* 130* 130*   < > 130*   POTASSIUM mmol/L 4 5 4 6 4 4 4 1   < > 3 7   CHLORIDE mmol/L 102 101 102 100   < > 104   CO2 mmol/L 20* 20* 18* 18*   < > 16*   BUN mg/dL 63* 71* 80* 83*   < > 77*   CREATININE mg/dL 1 32* 1 54* 1 78* 1 91*   < > 1 92*   EGFR ml/min/1 73sq m 40 33 28 25   < > 25   CALCIUM mg/dL 8 0* 8 3 8 1* 7 9*   < > 7 8*   AST U/L 75*  --   --  129*  --  72*   ALT U/L 131*  --   --  107*  --  122*   ALK PHOS U/L 524*  --   --  345*  --  276*    < > = values in this interval not displayed  Results from last 7 days   Lab Units 02/09/19  1325 02/06/19  0608 02/06/19  0603 02/05/19  1418   BLOOD CULTURE   --  No Growth After 5 Days  No Growth After 5 Days  --    GRAM STAIN RESULT  No Polys or Bacteria seen  --   --   --    BODY FLUID CULTURE, STERILE  2+ Growth of Enterococcus faecalis*  --   --   --    C DIFF TOXIN B   --   --   --  NEGATIVE for C difficle toxin by PCR        Procalcitonin level 16 31

## 2019-02-12 NOTE — PHYSICAL THERAPY NOTE
Physical Therapy Treatment Note     02/12/19 1510   Pain Assessment   Pain Assessment No/denies pain   Pain Score No Pain   Restrictions/Precautions   Other Precautions Fall Risk  (duc drain)   General   Chart Reviewed Yes   Family/Caregiver Present Yes  (son and granddaughter)   Cognition   Overall Cognitive Status Impaired  (pt perseverates on tasks, pt flat, )   Arousal/Participation Alert; Cooperative   Attention Attends with cues to redirect   Orientation Level Oriented X4   Following Commands Follows one step commands with increased time or repetition   Subjective   Subjective Pt offers no c /o pain  Pt  agreeable to PT  Bed Mobility   Supine to Sit 3  Moderate assistance   Additional items Assist x 1;HOB elevated; Bedrails; Increased time required;Verbal cues;LE management   Transfers   Sit to Stand 4  Minimal assistance   Additional items Increased time required;Verbal cues; Assist x 2   Stand to Sit 4  Minimal assistance   Additional items Assist x 1; Increased time required;Verbal cues;Armrests   Stand pivot 4  Minimal assistance   Additional items Assist x 2;Armrests; Increased time required;Verbal cues   Toilet transfer 4  Minimal assistance   Additional items Assist x 2;Armrests; Increased time required;Verbal cues; Commode   Ambulation/Elevation   Gait pattern Decreased foot clearance; Forward Flexion; Poor UE support;Narrow GABO  (L hip ER, )   Gait Assistance 3  Moderate assist   Additional items Assist x 1;Verbal cues   Assistive Device Rolling walker   Distance 45'x2   Balance   Static Sitting Good   Static Standing Poor +   Dynamic Standing Poor +   Ambulatory Poor   Endurance Deficit   Endurance Deficit Yes   Endurance Deficit Description fatigue,deconditioning, generalised weakness   Activity Tolerance   Activity Tolerance Patient limited by fatigue   Medical Staff Made Aware Garrett Lester   Nurse Made Aware yes   Assessment   Prognosis Fair   Problem List Decreased strength; Impaired balance;Decreased endurance;Decreased mobility; Decreased cognition;Decreased safety awareness   Assessment Pt rec'd in bed  Pt  performs supine to sit with mod assist x1 with increased time and verbal cues for mobility techniques  Pt performs sit to stand transfers with min assist x2, verbal cues for handplacement and increased time to perform  Pt progressed with ambulation distances to 39' x2 with mod assist x1 and one for management of o2 tank  Pt   Demonstrates impaired balance activity tolerance, safety and mobility  Pt demonstrates difficulty advancing L le and gait deviations of l hip ER, decreased foot clearance, steppage at times, adducted gait and forward flexed posture  Verbal cues to maintain close distance to walker at all times  Pt requires one standing rest break due to fatigue  Pt   Demonstrates poor safety awareness and carry over with safe transfer techniques  Spoke with pt's daughter and updated pt 's daughter on current mobility status and recommendations of rehab depsite pt 's wishes to return home at d/c   PT 's daughter reports pt would be able to come down to [place] to her house as she is home 24/7 and could care for her mother there  At this time recommend STR prior to d/c to home given impairments in balance, strength, activity tolerance, mobility, safety, and cognition as pt is functioning well below baseline level of mobility and as pt is alone for periods of time at home  Goals   Patient Goals " to go home "     LTG Expiration Date 02/20/19   Treatment Day 3   Plan   Treatment/Interventions Functional transfer training;LE strengthening/ROM; Elevations; Therapeutic exercise; Endurance training;Patient/family training;Equipment eval/education; Bed mobility;Gait training;OT;Family   Progress Progressing toward goals   PT Frequency   (4-5x/week)   Recommendation   Recommendation Short-term skilled PT   PT - OK to Discharge Yes  (to rehab)         Davis De Dios PTA

## 2019-02-12 NOTE — PLAN OF CARE
Problem: Potential for Falls  Goal: Patient will remain free of falls  Description  INTERVENTIONS:  - Assess patient frequently for physical needs  -  Identify cognitive and physical deficits and behaviors that affect risk of falls    -  [place] fall precautions as indicated by assessment   - Educate patient/family on patient safety including physical limitations  - Instruct patient to call for assistance with activity based on assessment  - Modify environment to reduce risk of injury  - Consider OT/PT consult to assist with strengthening/mobility   Outcome: Progressing     Problem: Prexisting or High Potential for Compromised Skin Integrity  Goal: Skin integrity is maintained or improved  Description  INTERVENTIONS:  - Identify patients at risk for skin breakdown  - Assess and monitor skin integrity  - Assess and monitor nutrition and hydration status  - Monitor labs (i e  albumin)  - Assess for incontinence   - Turn and reposition patient  - Assist with mobility/ambulation  - Relieve pressure over bony prominences  - Avoid friction and shearing  - Provide appropriate hygiene as needed including keeping skin clean and dry  - Evaluate need for skin moisturizer/barrier cream  - Collaborate with interdisciplinary team (i e  Nutrition, Rehabilitation, etc )   - Patient/family teaching   Outcome: Progressing     Problem: DISCHARGE PLANNING - CARE MANAGEMENT  Goal: Discharge to post-acute care or home with appropriate resources  Description  INTERVENTIONS:  - Conduct assessment to determine patient/family and health care team treatment goals, and need for post-acute services based on payer coverage, community resources, and patient preferences, and barriers to discharge  - Address psychosocial, clinical, and financial barriers to discharge as identified in assessment in conjunction with the patient/family and health care team  - Arrange appropriate level of post-acute services according to patient?s   needs and preference and payer coverage in collaboration with the physician and health care team  - Communicate with and update the patient/family, physician, and health care team regarding progress on the discharge plan  - Arrange appropriate transportation to post-acute venues   Outcome: Progressing     Problem: PAIN - ADULT  Goal: Verbalizes/displays adequate comfort level or baseline comfort level  Description  Interventions:  - Encourage patient to monitor pain and request assistance  - Assess pain using appropriate pain scale  - Administer analgesics based on type and severity of pain and evaluate response  - Implement non-pharmacological measures as appropriate and evaluate response  - Consider cultural and social influences on pain and pain management  - Notify physician/advanced practitioner if interventions unsuccessful or patient reports new pain   Outcome: Progressing     Problem: INFECTION - ADULT  Goal: Absence or prevention of progression during hospitalization  Description  INTERVENTIONS:  - Assess and monitor for signs and symptoms of infection  - Monitor lab/diagnostic results  - Monitor all insertion sites, i e  indwelling lines, tubes, and drains  - Monitor endotracheal (as able) and nasal secretions for changes in amount and color  - [place] appropriate cooling/warming therapies per order  - Administer medications as ordered  - Instruct and encourage patient and family to use good hand hygiene technique  - Identify and instruct in appropriate isolation precautions for identified infection/condition   Outcome: Progressing  Goal: Absence of fever/infection during neutropenic period  Description  INTERVENTIONS:  - Monitor WBC  - Implement neutropenic guidelines   Outcome: Progressing     Problem: SAFETY ADULT  Goal: Maintain or return to baseline ADL function  Description  INTERVENTIONS:  -  Assess patient's ability to carry out ADLs; assess patient's baseline for ADL function and identify physical deficits which impact ability to perform ADLs (bathing, care of mouth/teeth, toileting, grooming, dressing, etc )  - Assess/evaluate cause of self-care deficits   - Assess range of motion  - Assess patient's mobility; develop plan if impaired  - Assess patient's need for assistive devices and provide as appropriate  - Encourage maximum independence but intervene and supervise when necessary  ¯ Involve family in performance of ADLs  ¯ Assess for home care needs following discharge   ¯ Request OT consult to assist with ADL evaluation and planning for discharge  ¯ Provide patient education as appropriate   Outcome: Progressing  Goal: Maintain or return mobility status to optimal level  Description  INTERVENTIONS:  - Assess patient's baseline mobility status (ambulation, transfers, stairs, etc )    - Identify cognitive and physical deficits and behaviors that affect mobility  - Identify mobility aids required to assist with transfers and/or ambulation (gait belt, sit-to-stand, lift, walker, cane, etc )  - [place] fall precautions as indicated by assessment  - Record patient progress and toleration of activity level on Mobility SBAR; progress patient to next Phase/Stage  - Instruct patient to call for assistance with activity based on assessment  - Request Rehabilitation consult to assist with strengthening/weightbearing, etc    Outcome: Progressing     Problem: DISCHARGE PLANNING  Goal: Discharge to home or other facility with appropriate resources  Description  INTERVENTIONS:  - Identify barriers to discharge w/patient and caregiver  - Arrange for needed discharge resources and transportation as appropriate  - Identify discharge learning needs (meds, wound care, etc )  - Arrange for interpretive services to assist at discharge as needed  - Refer to Case Management Department for coordinating discharge planning if the patient needs post-hospital services based on physician/advanced practitioner order or complex needs related to functional status, cognitive ability, or social support system   Outcome: Progressing     Problem: Knowledge Deficit  Goal: Patient/family/caregiver demonstrates understanding of disease process, treatment plan, medications, and discharge instructions  Description  Complete learning assessment and assess knowledge base    Interventions:  - Provide teaching at level of understanding  - Provide teaching via preferred learning methods   Outcome: Progressing

## 2019-02-12 NOTE — PLAN OF CARE
Problem: OCCUPATIONAL THERAPY ADULT  Goal: Performs self-care activities at highest level of function for planned discharge setting  See evaluation for individualized goals  Description  Treatment Interventions: ADL retraining, Functional transfer training, UE strengthening/ROM, Endurance training, Patient/family training, Equipment evaluation/education, Compensatory technique education          See flowsheet documentation for full assessment, interventions and recommendations  Outcome: Progressing  Note:   Limitation: Decreased ADL status, Decreased UE strength, Decreased endurance  Prognosis: Good  Assessment: Pt was seen for skilled OT with focus on completion of self care tasks, functional mobility, review of fall prevention, review of home safety and review of current plan of care  See above levels of A required for all functional tasks  Pt with limited self engagement noted this tx session  Moderate reassurance and emotional support required to bring self to EOB  Pt with slow, controlled movements noted with functional tasks  Min A x2 for functional transfer to bedside commode with support of RW  Increased A for dynamic stand balance activity due to inconsistent use of RW and need for bring footing into RW perimeter prior to advancing  Mod A overall for LE dressing and jim care due to limited functional reach  Pt with flat affect and need for redirection due to perseverating behaviors noted, unable to terminate activity without cues provided  Pt may benefit from further cognitive screening to encourage optimal safety with return to home environment  Pt's son reports Pt's having periods were she is alone at home  Pt currently is requiring moderate A for functional transfers and LE self care routine  Poor functional balance noted with increased weakness  Currently recommending further rehab  Pt prefers to go directly home with home therapies   Encouraged Pt to actively participate in therapies as well as mobilize with nursing staff as tolerated        OT Discharge Recommendation: Short Term Rehab(Pt prefers to go directly home  )

## 2019-02-12 NOTE — PLAN OF CARE
Problem: PHYSICAL THERAPY ADULT  Goal: Performs mobility at highest level of function for planned discharge setting  See evaluation for individualized goals  Description  Treatment/Interventions: Functional transfer training, LE strengthening/ROM, Therapeutic exercise, Endurance training, Patient/family training, Equipment eval/education, Bed mobility, Gait training, Spoke to nursing, OT, Family  Equipment Recommended: Donavan Calderon       See flowsheet documentation for full assessment, interventions and recommendations  Note:   Prognosis: Fair  Problem List: Decreased strength, Impaired balance, Decreased endurance, Decreased mobility, Decreased cognition, Decreased safety awareness  Assessment: Pt rec'd in bed  Pt  performs supine to sit with mod assist x1 with increased time and verbal cues for mobility techniques  Pt performs sit to stand transfers with min assist x2, verbal cues for handplacement and increased time to perform  Pt progressed with ambulation distances to 39' x2 with mod assist x1 and one for management of o2 tank  Pt   Demonstrates impaired balance activity tolerance, safety and mobility  Pt demonstrates difficulty advancing L le and gait deviations of l hip ER, decreased foot clearance, steppage at times, adducted gait and forward flexed posture  Verbal cues to maintain close distance to walker at all times  Pt requires one standing rest break due to fatigue  Pt   Demonstrates poor safety awareness and carry over with safe transfer techniques  Spoke with pt's daughter and updated pt 's daughter on current mobility status and recommendations of rehab depsite pt 's wishes to return home at d/c   PT 's daughter reports pt would be able to come down to [place] to her house as she is home 24/7 and could care for her mother there    At this time recommend STR prior to d/c to home given impairments in balance, strength, activity tolerance, mobility, safety, and cognition as pt is functioning well below baseline level of mobility and as pt is alone for periods of time at home  Barriers to Discharge: Inaccessible home environment  Barriers to Discharge Comments: 3 MAU  Recommendation: Short-term skilled PT     PT - OK to Discharge: Yes(to rehab)    See flowsheet documentation for full assessment

## 2019-02-12 NOTE — PROGRESS NOTES
Vancomycin IV Pharmacy-to-Dose Consultation    Tami Fitzpatrick is a 75 y o  female who is currently receiving Vancomycin IV with management by the Pharmacy Consult service  ssessment/Plan:  The patient was reviewed  Renal function is stable and no signs or symptoms of nephrotoxicity and/or infusion reactions were documented in the chart  Based on today?s assessment, continue current vancomycin (day # 9) dosing of 1 gram iv q24h with a plan for trough to be drawn at 2330 on 2-18-19  We will continue to follow the patient?s culture results and clinical progress daily      Goldie Reynolds, Pharmacist

## 2019-02-12 NOTE — OCCUPATIONAL THERAPY NOTE
Occupational Therapy Treatment Note:         02/12/19 1524   Restrictions/Precautions   Weight Bearing Precautions Per Order No   Other Precautions Fall Risk  (duc drain)   Pain Assessment   Pain Assessment No/denies pain   Pain Score No Pain   Pain Type Chronic pain   Pain Location Arm   Pain Orientation Left   ADL   Where Assessed Edge of bed   Grooming Assistance 4  Minimal Assistance   Grooming Deficit Setup   LB Bathing Assistance 3  Moderate Assistance   LB Bathing Deficit Steadying;Setup;Supervision/safety;Verbal cueing; Increased time to complete;Perineal area; Buttocks;Right lower leg including foot; Left lower leg including foot   LB Bathing Comments Increased A required for functional reach to achieve safe balance  LB Dressing Assistance 3  Moderate Assistance   LB Dressing Deficit Steadying;Setup; Requires assistive device for steadying;Verbal cueing;Supervision/safety; Increased time to complete; Don/doff L sock; Don/doff R sock; Thread RLE into underwear; Thread LLE into underwear;Pull up over hips   LB Dressing Comments cues for safety required with functional reach  increased fatigue and limited balance noted  Toileting Assistance  3  Moderate Assistance   Toileting Deficit Setup;Steadying;Verbal cueing;Supervison/safety; Increased time to complete;Clothing management down;Clothing management up;Perineal hygiene; Bedside commode   Toileting Comments hands on A required to achieve safe balance with jim care instance  Functional Standing Tolerance   Time 3 mins   Activity dynamic stand balance activity  Bed Mobility   Supine to Sit 3  Moderate assistance   Additional items Assist x 1;Bedrails; Increased time required;Verbal cues;LE management   Sit to Supine 3  Moderate assistance   Additional items Assist x 1; Increased time required; Bedrails;Verbal cues;LE management   Additional Comments extended time required to bring self to EOB      Transfers   Sit to Stand 4  Minimal assistance   Additional items Assist x 2   Stand to Sit 4  Minimal assistance   Additional items Assist x 2   Stand pivot 4  Minimal assistance   Additional items Assist x 2   Toilet transfer 4  Minimal assistance   Additional items Assist x 2   Additional Comments Cues for safety required due to limited focus to tasks and inconsistent safety  Functional Mobility   Functional Mobility 3  Moderate assistance   Additional items Rolling walker   Cognition   Overall Cognitive Status Impaired   Arousal/Participation Alert; Responsive   Attention Attends with cues to redirect   Orientation Level Oriented X4   Memory Decreased recall of precautions   Following Commands Follows one step commands without difficulty   Comments Pt with noted perseverating behaviors this tx session  Need for cues to terminate tasks required  Additional Activities   Additional Activities Other (Comment)  (reviewed current plan of care/home safety with Pt and son)   Additional Activities Comments Pt's son reports Pt being resistant to therapies as well as a home life alert system  Activity Tolerance   Activity Tolerance Patient limited by fatigue   Medical Staff Made Aware Reported all findings to nursing staff  Assessment   Assessment Pt was seen for skilled OT with focus on completion of self care tasks, functional mobility, review of fall prevention, review of home safety and review of current plan of care  See above levels of A required for all functional tasks  Pt with limited self engagement noted this tx session  Moderate reassurance and emotional support required to bring self to EOB  Pt with slow, controlled movements noted with functional tasks  Min A x2 for functional transfer to bedside commode with support of RW  Increased A for dynamic stand balance activity due to inconsistent use of RW and need for bring footing into RW perimeter prior to advancing  Mod A overall for LE dressing and jim care due to limited functional reach   Pt with flat affect and need for redirection due to perseverating behaviors noted, unable to terminate activity without cues provided  Pt may benefit from further cognitive screening to encourage optimal safety with return to home environment  Pt's son reports Pt's having periods were she is alone at home  Pt currently is requiring moderate A for functional transfers and LE self care routine  Poor functional balance noted with increased weakness  Currently recommending further rehab  Pt prefers to go directly home with home therapies  Encouraged Pt to actively participate in therapies as well as mobilize with nursing staff as tolerated  Plan   Treatment Interventions Functional transfer training;ADL retraining;UE strengthening/ROM; Endurance training;Cognitive reorientation   Goal Expiration Date 02/17/19   Treatment Day 2   OT Frequency 3-5x/wk   Recommendation   OT Discharge Recommendation Short Term Rehab  (Pt prefers to go directly home  )   Barthel Index   Feeding 10   Bathing 0   Grooming Score 5   Dressing Score 5   Bladder Score 10   Bowels Score 10   Toilet Use Score 5   Transfers (Bed/Chair) Score 10   Mobility (Level Surface) Score 0   Stairs Score 0   Barthel Index Score 55   Modified [place] Scale   Modified [place] Scale 4   Harshal Carl, [address]

## 2019-02-13 LAB
ALBUMIN SERPL BCP-MCNC: 1.8 G/DL (ref 3.5–5)
ALP SERPL-CCNC: 524 U/L (ref 46–116)
ALT SERPL W P-5'-P-CCNC: 94 U/L (ref 12–78)
ANION GAP SERPL CALCULATED.3IONS-SCNC: 12 MMOL/L (ref 4–13)
AST SERPL W P-5'-P-CCNC: 45 U/L (ref 5–45)
BILIRUB SERPL-MCNC: 0.69 MG/DL (ref 0.2–1)
BUN SERPL-MCNC: 52 MG/DL (ref 5–25)
CALCIUM SERPL-MCNC: 8 MG/DL (ref 8.3–10.1)
CHLORIDE SERPL-SCNC: 102 MMOL/L (ref 100–108)
CO2 SERPL-SCNC: 22 MMOL/L (ref 21–32)
CREAT SERPL-MCNC: 1.07 MG/DL (ref 0.6–1.3)
ERYTHROCYTE [DISTWIDTH] IN BLOOD BY AUTOMATED COUNT: 24.9 % (ref 11.6–15.1)
GFR SERPL CREATININE-BSD FRML MDRD: 51 ML/MIN/1.73SQ M
GLUCOSE SERPL-MCNC: 186 MG/DL (ref 65–140)
GLUCOSE SERPL-MCNC: 191 MG/DL (ref 65–140)
GLUCOSE SERPL-MCNC: 203 MG/DL (ref 65–140)
GLUCOSE SERPL-MCNC: 76 MG/DL (ref 65–140)
GLUCOSE SERPL-MCNC: 77 MG/DL (ref 65–140)
HCT VFR BLD AUTO: 25.9 % (ref 34.8–46.1)
HGB BLD-MCNC: 8.2 G/DL (ref 11.5–15.4)
MCH RBC QN AUTO: 23.8 PG (ref 26.8–34.3)
MCHC RBC AUTO-ENTMCNC: 31.7 G/DL (ref 31.4–37.4)
MCV RBC AUTO: 75 FL (ref 82–98)
PLATELET # BLD AUTO: 372 THOUSANDS/UL (ref 149–390)
PMV BLD AUTO: 12.3 FL (ref 8.9–12.7)
POTASSIUM SERPL-SCNC: 4.6 MMOL/L (ref 3.5–5.3)
PROCALCITONIN SERPL-MCNC: 8.26 NG/ML
PROT SERPL-MCNC: 5.2 G/DL (ref 6.4–8.2)
RBC # BLD AUTO: 3.45 MILLION/UL (ref 3.81–5.12)
SODIUM SERPL-SCNC: 136 MMOL/L (ref 136–145)
WBC # BLD AUTO: 15.63 THOUSAND/UL (ref 4.31–10.16)

## 2019-02-13 PROCEDURE — 85027 COMPLETE CBC AUTOMATED: CPT | Performed by: INTERNAL MEDICINE

## 2019-02-13 PROCEDURE — 84145 PROCALCITONIN (PCT): CPT | Performed by: INTERNAL MEDICINE

## 2019-02-13 PROCEDURE — 80053 COMPREHEN METABOLIC PANEL: CPT | Performed by: INTERNAL MEDICINE

## 2019-02-13 PROCEDURE — 82948 REAGENT STRIP/BLOOD GLUCOSE: CPT

## 2019-02-13 PROCEDURE — 99232 SBSQ HOSP IP/OBS MODERATE 35: CPT | Performed by: INTERNAL MEDICINE

## 2019-02-13 RX ORDER — FUROSEMIDE 20 MG/1
20 TABLET ORAL DAILY
Status: DISCONTINUED | OUTPATIENT
Start: 2019-02-14 | End: 2019-02-15

## 2019-02-13 RX ADMIN — PANCRELIPASE 48000 UNITS: 24000; 76000; 120000 CAPSULE, DELAYED RELEASE PELLETS ORAL at 09:23

## 2019-02-13 RX ADMIN — HEPARIN SODIUM 5000 UNITS: 5000 INJECTION INTRAVENOUS; SUBCUTANEOUS at 21:14

## 2019-02-13 RX ADMIN — PANCRELIPASE 48000 UNITS: 24000; 76000; 120000 CAPSULE, DELAYED RELEASE PELLETS ORAL at 13:12

## 2019-02-13 RX ADMIN — INSULIN GLARGINE 25 UNITS: 100 INJECTION, SOLUTION SUBCUTANEOUS at 21:14

## 2019-02-13 RX ADMIN — INSULIN LISPRO 4 UNITS: 100 INJECTION, SOLUTION INTRAVENOUS; SUBCUTANEOUS at 13:12

## 2019-02-13 RX ADMIN — OXYCODONE HYDROCHLORIDE 5 MG: 5 TABLET ORAL at 21:14

## 2019-02-13 RX ADMIN — OXYCODONE HYDROCHLORIDE 5 MG: 5 TABLET ORAL at 11:06

## 2019-02-13 RX ADMIN — CEFTRIAXONE SODIUM 2000 MG: 10 INJECTION, POWDER, FOR SOLUTION INTRAVENOUS at 11:04

## 2019-02-13 RX ADMIN — VANCOMYCIN HYDROCHLORIDE 1000 MG: 1 INJECTION, SOLUTION INTRAVENOUS at 00:10

## 2019-02-13 RX ADMIN — FERROUS SULFATE TAB 325 MG (65 MG ELEMENTAL FE) 325 MG: 325 (65 FE) TAB at 09:23

## 2019-02-13 RX ADMIN — OXYCODONE HYDROCHLORIDE 5 MG: 5 TABLET ORAL at 17:11

## 2019-02-13 RX ADMIN — HEPARIN SODIUM 5000 UNITS: 5000 INJECTION INTRAVENOUS; SUBCUTANEOUS at 06:12

## 2019-02-13 RX ADMIN — INSULIN LISPRO 4 UNITS: 100 INJECTION, SOLUTION INTRAVENOUS; SUBCUTANEOUS at 17:41

## 2019-02-13 RX ADMIN — PANCRELIPASE 48000 UNITS: 24000; 76000; 120000 CAPSULE, DELAYED RELEASE PELLETS ORAL at 17:41

## 2019-02-13 RX ADMIN — HEPARIN SODIUM 5000 UNITS: 5000 INJECTION INTRAVENOUS; SUBCUTANEOUS at 13:11

## 2019-02-13 NOTE — PROGRESS NOTES
Progress Note - Infectious Disease   Adelia Flores 75 y o  female MRN: [id number]  Unit/Bed#: [place] [address] -01 Encounter: [id number]      Impression/Plan:  1  Severe sepsis-POA  Fever, leukocytosis, tachycardia, lactic acidosis   Appears to be secondary to polymicrobial bacteremia   Possibly in the setting of liver abscess   She remains quite ill, however fortunately she has remained hemodynamically stable despite the systemic illness   She seems to be tolerating the antibiotics without difficulty   Since drainage of possible liver abscess, patient is clinically improving with downtrending fever, leukocytosis, procalcitonin   Vancomycin trough is therapeutic  Procalcitonin level continues to decrease  -continue vancomycin, ceftriaxone, now at current dose  -if continues to improve, likely transition to oral doxycycline tomorrow  -plan oral doxycycline until the abscess cavity is closed  -follow up repeat blood cultures   -repeat CBC with diff and CMP  -supportive care     2  Polymicrobial bacteremia-suspect intra-abdominal or enteric source   No definitive source has been appreciated despite extensive imaging although CT of the abdomen and pelvis does suggest the possibility of pyelonephritis but the urinalysis and culture nondiagnostic   Consideration for the possibility of liver abscess   Transthoracic echocardiogram without valvular vegetation     -antibiotics as above  -follow up repeat blood cultures  -no additional ID workup for now     3  Enterococcal liver abscess   Now status post IR drainage with removal of 45 cc of cloudy fluid  Still with drainage into the ADALBERTO              -continue antibiotics as above  -possibly to oral antibiotics tomorrow              -monitor drain output     4   Acute kidney injury-suspect this is a pre renal issue   Perhaps sepsis is playing a role   The renal function continues to slowly improve   -dose adjusted antibiotics as above  -recheck creatinine clearance  -volume management  -no additional ID workup for now     5  Anemia-quite severe   Elevated LDH suggest the possibility of hemolysis   No definitive evidence of acute blood loss   The H&H has modestly improved  -workup as per the primary  -transfusion support  -monitor CBC with diff       6  Liver function test abnormalities-possibly secondary to sepsis   Possibly another etiology   CT the abdomen and pelvis does not reveal a definitive source   Liver function tests have modestly decreased   Right upper quadrant ultrasound was cystic lesion in the right lobe of the liver  The liver function tests remain quite high  -recheck liver function test  -if liver function test on continue to decrease recommend repeat imaging  -consider GI evaluation    Antibiotics:  Vancomycin 10  Ceftriaxone 7  Gram-negative antibiotics 10      Subjective:  Patient has no fever, chills, sweats; no nausea, vomiting, diarrhea; no cough, shortness of breath; no increased pain  Still with some abdominal pain  No new symptoms  Objective:  Vitals:  Temp:  [97 9 °F (36 6 °C)-98 2 °F (36 8 °C)] 98 2 °F (36 8 °C)  HR:  [68-78] 78  Resp:  [18-20] 18  BP: (122)/(62-68) 122/68  SpO2:  [97 %-100 %] 97 %  Temp (24hrs), Av °F (36 7 °C), Min:97 9 °F (36 6 °C), Max:98 2 °F (36 8 °C)  Current: Temperature: 98 2 °F (36 8 °C)    Physical Exam:   General Appearance:  Alert, interactive, nontoxic, no acute distress  Throat: Oropharynx moist without lesions  Lungs:   Clear to auscultation bilaterally; no wheezes, rhonchi or rales; respirations unlabored   Heart:  RRR; no murmur, rub or gallop   Abdomen:   Soft, non-tender, non-distended, positive bowel sounds  Right-sided ADALBERTO drain in place with output     Extremities: No clubbing, cyanosis or edema   Skin: No new rashes or lesions  No draining wounds noted         Labs, Imaging, & Other studies:   All pertinent labs and imaging studies were personally reviewed  Results from last 7 days   Lab Units 02/13/19  0436 02/12/19  0552 02/11/19  0601   WBC Thousand/uL 15 63* 16 73* 22 21*   HEMOGLOBIN g/dL 8 2* 7 8* 8 5*   PLATELETS Thousands/uL 372 296 236     Results from last 7 days   Lab Units 02/13/19  0436 02/12/19  0552 02/11/19  0601  02/09/19  0514   SODIUM mmol/L 136 134* 130*   < > 130*   POTASSIUM mmol/L 4 6 4 5 4 6   < > 4 1   CHLORIDE mmol/L 102 102 101   < > 100   CO2 mmol/L 22 20* 20*   < > 18*   BUN mg/dL 52* 63* 71*   < > 83*   CREATININE mg/dL 1 07 1 32* 1 54*   < > 1 91*   EGFR ml/min/1 73sq m 51 40 33   < > 25   CALCIUM mg/dL 8 0* 8 0* 8 3   < > 7 9*   AST U/L 45 75*  --   --  129*   ALT U/L 94* 131*  --   --  107*   ALK PHOS U/L 524* 524*  --   --  345*    < > = values in this interval not displayed       Results from last 7 days   Lab Units 02/09/19  1325   GRAM STAIN RESULT  No Polys or Bacteria seen   BODY FLUID CULTURE, STERILE  2+ Growth of Enterococcus faecalis*       Procalcitonin level 8 26

## 2019-02-13 NOTE — PROGRESS NOTES
Vancomycin IV Pharmacy-to-Dose Consultation    Suleman Dixon is a 75 y o  female who is currently receiving Vancomycin IV with management by the Pharmacy Consult service  Assessment/Plan:  The patient was reviewed  Renal function is stable and no signs or symptoms of nephrotoxicity and/or infusion reactions were documented in the chart  Based on today?s assessment, continue current vancomycin (day # 9) dosing of 1000mg Q24h ,with a plan for trough to be drawn at 2330 on 2/18/19    We will continue to follow the patient?s culture results and clinical progress daily      Martha Bradshaw

## 2019-02-13 NOTE — PLAN OF CARE
Problem: Potential for Falls  Goal: Patient will remain free of falls  Description  INTERVENTIONS:  - Assess patient frequently for physical needs  -  Identify cognitive and physical deficits and behaviors that affect risk of falls    -  [place] fall precautions as indicated by assessment   - Educate patient/family on patient safety including physical limitations  - Instruct patient to call for assistance with activity based on assessment  - Modify environment to reduce risk of injury  - Consider OT/PT consult to assist with strengthening/mobility   Outcome: Progressing     Problem: Prexisting or High Potential for Compromised Skin Integrity  Goal: Skin integrity is maintained or improved  Description  INTERVENTIONS:  - Identify patients at risk for skin breakdown  - Assess and monitor skin integrity  - Assess and monitor nutrition and hydration status  - Monitor labs (i e  albumin)  - Assess for incontinence   - Turn and reposition patient  - Assist with mobility/ambulation  - Relieve pressure over bony prominences  - Avoid friction and shearing  - Provide appropriate hygiene as needed including keeping skin clean and dry  - Evaluate need for skin moisturizer/barrier cream  - Collaborate with interdisciplinary team (i e  Nutrition, Rehabilitation, etc )   - Patient/family teaching   Outcome: Progressing     Problem: DISCHARGE PLANNING - CARE MANAGEMENT  Goal: Discharge to post-acute care or home with appropriate resources  Description  INTERVENTIONS:  - Conduct assessment to determine patient/family and health care team treatment goals, and need for post-acute services based on payer coverage, community resources, and patient preferences, and barriers to discharge  - Address psychosocial, clinical, and financial barriers to discharge as identified in assessment in conjunction with the patient/family and health care team  - Arrange appropriate level of post-acute services according to patient?s   needs and preference and payer coverage in collaboration with the physician and health care team  - Communicate with and update the patient/family, physician, and health care team regarding progress on the discharge plan  - Arrange appropriate transportation to post-acute venues   Outcome: Progressing     Problem: PAIN - ADULT  Goal: Verbalizes/displays adequate comfort level or baseline comfort level  Description  Interventions:  - Encourage patient to monitor pain and request assistance  - Assess pain using appropriate pain scale  - Administer analgesics based on type and severity of pain and evaluate response  - Implement non-pharmacological measures as appropriate and evaluate response  - Consider cultural and social influences on pain and pain management  - Notify physician/advanced practitioner if interventions unsuccessful or patient reports new pain   Outcome: Progressing     Problem: INFECTION - ADULT  Goal: Absence or prevention of progression during hospitalization  Description  INTERVENTIONS:  - Assess and monitor for signs and symptoms of infection  - Monitor lab/diagnostic results  - Monitor all insertion sites, i e  indwelling lines, tubes, and drains  - Monitor endotracheal (as able) and nasal secretions for changes in amount and color  - [place] appropriate cooling/warming therapies per order  - Administer medications as ordered  - Instruct and encourage patient and family to use good hand hygiene technique  - Identify and instruct in appropriate isolation precautions for identified infection/condition   Outcome: Progressing  Goal: Absence of fever/infection during neutropenic period  Description  INTERVENTIONS:  - Monitor WBC  - Implement neutropenic guidelines   Outcome: Progressing     Problem: SAFETY ADULT  Goal: Maintain or return to baseline ADL function  Description  INTERVENTIONS:  -  Assess patient's ability to carry out ADLs; assess patient's baseline for ADL function and identify physical deficits which impact ability to perform ADLs (bathing, care of mouth/teeth, toileting, grooming, dressing, etc )  - Assess/evaluate cause of self-care deficits   - Assess range of motion  - Assess patient's mobility; develop plan if impaired  - Assess patient's need for assistive devices and provide as appropriate  - Encourage maximum independence but intervene and supervise when necessary  ¯ Involve family in performance of ADLs  ¯ Assess for home care needs following discharge   ¯ Request OT consult to assist with ADL evaluation and planning for discharge  ¯ Provide patient education as appropriate   Outcome: Progressing  Goal: Maintain or return mobility status to optimal level  Description  INTERVENTIONS:  - Assess patient's baseline mobility status (ambulation, transfers, stairs, etc )    - Identify cognitive and physical deficits and behaviors that affect mobility  - Identify mobility aids required to assist with transfers and/or ambulation (gait belt, sit-to-stand, lift, walker, cane, etc )  - [place] fall precautions as indicated by assessment  - Record patient progress and toleration of activity level on Mobility SBAR; progress patient to next Phase/Stage  - Instruct patient to call for assistance with activity based on assessment  - Request Rehabilitation consult to assist with strengthening/weightbearing, etc    Outcome: Progressing     Problem: DISCHARGE PLANNING  Goal: Discharge to home or other facility with appropriate resources  Description  INTERVENTIONS:  - Identify barriers to discharge w/patient and caregiver  - Arrange for needed discharge resources and transportation as appropriate  - Identify discharge learning needs (meds, wound care, etc )  - Arrange for interpretive services to assist at discharge as needed  - Refer to Case Management Department for coordinating discharge planning if the patient needs post-hospital services based on physician/advanced practitioner order or complex needs related to functional status, cognitive ability, or social support system   Outcome: Progressing     Problem: Knowledge Deficit  Goal: Patient/family/caregiver demonstrates understanding of disease process, treatment plan, medications, and discharge instructions  Description  Complete learning assessment and assess knowledge base    Interventions:  - Provide teaching at level of understanding  - Provide teaching via preferred learning methods   Outcome: Progressing

## 2019-02-13 NOTE — UTILIZATION REVIEW
Continued Stay Review    Date:  2/13/2019    Vital Signs: /68 (BP Location: Left arm)   Pulse 78   Temp 98 2 °F (36 8 °C) (Temporal)   Resp 18   Ht 5' 5" (1 651 m)   Wt 76 7 kg (169 lb 1 5 oz)   SpO2 97%   BMI 28 14 kg/m²      Assessment/Plan:  76 yo female with polymicrobial bacteremia likely from enterococcal liver abscess  S/P drainage  Still with draiinge in ADALBERTO tube  Continue IV ABX's  Continue to monitor Hg  Improved to 9 6 s/p 2 units PC's  Now 8 2 - continue to monitor  Feels weak,  + abd pain  But improved    Medications:   Scheduled Meds:   Current Facility-Administered Medications:  acetaminophen 650 mg Oral 4x Daily PRN     cefTRIAXone 2,000 mg Intravenous Q24H     ferrous sulfate 325 mg Oral Daily With Breakfast     heparin (porcine) 5,000 Units Subcutaneous Q8H [address]     insulin glargine 25 Units Subcutaneous HS     insulin lispro 4-20 Units Subcutaneous TID AC     insulin lispro 8 Units Subcutaneous TID With Meals     morphine injection 1 mg Intravenous Q4H PRN     oxyCODONE 5 mg Oral Q4H PRN  x 1    pancrelipase (Lip-Prot-Amyl) 48,000 Units Oral TID With Meals     vancomycin 15 mg/kg Intravenous Q24H       Pertinent Labs/Diagnostic Results:   WBC's 15 63  Hg 8 2  BUN 52,  Cr 1 07,   Alb 1 8  ALT 94,   Alk Phos 524  Procalcitonin 8 26    Age/Sex: 75 y o  female   Discharge Plan:  Refusing rehab @ [facility]  Network Utilization Review Department  Phone: [phone]; Fax [phone]  [email or web address]  ATTENTION: Please call with any questions or concerns to [phone]  and carefully listen to the prompts so that you are directed to the right person  Send all requests for admission clinical reviews, approved or denied determinations and any other requests to fax [phone]   All voicemails are confidential

## 2019-02-13 NOTE — PROGRESS NOTES
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  Appreciate ID recommendations  Flagyl was d/barbie  She is to conitnue ceftriaxone and vancomycin  Echo was negative for vegetation  procalcitonin continues to trend down  Possibly change to po antibiotics tomorrow consisting of doxycycline  duc drain is still in place        2  Anemia, multifactorial secondary to iron deficiency, critical illness and chronic disease- Hemoglobin initially improved to 9 6 after transfusion of 2 units packed red blood cells  H/h stable at 8 2  Will continue to monitor and will continue iron supplements       3  ANNE likely prerenal and secondary to sepsis- s/p lasix IV x1  Creatinine improved to 1 07  Will start po lasix tomorrow       4-Extensive cancer history including breast, pancreatic and colon with concern for recurrent disease               -Pancreatic head adenocarcinoma with a prior [id number], categorically unresectable treated with neoadjuvant chemoradiotherapy                -bilateral breast cancer and 2x colon cancer   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, colorectal, thyroid) and 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                 -BJA been following up at [facility]               -ND scan from this admission shows extensive lymphadenopathy and liver lesions   appreciate oncology recommendations  Pt will need to follow up with sergio rodriguez       5- DM Type II- Continue current Lantus/SSI regimen   fasting blood glucose acceptable  Continue pre meal insulin of 8 units  Will continue to monitor and adjust as needed       6-transaminitis likely secondary to sepsis   AST ALT is trended down  Will repeat again       7  Chronic diastolic chf- restart po lasix tomorrow am  Hold zaroxolyn       dispo- Pt refused STR  She is set up with [facility]        Discussed in full with nurse       Subjective:   Pt seen and examined  Pt improved  She states she is feeling better  No f/c no cp no sob no n/v/d no abd pain  Her main complaint is that her legs are still swollen and she feels weak  Objective:     Vitals: Blood pressure 114/60, pulse 67, temperature (!) 97 1 °F (36 2 °C), temperature source Temporal, resp  rate 18, height 5' 5" (1 651 m), weight 76 7 kg (169 lb 1 5 oz), SpO2 99 %  ,Body mass index is 28 14 kg/m²  Lab, Imaging and other studies:  Results from last 7 days   Lab Units 02/13/19  0436  02/09/19  0515   WBC Thousand/uL 15 63*   < >  --    HEMOGLOBIN g/dL 8 2*   < >  --    HEMATOCRIT % 25 9*   < >  --    PLATELETS Thousands/uL 372   < >  --    INR   --   --  1 47*    < > = values in this interval not displayed  Results from last 7 days   Lab Units 02/13/19  0436   POTASSIUM mmol/L 4 6   CHLORIDE mmol/L 102   CO2 mmol/L 22   BUN mg/dL 52*   CREATININE mg/dL 1 07   CALCIUM mg/dL 8 0*   ALK PHOS U/L 524*   ALT U/L 94*   AST U/L 45         Lab Results   Component Value Date    BLOODCX No Growth After 5 Days  02/06/2019    BLOODCX No Growth After 5 Days   02/06/2019    BLOODCX Enterococcus faecalis (A) 02/04/2019    BLOODCX Escherichia coli (A) 02/04/2019    BLOODCX Klebsiella oxytoca (A) 02/04/2019    URINECX No Growth <1000 cfu/mL 02/05/2019     Scheduled Meds:   Current Facility-Administered Medications:  acetaminophen 650 mg Oral 4x Daily PRN ESE Humphrey    cefTRIAXone 2,000 mg Intravenous Q24H Parris Henriquez MD Last Rate: 2,000 mg (02/13/19 1104)   ferrous sulfate 325 mg Oral Daily With Breakfast ESE Gale    heparin (porcine) 5,000 Units Subcutaneous Q8H [address] ESE Gracia    insulin glargine 25 Units Subcutaneous HS ESE Gale    insulin lispro 4-20 Units Subcutaneous TID AC Shala K ESE Broderick    insulin lispro 8 Units Subcutaneous TID With Meals Subha Rodriguez DO    morphine injection 1 mg Intravenous Q4H PRN Jostin Carbajal MD    oxyCODONE 5 mg Oral Q4H PRN ESE Hernandez    pancrelipase (Lip-Prot-Amyl) 48,000 Units Oral TID With Meals Artur Jaimes ESE Broderick    vancomycin 15 mg/kg Intravenous Q24H ESE Hernandez Last Rate: 1,000 mg (02/13/19 0010)     Continuous Infusions:    PRN Meds:   acetaminophen    morphine injection    oxyCODONE      Physical exam:  Physical Exam  General appearance: alert and oriented, in no acute distress  Head: Normocephalic, without obvious abnormality, atraumatic  Eyes: conjunctivae/corneas clear  PERRL, EOM's intact  Fundi benign    Neck: no adenopathy, no carotid bruit, no JVD, supple, symmetrical, trachea midline and thyroid not enlarged, symmetric, no tenderness/mass/nodules  Lungs: clear to auscultation bilaterally  Heart: regular rate and rhythm, S1, S2 normal, no murmur, click, rub or gallop  Abdomen: soft, non-tender; bowel sounds normal; no masses,  no organomegaly  Extremities: edema +1 edema bilateral improved from yesterday  Pulses: 2+ and symmetric  Skin: Skin color, texture, turgor normal  No rashes or lesions  Neurologic: Mental status: Alert, oriented, thought content appropriate      VTE Pharmacologic Prophylaxis: Heparin  VTE Mechanical Prophylaxis: sequential compression device    Counseling / Coordination of Care  Total floor / unit time spent today 20 minutes      Current Length of Stay: 9 day(s)    Current Patient Status: Inpatient       Code Status: Level 1 - Full Code

## 2019-02-13 NOTE — UTILIZATION REVIEW
Jayy Yarbrough RN   Registered Nurse   Utilization Review   Utilization Review   Signed   Date of Service:  2/9/2019  1:04 PM               Signed                 Show:Clear all  [x]Manual[x]Template[x]Copied    Added by:  Jg Dewitt RN      []Price for details         Continued Stay Review     Date:  2/9/2019     Vital Signs:   02/09/19 0814   98 4 °F (36 9 °C)   85   20   88/65    98 %   Nasal cannula   Lying      Assessment/Plan:   Sepsis secondary to polymicrobial bacteremia possibly in setting of liver abscess  Initial blood cultures preliminarily positive for Enterococcus and E coli     Repeat blood cultures are pending   WBC's still elevated but  Trending down  Continue ABX's  ID following     Ultrasound guided placement today of an 8 5 Fr drainage catheter into the right hepatic fluid collection yielded 45 ml of cloudy, yellow fluid      Anemia - Hg initially improved to 9 6 after 2 units PC's but down to 7 8 yesterday & today  Monitor  ANNE - likely prerenal and secondary to sepsis    Urine output improving - continue to monitor renal fx        Medications:   Scheduled Meds:   Current Facility-Administered Medications:  acetaminophen 650 mg Oral 4x Daily PRN ESE Benito     cefTRIAXone 2,000 mg Intravenous Q24H       ferrous sulfate 325 mg Oral Daily With Breakfast                     heparin (porcine) 5,000 Units Subcutaneous Q8H [address]       insulin glargine 25 Units Subcutaneous HS       insulin lispro 4-20 Units Subcutaneous TID AC       insulin lispro 5 Units Subcutaneous TID With Meals       metroNIDAZOLE 500 mg Oral Q8H [address]       morphine injection 1 mg Intravenous Q4H PRN       oxyCODONE 5 mg Oral Q4H PRN  x 3  2/9     pancrelipase (Lip-Prot-Amyl) 48,000 Units Oral TID With Meals       vancomycin 15 mg/kg Intravenous Q24H             Pertinent Labs/Diagnostic Results:   BUN 83,   Cr 1 91,   CO2  18,  Ca 7 9,   ,  ,   Alk phos 345  WBC's 23 80,   Hg 7 8 /  Plats 143     Age/Sex: 75 y o  female   Discharge Plan:  TBD     Network Utilization Review Department  Phone: [phone]; Fax [phone]  [email or web address]  ATTENTION: Please call with any questions or concerns to [phone]  and carefully listen to the prompts so that you are directed to the right person  Send all requests for admission clinical reviews, approved or denied determinations and any other requests to fax [phone]   All voicemails are confidential

## 2019-02-14 LAB
ALBUMIN SERPL BCP-MCNC: 1.8 G/DL (ref 3.5–5)
ALP SERPL-CCNC: 538 U/L (ref 46–116)
ALT SERPL W P-5'-P-CCNC: 71 U/L (ref 12–78)
ANION GAP SERPL CALCULATED.3IONS-SCNC: 10 MMOL/L (ref 4–13)
AST SERPL W P-5'-P-CCNC: 30 U/L (ref 5–45)
BILIRUB SERPL-MCNC: 0.72 MG/DL (ref 0.2–1)
BUN SERPL-MCNC: 44 MG/DL (ref 5–25)
CALCIUM SERPL-MCNC: 8 MG/DL (ref 8.3–10.1)
CHLORIDE SERPL-SCNC: 102 MMOL/L (ref 100–108)
CO2 SERPL-SCNC: 22 MMOL/L (ref 21–32)
CREAT SERPL-MCNC: 1 MG/DL (ref 0.6–1.3)
ERYTHROCYTE [DISTWIDTH] IN BLOOD BY AUTOMATED COUNT: 25.7 % (ref 11.6–15.1)
GFR SERPL CREATININE-BSD FRML MDRD: 56 ML/MIN/1.73SQ M
GLUCOSE SERPL-MCNC: 121 MG/DL (ref 65–140)
GLUCOSE SERPL-MCNC: 130 MG/DL (ref 65–140)
GLUCOSE SERPL-MCNC: 190 MG/DL (ref 65–140)
GLUCOSE SERPL-MCNC: 226 MG/DL (ref 65–140)
GLUCOSE SERPL-MCNC: 266 MG/DL (ref 65–140)
HCT VFR BLD AUTO: 25.9 % (ref 34.8–46.1)
HGB BLD-MCNC: 8.3 G/DL (ref 11.5–15.4)
MCH RBC QN AUTO: 24.2 PG (ref 26.8–34.3)
MCHC RBC AUTO-ENTMCNC: 32 G/DL (ref 31.4–37.4)
MCV RBC AUTO: 76 FL (ref 82–98)
PLATELET # BLD AUTO: 365 THOUSANDS/UL (ref 149–390)
PMV BLD AUTO: 11.6 FL (ref 8.9–12.7)
POTASSIUM SERPL-SCNC: 4.5 MMOL/L (ref 3.5–5.3)
PROT SERPL-MCNC: 5.2 G/DL (ref 6.4–8.2)
RBC # BLD AUTO: 3.43 MILLION/UL (ref 3.81–5.12)
SODIUM SERPL-SCNC: 134 MMOL/L (ref 136–145)
WBC # BLD AUTO: 15.86 THOUSAND/UL (ref 4.31–10.16)

## 2019-02-14 PROCEDURE — 97116 GAIT TRAINING THERAPY: CPT

## 2019-02-14 PROCEDURE — 80053 COMPREHEN METABOLIC PANEL: CPT | Performed by: INTERNAL MEDICINE

## 2019-02-14 PROCEDURE — 97110 THERAPEUTIC EXERCISES: CPT

## 2019-02-14 PROCEDURE — 97535 SELF CARE MNGMENT TRAINING: CPT

## 2019-02-14 PROCEDURE — 97530 THERAPEUTIC ACTIVITIES: CPT

## 2019-02-14 PROCEDURE — 82948 REAGENT STRIP/BLOOD GLUCOSE: CPT

## 2019-02-14 PROCEDURE — 99232 SBSQ HOSP IP/OBS MODERATE 35: CPT | Performed by: INTERNAL MEDICINE

## 2019-02-14 PROCEDURE — 85027 COMPLETE CBC AUTOMATED: CPT | Performed by: INTERNAL MEDICINE

## 2019-02-14 RX ORDER — DOXYCYCLINE HYCLATE 100 MG/1
100 CAPSULE ORAL EVERY 12 HOURS SCHEDULED
Status: DISCONTINUED | OUTPATIENT
Start: 2019-02-14 | End: 2019-02-20 | Stop reason: HOSPADM

## 2019-02-14 RX ADMIN — OXYCODONE HYDROCHLORIDE 5 MG: 5 TABLET ORAL at 17:30

## 2019-02-14 RX ADMIN — INSULIN LISPRO 8 UNITS: 100 INJECTION, SOLUTION INTRAVENOUS; SUBCUTANEOUS at 18:13

## 2019-02-14 RX ADMIN — DOXYCYCLINE HYCLATE 100 MG: 100 CAPSULE ORAL at 13:00

## 2019-02-14 RX ADMIN — INSULIN LISPRO 8 UNITS: 100 INJECTION, SOLUTION INTRAVENOUS; SUBCUTANEOUS at 13:37

## 2019-02-14 RX ADMIN — OXYCODONE HYDROCHLORIDE 5 MG: 5 TABLET ORAL at 02:30

## 2019-02-14 RX ADMIN — HEPARIN SODIUM 5000 UNITS: 5000 INJECTION INTRAVENOUS; SUBCUTANEOUS at 13:37

## 2019-02-14 RX ADMIN — FUROSEMIDE 20 MG: 20 TABLET ORAL at 09:20

## 2019-02-14 RX ADMIN — PANCRELIPASE 48000 UNITS: 24000; 76000; 120000 CAPSULE, DELAYED RELEASE PELLETS ORAL at 13:38

## 2019-02-14 RX ADMIN — FERROUS SULFATE TAB 325 MG (65 MG ELEMENTAL FE) 325 MG: 325 (65 FE) TAB at 09:20

## 2019-02-14 RX ADMIN — HEPARIN SODIUM 5000 UNITS: 5000 INJECTION INTRAVENOUS; SUBCUTANEOUS at 05:53

## 2019-02-14 RX ADMIN — CEFTRIAXONE SODIUM 2000 MG: 10 INJECTION, POWDER, FOR SOLUTION INTRAVENOUS at 10:50

## 2019-02-14 RX ADMIN — INSULIN GLARGINE 25 UNITS: 100 INJECTION, SOLUTION SUBCUTANEOUS at 21:43

## 2019-02-14 RX ADMIN — DOXYCYCLINE HYCLATE 100 MG: 100 CAPSULE ORAL at 21:43

## 2019-02-14 RX ADMIN — PANCRELIPASE 48000 UNITS: 24000; 76000; 120000 CAPSULE, DELAYED RELEASE PELLETS ORAL at 09:20

## 2019-02-14 RX ADMIN — HEPARIN SODIUM 5000 UNITS: 5000 INJECTION INTRAVENOUS; SUBCUTANEOUS at 21:43

## 2019-02-14 RX ADMIN — VANCOMYCIN HYDROCHLORIDE 1000 MG: 1 INJECTION, SOLUTION INTRAVENOUS at 01:17

## 2019-02-14 RX ADMIN — PANCRELIPASE 48000 UNITS: 24000; 76000; 120000 CAPSULE, DELAYED RELEASE PELLETS ORAL at 18:12

## 2019-02-14 NOTE — PROGRESS NOTES
[facility] Progress Note  Patient: Vic Holland 75 y o  female   MRN: [id number]  PCP: Ania Kiran, DO  Unit/Bed#: [facility] 68 2 -01 Encounter: [id number]  Date Of Visit: 02/14/19      Assessment/plan  1  Severe sepsis-poa  Due to polymicrobial bacteremia  Likely from enterococcal liver abscess  Appreciate ID recommendations  Flagyl was d/barbie  Echo was negative for vegetation  procalcitonin continues to trend down  Changed to doxycycline  Vancomycin and ceftriaxone d/barbie  duc drain is still in place still draining 50 cc        2  Anemia, multifactorial secondary to iron deficiency, critical illness and chronic disease- Hemoglobin initially improved to 9 6 after transfusion of 2 units packed red blood cells  H/h stable at 8 3  Will continue to monitor and will continue iron supplements       3  ANNE likely prerenal and secondary to sepsis- s/p lasix IV x1  Creatinine improved to 1 00  Continue po lasix       4-Extensive cancer history including breast, pancreatic and colon with concern for recurrent disease               -Pancreatic head adenocarcinoma with a prior [id number], categorically unresectable treated with neoadjuvant chemoradiotherapy                -bilateral breast cancer and 2x colon cancer   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, colorectal, thyroid) and 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                 -ZOQ been following up at [facility]               -CO scan from this admission shows extensive lymphadenopathy and liver lesions   appreciate oncology recommendations  Pt will need to follow up with sergio rodriguez       5- DM Type II- Continue current Lantus/SSI regimen   fasting blood glucose acceptable  Continue pre meal insulin of 10 units  Will continue to monitor and adjust as needed       6-transaminitis likely secondary to sepsis   AST ALT is trended down      7   Chronic diastolic chf- restart po lasix tomorrow am  Hold zaroxolyn       dispo- pt refused str  Possible d/c to home tomorrow         Discussed in full with nurse      Subjective:   Pt seen and examined  Pt states she feels stronger today but her legs are still not the same  No f/c no cp no sob no n/v/d no abd pain  Pt wants to shower  Objective:     Vitals: Blood pressure 143/59, pulse 79, temperature 99 5 °F (37 5 °C), temperature source Temporal, resp  rate 20, height 5' 5" (1 651 m), weight 77 5 kg (170 lb 13 7 oz), SpO2 98 %  ,Body mass index is 28 43 kg/m²  Lab, Imaging and other studies:  Results from last 7 days   Lab Units 02/14/19 0457  02/09/19  0515   WBC Thousand/uL 15 86*   < >  --    HEMOGLOBIN g/dL 8 3*   < >  --    HEMATOCRIT % 25 9*   < >  --    PLATELETS Thousands/uL 365   < >  --    INR   --   --  1 47*    < > = values in this interval not displayed  Results from last 7 days   Lab Units 02/14/19  0457   POTASSIUM mmol/L 4 5   CHLORIDE mmol/L 102   CO2 mmol/L 22   BUN mg/dL 44*   CREATININE mg/dL 1 00   CALCIUM mg/dL 8 0*   ALK PHOS U/L 538*   ALT U/L 71   AST U/L 30         Lab Results   Component Value Date    BLOODCX No Growth After 5 Days  02/06/2019    BLOODCX No Growth After 5 Days   02/06/2019    BLOODCX Enterococcus faecalis (A) 02/04/2019    BLOODCX Escherichia coli (A) 02/04/2019    BLOODCX Klebsiella oxytoca (A) 02/04/2019    URINECX No Growth <1000 cfu/mL 02/05/2019     Scheduled Meds:   Current Facility-Administered Medications:  acetaminophen 650 mg Oral 4x Daily PRN Essie Severs, CRNP   doxycycline hyclate 100 mg Oral Q12H [address] Venetta Mohs, MD   ferrous sulfate 325 mg Oral Daily With Breakfast ESE Boggs   furosemide 20 mg Oral Daily Subha Rodriguez DO   heparin (porcine) 5,000 Units Subcutaneous [facility] Essie Severs, CRNP   insulin glargine 25 Units Subcutaneous HS ESE Boggs   [START ON 2/15/2019] insulin lispro 10 Units Subcutaneous TID With Meals Subha Rodriguez DO   insulin lispro 4-20 Units Subcutaneous TID AC ShalaESE Gray   morphine injection 1 mg Intravenous Q4H PRN Janice Dupree MD   oxyCODONE 5 mg Oral Q4H PRN ESE Humphrey   pancrelipase (Lip-Prot-Amyl) 48,000 Units Oral TID With Meals Shala K ESE Broderick     Continuous Infusions:    PRN Meds:   acetaminophen    morphine injection    oxyCODONE      Physical exam:  Physical Exam  General appearance: alert and oriented, in no acute distress  Head: Normocephalic, without obvious abnormality, atraumatic  Eyes: conjunctivae/corneas clear  PERRL, EOM's intact  Fundi benign    Neck: no adenopathy, no carotid bruit, no JVD, supple, symmetrical, trachea midline and thyroid not enlarged, symmetric, no tenderness/mass/nodules  Lungs: clear to auscultation bilaterally  Heart: regular rate and rhythm, S1, S2 normal, no murmur, click, rub or gallop  Abdomen: soft, non-tender; bowel sounds normal; no masses,  no organomegaly  Extremities: edema +1 pitting edema that has improved in her legs bilateral  Pulses: 2+ and symmetric  Skin: Skin color, texture, turgor normal  No rashes or lesions  Neurologic: Grossly normal      VTE Pharmacologic Prophylaxis: Heparin  VTE Mechanical Prophylaxis: sequential compression device    Counseling / Coordination of Care  Total floor / unit time spent today 20 minutes      Current Length of Stay: 10 day(s)    Current Patient Status: Inpatient       Code Status: Level 1 - Full Code

## 2019-02-14 NOTE — PLAN OF CARE
Problem: PHYSICAL THERAPY ADULT  Goal: Performs mobility at highest level of function for planned discharge setting  See evaluation for individualized goals  Description  Treatment/Interventions: Functional transfer training, LE strengthening/ROM, Therapeutic exercise, Endurance training, Patient/family training, Equipment eval/education, Bed mobility, Gait training, Spoke to nursing, OT, Family  Equipment Recommended: Clementine Shipley       See flowsheet documentation for full assessment, interventions and recommendations  Outcome: Progressing  Note:   Prognosis: Fair  Problem List: Decreased strength, Decreased endurance, Impaired balance, Decreased mobility, Decreased range of motion, Decreased safety awareness, Decreased cognition  Assessment: PT rec'd out of bed in chair upon arrival   Less assistance for sit to stand and stand to sit transfers pt requiring min assist x1 with verbal cues for safe approach to chair and toilet and handplacement  Pt requires min assist for steadying assist while pt handling clothing prior to and post toileting  Pt demonstrates gait deviations with L le trendelenburg gait  as noted with (+) lob requiring mod assist to correct  Pt  Requires verbal cues for improved posture, safe mobility and transfer techniques, pacing techniques and cues to maintain close distance to walker at all times  Pt requires increased time for all aspects of mobility  Continue to  recommend STR prior to d/c to home given impairments in balance, strength, activity tolerance, mobility, safety, and cognition as pt is functioning well below baseline level of mobility and as pt is alone at home for periods of time  PT remained seated out of bed in recliner with b/l le's elevated at conclusion of PT session with all needs met  Recommend pt sit out of bed for meals, and ambulate with nursing staff 2-3x/day     Barriers to Discharge: Inaccessible home environment  Barriers to Discharge Comments: 3 MAU  Recommendation: Short-term skilled PT     PT - OK to Discharge: Yes(to rehab)    See flowsheet documentation for full assessment

## 2019-02-14 NOTE — PROGRESS NOTES
Progress Note - Infectious Disease   Baltazar Herrera 75 y o  female MRN: [id number]  Unit/Bed#: [place] [address] -01 Encounter: [id number]      Impression/Plan:  1  Severe sepsis-POA  Fever, leukocytosis, tachycardia, lactic acidosis   Appears to be secondary to polymicrobial bacteremia   Possibly in the setting of liver abscess   She remains quite ill, however fortunately she has remained hemodynamically stable despite the systemic illness   She seems to be tolerating the antibiotics without difficulty   Since drainage of possible liver abscess, patient is clinically improving with downtrending fever, leukocytosis, procalcitonin   Vancomycin trough is therapeutic   Procalcitonin level continues to decrease  -discontinue vancomycin ceftriaxone  -doxycycline 100 mg p o  Q 12 hours  -plan oral doxycycline until the abscess cavity is closed  -repeat CBC with diff and CMP  -supportive care     2  Polymicrobial bacteremia-suspect intra-abdominal or enteric source   No definitive source has been appreciated despite extensive imaging although CT of the abdomen and pelvis does suggest the possibility of pyelonephritis but the urinalysis and culture nondiagnostic   Consideration for the possibility of liver abscess   Transthoracic echocardiogram without valvular vegetation   The bacteremia has cleared   -antibiotics as above  -follow up repeat blood cultures  -no additional ID workup for now     3  Enterococcal liver abscess   Now status post IR drainage with removal of 45 cc of cloudy fluid   Still with drainage into the ADALBERTO              -to oral antibiotics as above              -monitor drain output  -drain management  -needs IR follow-up as an outpatient to determine when repeat drain study should be done  -planned continued oral antibiotics until the abscess cavities close     4   Acute kidney injury-suspect this is a pre renal issue   Perhaps sepsis is playing a role   The renal function continues to slowly improve   -dose adjusted antibiotics as above  -recheck creatinine clearance  -volume management  -no additional ID workup for now     5  Anemia-quite severe   Elevated LDH suggest the possibility of hemolysis   No definitive evidence of acute blood loss   The H&H has modestly improved  -workup as per the primary  -transfusion support  -monitor CBC with diff       6  Liver function test abnormalities-possibly secondary to sepsis   Possibly another etiology   CT the abdomen and pelvis does not reveal a definitive source   Liver function tests have modestly decreased   Right upper quadrant ultrasound was cystic lesion in the right lobe of the liver   The liver function tests remain quite high  -recheck liver function test  -if liver function test on continue to decrease recommend repeat imaging    7  Disposition-possible discharge soon if patient continues to improve and tolerates oral antibiotics without difficulty  Discussed in detail with the primary service    Antibiotics:  Vancomycin 11  Ceftriaxone 8  Gram-negative antibiotics 11    Subjective:  Patient has no fever, chills, sweats; no nausea, vomiting, diarrhea; no cough, shortness of breath; no increased pain  No new symptoms  She is feeling much better overall  Objective:  Vitals:  Temp:  [97 1 °F (36 2 °C)-97 2 °F (36 2 °C)] 97 2 °F (36 2 °C)  HR:  [64-82] 64  Resp:  [18] 18  BP: (114-134)/(60-74) 129/71  SpO2:  [96 %-99 %] 97 %  Temp (24hrs), Av 2 °F (36 2 °C), Min:97 1 °F (36 2 °C), Max:97 2 °F (36 2 °C)  Current: Temperature: (!) 97 2 °F (36 2 °C)    Physical Exam:   General Appearance:  Alert, interactive, nontoxic, no acute distress  Throat: Oropharynx moist without lesions  Lungs:   Clear to auscultation bilaterally; no wheezes, rhonchi or rales; respirations unlabored   Heart:  RRR; no murmur, rub or gallop   Abdomen:   Soft, non-tender, non-distended, positive bowel sounds    Right-sided ADALBERTO drain in place with good output     Extremities: No clubbing, cyanosis or edema   Skin: No new rashes or lesions  No draining wounds noted         Labs, Imaging, & Other studies:   All pertinent labs and imaging studies were personally reviewed  Results from last 7 days   Lab Units 02/14/19  0457 02/13/19  0436 02/12/19  0552   WBC Thousand/uL 15 86* 15 63* 16 73*   HEMOGLOBIN g/dL 8 3* 8 2* 7 8*   PLATELETS Thousands/uL 365 372 296     Results from last 7 days   Lab Units 02/14/19  0457 02/13/19  0436 02/12/19  0552   SODIUM mmol/L 134* 136 134*   POTASSIUM mmol/L 4 5 4 6 4 5   CHLORIDE mmol/L 102 102 102   CO2 mmol/L 22 22 20*   BUN mg/dL 44* 52* 63*   CREATININE mg/dL 1 00 1 07 1 32*   EGFR ml/min/1 73sq m 56 51 40   CALCIUM mg/dL 8 0* 8 0* 8 0*   AST U/L 30 45 75*   ALT U/L 71 94* 131*   ALK PHOS U/L 538* 524* 524*     Results from last 7 days   Lab Units 02/09/19  1325   GRAM STAIN RESULT  No Polys or Bacteria seen   BODY FLUID CULTURE, STERILE  2+ Growth of Enterococcus faecalis*

## 2019-02-14 NOTE — PHYSICAL THERAPY NOTE
Physical Therapy Treatment Note       02/14/19 1213   Pain Assessment   Pain Assessment No/denies pain   Pain Score No Pain   Restrictions/Precautions   Other Precautions Limb alert;Multiple lines; Fall Risk  (ADALBERTO drain)   General   Chart Reviewed Yes   Response to Previous Treatment Patient reporting fatigue but able to participate  Family/Caregiver Present Yes  (son)   Cognition   Overall Cognitive Status Impaired   Arousal/Participation Alert; Responsive   Attention Attends with cues to redirect   Orientation Level Oriented X4   Memory Decreased recall of precautions;Decreased short term memory   Following Commands Follows multistep commands with increased time or repetition   Subjective   Subjective Pt reporting fataigue, no c/o pain  Transfers   Sit to Stand 4  Minimal assistance   Additional items Assist x 1; Armrests; Increased time required;Verbal cues   Stand to Sit 4  Minimal assistance   Additional items Assist x 1; Armrests; Increased time required;Verbal cues   Toilet transfer 4  Minimal assistance   Additional items Assist x 1; Increased time required;Verbal cues;Standard toilet  (with use of grab bar   )   Ambulation/Elevation   Gait pattern Forward Flexion;[place];Decreased foot clearance;R Knee Jeevan  ((+) LOb x1)   Gait Assistance 4  Minimal assist   Additional items Assist x 1;Verbal cues  (mod assist x1 due to one episode of LOB   )   Assistive Device Rolling walker   Distance 25'x2   Balance   Static Sitting Good   Dynamic Sitting Fair +   Static Standing Poor +   Dynamic Standing Poor +   Ambulatory Poor +   Endurance Deficit   Endurance Deficit Yes   Endurance Deficit Description fatigue, weakness, deconditioning, generalized weakness   Activity Tolerance   Activity Tolerance Patient limited by fatigue   Medical Staff Made Aware Major Campbell   Nurse Made Aware yes   Exercises   Hip Flexion Sitting;AROM; Bilateral  (x 12 reps   )   Hip Abduction Sitting;10 reps;AROM; Bilateral   Hip Adduction Sitting;AROM; Bilateral  (isometric hip add  x 12 reps  )   Knee AROM [employer]; Bilateral  (x 12 reps   )   Ankle Pumps Sitting;AROM; Bilateral  (x 12 reps, ankle circles CW and CCW)   Equipment Use   Comments PT performed toielt transfers with min assist x1 and verbl cues for handplacement and safety  Pt requires min assist for steadying assist while pt managed under garment pre and post toileting  Assessment   Prognosis Fair   Problem List Decreased strength;Decreased endurance; Impaired balance;Decreased mobility; Decreased range of motion;Decreased safety awareness;Decreased cognition   Assessment PT rec'd out of bed in chair upon arrival   Less assistance for sit to stand and stand to sit transfers pt requiring min assist x1 with verbal cues for safe approach to chair and toilet and handplacement  Pt requires min assist for steadying assist while pt handling clothing prior to and post toileting  Pt demonstrates gait deviations with L le trendelenburg gait  as noted with (+) lob requiring mod assist to correct  Pt  Requires verbal cues for improved posture, safe mobility and transfer techniques, pacing techniques and cues to maintain close distance to walker at all times  Pt requires increased time for all aspects of mobility    Continue to  recommend STR prior to d/c to home given impairments in balance, strength, activity tolerance, mobility, safety, and cognition as pt is functioning well below baseline level of mobility and as pt is alone at home for periods of time  PT remained seated out of bed in recliner with b/l le's elevated at conclusion of PT session with all needs met  Recommend pt sit out of bed for meals, and ambulate with nursing staff 2-3x/day  Goals   Patient Goals " to go home "     LTG Expiration Date 02/20/19   Treatment Day 4   Plan   Treatment/Interventions Functional transfer training;LE strengthening/ROM; Therapeutic exercise; Endurance training;Patient/family training;Equipment eval/education; Bed mobility; Compensatory technique education;OT;Family;Spoke to nursing   Progress Slow progress, decreased activity tolerance   PT Frequency   (4-5x/week)   Recommendation   Recommendation Short-term skilled PT   PT - OK to Discharge Yes  (to rehab)       Beata Cotter PTA

## 2019-02-14 NOTE — PLAN OF CARE
Problem: Potential for Falls  Goal: Patient will remain free of falls  Description  INTERVENTIONS:  - Assess patient frequently for physical needs  -  Identify cognitive and physical deficits and behaviors that affect risk of falls    -  [place] fall precautions as indicated by assessment   - Educate patient/family on patient safety including physical limitations  - Instruct patient to call for assistance with activity based on assessment  - Modify environment to reduce risk of injury  - Consider OT/PT consult to assist with strengthening/mobility   Outcome: Progressing     Problem: Prexisting or High Potential for Compromised Skin Integrity  Goal: Skin integrity is maintained or improved  Description  INTERVENTIONS:  - Identify patients at risk for skin breakdown  - Assess and monitor skin integrity  - Assess and monitor nutrition and hydration status  - Monitor labs (i e  albumin)  - Assess for incontinence   - Turn and reposition patient  - Assist with mobility/ambulation  - Relieve pressure over bony prominences  - Avoid friction and shearing  - Provide appropriate hygiene as needed including keeping skin clean and dry  - Evaluate need for skin moisturizer/barrier cream  - Collaborate with interdisciplinary team (i e  Nutrition, Rehabilitation, etc )   - Patient/family teaching   Outcome: Progressing     Problem: DISCHARGE PLANNING - CARE MANAGEMENT  Goal: Discharge to post-acute care or home with appropriate resources  Description  INTERVENTIONS:  - Conduct assessment to determine patient/family and health care team treatment goals, and need for post-acute services based on payer coverage, community resources, and patient preferences, and barriers to discharge  - Address psychosocial, clinical, and financial barriers to discharge as identified in assessment in conjunction with the patient/family and health care team  - Arrange appropriate level of post-acute services according to patient?s   needs and preference and payer coverage in collaboration with the physician and health care team  - Communicate with and update the patient/family, physician, and health care team regarding progress on the discharge plan  - Arrange appropriate transportation to post-acute venues   Outcome: Progressing     Problem: PAIN - ADULT  Goal: Verbalizes/displays adequate comfort level or baseline comfort level  Description  Interventions:  - Encourage patient to monitor pain and request assistance  - Assess pain using appropriate pain scale  - Administer analgesics based on type and severity of pain and evaluate response  - Implement non-pharmacological measures as appropriate and evaluate response  - Consider cultural and social influences on pain and pain management  - Notify physician/advanced practitioner if interventions unsuccessful or patient reports new pain   Outcome: Progressing     Problem: INFECTION - ADULT  Goal: Absence or prevention of progression during hospitalization  Description  INTERVENTIONS:  - Assess and monitor for signs and symptoms of infection  - Monitor lab/diagnostic results  - Monitor all insertion sites, i e  indwelling lines, tubes, and drains  - Monitor endotracheal (as able) and nasal secretions for changes in amount and color  - [place] appropriate cooling/warming therapies per order  - Administer medications as ordered  - Instruct and encourage patient and family to use good hand hygiene technique  - Identify and instruct in appropriate isolation precautions for identified infection/condition   Outcome: Progressing  Goal: Absence of fever/infection during neutropenic period  Description  INTERVENTIONS:  - Monitor WBC  - Implement neutropenic guidelines   Outcome: Progressing     Problem: SAFETY ADULT  Goal: Maintain or return to baseline ADL function  Description  INTERVENTIONS:  -  Assess patient's ability to carry out ADLs; assess patient's baseline for ADL function and identify physical deficits which impact ability to perform ADLs (bathing, care of mouth/teeth, toileting, grooming, dressing, etc )  - Assess/evaluate cause of self-care deficits   - Assess range of motion  - Assess patient's mobility; develop plan if impaired  - Assess patient's need for assistive devices and provide as appropriate  - Encourage maximum independence but intervene and supervise when necessary  ¯ Involve family in performance of ADLs  ¯ Assess for home care needs following discharge   ¯ Request OT consult to assist with ADL evaluation and planning for discharge  ¯ Provide patient education as appropriate   Outcome: Progressing  Goal: Maintain or return mobility status to optimal level  Description  INTERVENTIONS:  - Assess patient's baseline mobility status (ambulation, transfers, stairs, etc )    - Identify cognitive and physical deficits and behaviors that affect mobility  - Identify mobility aids required to assist with transfers and/or ambulation (gait belt, sit-to-stand, lift, walker, cane, etc )  - [place] fall precautions as indicated by assessment  - Record patient progress and toleration of activity level on Mobility SBAR; progress patient to next Phase/Stage  - Instruct patient to call for assistance with activity based on assessment  - Request Rehabilitation consult to assist with strengthening/weightbearing, etc    Outcome: Progressing     Problem: DISCHARGE PLANNING  Goal: Discharge to home or other facility with appropriate resources  Description  INTERVENTIONS:  - Identify barriers to discharge w/patient and caregiver  - Arrange for needed discharge resources and transportation as appropriate  - Identify discharge learning needs (meds, wound care, etc )  - Arrange for interpretive services to assist at discharge as needed  - Refer to Case Management Department for coordinating discharge planning if the patient needs post-hospital services based on physician/advanced practitioner order or complex needs related to functional status, cognitive ability, or social support system   Outcome: Progressing     Problem: Knowledge Deficit  Goal: Patient/family/caregiver demonstrates understanding of disease process, treatment plan, medications, and discharge instructions  Description  Complete learning assessment and assess knowledge base    Interventions:  - Provide teaching at level of understanding  - Provide teaching via preferred learning methods   Outcome: Progressing

## 2019-02-14 NOTE — PLAN OF CARE
BRANDON Olson    Problem: OCCUPATIONAL THERAPY ADULT  Goal: Performs self-care activities at highest level of function for planned discharge setting  See evaluation for individualized goals  Description  Treatment Interventions: ADL retraining, Functional transfer training, UE strengthening/ROM, Endurance training, Patient/family training, Equipment evaluation/education, Compensatory technique education          See flowsheet documentation for full assessment, interventions and recommendations  Outcome: Progressing  Note:   Limitation: Decreased ADL status, Decreased UE strength, Decreased endurance  Prognosis: Good  Assessment: Pt was seen for skilled OT with focus on completion of self care tasks, functional mobility, review of fall prevention and review of current plan of care  Pt continues with slow, perseverating behaviors with completion of functional tasks  Difficulty noted to terminate activity with appropriate advancement of next step in a timely manor  Pt's son reports, "She's always been like that"  See above levels of A required for all functional tasks  Pt will benefit from further rehab due to noted deficits to return to previous levels of functioning as well as continued family support   Simon Elliott, [address]     OT Discharge Recommendation: Short Term Rehab

## 2019-02-14 NOTE — PLAN OF CARE
Problem: Potential for Falls  Goal: Patient will remain free of falls  Description  INTERVENTIONS:  - Assess patient frequently for physical needs  -  Identify cognitive and physical deficits and behaviors that affect risk of falls    -  [place] fall precautions as indicated by assessment   - Educate patient/family on patient safety including physical limitations  - Instruct patient to call for assistance with activity based on assessment  - Modify environment to reduce risk of injury  - Consider OT/PT consult to assist with strengthening/mobility   Outcome: Progressing     Problem: Prexisting or High Potential for Compromised Skin Integrity  Goal: Skin integrity is maintained or improved  Description  INTERVENTIONS:  - Identify patients at risk for skin breakdown  - Assess and monitor skin integrity  - Assess and monitor nutrition and hydration status  - Monitor labs (i e  albumin)  - Assess for incontinence   - Turn and reposition patient  - Assist with mobility/ambulation  - Relieve pressure over bony prominences  - Avoid friction and shearing  - Provide appropriate hygiene as needed including keeping skin clean and dry  - Evaluate need for skin moisturizer/barrier cream  - Collaborate with interdisciplinary team (i e  Nutrition, Rehabilitation, etc )   - Patient/family teaching   Outcome: Progressing     Problem: DISCHARGE PLANNING - CARE MANAGEMENT  Goal: Discharge to post-acute care or home with appropriate resources  Description  INTERVENTIONS:  - Conduct assessment to determine patient/family and health care team treatment goals, and need for post-acute services based on payer coverage, community resources, and patient preferences, and barriers to discharge  - Address psychosocial, clinical, and financial barriers to discharge as identified in assessment in conjunction with the patient/family and health care team  - Arrange appropriate level of post-acute services according to patient?s   needs and preference and payer coverage in collaboration with the physician and health care team  - Communicate with and update the patient/family, physician, and health care team regarding progress on the discharge plan  - Arrange appropriate transportation to post-acute venues   Outcome: Progressing     Problem: PAIN - ADULT  Goal: Verbalizes/displays adequate comfort level or baseline comfort level  Description  Interventions:  - Encourage patient to monitor pain and request assistance  - Assess pain using appropriate pain scale  - Administer analgesics based on type and severity of pain and evaluate response  - Implement non-pharmacological measures as appropriate and evaluate response  - Consider cultural and social influences on pain and pain management  - Notify physician/advanced practitioner if interventions unsuccessful or patient reports new pain   Outcome: Progressing     Problem: INFECTION - ADULT  Goal: Absence or prevention of progression during hospitalization  Description  INTERVENTIONS:  - Assess and monitor for signs and symptoms of infection  - Monitor lab/diagnostic results  - Monitor all insertion sites, i e  indwelling lines, tubes, and drains  - Monitor endotracheal (as able) and nasal secretions for changes in amount and color  - [place] appropriate cooling/warming therapies per order  - Administer medications as ordered  - Instruct and encourage patient and family to use good hand hygiene technique  - Identify and instruct in appropriate isolation precautions for identified infection/condition   Outcome: Progressing  Goal: Absence of fever/infection during neutropenic period  Description  INTERVENTIONS:  - Monitor WBC  - Implement neutropenic guidelines   Outcome: Progressing     Problem: SAFETY ADULT  Goal: Maintain or return to baseline ADL function  Description  INTERVENTIONS:  -  Assess patient's ability to carry out ADLs; assess patient's baseline for ADL function and identify physical deficits which impact ability to perform ADLs (bathing, care of mouth/teeth, toileting, grooming, dressing, etc )  - Assess/evaluate cause of self-care deficits   - Assess range of motion  - Assess patient's mobility; develop plan if impaired  - Assess patient's need for assistive devices and provide as appropriate  - Encourage maximum independence but intervene and supervise when necessary  ¯ Involve family in performance of ADLs  ¯ Assess for home care needs following discharge   ¯ Request OT consult to assist with ADL evaluation and planning for discharge  ¯ Provide patient education as appropriate   Outcome: Progressing  Goal: Maintain or return mobility status to optimal level  Description  INTERVENTIONS:  - Assess patient's baseline mobility status (ambulation, transfers, stairs, etc )    - Identify cognitive and physical deficits and behaviors that affect mobility  - Identify mobility aids required to assist with transfers and/or ambulation (gait belt, sit-to-stand, lift, walker, cane, etc )  - [place] fall precautions as indicated by assessment  - Record patient progress and toleration of activity level on Mobility SBAR; progress patient to next Phase/Stage  - Instruct patient to call for assistance with activity based on assessment  - Request Rehabilitation consult to assist with strengthening/weightbearing, etc    Outcome: Progressing     Problem: DISCHARGE PLANNING  Goal: Discharge to home or other facility with appropriate resources  Description  INTERVENTIONS:  - Identify barriers to discharge w/patient and caregiver  - Arrange for needed discharge resources and transportation as appropriate  - Identify discharge learning needs (meds, wound care, etc )  - Arrange for interpretive services to assist at discharge as needed  - Refer to Case Management Department for coordinating discharge planning if the patient needs post-hospital services based on physician/advanced practitioner order or complex needs related to functional status, cognitive ability, or social support system   Outcome: Progressing     Problem: Knowledge Deficit  Goal: Patient/family/caregiver demonstrates understanding of disease process, treatment plan, medications, and discharge instructions  Description  Complete learning assessment and assess knowledge base    Interventions:  - Provide teaching at level of understanding  - Provide teaching via preferred learning methods   Outcome: Progressing

## 2019-02-15 LAB
ALBUMIN SERPL BCP-MCNC: 2.2 G/DL (ref 3.5–5)
ALP SERPL-CCNC: 657 U/L (ref 46–116)
ALT SERPL W P-5'-P-CCNC: 64 U/L (ref 12–78)
ANION GAP SERPL CALCULATED.3IONS-SCNC: 7 MMOL/L (ref 4–13)
AST SERPL W P-5'-P-CCNC: 32 U/L (ref 5–45)
BILIRUB SERPL-MCNC: 0.88 MG/DL (ref 0.2–1)
BUN SERPL-MCNC: 37 MG/DL (ref 5–25)
CALCIUM SERPL-MCNC: 8.7 MG/DL (ref 8.3–10.1)
CHLORIDE SERPL-SCNC: 101 MMOL/L (ref 100–108)
CO2 SERPL-SCNC: 24 MMOL/L (ref 21–32)
CREAT SERPL-MCNC: 1.04 MG/DL (ref 0.6–1.3)
ERYTHROCYTE [DISTWIDTH] IN BLOOD BY AUTOMATED COUNT: 26.5 % (ref 11.6–15.1)
GFR SERPL CREATININE-BSD FRML MDRD: 53 ML/MIN/1.73SQ M
GLUCOSE SERPL-MCNC: 111 MG/DL (ref 65–140)
GLUCOSE SERPL-MCNC: 154 MG/DL (ref 65–140)
GLUCOSE SERPL-MCNC: 178 MG/DL (ref 65–140)
GLUCOSE SERPL-MCNC: 199 MG/DL (ref 65–140)
GLUCOSE SERPL-MCNC: 81 MG/DL (ref 65–140)
HCT VFR BLD AUTO: 29.1 % (ref 34.8–46.1)
HGB BLD-MCNC: 9.1 G/DL (ref 11.5–15.4)
MCH RBC QN AUTO: 24 PG (ref 26.8–34.3)
MCHC RBC AUTO-ENTMCNC: 31.3 G/DL (ref 31.4–37.4)
MCV RBC AUTO: 77 FL (ref 82–98)
PLATELET # BLD AUTO: 456 THOUSANDS/UL (ref 149–390)
PMV BLD AUTO: 11.1 FL (ref 8.9–12.7)
POTASSIUM SERPL-SCNC: 4.9 MMOL/L (ref 3.5–5.3)
PROT SERPL-MCNC: 5.9 G/DL (ref 6.4–8.2)
RBC # BLD AUTO: 3.79 MILLION/UL (ref 3.81–5.12)
SODIUM SERPL-SCNC: 132 MMOL/L (ref 136–145)
WBC # BLD AUTO: 15.56 THOUSAND/UL (ref 4.31–10.16)

## 2019-02-15 PROCEDURE — 80053 COMPREHEN METABOLIC PANEL: CPT | Performed by: INTERNAL MEDICINE

## 2019-02-15 PROCEDURE — 99232 SBSQ HOSP IP/OBS MODERATE 35: CPT | Performed by: INTERNAL MEDICINE

## 2019-02-15 PROCEDURE — 82948 REAGENT STRIP/BLOOD GLUCOSE: CPT

## 2019-02-15 PROCEDURE — 85027 COMPLETE CBC AUTOMATED: CPT | Performed by: INTERNAL MEDICINE

## 2019-02-15 PROCEDURE — 97116 GAIT TRAINING THERAPY: CPT

## 2019-02-15 PROCEDURE — 97110 THERAPEUTIC EXERCISES: CPT

## 2019-02-15 RX ADMIN — DOXYCYCLINE HYCLATE 100 MG: 100 CAPSULE ORAL at 08:42

## 2019-02-15 RX ADMIN — FERROUS SULFATE TAB 325 MG (65 MG ELEMENTAL FE) 325 MG: 325 (65 FE) TAB at 08:42

## 2019-02-15 RX ADMIN — HEPARIN SODIUM 5000 UNITS: 5000 INJECTION INTRAVENOUS; SUBCUTANEOUS at 05:32

## 2019-02-15 RX ADMIN — DOXYCYCLINE HYCLATE 100 MG: 100 CAPSULE ORAL at 21:59

## 2019-02-15 RX ADMIN — HEPARIN SODIUM 5000 UNITS: 5000 INJECTION INTRAVENOUS; SUBCUTANEOUS at 21:58

## 2019-02-15 RX ADMIN — OXYCODONE HYDROCHLORIDE 5 MG: 5 TABLET ORAL at 17:33

## 2019-02-15 RX ADMIN — PANCRELIPASE 48000 UNITS: 24000; 76000; 120000 CAPSULE, DELAYED RELEASE PELLETS ORAL at 13:06

## 2019-02-15 RX ADMIN — OXYCODONE HYDROCHLORIDE 5 MG: 5 TABLET ORAL at 01:14

## 2019-02-15 RX ADMIN — INSULIN GLARGINE 25 UNITS: 100 INJECTION, SOLUTION SUBCUTANEOUS at 21:59

## 2019-02-15 RX ADMIN — HEPARIN SODIUM 5000 UNITS: 5000 INJECTION INTRAVENOUS; SUBCUTANEOUS at 13:11

## 2019-02-15 RX ADMIN — PANCRELIPASE 48000 UNITS: 24000; 76000; 120000 CAPSULE, DELAYED RELEASE PELLETS ORAL at 08:41

## 2019-02-15 RX ADMIN — OXYCODONE HYDROCHLORIDE 5 MG: 5 TABLET ORAL at 22:50

## 2019-02-15 RX ADMIN — FUROSEMIDE 20 MG: 20 TABLET ORAL at 08:42

## 2019-02-15 RX ADMIN — INSULIN LISPRO 4 UNITS: 100 INJECTION, SOLUTION INTRAVENOUS; SUBCUTANEOUS at 13:06

## 2019-02-15 RX ADMIN — PANCRELIPASE 48000 UNITS: 24000; 76000; 120000 CAPSULE, DELAYED RELEASE PELLETS ORAL at 17:40

## 2019-02-15 RX ADMIN — INSULIN LISPRO 4 UNITS: 100 INJECTION, SOLUTION INTRAVENOUS; SUBCUTANEOUS at 17:40

## 2019-02-15 NOTE — MALNUTRITION/BMI
This medical record reflects one or more clinical indicators suggestive of malnutrition and/or morbid obesity  Malnutrition Findings:   Malnutrition type: Chronic illness(chronic moderate pro, florencia malnutrition d/t condition as evidence by moderate depletion of fat around orbitals and mild muscle depletion of temporals, <75% energy intake > 1 month)  Degree of Malnutrition: Malnutrition of moderate degree(discussed ways to increase pro, florencia intake, pt didn't want snacks in between meals, added supplements to aid w/ meeting pro, florencia needs)  Malnutrition Characteristics: Fat loss, Muscle loss, Inadequate energy    BMI Findings: Body mass index is 28 54 kg/m²  See Nutrition note dated 2/15/19 for additional details  Completed nutrition assessment is viewable in the nutrition documentation

## 2019-02-15 NOTE — PROGRESS NOTES
[facility] Progress Note  Patient: Sabrina Kamara 75 y o  female   MRN: [id number]  PCP: Demetra Kelly,   Unit/Bed#: [place] 2 -01 Encounter: [id number]  Date Of Visit: 02/15/19      Assessment/plan  1  Severe sepsis-poa  Due to polymicrobial bacteremia  Likely from enterococcal liver abscess  Appreciate ID recommendations  Flagyl was d/barbie  Echo was negative for vegetation  procalcitonin continues to trend down  Changed to doxycycline  Vancomycin and ceftriaxone d/barbie  duc drain is still in place still draining  Continue to monitor as pt thinks doxycycline is causing nausea  Will ask ID to re eval tomorrow       2  Anemia, multifactorial secondary to iron deficiency, critical illness and chronic disease- Hemoglobin initially improved to 9 6 after transfusion of 2 units packed red blood cells  H/h stable at 9 1  Will continue to monitor and will continue iron supplements       3  ANNE likely prerenal and secondary to sepsis- s/p lasix IV x1  Resolved  Hold further lasix    4-Extensive cancer history including breast, pancreatic and colon with concern for recurrent disease               -Pancreatic head adenocarcinoma with a prior [id number], categorically unresectable treated with neoadjuvant chemoradiotherapy                -bilateral breast cancer and 2x colon cancer   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, colorectal, thyroid) and 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                 -LOUISE been following up at [facility]               -EL scan from this admission shows extensive lymphadenopathy and liver lesions   appreciate oncology recommendations  Pt will need to follow up with sergio rodriguez       5- DM Type II- Continue current Lantus/SSI regimen   fasting blood glucose acceptable  Continue pre meal insulin of 10 units  Will continue to monitor and adjust as needed  hold on further adjustment tonight as she vomited     6-transaminitis likely secondary to sepsis   AST ALT is trended down      7  Chronic diastolic chf-hold further diuretics       dispo- pt refused str  Hold on discharge as pt is vomiting       Discussed in full with nurse      Subjective:   Pt seen and examined  Pt states she vomited tonight  She is worried that her abd is getting upset due to her antibiotics  No f/c no cp no sob no abd pain  Objective:     Vitals: Blood pressure 129/70, pulse 74, temperature 98 8 °F (37 1 °C), temperature source Temporal, resp  rate 18, height 5' 5" (1 651 m), weight 77 8 kg (171 lb 8 3 oz), SpO2 96 %  ,Body mass index is 28 54 kg/m²  Lab, Imaging and other studies:  Results from last 7 days   Lab Units 02/15/19  0606  02/09/19  0515   WBC Thousand/uL 15 56*   < >  --    HEMOGLOBIN g/dL 9 1*   < >  --    HEMATOCRIT % 29 1*   < >  --    PLATELETS Thousands/uL 456*   < >  --    INR   --   --  1 47*    < > = values in this interval not displayed  Results from last 7 days   Lab Units 02/15/19  0606   POTASSIUM mmol/L 4 9   CHLORIDE mmol/L 101   CO2 mmol/L 24   BUN mg/dL 37*   CREATININE mg/dL 1 04   CALCIUM mg/dL 8 7   ALK PHOS U/L 657*   ALT U/L 64   AST U/L 32         Lab Results   Component Value Date    BLOODCX No Growth After 5 Days  02/06/2019    BLOODCX No Growth After 5 Days   02/06/2019    BLOODCX Enterococcus faecalis (A) 02/04/2019    BLOODCX Escherichia coli (A) 02/04/2019    BLOODCX Klebsiella oxytoca (A) 02/04/2019    URINECX No Growth <1000 cfu/mL 02/05/2019     Scheduled Meds:   Current Facility-Administered Medications:  acetaminophen 650 mg Oral 4x Daily PRN ESE Jin   doxycycline hyclate 100 mg Oral Q12H [facility] Roel King MD   ferrous sulfate 325 mg Oral Daily With Breakfast ESE Gale   heparin (porcine) 5,000 Units Subcutaneous Q8H [facility] ESE Jin   insulin glargine 25 Units Subcutaneous HS ESE Gale   insulin lispro 10 Units Subcutaneous TID With Meals Subha Rodriguez DO   insulin lispro 4-20 Units Subcutaneous TID AC ESE Gale   morphine injection 1 mg Intravenous Q4H PRN Deyvi Millan MD   oxyCODONE 5 mg Oral Q4H PRN ESE Pritchard   pancrelipase (Lip-Prot-Amyl) 48,000 Units Oral TID With Meals ESE Gale     Continuous Infusions:    PRN Meds:   acetaminophen    morphine injection    oxyCODONE      Physical exam:  Physical Exam  General appearance: alert and oriented, in no acute distress  Head: Normocephalic, without obvious abnormality, atraumatic  Eyes: conjunctivae/corneas clear  PERRL, EOM's intact  Fundi benign    Neck: no adenopathy, no carotid bruit, no JVD, supple, symmetrical, trachea midline and thyroid not enlarged, symmetric, no tenderness/mass/nodules  Lungs: clear to auscultation bilaterally  Heart: regular rate and rhythm, S1, S2 normal, no murmur, click, rub or gallop  Abdomen: soft, non-tender; bowel sounds normal; no masses,  no organomegaly  Extremities: edema +1 edema bilateral  Pulses: 2+ and symmetric  Skin: Skin color, texture, turgor normal  No rashes or lesions  Neurologic: Mental status: Alert, oriented, thought content appropriate      VTE Pharmacologic Prophylaxis: Heparin  VTE Mechanical Prophylaxis: sequential compression device    Counseling / Coordination of Care  Total floor / unit time spent today 20 minutes     Current Length of Stay: 11 day(s)    Current Patient Status: Inpatient       Code Status: Level 1 - Full Code

## 2019-02-15 NOTE — PROGRESS NOTES
Progress Note - Infectious Disease   Nanci Brown 75 y o  female MRN: [id number]  Unit/Bed#: [place] [address] -01 Encounter: [id number]      Impression/Plan:  1  Severe sepsis-POA  Fever, leukocytosis, tachycardia, lactic acidosis   Appears to be secondary to polymicrobial bacteremia   Possibly in the setting of liver abscess   She remains quite ill, however fortunately she has remained hemodynamically stable despite the systemic illness   She seems to be tolerating the antibiotics without difficulty   Since drainage of possible liver abscess, patient is clinically improving with downtrending fever, leukocytosis, procalcitonin   Vancomycin trough is therapeutic   Procalcitonin level continues to decrease  -continue oral doxycycline until the abscess cavities close  -repeat CBC with diff and CMP in 2 weeks  -supportive care     2   Polymicrobial bacteremia-suspect intra-abdominal or enteric source   No definitive source has been appreciated despite extensive imaging although CT of the abdomen and pelvis does suggest the possibility of pyelonephritis but the urinalysis and culture nondiagnostic   Consideration for the possibility of liver abscess   Transthoracic echocardiogram without valvular vegetation   The bacteremia has cleared   -antibiotics as above  -no additional ID workup for now     3  Enterococcal liver abscess   Now status post IR drainage with removal of 45 cc of cloudy fluid   Still with drainage into the ADALBERTO              -to oral antibiotics as above              -monitor drain output  -drain management  -needs IR follow-up as an outpatient to determine when repeat drain study should be done  -planned continued oral antibiotics until the abscess cavities close  -may end up needing repeat CT the abdomen and pelvis if resolution not apparent on drains studies     4  Acute kidney injury-suspect this is a pre renal issue   Perhaps sepsis is playing a role   The renal function continues to slowly improve   -monitor GFR  -volume management  -no additional ID workup for now     5  Anemia-quite severe   Elevated LDH suggest the possibility of hemolysis   No definitive evidence of acute blood loss   The H&H has modestly improved  -workup as per the primary  -transfusion support  -monitor CBC with diff       6  Liver function test abnormalities-possibly secondary to sepsis   Possibly another etiology   CT the abdomen and pelvis does not reveal a definitive source   Liver function tests have modestly decreased   Right upper quadrant ultrasound was cystic lesion in the right lobe of the liver   The liver function tests remain quite high  -recheck liver function test  -if liver function test on continue to decrease recommend repeat imaging     7  Disposition-possible discharge home later today or tomorrow  Follow-up in the Infectious Disease office in 2-3 weeks  Will see the patient again 2019 if not discharged  Please call if questions  Antibiotics:  Doxycycline 2  Antibiotics 12    Subjective:  Patient has no fever, chills, sweats; no nausea, vomiting, diarrhea; no cough, shortness of breath; no increased pain  No new symptoms  Objective:  Vitals:  Temp:  [97 6 °F (36 4 °C)-99 5 °F (37 5 °C)] 97 6 °F (36 4 °C)  HR:  [58-79] 58  Resp:  [18-20] 18  BP: (142-143)/(59-71) 143/71  SpO2:  [96 %-98 %] 96 %  Temp (24hrs), Av 3 °F (36 8 °C), Min:97 6 °F (36 4 °C), Max:99 5 °F (37 5 °C)  Current: Temperature: 97 6 °F (36 4 °C)    Physical Exam:   General Appearance:  Alert, interactive, nontoxic, no acute distress  Throat: Oropharynx moist without lesions  Lungs:   Clear to auscultation bilaterally; no wheezes, rhonchi or rales; respirations unlabored   Heart:  RRR; no murmur, rub or gallop   Abdomen:   Soft, non-tender, non-distended, positive bowel sounds  Right-sided ADALBERTO drain in place  Extremities: No clubbing, cyanosis or edema   Skin: No new rashes or lesions  No draining wounds noted  Labs, Imaging, & Other studies:   All pertinent labs and imaging studies were personally reviewed  Results from last 7 days   Lab Units 02/15/19  0606 02/14/19  0457 02/13/19  0436   WBC Thousand/uL 15 56* 15 86* 15 63*   HEMOGLOBIN g/dL 9 1* 8 3* 8 2*   PLATELETS Thousands/uL 456* 365 372     Results from last 7 days   Lab Units 02/15/19  0606 02/14/19  0457 02/13/19  0436   SODIUM mmol/L 132* 134* 136   POTASSIUM mmol/L 4 9 4 5 4 6   CHLORIDE mmol/L 101 102 102   CO2 mmol/L 24 22 22   BUN mg/dL 37* 44* 52*   CREATININE mg/dL 1 04 1 00 1 07   EGFR ml/min/1 73sq m 53 56 51   CALCIUM mg/dL 8 7 8 0* 8 0*   AST U/L 32 30 45   ALT U/L 64 71 94*   ALK PHOS U/L 657* 538* 524*     Results from last 7 days   Lab Units 02/09/19  1325   GRAM STAIN RESULT  No Polys or Bacteria seen   BODY FLUID CULTURE, STERILE  2+ Growth of Enterococcus faecalis*

## 2019-02-15 NOTE — PLAN OF CARE
Problem: Potential for Falls  Goal: Patient will remain free of falls  Description  INTERVENTIONS:  - Assess patient frequently for physical needs  -  Identify cognitive and physical deficits and behaviors that affect risk of falls    -  [place] fall precautions as indicated by assessment   - Educate patient/family on patient safety including physical limitations  - Instruct patient to call for assistance with activity based on assessment  - Modify environment to reduce risk of injury  - Consider OT/PT consult to assist with strengthening/mobility   Outcome: Progressing     Problem: Prexisting or High Potential for Compromised Skin Integrity  Goal: Skin integrity is maintained or improved  Description  INTERVENTIONS:  - Identify patients at risk for skin breakdown  - Assess and monitor skin integrity  - Assess and monitor nutrition and hydration status  - Monitor labs (i e  albumin)  - Assess for incontinence   - Turn and reposition patient  - Assist with mobility/ambulation  - Relieve pressure over bony prominences  - Avoid friction and shearing  - Provide appropriate hygiene as needed including keeping skin clean and dry  - Evaluate need for skin moisturizer/barrier cream  - Collaborate with interdisciplinary team (i e  Nutrition, Rehabilitation, etc )   - Patient/family teaching   Outcome: Progressing     Problem: DISCHARGE PLANNING - CARE MANAGEMENT  Goal: Discharge to post-acute care or home with appropriate resources  Description  INTERVENTIONS:  - Conduct assessment to determine patient/family and health care team treatment goals, and need for post-acute services based on payer coverage, community resources, and patient preferences, and barriers to discharge  - Address psychosocial, clinical, and financial barriers to discharge as identified in assessment in conjunction with the patient/family and health care team  - Arrange appropriate level of post-acute services according to patient?s   needs and preference and payer coverage in collaboration with the physician and health care team  - Communicate with and update the patient/family, physician, and health care team regarding progress on the discharge plan  - Arrange appropriate transportation to post-acute venues   Outcome: Progressing     Problem: PAIN - ADULT  Goal: Verbalizes/displays adequate comfort level or baseline comfort level  Description  Interventions:  - Encourage patient to monitor pain and request assistance  - Assess pain using appropriate pain scale  - Administer analgesics based on type and severity of pain and evaluate response  - Implement non-pharmacological measures as appropriate and evaluate response  - Consider cultural and social influences on pain and pain management  - Notify physician/advanced practitioner if interventions unsuccessful or patient reports new pain   Outcome: Progressing     Problem: INFECTION - ADULT  Goal: Absence or prevention of progression during hospitalization  Description  INTERVENTIONS:  - Assess and monitor for signs and symptoms of infection  - Monitor lab/diagnostic results  - Monitor all insertion sites, i e  indwelling lines, tubes, and drains  - Monitor endotracheal (as able) and nasal secretions for changes in amount and color  - [place] appropriate cooling/warming therapies per order  - Administer medications as ordered  - Instruct and encourage patient and family to use good hand hygiene technique  - Identify and instruct in appropriate isolation precautions for identified infection/condition   Outcome: Progressing  Goal: Absence of fever/infection during neutropenic period  Description  INTERVENTIONS:  - Monitor WBC  - Implement neutropenic guidelines   Outcome: Progressing     Problem: SAFETY ADULT  Goal: Maintain or return to baseline ADL function  Description  INTERVENTIONS:  -  Assess patient's ability to carry out ADLs; assess patient's baseline for ADL function and identify physical deficits which impact ability to perform ADLs (bathing, care of mouth/teeth, toileting, grooming, dressing, etc )  - Assess/evaluate cause of self-care deficits   - Assess range of motion  - Assess patient's mobility; develop plan if impaired  - Assess patient's need for assistive devices and provide as appropriate  - Encourage maximum independence but intervene and supervise when necessary  ¯ Involve family in performance of ADLs  ¯ Assess for home care needs following discharge   ¯ Request OT consult to assist with ADL evaluation and planning for discharge  ¯ Provide patient education as appropriate   Outcome: Progressing  Goal: Maintain or return mobility status to optimal level  Description  INTERVENTIONS:  - Assess patient's baseline mobility status (ambulation, transfers, stairs, etc )    - Identify cognitive and physical deficits and behaviors that affect mobility  - Identify mobility aids required to assist with transfers and/or ambulation (gait belt, sit-to-stand, lift, walker, cane, etc )  - [place] fall precautions as indicated by assessment  - Record patient progress and toleration of activity level on Mobility SBAR; progress patient to next Phase/Stage  - Instruct patient to call for assistance with activity based on assessment  - Request Rehabilitation consult to assist with strengthening/weightbearing, etc    Outcome: Progressing     Problem: DISCHARGE PLANNING  Goal: Discharge to home or other facility with appropriate resources  Description  INTERVENTIONS:  - Identify barriers to discharge w/patient and caregiver  - Arrange for needed discharge resources and transportation as appropriate  - Identify discharge learning needs (meds, wound care, etc )  - Arrange for interpretive services to assist at discharge as needed  - Refer to Case Management Department for coordinating discharge planning if the patient needs post-hospital services based on physician/advanced practitioner order or complex needs related to functional status, cognitive ability, or social support system   Outcome: Progressing     Problem: Knowledge Deficit  Goal: Patient/family/caregiver demonstrates understanding of disease process, treatment plan, medications, and discharge instructions  Description  Complete learning assessment and assess knowledge base    Interventions:  - Provide teaching at level of understanding  - Provide teaching via preferred learning methods   Outcome: Progressing

## 2019-02-15 NOTE — DISCHARGE INSTRUCTIONS
TUBE CARE INSTRUCTIONS    Care after your procedure:    Resume your normal diet  Small sips of flat soda will help with nausea  1  The properly functioning catheter should be forward flushed once (1x) daily with 10ml of normal saline using clean technique  You will be given a prescription for flushes  To flush the tube, clean both connections with alcohol swab  Twist off the drainage bag/ bulb  tubing and twist the saline syringe into the drainage tube and flush  Remove the syringe and twist the drainage bag / bulb tubing tubing back on     2  The drainage bag/bulb may be emptied as necessary  Keep a record of the amount of fluid you drain from your tube  This should be done with clean technique as well  3  A fresh dressing should be applied daily over the tube insertion site  4  As the tube is secured to the skin with only a suture,try not to pull on your tube  Tub baths are not permitted  Showers are permitted if the patient's skin entry site is prevented from getting wet  Similarly, washcloth "baths" are acceptable  Contact Interventional Radiology at [phone] [facility] PATIENTS: Contact Interventional Radiology at [phone]) [facility] PATIENTS: Contact Interventional Radiology at [phone]) if:    1  Leakage or large amounts of liquid around the catheter  2  Fever of 101 degrees lasting several hours without other obvious cause (such as sore throat, flu, etc)  3  Persistent nausea or vomiting  4  Diminished drainage, which may be associated with pressure or pain  Or when the     drainage from your tube is less than 10mls for 48 hours  5  Catheter pulled back or falls out  The following pharmacies carry the flush syringes         [facility]                     [facility]  [address]                         [address] [facility]  Phone [phone]            Phone [phone]   [facility]                                [phone]  [address] [facility] [address]                      [address] [place]  Phone [phone]            Phone [phone]                      Hilary Wallace                                                                                                          [phone]  [facility]  [facility] [address]  [employer]  Phone [phone]     TUBE CARE INSTRUCTIONS    Care after your procedure:    Resume your normal diet  Small sips of flat soda will help with nausea  1  The properly functioning catheter should be forward flushed once (1x) daily with 10ml of normal saline using clean technique  You will be given a prescription for flushes  To flush the tube, clean both connections with alcohol swab  Twist off the drainage bag/ bulb  tubing and twist the saline syringe into the drainage tube and flush  Remove the syringe and twist the drainage bag / bulb tubing tubing back on     2  The drainage bag/bulb may be emptied as necessary  Keep a record of the amount of fluid you drain from your tube  This should be done with clean technique as well  3  A fresh dressing should be applied daily over the tube insertion site  4  As the tube is secured to the skin with only a suture,try not to pull on your tube  Tub baths are not permitted  Showers are permitted if the patient's skin entry site is prevented from getting wet  Similarly, washcloth "baths" are acceptable       Contact Interventional Radiology at [phone] [place] PATIENTS: Contact Interventional Radiology at [phone]) Veronica Bores PATIENTS: Contact Interventional Radiology at [phone]) if:    1  Leakage or large amounts of liquid around the catheter  2  Fever of 101 degrees lasting several hours without other obvious cause (such as sore throat, flu, etc)  3  Persistent nausea or vomiting  4  Diminished drainage, which may be associated with pressure or pain  Or when the     drainage from your tube is less than 10mls for 48 hours  5  Catheter pulled back or falls out  The following pharmacies carry the flush syringes  [facility]  [address]  Phone [phone]            Phone [phone] Nilay Se[address]             [address]                                [phone]  [address] [facility] [address]                      [address] [place]  Phone [phone]            Phone [phone]                      Katy Kingsley                                                                                                          [phone]  [facility]  [facility] [address]    [facility]  Phone [phone]      CBC with diff and CMP in 2 weeks after discharge

## 2019-02-15 NOTE — PHYSICAL THERAPY NOTE
PHYSICAL THERAPY NOTE          Patient Name: Suleman Dixon  YMIIX'X Date: 2/15/2019        02/15/19 1132   Pain Assessment   Pain Assessment No/denies pain   Pain Score No Pain   Restrictions/Precautions   Other Precautions Limb alert; Fall Risk   General   Chart Reviewed Yes   Additional Pertinent History Continues on IV antibiotics, feeling stronger  Family/Caregiver Present Yes  (son)   Cognition   Overall Cognitive Status Impaired   Arousal/Participation Alert   Attention Attends with cues to redirect   Orientation Level Oriented X4   Memory Decreased recall of precautions;Decreased short term memory   Following Commands Follows multistep commands with increased time or repetition   Subjective   Subjective Patient states I like to walk  Transfers   Sit to Stand 5  Supervision   Additional items Armrests   Stand to Sit 5  Supervision   Additional items Armrests   Ambulation/Elevation   Gait pattern Wide GABO; Short stride;[place];Decreased foot clearance   Gait Assistance 5  Supervision   Additional items Assist x 1   Assistive Device Rolling walker   Distance 75' x 1;    Stair Management Assistance Not tested   Balance   Static Standing Fair   Dynamic Standing Fair -   Ambulatory Fair -  (Gait Speed: 0 3 m/sec)   Endurance Deficit   Endurance Deficit Yes   Endurance Deficit Description Ireneelle Robertt reports fatigue and requirtes rest breaks  (Resting HR 67 post exercise and walking HR 86; SpO2 97-98% o)   Activity Tolerance   Activity Tolerance Patient limited by fatigue   Nurse Made Aware Spoke with nurse re dampness in area of incision on robe   Exercises   Hip Flexion 10 reps; Sitting;Bilateral;AROM   Knee AROM Short Arc Quad Sitting;15 reps;Bilateral;AROM   Ankle Pumps 20 reps; Sitting;Bilateral;AROM   Assessment   Prognosis Fair   Problem List Decreased strength;Decreased endurance; Impaired balance;Decreased mobility; Decreased range of motion;Decreased safety awareness;Decreased cognition   Assessment PT rec'd out of bed in chair upon arrival   Supervision for sit to stand and stand to sit transfers     Pt demonstrates gait deviations with L LE trendelenburg gait  but no balance lost during gait today  Gait distance dramatically imrpved to 76' with supervision/CG and cue to pace  Pt  Requires verbal cues for improved posture, and cues to maintain close distance to walker at all times  Pt requires increased time for all aspects of mobility  Continue to  recommend STR prior to d/c to home given impairments in balance, strength, activity tolerance, mobility, safety, and cognition as pt is functioning well below baseline level of mobility and as pt is alone at home for periods of time  PT remained seated out of bed in recliner at conclusion of PT session with all needs met  Recommend pt sit out of bed for meals, and ambulate with nursing staff 2-3x/day  Vitals stable during session HR 67-86 and SpO2 98%  Patient Identifiers: Name and birthdate  Barriers to Discharge Inaccessible home environment   Barriers to Discharge Comments 3 MAU   Goals   Patient Goals To go home   LTG Expiration Date 02/20/19   Treatment Day 5   Plan   Treatment/Interventions Functional transfer training;LE strengthening/ROM; Therapeutic exercise; Endurance training;Patient/family training;Bed mobility;Gait training   Progress Progressing toward goals   PT Frequency Other (Comment)  (4-5x/wk)   Recommendation   Recommendation Short-term skilled PT   Equipment Recommended Walker   PT - OK to Discharge Yes     Rambo Acosta, MPT, GCS

## 2019-02-16 LAB
ANION GAP SERPL CALCULATED.3IONS-SCNC: 9 MMOL/L (ref 4–13)
BUN SERPL-MCNC: 35 MG/DL (ref 5–25)
CALCIUM SERPL-MCNC: 8.3 MG/DL (ref 8.3–10.1)
CHLORIDE SERPL-SCNC: 102 MMOL/L (ref 100–108)
CO2 SERPL-SCNC: 24 MMOL/L (ref 21–32)
CREAT SERPL-MCNC: 0.98 MG/DL (ref 0.6–1.3)
ERYTHROCYTE [DISTWIDTH] IN BLOOD BY AUTOMATED COUNT: 26.8 % (ref 11.6–15.1)
GFR SERPL CREATININE-BSD FRML MDRD: 57 ML/MIN/1.73SQ M
GLUCOSE SERPL-MCNC: 141 MG/DL (ref 65–140)
GLUCOSE SERPL-MCNC: 162 MG/DL (ref 65–140)
GLUCOSE SERPL-MCNC: 176 MG/DL (ref 65–140)
GLUCOSE SERPL-MCNC: 67 MG/DL (ref 65–140)
GLUCOSE SERPL-MCNC: 71 MG/DL (ref 65–140)
HCT VFR BLD AUTO: 26.2 % (ref 34.8–46.1)
HGB BLD-MCNC: 8.2 G/DL (ref 11.5–15.4)
MCH RBC QN AUTO: 23.8 PG (ref 26.8–34.3)
MCHC RBC AUTO-ENTMCNC: 31.3 G/DL (ref 31.4–37.4)
MCV RBC AUTO: 76 FL (ref 82–98)
PLATELET # BLD AUTO: 454 THOUSANDS/UL (ref 149–390)
PMV BLD AUTO: 11.2 FL (ref 8.9–12.7)
POTASSIUM SERPL-SCNC: 4.4 MMOL/L (ref 3.5–5.3)
RBC # BLD AUTO: 3.45 MILLION/UL (ref 3.81–5.12)
SODIUM SERPL-SCNC: 135 MMOL/L (ref 136–145)
WBC # BLD AUTO: 14.97 THOUSAND/UL (ref 4.31–10.16)

## 2019-02-16 PROCEDURE — 99233 SBSQ HOSP IP/OBS HIGH 50: CPT | Performed by: INTERNAL MEDICINE

## 2019-02-16 PROCEDURE — 85027 COMPLETE CBC AUTOMATED: CPT | Performed by: INTERNAL MEDICINE

## 2019-02-16 PROCEDURE — 99232 SBSQ HOSP IP/OBS MODERATE 35: CPT | Performed by: INTERNAL MEDICINE

## 2019-02-16 PROCEDURE — 82948 REAGENT STRIP/BLOOD GLUCOSE: CPT

## 2019-02-16 PROCEDURE — 80048 BASIC METABOLIC PNL TOTAL CA: CPT | Performed by: INTERNAL MEDICINE

## 2019-02-16 RX ORDER — FUROSEMIDE 20 MG/1
20 TABLET ORAL ONCE
Status: COMPLETED | OUTPATIENT
Start: 2019-02-16 | End: 2019-02-16

## 2019-02-16 RX ADMIN — FUROSEMIDE 20 MG: 20 TABLET ORAL at 12:49

## 2019-02-16 RX ADMIN — DOXYCYCLINE HYCLATE 100 MG: 100 CAPSULE ORAL at 20:39

## 2019-02-16 RX ADMIN — PANCRELIPASE 48000 UNITS: 24000; 76000; 120000 CAPSULE, DELAYED RELEASE PELLETS ORAL at 13:23

## 2019-02-16 RX ADMIN — HEPARIN SODIUM 5000 UNITS: 5000 INJECTION INTRAVENOUS; SUBCUTANEOUS at 05:15

## 2019-02-16 RX ADMIN — PANCRELIPASE 48000 UNITS: 24000; 76000; 120000 CAPSULE, DELAYED RELEASE PELLETS ORAL at 08:34

## 2019-02-16 RX ADMIN — INSULIN GLARGINE 25 UNITS: 100 INJECTION, SOLUTION SUBCUTANEOUS at 21:02

## 2019-02-16 RX ADMIN — FERROUS SULFATE TAB 325 MG (65 MG ELEMENTAL FE) 325 MG: 325 (65 FE) TAB at 08:34

## 2019-02-16 RX ADMIN — HEPARIN SODIUM 5000 UNITS: 5000 INJECTION INTRAVENOUS; SUBCUTANEOUS at 21:02

## 2019-02-16 RX ADMIN — INSULIN LISPRO 4 UNITS: 100 INJECTION, SOLUTION INTRAVENOUS; SUBCUTANEOUS at 12:48

## 2019-02-16 RX ADMIN — OXYCODONE HYDROCHLORIDE 5 MG: 5 TABLET ORAL at 20:39

## 2019-02-16 RX ADMIN — HEPARIN SODIUM 5000 UNITS: 5000 INJECTION INTRAVENOUS; SUBCUTANEOUS at 13:18

## 2019-02-16 RX ADMIN — PANCRELIPASE 48000 UNITS: 24000; 76000; 120000 CAPSULE, DELAYED RELEASE PELLETS ORAL at 18:14

## 2019-02-16 RX ADMIN — DOXYCYCLINE HYCLATE 100 MG: 100 CAPSULE ORAL at 08:33

## 2019-02-16 NOTE — PROGRESS NOTES
Progress Note - Infectious Disease   Jori Ormond 75 y o  female MRN: [id number]  Unit/Bed#: [place] [address] -01 Encounter: [id number]      Impression/Plan:  1  Severe sepsis-POA  Fever, leukocytosis, tachycardia, lactic acidosis   Appears to be secondary to polymicrobial bacteremia   Possibly in the setting of liver abscess   She is much improved clinically,   She seems to be tolerating the antibiotics without difficulty   Since drainage of possible liver abscess, patient is clinically improving with downtrending fever, leukocytosis, procalcitonin  She completed 10 days of intravenous antibiotics    -continue oral doxycycline until the abscess cavities close  -repeat CBC with diff and CMP in 2 weeks  -supportive care     2  Polymicrobial bacteremia-suspect intra-abdominal or enteric source   No definitive source has been appreciated despite extensive imaging although CT of the abdomen and pelvis does suggest the possibility of pyelonephritis but the urinalysis and culture nondiagnostic   Consideration for the possibility of liver abscess   Transthoracic echocardiogram without valvular vegetation   The bacteremia has cleared  She completed 10 days of intravenous antibiotics  -antibiotics as above  -no additional ID workup for now     3  Enterococcal liver abscess   Now status post IR drainage with removal of 45 cc of cloudy fluid   Still with drainage into the ADALBERTO  She is now tolerating the doxycycline without difficulty                -[id number] as above              -monitor drain output  -drain management  -needs IR follow-up as an outpatient to determine when repeat drain study should be done  -planned continued oral antibiotics until the abscess cavities close  -may end up needing repeat CT the abdomen and pelvis if resolution not apparent on drains studies     4  Acute kidney injury-suspect this is a pre renal issue   Perhaps sepsis is playing a role   The renal function continues to slowly improve   -monitor GFR  -volume management  -no additional ID workup for now     5  Anemia-quite severe   No definitive evidence of acute blood loss   The H&H has modestly improved but continues to wax and wane   -workup as per the primary  -transfusion support  -monitor CBC with diff       6  Liver function test abnormalities-possibly secondary to sepsis   Possibly another etiology   CT the abdomen and pelvis does not reveal a definitive source   Liver function tests have modestly decreased   Right upper quadrant ultrasound was cystic lesion in the right lobe of the liver   The liver function tests remain quite high  -recheck liver function test  -if liver function test on continue to decrease recommend repeat imaging     7  Disposition-possible discharge home later today or tomorrow  Follow-up in the Infectious Disease office in 2-3 weeks  Will see the patient again 2019 if not discharged  Please call if questions  Antibiotics:  Doxycycline 3  Antibiotics 13    Subjective:  Asked to reassess the patient and she had some nausea and vomiting yesterday  The nausea and vomiting is completely resolved and the patient tolerated the oral antibiotics today without difficulty  Patient has no fever, chills, sweats; no current nausea, vomiting, diarrhea; no cough, shortness of breath; no pain  No new symptoms  Objective:  Vitals:  Temp:  [97 4 °F (36 3 °C)-99 5 °F (37 5 °C)] 97 4 °F (36 3 °C)  HR:  [59-81] 59  Resp:  [18-20] 18  BP: (127-139)/(58-70) 139/66  SpO2:  [95 %-96 %] 95 %  Temp (24hrs), Av 6 °F (37 °C), Min:97 4 °F (36 3 °C), Max:99 5 °F (37 5 °C)  Current: Temperature: (!) 97 4 °F (36 3 °C)    Physical Exam:   General Appearance:  Alert, interactive, nontoxic, no acute distress  Throat: Oropharynx moist without lesions      Lungs:   Clear to auscultation bilaterally; no wheezes, rhonchi or rales; respirations unlabored   Heart:  RRR; no murmur, rub or gallop   Abdomen:   Soft, non-tender, non-distended, positive bowel sounds  Right-sided abdominal drain in place    Extremities: No clubbing, cyanosis or edema   Skin: No new rashes or lesions  No draining wounds noted         Labs, Imaging, & Other studies:   All pertinent labs and imaging studies were personally reviewed  Results from last 7 days   Lab Units 02/16/19  0514 02/15/19  0606 02/14/19  0457   WBC Thousand/uL 14 97* 15 56* 15 86*   HEMOGLOBIN g/dL 8 2* 9 1* 8 3*   PLATELETS Thousands/uL 454* 456* 365     Results from last 7 days   Lab Units 02/16/19  0514 02/15/19  0606 02/14/19  0457 02/13/19  0436   SODIUM mmol/L 135* 132* 134* 136   POTASSIUM mmol/L 4 4 4 9 4 5 4 6   CHLORIDE mmol/L 102 101 102 102   CO2 mmol/L 24 24 22 22   BUN mg/dL 35* 37* 44* 52*   CREATININE mg/dL 0 98 1 04 1 00 1 07   EGFR ml/min/1 73sq m 57 53 56 51   CALCIUM mg/dL 8 3 8 7 8 0* 8 0*   AST U/L  --  32 30 45   ALT U/L  --  64 71 94*   ALK PHOS U/L  --  657* 538* 524*

## 2019-02-16 NOTE — PROGRESS NOTES
Progress Note - Teresita Figueroa 75 y o  female MRN: [id number]    Unit/Bed#: [place] [address] -01 Encounter: [id number]      Subjective: The patient feels somewhat better  Her nausea has improved  Her legs feel tight and heavy as a result of edema  She says that this interferes with her walking well  She has had no vomiting  She seems to be eating somewhat better  She denies abdominal pain, chest pain, or shortness of breath  Physical Exam:   Temp:  [97 4 °F (36 3 °C)-99 5 °F (37 5 °C)] 97 4 °F (36 3 °C)  HR:  [59-81] 59  Resp:  [18-20] 18  BP: (127-130)/(58-70) 130/69    Gen:  Well-developed, well-nourished, in no distress  Neck:  Supple  No lymphadenopathy, goiter, or bruit  Heart:  Regular rhythm  No murmur, gallop, or rub  Lungs:  Clear to auscultation and percussion  No wheezing, rales, or rhonchi    Abd:  Soft with active bowel sounds  No mass, tenderness, or organomegaly  Extremities:  +2 edema  Neuro:  Alert and oriented    No focal sign  Skin:  Warm and dry      LABS:   CBC:   Lab Results   Component Value Date    WBC 14 97 (H) 02/16/2019    HGB 8 2 (L) 02/16/2019    HCT 26 2 (L) 02/16/2019    MCV 76 (L) 02/16/2019     (H) 02/16/2019    MCH 23 8 (L) 02/16/2019    MCHC 31 3 (L) 02/16/2019    RDW 26 8 (H) 02/16/2019    MPV 11 2 02/16/2019   , CMP:   Lab Results   Component Value Date    SODIUM 135 (L) 02/16/2019    K 4 4 02/16/2019     02/16/2019    CO2 24 02/16/2019    BUN 35 (H) 02/16/2019    CREATININE 0 98 02/16/2019    CALCIUM 8 3 02/16/2019    EGFR 57 02/16/2019           Patient Active Problem List   Diagnosis    Benign essential hypertension    Vitamin D deficiency    [facility] mellitus [facility]    Breast cancer [facility]    Carcinoma of head of pancreas ([facility]    Carcinoma of rectosigmoid junction (HCC)    Acid reflux    Arthritis    Other hemorrhoids    Loose stools    Gait abnormality    Localized edema    [facility]    [facility]NE [facility]acute [facility] ([facility]    Lactic acidosis    Anemia    Leukocytosis    Pyelonephritis    Ascites       Assessment/Plan:  1  Enterococcal liver abscess  2  Severe sepsis secondary to 1 , improved  3  Anemia, multifactorial, on iron replacement  4  Extensive cancer history  5  Type 2 diabetes  6  Acute kidney injury, resolved  7  Edema     The patient was on Lasix for a time because of edema  This seemed to be helping  It was stopped because of concerns about her kidney function  She will be given 1 dose of furosemide today  Her creatinine will be recheck tomorrow  Her weight will be monitored carefully  Hopefully, if her edema is satisfactorily controlled and she is otherwise doing well, she will be able to go home tomorrow      VTE Pharmacologic Prophylaxis: Heparin  VTE Mechanical Prophylaxis: sequential compression device

## 2019-02-16 NOTE — PLAN OF CARE
Problem: Potential for Falls  Goal: Patient will remain free of falls  Description  INTERVENTIONS:  - Assess patient frequently for physical needs  -  Identify cognitive and physical deficits and behaviors that affect risk of falls    -  [place] fall precautions as indicated by assessment   - Educate patient/family on patient safety including physical limitations  - Instruct patient to call for assistance with activity based on assessment  - Modify environment to reduce risk of injury  - Consider OT/PT consult to assist with strengthening/mobility   Outcome: Progressing     Problem: Prexisting or High Potential for Compromised Skin Integrity  Goal: Skin integrity is maintained or improved  Description  INTERVENTIONS:  - Identify patients at risk for skin breakdown  - Assess and monitor skin integrity  - Assess and monitor nutrition and hydration status  - Monitor labs (i e  albumin)  - Assess for incontinence   - Turn and reposition patient  - Assist with mobility/ambulation  - Relieve pressure over bony prominences  - Avoid friction and shearing  - Provide appropriate hygiene as needed including keeping skin clean and dry  - Evaluate need for skin moisturizer/barrier cream  - Collaborate with interdisciplinary team (i e  Nutrition, Rehabilitation, etc )   - Patient/family teaching   Outcome: Progressing     Problem: DISCHARGE PLANNING - CARE MANAGEMENT  Goal: Discharge to post-acute care or home with appropriate resources  Description  INTERVENTIONS:  - Conduct assessment to determine patient/family and health care team treatment goals, and need for post-acute services based on payer coverage, community resources, and patient preferences, and barriers to discharge  - Address psychosocial, clinical, and financial barriers to discharge as identified in assessment in conjunction with the patient/family and health care team  - Arrange appropriate level of post-acute services according to patient?s   needs and preference and payer coverage in collaboration with the physician and health care team  - Communicate with and update the patient/family, physician, and health care team regarding progress on the discharge plan  - Arrange appropriate transportation to post-acute venues   Outcome: Progressing     Problem: PAIN - ADULT  Goal: Verbalizes/displays adequate comfort level or baseline comfort level  Description  Interventions:  - Encourage patient to monitor pain and request assistance  - Assess pain using appropriate pain scale  - Administer analgesics based on type and severity of pain and evaluate response  - Implement non-pharmacological measures as appropriate and evaluate response  - Consider cultural and social influences on pain and pain management  - Notify physician/advanced practitioner if interventions unsuccessful or patient reports new pain   Outcome: Progressing     Problem: INFECTION - ADULT  Goal: Absence or prevention of progression during hospitalization  Description  INTERVENTIONS:  - Assess and monitor for signs and symptoms of infection  - Monitor lab/diagnostic results  - Monitor all insertion sites, i e  indwelling lines, tubes, and drains  - Monitor endotracheal (as able) and nasal secretions for changes in amount and color  - [place] appropriate cooling/warming therapies per order  - Administer medications as ordered  - Instruct and encourage patient and family to use good hand hygiene technique  - Identify and instruct in appropriate isolation precautions for identified infection/condition   Outcome: Progressing  Goal: Absence of fever/infection during neutropenic period  Description  INTERVENTIONS:  - Monitor WBC  - Implement neutropenic guidelines   Outcome: Progressing     Problem: SAFETY ADULT  Goal: Maintain or return to baseline ADL function  Description  INTERVENTIONS:  -  Assess patient's ability to carry out ADLs; assess patient's baseline for ADL function and identify physical deficits which impact ability to perform ADLs (bathing, care of mouth/teeth, toileting, grooming, dressing, etc )  - Assess/evaluate cause of self-care deficits   - Assess range of motion  - Assess patient's mobility; develop plan if impaired  - Assess patient's need for assistive devices and provide as appropriate  - Encourage maximum independence but intervene and supervise when necessary  ¯ Involve family in performance of ADLs  ¯ Assess for home care needs following discharge   ¯ Request OT consult to assist with ADL evaluation and planning for discharge  ¯ Provide patient education as appropriate   Outcome: Progressing  Goal: Maintain or return mobility status to optimal level  Description  INTERVENTIONS:  - Assess patient's baseline mobility status (ambulation, transfers, stairs, etc )    - Identify cognitive and physical deficits and behaviors that affect mobility  - Identify mobility aids required to assist with transfers and/or ambulation (gait belt, sit-to-stand, lift, walker, cane, etc )  - [place] fall precautions as indicated by assessment  - Record patient progress and toleration of activity level on Mobility SBAR; progress patient to next Phase/Stage  - Instruct patient to call for assistance with activity based on assessment  - Request Rehabilitation consult to assist with strengthening/weightbearing, etc    Outcome: Progressing     Problem: DISCHARGE PLANNING  Goal: Discharge to home or other facility with appropriate resources  Description  INTERVENTIONS:  - Identify barriers to discharge w/patient and caregiver  - Arrange for needed discharge resources and transportation as appropriate  - Identify discharge learning needs (meds, wound care, etc )  - Arrange for interpretive services to assist at discharge as needed  - Refer to Case Management Department for coordinating discharge planning if the patient needs post-hospital services based on physician/advanced practitioner order or complex needs related to functional status, cognitive ability, or social support system   Outcome: Progressing     Problem: Knowledge Deficit  Goal: Patient/family/caregiver demonstrates understanding of disease process, treatment plan, medications, and discharge instructions  Description  Complete learning assessment and assess knowledge base  Interventions:  - Provide teaching at level of understanding  - Provide teaching via preferred learning methods   Outcome: Progressing     Problem: Nutrition/Hydration-ADULT  Goal: Nutrient/Hydration intake appropriate for improving, restoring or maintaining nutritional needs  Description  Monitor and assess patient's nutrition/hydration status for malnutrition (ex- brittle hair, bruises, dry skin, pale skin and conjunctiva, muscle wasting, smooth red tongue, and disorientation)  Collaborate with interdisciplinary team and initiate plan and interventions as ordered  Monitor patient's weight and dietary intake as ordered or per policy  Utilize nutrition screening tool and intervene per policy  Determine patient's food preferences and provide high-protein, high-caloric foods as appropriate       INTERVENTIONS:  - Monitor oral intake, urinary output, labs, and treatment plans  - Assess nutrition and hydration status and recommend course of action  - Evaluate amount of meals eaten  - Assist patient with eating if necessary   - Allow adequate time for meals  - Recommend/ encourage appropriate diets, oral nutritional supplements, and vitamin/mineral supplements  - Order, calculate, and assess calorie counts as needed  - Recommend, monitor, and adjust tube feedings and TPN/PPN based on assessed needs  - Assess need for intravenous fluids  - Provide specific nutrition/hydration education as appropriate  - Include patient/family/caregiver in decisions related to nutrition  Outcome: Progressing

## 2019-02-17 LAB
ANION GAP SERPL CALCULATED.3IONS-SCNC: 5 MMOL/L (ref 4–13)
ANISOCYTOSIS BLD QL SMEAR: PRESENT
BASOPHILS # BLD MANUAL: 0 THOUSAND/UL (ref 0–0.1)
BASOPHILS NFR MAR MANUAL: 0 % (ref 0–1)
BUN SERPL-MCNC: 31 MG/DL (ref 5–25)
CALCIUM SERPL-MCNC: 8.2 MG/DL (ref 8.3–10.1)
CHLORIDE SERPL-SCNC: 104 MMOL/L (ref 100–108)
CO2 SERPL-SCNC: 24 MMOL/L (ref 21–32)
CREAT SERPL-MCNC: 1.02 MG/DL (ref 0.6–1.3)
EOSINOPHIL # BLD MANUAL: 0 THOUSAND/UL (ref 0–0.4)
EOSINOPHIL NFR BLD MANUAL: 0 % (ref 0–6)
ERYTHROCYTE [DISTWIDTH] IN BLOOD BY AUTOMATED COUNT: 27.8 % (ref 11.6–15.1)
GFR SERPL CREATININE-BSD FRML MDRD: 54 ML/MIN/1.73SQ M
GLUCOSE SERPL-MCNC: 105 MG/DL (ref 65–140)
GLUCOSE SERPL-MCNC: 118 MG/DL (ref 65–140)
GLUCOSE SERPL-MCNC: 167 MG/DL (ref 65–140)
GLUCOSE SERPL-MCNC: 219 MG/DL (ref 65–140)
GLUCOSE SERPL-MCNC: 67 MG/DL (ref 65–140)
HCT VFR BLD AUTO: 24.3 % (ref 34.8–46.1)
HGB BLD-MCNC: 7.6 G/DL (ref 11.5–15.4)
LYMPHOCYTES # BLD AUTO: 2.71 THOUSAND/UL (ref 0.6–4.47)
LYMPHOCYTES # BLD AUTO: 23 % (ref 14–44)
MCH RBC QN AUTO: 24.3 PG (ref 26.8–34.3)
MCHC RBC AUTO-ENTMCNC: 31.3 G/DL (ref 31.4–37.4)
MCV RBC AUTO: 78 FL (ref 82–98)
MONOCYTES # BLD AUTO: 0.71 THOUSAND/UL (ref 0–1.22)
MONOCYTES NFR BLD: 6 % (ref 4–12)
MYELOCYTES NFR BLD MANUAL: 1 % (ref 0–1)
NEUTROPHILS # BLD MANUAL: 8.14 THOUSAND/UL (ref 1.85–7.62)
NEUTS SEG NFR BLD AUTO: 69 % (ref 43–75)
NRBC BLD AUTO-RTO: 0 /100 WBCS
PLATELET # BLD AUTO: 408 THOUSANDS/UL (ref 149–390)
PLATELET BLD QL SMEAR: ABNORMAL
PMV BLD AUTO: 11.2 FL (ref 8.9–12.7)
POTASSIUM SERPL-SCNC: 4.2 MMOL/L (ref 3.5–5.3)
RBC # BLD AUTO: 3.13 MILLION/UL (ref 3.81–5.12)
RBC MORPH BLD: PRESENT
SODIUM SERPL-SCNC: 133 MMOL/L (ref 136–145)
TOTAL CELLS COUNTED SPEC: 100
VARIANT LYMPHS # BLD AUTO: 1 %
WBC # BLD AUTO: 11.79 THOUSAND/UL (ref 4.31–10.16)

## 2019-02-17 PROCEDURE — 99232 SBSQ HOSP IP/OBS MODERATE 35: CPT | Performed by: INTERNAL MEDICINE

## 2019-02-17 PROCEDURE — 82948 REAGENT STRIP/BLOOD GLUCOSE: CPT

## 2019-02-17 PROCEDURE — 85027 COMPLETE CBC AUTOMATED: CPT | Performed by: INTERNAL MEDICINE

## 2019-02-17 PROCEDURE — 85007 BL SMEAR W/DIFF WBC COUNT: CPT | Performed by: INTERNAL MEDICINE

## 2019-02-17 PROCEDURE — 80048 BASIC METABOLIC PNL TOTAL CA: CPT | Performed by: INTERNAL MEDICINE

## 2019-02-17 RX ORDER — INSULIN GLARGINE 100 [IU]/ML
20 INJECTION, SOLUTION SUBCUTANEOUS
Status: DISCONTINUED | OUTPATIENT
Start: 2019-02-17 | End: 2019-02-20 | Stop reason: HOSPADM

## 2019-02-17 RX ADMIN — INSULIN GLARGINE 20 UNITS: 100 INJECTION, SOLUTION SUBCUTANEOUS at 22:00

## 2019-02-17 RX ADMIN — OXYCODONE HYDROCHLORIDE 5 MG: 5 TABLET ORAL at 22:44

## 2019-02-17 RX ADMIN — PANCRELIPASE 48000 UNITS: 24000; 76000; 120000 CAPSULE, DELAYED RELEASE PELLETS ORAL at 08:09

## 2019-02-17 RX ADMIN — OXYCODONE HYDROCHLORIDE 5 MG: 5 TABLET ORAL at 16:53

## 2019-02-17 RX ADMIN — PANCRELIPASE 48000 UNITS: 24000; 76000; 120000 CAPSULE, DELAYED RELEASE PELLETS ORAL at 12:46

## 2019-02-17 RX ADMIN — DOXYCYCLINE HYCLATE 100 MG: 100 CAPSULE ORAL at 22:00

## 2019-02-17 RX ADMIN — DOXYCYCLINE HYCLATE 100 MG: 100 CAPSULE ORAL at 08:09

## 2019-02-17 RX ADMIN — PANCRELIPASE 48000 UNITS: 24000; 76000; 120000 CAPSULE, DELAYED RELEASE PELLETS ORAL at 17:54

## 2019-02-17 RX ADMIN — FERROUS SULFATE TAB 325 MG (65 MG ELEMENTAL FE) 325 MG: 325 (65 FE) TAB at 08:09

## 2019-02-17 RX ADMIN — HEPARIN SODIUM 5000 UNITS: 5000 INJECTION INTRAVENOUS; SUBCUTANEOUS at 14:20

## 2019-02-17 RX ADMIN — HEPARIN SODIUM 5000 UNITS: 5000 INJECTION INTRAVENOUS; SUBCUTANEOUS at 22:00

## 2019-02-17 RX ADMIN — HEPARIN SODIUM 5000 UNITS: 5000 INJECTION INTRAVENOUS; SUBCUTANEOUS at 05:53

## 2019-02-17 RX ADMIN — INSULIN LISPRO 4 UNITS: 100 INJECTION, SOLUTION INTRAVENOUS; SUBCUTANEOUS at 12:46

## 2019-02-17 RX ADMIN — OXYCODONE HYDROCHLORIDE 5 MG: 5 TABLET ORAL at 01:19

## 2019-02-17 NOTE — PLAN OF CARE
Problem: Potential for Falls  Goal: Patient will remain free of falls  Description  INTERVENTIONS:  - Assess patient frequently for physical needs  -  Identify cognitive and physical deficits and behaviors that affect risk of falls    -  [place] fall precautions as indicated by assessment   - Educate patient/family on patient safety including physical limitations  - Instruct patient to call for assistance with activity based on assessment  - Modify environment to reduce risk of injury  - Consider OT/PT consult to assist with strengthening/mobility   Outcome: Progressing     Problem: Prexisting or High Potential for Compromised Skin Integrity  Goal: Skin integrity is maintained or improved  Description  INTERVENTIONS:  - Identify patients at risk for skin breakdown  - Assess and monitor skin integrity  - Assess and monitor nutrition and hydration status  - Monitor labs (i e  albumin)  - Assess for incontinence   - Turn and reposition patient  - Assist with mobility/ambulation  - Relieve pressure over bony prominences  - Avoid friction and shearing  - Provide appropriate hygiene as needed including keeping skin clean and dry  - Evaluate need for skin moisturizer/barrier cream  - Collaborate with interdisciplinary team (i e  Nutrition, Rehabilitation, etc )   - Patient/family teaching   Outcome: Progressing     Problem: DISCHARGE PLANNING - CARE MANAGEMENT  Goal: Discharge to post-acute care or home with appropriate resources  Description  INTERVENTIONS:  - Conduct assessment to determine patient/family and health care team treatment goals, and need for post-acute services based on payer coverage, community resources, and patient preferences, and barriers to discharge  - Address psychosocial, clinical, and financial barriers to discharge as identified in assessment in conjunction with the patient/family and health care team  - Arrange appropriate level of post-acute services according to patient?s   needs and preference and payer coverage in collaboration with the physician and health care team  - Communicate with and update the patient/family, physician, and health care team regarding progress on the discharge plan  - Arrange appropriate transportation to post-acute venues   Outcome: Progressing     Problem: PAIN - ADULT  Goal: Verbalizes/displays adequate comfort level or baseline comfort level  Description  Interventions:  - Encourage patient to monitor pain and request assistance  - Assess pain using appropriate pain scale  - Administer analgesics based on type and severity of pain and evaluate response  - Implement non-pharmacological measures as appropriate and evaluate response  - Consider cultural and social influences on pain and pain management  - Notify physician/advanced practitioner if interventions unsuccessful or patient reports new pain   Outcome: Progressing     Problem: INFECTION - ADULT  Goal: Absence or prevention of progression during hospitalization  Description  INTERVENTIONS:  - Assess and monitor for signs and symptoms of infection  - Monitor lab/diagnostic results  - Monitor all insertion sites, i e  indwelling lines, tubes, and drains  - Monitor endotracheal (as able) and nasal secretions for changes in amount and color  - [place] appropriate cooling/warming therapies per order  - Administer medications as ordered  - Instruct and encourage patient and family to use good hand hygiene technique  - Identify and instruct in appropriate isolation precautions for identified infection/condition   Outcome: Progressing  Goal: Absence of fever/infection during neutropenic period  Description  INTERVENTIONS:  - Monitor WBC  - Implement neutropenic guidelines   Outcome: Progressing     Problem: SAFETY ADULT  Goal: Maintain or return to baseline ADL function  Description  INTERVENTIONS:  -  Assess patient's ability to carry out ADLs; assess patient's baseline for ADL function and identify physical deficits which impact ability to perform ADLs (bathing, care of mouth/teeth, toileting, grooming, dressing, etc )  - Assess/evaluate cause of self-care deficits   - Assess range of motion  - Assess patient's mobility; develop plan if impaired  - Assess patient's need for assistive devices and provide as appropriate  - Encourage maximum independence but intervene and supervise when necessary  ¯ Involve family in performance of ADLs  ¯ Assess for home care needs following discharge   ¯ Request OT consult to assist with ADL evaluation and planning for discharge  ¯ Provide patient education as appropriate   Outcome: Progressing  Goal: Maintain or return mobility status to optimal level  Description  INTERVENTIONS:  - Assess patient's baseline mobility status (ambulation, transfers, stairs, etc )    - Identify cognitive and physical deficits and behaviors that affect mobility  - Identify mobility aids required to assist with transfers and/or ambulation (gait belt, sit-to-stand, lift, walker, cane, etc )  - [place] fall precautions as indicated by assessment  - Record patient progress and toleration of activity level on Mobility SBAR; progress patient to next Phase/Stage  - Instruct patient to call for assistance with activity based on assessment  - Request Rehabilitation consult to assist with strengthening/weightbearing, etc    Outcome: Progressing     Problem: DISCHARGE PLANNING  Goal: Discharge to home or other facility with appropriate resources  Description  INTERVENTIONS:  - Identify barriers to discharge w/patient and caregiver  - Arrange for needed discharge resources and transportation as appropriate  - Identify discharge learning needs (meds, wound care, etc )  - Arrange for interpretive services to assist at discharge as needed  - Refer to Case Management Department for coordinating discharge planning if the patient needs post-hospital services based on physician/advanced practitioner order or complex needs related to functional status, cognitive ability, or social support system   Outcome: Progressing     Problem: Knowledge Deficit  Goal: Patient/family/caregiver demonstrates understanding of disease process, treatment plan, medications, and discharge instructions  Description  Complete learning assessment and assess knowledge base  Interventions:  - Provide teaching at level of understanding  - Provide teaching via preferred learning methods   Outcome: Progressing     Problem: Nutrition/Hydration-ADULT  Goal: Nutrient/Hydration intake appropriate for improving, restoring or maintaining nutritional needs  Description  Monitor and assess patient's nutrition/hydration status for malnutrition (ex- brittle hair, bruises, dry skin, pale skin and conjunctiva, muscle wasting, smooth red tongue, and disorientation)  Collaborate with interdisciplinary team and initiate plan and interventions as ordered  Monitor patient's weight and dietary intake as ordered or per policy  Utilize nutrition screening tool and intervene per policy  Determine patient's food preferences and provide high-protein, high-caloric foods as appropriate       INTERVENTIONS:  - Monitor oral intake, urinary output, labs, and treatment plans  - Assess nutrition and hydration status and recommend course of action  - Evaluate amount of meals eaten  - Assist patient with eating if necessary   - Allow adequate time for meals  - Recommend/ encourage appropriate diets, oral nutritional supplements, and vitamin/mineral supplements  - Order, calculate, and assess calorie counts as needed  - Recommend, monitor, and adjust tube feedings and TPN/PPN based on assessed needs  - Assess need for intravenous fluids  - Provide specific nutrition/hydration education as appropriate  - Include patient/family/caregiver in decisions related to nutrition  Outcome: Progressing Attending Only

## 2019-02-17 NOTE — PROGRESS NOTES
[facility] Progress Note  Patient: Brook Pa 75 y o  female   MRN: [id number]  PCP: Cathleen Ramon, DO  Unit/Bed#: [place] 68 2 -01 Encounter: [id number]  Date Of Visit: 02/17/19      Assessment/plan  1  Severe sepsis-poa  Due to polymicrobial bacteremia  Likely from enterococcal liver abscess  Appreciate ID recommendations  Flagyl was d/barbie  Echo was negative for vegetation  procalcitonin continues to trend down  Changed to doxycycline  Vancomycin and ceftriaxone d/barbie  duc drain is still in place still draining  Pt will need to follow up with ID and IR outpt  She will need a repeat cmp and cbc with diff in 2 weeks       2  Anemia, multifactorial secondary to iron deficiency, critical illness and chronic disease- Hemoglobin initially improved to 9 6 after transfusion of 2 units packed red blood cells  H/h decreased to 68   Will continue to monitor and will continue iron supplements  Will transfuse as needed       3  ANNE likely prerenal and secondary to sepsis- resolved       4-Extensive cancer history including breast, pancreatic and colon with concern for recurrent disease               -Pancreatic head adenocarcinoma with a prior [id number], categorically unresectable treated with neoadjuvant chemoradiotherapy                -bilateral breast cancer and 2x colon cancer   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, colorectal, thyroid) and 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                 -TXH been following up at [facility]               -XP scan from this admission shows extensive lymphadenopathy and liver lesions   appreciate oncology recommendations  Pt will need to follow up with serigo rodriguez       5- DM Type II- pt was hypoglycemic this am  Decrease lantus to 20 units  Continue lispro of 10 units       6-transaminitis likely secondary to sepsis   AST ALT is trended down      7   Chronic diastolic chf-pt had been given lasix  Will hold further diuretics at this point  Will monitor to see when to restart       dispo- pt would like str now  Case management to follow up on monday     Subjective:   Pt seen and examined  Nausea has resolved  She is willing to go to str now  No f/c no cp no sob no n/v/d no abd pain  Legs are still swollen but improved  Objective:     Vitals: Blood pressure 124/65, pulse 56, temperature 97 7 °F (36 5 °C), temperature source Temporal, resp  rate 18, height 5' 5" (1 651 m), weight 71 4 kg (157 lb 6 5 oz), SpO2 97 %  ,Body mass index is 26 19 kg/m²  Lab, Imaging and other studies:  Results from last 7 days   Lab Units 02/17/19  0521   WBC Thousand/uL 11 79*   HEMOGLOBIN g/dL 7 6*   HEMATOCRIT % 24 3*   PLATELETS Thousands/uL 408*     Results from last 7 days   Lab Units 02/17/19  0521  02/15/19  0606   POTASSIUM mmol/L 4 2   < > 4 9   CHLORIDE mmol/L 104   < > 101   CO2 mmol/L 24   < > 24   BUN mg/dL 31*   < > 37*   CREATININE mg/dL 1 02   < > 1 04   CALCIUM mg/dL 8 2*   < > 8 7   ALK PHOS U/L  --   --  657*   ALT U/L  --   --  64   AST U/L  --   --  32    < > = values in this interval not displayed  Lab Results   Component Value Date    BLOODCX No Growth After 5 Days  02/06/2019    BLOODCX No Growth After 5 Days   02/06/2019    BLOODCX Enterococcus faecalis (A) 02/04/2019    BLOODCX Escherichia coli (A) 02/04/2019    BLOODCX Klebsiella oxytoca (A) 02/04/2019    URINECX No Growth <1000 cfu/mL 02/05/2019     Scheduled Meds:   Current Facility-Administered Medications:  acetaminophen 650 mg Oral 4x Daily PRN ESE Stacy   doxycycline hyclate 100 mg Oral Q12H [address] Amandeep Stevenson MD   ferrous sulfate 325 mg Oral Daily With Breakfast ESE Gale   heparin (porcine) 5,000 Units Subcutaneous Q8H [address] ESE Stacy   insulin glargine 25 Units Subcutaneous HS ESE Gale   insulin lispro 10 Units Subcutaneous TID With Meals Subha Rodriguez DO   insulin lispro 4-20 Units Subcutaneous TID AC ShalaESE Gray   oxyCODONE 5 mg Oral Q4H PRN ESE Youssef   pancrelipase (Lip-Prot-Amyl) 48,000 Units Oral TID With Meals Shala K ESE Broderick     Continuous Infusions:    PRN Meds:   acetaminophen    oxyCODONE      Physical exam:  Physical Exam  General appearance: alert and oriented, in no acute distress  Head: Normocephalic, without obvious abnormality, atraumatic  Eyes: conjunctivae/corneas clear  PERRL, EOM's intact  Fundi benign    Neck: no adenopathy, no carotid bruit, no JVD, supple, symmetrical, trachea midline and thyroid not enlarged, symmetric, no tenderness/mass/nodules  Lungs: clear to auscultation bilaterally  Heart: regular rate and rhythm, S1, S2 normal, no murmur, click, rub or gallop  Abdomen: soft, non-tender; bowel sounds normal; no masses,  no organomegaly  Extremities: edema +1 bilateral lower ext  Pulses: 2+ and symmetric  Skin: Skin color, texture, turgor normal  No rashes or lesions  Neurologic: Mental status: Alert, oriented, thought content appropriate      VTE Pharmacologic Prophylaxis: Heparin  VTE Mechanical Prophylaxis: sequential compression device    Counseling / Coordination of Care  Total floor / unit time spent today 20 minutes      Current Length of Stay: 13 day(s)    Current Patient Status: Inpatient       Code Status: Level 1 - Full Code

## 2019-02-18 PROBLEM — R74.01 TRANSAMINITIS: Status: ACTIVE | Noted: 2019-02-18

## 2019-02-18 PROBLEM — R65.20 SEVERE SEPSIS (HCC): Status: ACTIVE | Noted: 2019-02-18

## 2019-02-18 PROBLEM — A41.9 SEVERE SEPSIS (HCC): Status: ACTIVE | Noted: 2019-02-18

## 2019-02-18 PROBLEM — I50.32 CHRONIC DIASTOLIC CHF (CONGESTIVE HEART FAILURE) (HCC): Status: ACTIVE | Noted: 2019-02-18

## 2019-02-18 PROBLEM — K75.0 BACTERIAL LIVER ABSCESS: Status: ACTIVE | Noted: 2019-02-18

## 2019-02-18 PROBLEM — R78.81 BACTEREMIA: Status: ACTIVE | Noted: 2019-02-18

## 2019-02-18 LAB
ANION GAP SERPL CALCULATED.3IONS-SCNC: 9 MMOL/L (ref 4–13)
BUN SERPL-MCNC: 29 MG/DL (ref 5–25)
CALCIUM SERPL-MCNC: 8 MG/DL (ref 8.3–10.1)
CHLORIDE SERPL-SCNC: 105 MMOL/L (ref 100–108)
CO2 SERPL-SCNC: 22 MMOL/L (ref 21–32)
CREAT SERPL-MCNC: 1.07 MG/DL (ref 0.6–1.3)
ERYTHROCYTE [DISTWIDTH] IN BLOOD BY AUTOMATED COUNT: 27.9 % (ref 11.6–15.1)
GFR SERPL CREATININE-BSD FRML MDRD: 51 ML/MIN/1.73SQ M
GLUCOSE SERPL-MCNC: 110 MG/DL (ref 65–140)
GLUCOSE SERPL-MCNC: 115 MG/DL (ref 65–140)
GLUCOSE SERPL-MCNC: 167 MG/DL (ref 65–140)
GLUCOSE SERPL-MCNC: 197 MG/DL (ref 65–140)
GLUCOSE SERPL-MCNC: 214 MG/DL (ref 65–140)
GLUCOSE SERPL-MCNC: 88 MG/DL (ref 65–140)
HCT VFR BLD AUTO: 23.6 % (ref 34.8–46.1)
HGB BLD-MCNC: 7.3 G/DL (ref 11.5–15.4)
MCH RBC QN AUTO: 24.4 PG (ref 26.8–34.3)
MCHC RBC AUTO-ENTMCNC: 30.9 G/DL (ref 31.4–37.4)
MCV RBC AUTO: 79 FL (ref 82–98)
PLATELET # BLD AUTO: 382 THOUSANDS/UL (ref 149–390)
PMV BLD AUTO: 11.5 FL (ref 8.9–12.7)
POTASSIUM SERPL-SCNC: 4.4 MMOL/L (ref 3.5–5.3)
RBC # BLD AUTO: 2.99 MILLION/UL (ref 3.81–5.12)
SODIUM SERPL-SCNC: 136 MMOL/L (ref 136–145)
WBC # BLD AUTO: 10.34 THOUSAND/UL (ref 4.31–10.16)

## 2019-02-18 PROCEDURE — 97110 THERAPEUTIC EXERCISES: CPT

## 2019-02-18 PROCEDURE — 85027 COMPLETE CBC AUTOMATED: CPT | Performed by: INTERNAL MEDICINE

## 2019-02-18 PROCEDURE — 99232 SBSQ HOSP IP/OBS MODERATE 35: CPT | Performed by: FAMILY MEDICINE

## 2019-02-18 PROCEDURE — 80048 BASIC METABOLIC PNL TOTAL CA: CPT | Performed by: INTERNAL MEDICINE

## 2019-02-18 PROCEDURE — 82948 REAGENT STRIP/BLOOD GLUCOSE: CPT

## 2019-02-18 PROCEDURE — 97535 SELF CARE MNGMENT TRAINING: CPT

## 2019-02-18 PROCEDURE — 99232 SBSQ HOSP IP/OBS MODERATE 35: CPT | Performed by: INTERNAL MEDICINE

## 2019-02-18 RX ADMIN — DOXYCYCLINE HYCLATE 100 MG: 100 CAPSULE ORAL at 21:13

## 2019-02-18 RX ADMIN — INSULIN GLARGINE 20 UNITS: 100 INJECTION, SOLUTION SUBCUTANEOUS at 21:15

## 2019-02-18 RX ADMIN — HEPARIN SODIUM 5000 UNITS: 5000 INJECTION INTRAVENOUS; SUBCUTANEOUS at 05:55

## 2019-02-18 RX ADMIN — PANCRELIPASE 48000 UNITS: 24000; 76000; 120000 CAPSULE, DELAYED RELEASE PELLETS ORAL at 07:33

## 2019-02-18 RX ADMIN — PANCRELIPASE 48000 UNITS: 24000; 76000; 120000 CAPSULE, DELAYED RELEASE PELLETS ORAL at 13:08

## 2019-02-18 RX ADMIN — INSULIN LISPRO 8 UNITS: 100 INJECTION, SOLUTION INTRAVENOUS; SUBCUTANEOUS at 13:09

## 2019-02-18 RX ADMIN — HEPARIN SODIUM 5000 UNITS: 5000 INJECTION INTRAVENOUS; SUBCUTANEOUS at 13:10

## 2019-02-18 RX ADMIN — OXYCODONE HYDROCHLORIDE 5 MG: 5 TABLET ORAL at 22:29

## 2019-02-18 RX ADMIN — PANCRELIPASE 48000 UNITS: 24000; 76000; 120000 CAPSULE, DELAYED RELEASE PELLETS ORAL at 17:48

## 2019-02-18 RX ADMIN — DOXYCYCLINE HYCLATE 100 MG: 100 CAPSULE ORAL at 08:35

## 2019-02-18 RX ADMIN — INSULIN LISPRO 4 UNITS: 100 INJECTION, SOLUTION INTRAVENOUS; SUBCUTANEOUS at 17:46

## 2019-02-18 RX ADMIN — OXYCODONE HYDROCHLORIDE 5 MG: 5 TABLET ORAL at 18:25

## 2019-02-18 RX ADMIN — HEPARIN SODIUM 5000 UNITS: 5000 INJECTION INTRAVENOUS; SUBCUTANEOUS at 21:16

## 2019-02-18 RX ADMIN — FERROUS SULFATE TAB 325 MG (65 MG ELEMENTAL FE) 325 MG: 325 (65 FE) TAB at 07:33

## 2019-02-18 NOTE — SOCIAL WORK
CM attempted to meet with patient x2 for SNF options however, patient using bathroom  CM will [place] back to see patient later today to review SNF options and work with patient on placed SNF referrals  CM department will continue to follow through discharge

## 2019-02-18 NOTE — ASSESSMENT & PLAN NOTE
This was a polymicrobial bacteremia with possible intra-abdominal or enteric source  CT imaging of the abdomen and pelvis did not reveal a definitive source although did suggest pyelonephritis, but urinalysis and culture studies were nondiagnostic  Transthoracic echocardiogram was negative for valvular vegetation  Bacteremia has cleared and patient has completed 10 days of IV antibiotics

## 2019-02-18 NOTE — PROGRESS NOTES
Progress Note - Elizabeth Sanders 1945, 75 y o  female MRN: [id number]    Unit/Bed#: [address] Encounter: [id number]    Primary Care Provider: Arnold Mcardle, DO   Date and time admitted to hospital: 2/4/2019  8:18 PM        * Severe sepsis [facility])  Assessment & Plan  Patient is much improved clinically as her fever, tachycardia, leukocytosis, and lactic acidosis have all resolved  Patient is tolerating antibiotics without difficulty and liver abscess is being drained with a ADALBERTO drain  Continue oral doxycycline  Repeat CBC and CMP in 2 weeks  Continue supportive care  Bacteremia  Assessment & Plan  This was a polymicrobial bacteremia with possible intra-abdominal or enteric source  CT imaging of the abdomen and pelvis did not reveal a definitive source although did suggest pyelonephritis, but urinalysis and culture studies were nondiagnostic  Transthoracic echocardiogram was negative for valvular vegetation  Bacteremia has cleared and patient has completed 10 days of IV antibiotics  Bacterial liver abscess  Assessment & Plan  Enterococcal liver abscess status post IR drainage of 45 ml of cloudy fluid  Patient still has drainage into the ADALBERTO drain  Continue oral doxycycline until abscess cavities close  Monitor drain output and patient will need outpatient follow-up with Interventional Radiology as an outpatient to determine when repeat drain study should be performed-consider repeating CT of abdomen and pelvis if repeat drain studies do not show resolution  ANNE (acute kidney injury) [facility])  Assessment & Plan  Acute kidney injury was most likely prerenal secondary to sepsis  ANNE has resolved  Continue to avoid nephrotoxins, NSAIDs, anemia, hypertension  Anemia  Assessment & Plan  Patient's current hemoglobin is 7 3 which has trended down from 7 6  Will maintain low threshold for blood transfusion if hemoglobin drops below 7    Continue to monitor hemoglobin/hematocrit q 12 hours     Type 2 diabetes mellitus [facility])  Assessment & Plan  Lab Results   Component Value Date    HGBA1C 5 7 2018       Recent Labs     19  2115 19  0707 19  0908 19  1100   POCGLU 219* 110 167* 214*       Blood Sugar Average: Last 72 hrs:  (P) 751 [id number]   Continue Lantus insulin 20 units subcutaneously daily at bedtime with lispro insulin 10 units subcutaneously t i d  With meals  Continue sliding scale insulin  Continue diabetic diet  Transaminitis  Assessment & Plan  Elevated LFTs most likely related to recent sepsis  Continue to trend and monitor  Chronic diastolic CHF (congestive heart failure) (HCC)  Assessment & Plan  Euvolemic and compensated  VTE Pharmacologic Prophylaxis:   Pharmacologic: Heparin  Mechanical VTE Prophylaxis in Place: No    Patient Centered Rounds: I have performed bedside rounds with nursing staff today  Discussions with Specialists or Other Care Team Provider:  Yes    Education and Discussions with Family / Patient:  Yes    Time Spent for Care: 15 minutes  More than 50% of total time spent on counseling and coordination of care as described above  Current Length of Stay: 14 day(s)    Current Patient Status: Inpatient   Certification Statement: The patient will continue to require additional inpatient hospital stay due to Liver abscess  Discharge Plan: To short-term rehab possibly tomorrow    Code Status: Level 1 - Full Code      Subjective:   Patient has no complaints at this time  Objective:     Vitals:   Temp (24hrs), Av 3 °F (36 8 °C), Min:97 2 °F (36 2 °C), Max:99 1 °F (37 3 °C)    Temp:  [97 2 °F (36 2 °C)-99 1 °F (37 3 °C)] 97 2 °F (36 2 °C)  HR:  [68-89] 68  Resp:  [18] 18  BP: (131-138)/(64-65) 131/65  SpO2:  [95 %-99 %] 99 %  Body mass index is 28 62 kg/m²  Input and Output Summary (last 24 hours):        Intake/Output Summary (Last 24 hours) at 2019 1456  Last data filed at 2019 1324  Gross per 24 hour   Intake 160 ml   Output 1290 ml   Net -1130 ml       Physical Exam:     Physical Exam   Constitutional: She is oriented to person, place, and time  She appears well-developed and well-nourished  No distress  HENT:   Head: Normocephalic and atraumatic  Eyes: Pupils are equal, round, and reactive to light  EOM are normal    Neck: Normal range of motion  Neck supple  Cardiovascular: Normal rate, regular rhythm and normal heart sounds  Pulmonary/Chest: Effort normal and breath sounds normal  No stridor  No respiratory distress  Abdominal: Soft  Bowel sounds are normal    Musculoskeletal: Normal range of motion  She exhibits no edema or tenderness  Neurological: She is alert and oriented to person, place, and time  Skin: Skin is warm and dry  She is not diaphoretic  Additional Data:     Labs:    Results from last 7 days   Lab Units 02/18/19  0601 02/17/19  0521   WBC Thousand/uL 10 34* 11 79*   HEMOGLOBIN g/dL 7 3* 7 6*   HEMATOCRIT % 23 6* 24 3*   PLATELETS Thousands/uL 382 408*   LYMPHO PCT %  --  23   MONO PCT %  --  6   EOS PCT %  --  0     Results from last 7 days   Lab Units 02/18/19  0601  02/15/19  0606   SODIUM mmol/L 136   < > 132*   POTASSIUM mmol/L 4 4   < > 4 9   CHLORIDE mmol/L 105   < > 101   CO2 mmol/L 22   < > 24   BUN mg/dL 29*   < > 37*   CREATININE mg/dL 1 07   < > 1 04   ANION GAP mmol/L 9   < > 7   CALCIUM mg/dL 8 0*   < > 8 7   ALBUMIN g/dL  --   --  2 2*   TOTAL BILIRUBIN mg/dL  --   --  0 88   ALK PHOS U/L  --   --  657*   ALT U/L  --   --  64   AST U/L  --   --  32   GLUCOSE RANDOM mg/dL 115   < > 81    < > = values in this interval not displayed           Results from last 7 days   Lab Units 02/18/19  1100 02/18/19  0908 02/18/19  0707 02/17/19  2115 02/17/19  1557 02/17/19  1124 02/17/19  0755 02/16/19  2110 02/16/19  1555 02/16/19  1112 02/16/19  0719 02/15/19  2039   POC GLUCOSE mg/dl 214* 167* 110 219* 118 167* 67 176* 141* 162* 71 154*         Results from last 7 days   Lab Units 02/13/19  0436 02/12/19  0552   PROCALCITONIN ng/ml 8 26* 16 31*           * I Have Reviewed All Lab Data Listed Above  * Additional Pertinent Lab Tests Reviewed: [facility] 65 Admission Reviewed    Imaging:    Imaging Reports Reviewed Today Include:  None available  Imaging Personally Reviewed by Myself Includes:  None available    Recent Cultures (last 7 days):           Last 24 Hours Medication List:     Current Facility-Administered Medications:  acetaminophen 650 mg Oral 4x Daily PRN Yara Hamper, CRNP   doxycycline hyclate 100 mg Oral Q12H [address] Ashley Nance MD   ferrous sulfate 325 mg Oral Daily With Breakfast Shala K ESE Broderick   heparin (porcine) 5,000 Units Subcutaneous Q8H [address], ESE   insulin glargine 20 Units Subcutaneous HS Subha Ambron, DO   insulin lispro 10 Units Subcutaneous TID With Meals Subha Ambron, DO   insulin lispro 4-20 Units Subcutaneous TID AC Shala K ESE Broderick   oxyCODONE 5 mg Oral Q4H PRN Yara Hamper, BILLYNP   pancrelipase (Lip-Prot-Amyl) 48,000 Units Oral TID With Meals ESE Phelps        Today, Patient Was Seen By: Wilma Yanes MD    ** Please Note: Dictation voice to text software may have been used in the creation of this document   **

## 2019-02-18 NOTE — PLAN OF CARE
Problem: OCCUPATIONAL THERAPY ADULT  Goal: Performs self-care activities at highest level of function for planned discharge setting  See evaluation for individualized goals  Description  Treatment Interventions: ADL retraining, Functional transfer training, UE strengthening/ROM, Endurance training, Patient/family training, Equipment evaluation/education, Compensatory technique education          See flowsheet documentation for full assessment, interventions and recommendations  Outcome: Progressing  Note:   Limitation: Decreased ADL status, Decreased UE strength, Decreased endurance  Prognosis: Good  Assessment: Pt seen for OT treatment session this AM focusing on functional activity tolerance, ADLs, UE ROM/strengthening, and functional mobility/transfers  Pt alert and cooperative throughout session  Pt seated EOB at start of session  Transfers (sit<>stand, RW<>standard toilet) completed with Min A with cues for safe technique and hand placement  Min A required for functional mobility with CGA required for balance/steadying for optimal pt safety  Toileting tasks completed with Min A 2* CGA required when standing for perineal hygiene  Light grooming tasks completed with Supervision to wash/dry hands while standing at sink  Pt returned to EOB for dressing tasks  Supervision required to don/doff joanna 2* setup required and increased time to complete  LB ADLs completed with Mod A 2* decreased functional reach and increased swelling to don/doff B/L socks and apply lotion to BLEs per pt's request  Pt seated EOB at end of session with call bell and phone within reach  All needs met and pt reports no further questions for OT at this time  Continue to recommend STR upon D/C  OT LTGs remain appropriate at this time, therefore extend 10 additional days  OT to follow pt on caseload        OT Discharge Recommendation: Short Term Rehab  OT - OK to Discharge: Yes(when medically cleared to rehab)

## 2019-02-18 NOTE — UTILIZATION REVIEW
Continued Stay Review    Date: 02/18/19  Vital Signs: /65 (BP Location: Left arm)   Pulse 68   Temp (!) 97 2 °F (36 2 °C) (Temporal)   Resp 18   Ht 5' 5" (1 651 m)   Wt 78 kg (171 lb 15 3 oz)   SpO2 99%   BMI 28 62 kg/m²   Assessment/Plan:   Severe sepsis [facility])  Assessment & Plan  Patient is much improved clinically as her fever, tachycardia, leukocytosis, and lactic acidosis have all resolved  Patient is tolerating antibiotics without difficulty and liver abscess is being drained with a ADALBERTO drain  Continue oral doxycycline  Repeat CBC and CMP in 2 weeks  Continue supportive care  Bacteremia  Assessment & Plan  This was a polymicrobial bacteremia with possible intra-abdominal or enteric source  CT imaging of the abdomen and pelvis did not reveal a definitive source although did suggest pyelonephritis, but urinalysis and culture studies were nondiagnostic  Transthoracic echocardiogram was negative for valvular vegetation  Bacteremia has cleared and patient has completed 10 days of IV antibiotics  Bacterial liver abscess  Assessment & Plan  Enterococcal liver abscess status post IR drainage of 45 ml of cloudy fluid  Patient still has drainage into the ADALBERTO drain  Continue oral doxycycline until abscess cavities close  Monitor drain output and patient will need outpatient follow-up with Interventional Radiology as an outpatient to determine when repeat drain study should be performed-consider repeating CT of abdomen and pelvis if repeat drain studies do not show resolution  ANNE (acute kidney injury) [facility])  Assessment & Plan  Acute kidney injury was most likely prerenal secondary to sepsis  ANNE has resolved  Continue to avoid nephrotoxins, NSAIDs, anemia, hypertension    Anemia  Assessment & Plan  Patient's current hemoglobin is 7 3 which has trended down from 7 6  Will maintain low threshold for blood transfusion if hemoglobin drops below 7    Continue to monitor hemoglobin/hematocrit q 12 hours   Type 2 diabetes mellitus [facility])  Assessment & Plan  Recent Labs     02/17/19  2115 02/18/19  0707 02/18/19  0908 02/18/19  1100   POCGLU 219* 110 167* 214*      Blood Sugar Average: Last 72 hrs:  (P) 138 [id number]   Continue Lantus insulin 20 units subcutaneously daily at bedtime with lispro insulin 10 units subcutaneously t i d  With meals  Continue sliding scale insulin  Continue diabetic diet  Transaminitis  Assessment & Plan  Elevated LFTs most likely related to recent sepsis  Continue to trend and monitor  Chronic diastolic CHF (congestive heart failure) (HCC)  Assessment & Plan  Euvolemic and compensated  VTE Pharmacologic Prophylaxis:   Pharmacologic: Heparin  Mechanical VTE Prophylaxis in Place: No   Current Patient Status: Inpatient   Certification Statement: The patient will continue to require additional inpatient hospital stay due to Liver abscess  Medications:   Scheduled Meds:   Current Facility-Administered Medications:  acetaminophen 650 mg Oral 4x Daily PRN   doxycycline hyclate 100 mg Oral Q12H [facility]   ferrous sulfate 325 mg Oral Daily With Breakfast   heparin (porcine) 5,000 Units Subcutaneous Q8H [facility]   insulin glargine 20 Units Subcutaneous HS   insulin lispro 10 Units Subcutaneous TID With Meals   insulin lispro 4-20 Units Subcutaneous TID AC   oxyCODONE 5 mg Oral Q4H PRN   pancrelipase (Lip-Prot-Amyl) 48,000 Units Oral TID With Meals     Continuous Infusions:    PRN Meds:   acetaminophen    oxyCODONE  Pertinent Labs/Diagnostic Results: WBC 10 34, HGB 7 3, HCT 23 6, BUN 29, CA 8 0  Age/Sex: 75 y o  female   Discharge Plan: To short-term rehab possibly tomorrow    Network Utilization Review Department  Phone: [phone]; Fax [phone]  [email or web address]  ATTENTION: Please call with any questions or concerns to [phone]  and carefully listen to the prompts so that you are directed to the right person     Send all requests for admission clinical reviews, approved or denied determinations and any other requests to fax [phone]   All voicemails are confidential

## 2019-02-18 NOTE — ASSESSMENT & PLAN NOTE
Patient's current hemoglobin is 7 3 which has trended down from 7 6  Will maintain low threshold for blood transfusion if hemoglobin drops below 7  Continue to monitor hemoglobin/hematocrit q 12 hours

## 2019-02-18 NOTE — ASSESSMENT & PLAN NOTE
Patient is much improved clinically as her fever, tachycardia, leukocytosis, and lactic acidosis have all resolved  Patient is tolerating antibiotics without difficulty and liver abscess is being drained with a ADALBERTO drain  Continue oral doxycycline  Repeat CBC and CMP in 2 weeks  Continue supportive care

## 2019-02-18 NOTE — ASSESSMENT & PLAN NOTE
Enterococcal liver abscess status post IR drainage of 45 ml of cloudy fluid  Patient still has drainage into the ADALBERTO drain  Continue oral doxycycline until abscess cavities close  Monitor drain output and patient will need outpatient follow-up with Interventional Radiology as an outpatient to determine when repeat drain study should be performed-consider repeating CT of abdomen and pelvis if repeat drain studies do not show resolution

## 2019-02-18 NOTE — ASSESSMENT & PLAN NOTE
Lab Results   Component Value Date    HGBA1C 5 7 11/13/2018       Recent Labs     02/17/19  2115 02/18/19  0707 02/18/19  0908 02/18/19  1100   POCGLU 219* 110 167* 214*       Blood Sugar Average: Last 72 hrs:  (P) 758 [id number]   Continue Lantus insulin 20 units subcutaneously daily at bedtime with lispro insulin 10 units subcutaneously t i d  With meals  Continue sliding scale insulin  Continue diabetic diet

## 2019-02-18 NOTE — SOCIAL WORK
LOS 14 DAYS  CM met with patient at bedside to review SNF options  Patient reported having worked in [employer] in the past and was familiar with [facility]  Patient is interested in list of surrounding SNF and CM printed a list closets to patient home  List provide to patient at bedside and 3 choices requested from patient in order of preference  CM department will follow up later today with patient to submit SNF referrals from choices selected

## 2019-02-18 NOTE — PLAN OF CARE
Problem: Potential for Falls  Goal: Patient will remain free of falls  Description  INTERVENTIONS:  - Assess patient frequently for physical needs  -  Identify cognitive and physical deficits and behaviors that affect risk of falls    -  [place] fall precautions as indicated by assessment   - Educate patient/family on patient safety including physical limitations  - Instruct patient to call for assistance with activity based on assessment  - Modify environment to reduce risk of injury  - Consider OT/PT consult to assist with strengthening/mobility   Outcome: Progressing     Problem: Prexisting or High Potential for Compromised Skin Integrity  Goal: Skin integrity is maintained or improved  Description  INTERVENTIONS:  - Identify patients at risk for skin breakdown  - Assess and monitor skin integrity  - Assess and monitor nutrition and hydration status  - Monitor labs (i e  albumin)  - Assess for incontinence   - Turn and reposition patient  - Assist with mobility/ambulation  - Relieve pressure over bony prominences  - Avoid friction and shearing  - Provide appropriate hygiene as needed including keeping skin clean and dry  - Evaluate need for skin moisturizer/barrier cream  - Collaborate with interdisciplinary team (i e  Nutrition, Rehabilitation, etc )   - Patient/family teaching   Outcome: Progressing     Problem: DISCHARGE PLANNING - CARE MANAGEMENT  Goal: Discharge to post-acute care or home with appropriate resources  Description  INTERVENTIONS:  - Conduct assessment to determine patient/family and health care team treatment goals, and need for post-acute services based on payer coverage, community resources, and patient preferences, and barriers to discharge  - Address psychosocial, clinical, and financial barriers to discharge as identified in assessment in conjunction with the patient/family and health care team  - Arrange appropriate level of post-acute services according to patient?s   needs and preference and payer coverage in collaboration with the physician and health care team  - Communicate with and update the patient/family, physician, and health care team regarding progress on the discharge plan  - Arrange appropriate transportation to post-acute venues   Outcome: Progressing     Problem: PAIN - ADULT  Goal: Verbalizes/displays adequate comfort level or baseline comfort level  Description  Interventions:  - Encourage patient to monitor pain and request assistance  - Assess pain using appropriate pain scale  - Administer analgesics based on type and severity of pain and evaluate response  - Implement non-pharmacological measures as appropriate and evaluate response  - Consider cultural and social influences on pain and pain management  - Notify physician/advanced practitioner if interventions unsuccessful or patient reports new pain   Outcome: Progressing     Problem: INFECTION - ADULT  Goal: Absence or prevention of progression during hospitalization  Description  INTERVENTIONS:  - Assess and monitor for signs and symptoms of infection  - Monitor lab/diagnostic results  - Monitor all insertion sites, i e  indwelling lines, tubes, and drains  - Monitor endotracheal (as able) and nasal secretions for changes in amount and color  - [place] appropriate cooling/warming therapies per order  - Administer medications as ordered  - Instruct and encourage patient and family to use good hand hygiene technique  - Identify and instruct in appropriate isolation precautions for identified infection/condition   Outcome: Progressing  Goal: Absence of fever/infection during neutropenic period  Description  INTERVENTIONS:  - Monitor WBC  - Implement neutropenic guidelines   Outcome: Progressing     Problem: SAFETY ADULT  Goal: Maintain or return to baseline ADL function  Description  INTERVENTIONS:  -  Assess patient's ability to carry out ADLs; assess patient's baseline for ADL function and identify physical deficits which impact ability to perform ADLs (bathing, care of mouth/teeth, toileting, grooming, dressing, etc )  - Assess/evaluate cause of self-care deficits   - Assess range of motion  - Assess patient's mobility; develop plan if impaired  - Assess patient's need for assistive devices and provide as appropriate  - Encourage maximum independence but intervene and supervise when necessary  ¯ Involve family in performance of ADLs  ¯ Assess for home care needs following discharge   ¯ Request OT consult to assist with ADL evaluation and planning for discharge  ¯ Provide patient education as appropriate   Outcome: Progressing  Goal: Maintain or return mobility status to optimal level  Description  INTERVENTIONS:  - Assess patient's baseline mobility status (ambulation, transfers, stairs, etc )    - Identify cognitive and physical deficits and behaviors that affect mobility  - Identify mobility aids required to assist with transfers and/or ambulation (gait belt, sit-to-stand, lift, walker, cane, etc )  - [place] fall precautions as indicated by assessment  - Record patient progress and toleration of activity level on Mobility SBAR; progress patient to next Phase/Stage  - Instruct patient to call for assistance with activity based on assessment  - Request Rehabilitation consult to assist with strengthening/weightbearing, etc    Outcome: Progressing     Problem: DISCHARGE PLANNING  Goal: Discharge to home or other facility with appropriate resources  Description  INTERVENTIONS:  - Identify barriers to discharge w/patient and caregiver  - Arrange for needed discharge resources and transportation as appropriate  - Identify discharge learning needs (meds, wound care, etc )  - Arrange for interpretive services to assist at discharge as needed  - Refer to Case Management Department for coordinating discharge planning if the patient needs post-hospital services based on physician/advanced practitioner order or complex needs related to functional status, cognitive ability, or social support system   Outcome: Progressing     Problem: Knowledge Deficit  Goal: Patient/family/caregiver demonstrates understanding of disease process, treatment plan, medications, and discharge instructions  Description  Complete learning assessment and assess knowledge base  Interventions:  - Provide teaching at level of understanding  - Provide teaching via preferred learning methods   Outcome: Progressing     Problem: Nutrition/Hydration-ADULT  Goal: Nutrient/Hydration intake appropriate for improving, restoring or maintaining nutritional needs  Description  Monitor and assess patient's nutrition/hydration status for malnutrition (ex- brittle hair, bruises, dry skin, pale skin and conjunctiva, muscle wasting, smooth red tongue, and disorientation)  Collaborate with interdisciplinary team and initiate plan and interventions as ordered  Monitor patient's weight and dietary intake as ordered or per policy  Utilize nutrition screening tool and intervene per policy  Determine patient's food preferences and provide high-protein, high-caloric foods as appropriate       INTERVENTIONS:  - Monitor oral intake, urinary output, labs, and treatment plans  - Assess nutrition and hydration status and recommend course of action  - Evaluate amount of meals eaten  - Assist patient with eating if necessary   - Allow adequate time for meals  - Recommend/ encourage appropriate diets, oral nutritional supplements, and vitamin/mineral supplements  - Order, calculate, and assess calorie counts as needed  - Recommend, monitor, and adjust tube feedings and TPN/PPN based on assessed needs  - Assess need for intravenous fluids  - Provide specific nutrition/hydration education as appropriate  - Include patient/family/caregiver in decisions related to nutrition  Outcome: Progressing

## 2019-02-18 NOTE — ASSESSMENT & PLAN NOTE
Acute kidney injury was most likely prerenal secondary to sepsis  ANNE has resolved  Continue to avoid nephrotoxins, NSAIDs, anemia, hypertension

## 2019-02-18 NOTE — PROGRESS NOTES
Progress Note - Infectious Disease   Liz Escoto 75 y o  female MRN: [id number]  Unit/Bed#: [place] [address] -01 Encounter: [id number]      Impression/Plan:  1  Severe sepsis-POA  Fever, leukocytosis, tachycardia, lactic acidosis   Appears to be secondary to polymicrobial bacteremia   Possibly in the setting of liver abscess   She is much improved clinically,   She seems to be tolerating the antibiotics without difficulty   Since drainage of possible liver abscess, patient is clinically improving with downtrending fever, leukocytosis, procalcitonin  She completed 10 days of intravenous antibiotics    -continue oral doxycycline until the abscess cavities close  -repeat CBC with diff and CMP in 2 weeks  -supportive care     2  Polymicrobial bacteremia-suspect intra-abdominal or enteric source   No definitive source has been appreciated despite extensive imaging although CT of the abdomen and pelvis does suggest the possibility of pyelonephritis but the urinalysis and culture nondiagnostic   Consideration for the possibility of liver abscess   Transthoracic echocardiogram without valvular vegetation   The bacteremia has cleared  She completed 10 days of intravenous antibiotics  -antibiotics as above  -no additional ID workup for now     3  Enterococcal liver abscess   Now status post IR drainage with removal of 45 cc of cloudy fluid   Still with drainage into the ADALBERTO  She is now tolerating the doxycycline without difficulty                -[id number] as above              -monitor drain output  -drain management  -needs IR follow-up as an outpatient to determine when repeat drain study should be done  -planned continued oral antibiotics until the abscess cavities close  -may end up needing repeat CT the abdomen and pelvis if resolution not apparent on drains studies     4  Acute kidney injury-suspect this is a pre renal issue   Perhaps sepsis is playing a role   The renal function has improved and now stabilized  -monitor GFR  -volume management  -no additional ID workup for now     5  Anemia-quite severe   No definitive evidence of acute blood loss   The H&H has modestly improved but continues to wax and wane   -workup as per the primary  -transfusion support  -monitor CBC with diff       6  Liver function test abnormalities-possibly secondary to sepsis   Possibly another etiology   CT the abdomen and pelvis does not reveal a definitive source   Liver function tests have modestly decreased   Right upper quadrant ultrasound was cystic lesion in the right lobe of the liver   The liver function tests remain quite high but have not been repeated in the last few days  -recheck liver function test  -if liver function test do not continue to decrease recommend repeat imaging     7  Disposition-patient now going to rehab  Follow-up in our office in 2-3 weeks  Antibiotics:  Doxycycline 5  Antibiotics 15    Subjective:  Patient has no fever, chills, sweats; no nausea, vomiting, diarrhea; no cough, shortness of breath; no pain  No new symptoms  She seems in better spirits  Objective:  Vitals:  Temp:  [98 6 °F (37 °C)-99 2 °F (37 3 °C)] 99 1 °F (37 3 °C)  HR:  [78-89] 78  Resp:  [18-20] 18  BP: (133-138)/(64-66) 135/65  SpO2:  [95 %-99 %] 95 %  Temp (24hrs), Av °F (37 2 °C), Min:98 6 °F (37 °C), Max:99 2 °F (37 3 °C)  Current: Temperature: 99 1 °F (37 3 °C)    Physical Exam:   General Appearance:  Alert, interactive, nontoxic, no acute distress  Throat: Oropharynx moist without lesions  Lungs:   Clear to auscultation bilaterally; no wheezes, rhonchi or rales; respirations unlabored   Heart:  RRR; no murmur, rub or gallop   Abdomen:   Soft, non-tender, non-distended, positive bowel sounds  Right-sided ADALBERTO drain in place  Extremities: No clubbing, cyanosis or edema   Skin: No new rashes or lesions  No draining wounds noted         Labs, Imaging, & Other studies:   All pertinent labs and imaging studies were personally reviewed  Results from last 7 days   Lab Units 02/18/19  0601 02/17/19  0521 02/16/19  0514   WBC Thousand/uL 10 34* 11 79* 14 97*   HEMOGLOBIN g/dL 7 3* 7 6* 8 2*   PLATELETS Thousands/uL 382 408* 454*     Results from last 7 days   Lab Units 02/18/19  0601 02/17/19  0521 02/16/19  0514 02/15/19  0606 02/14/19  0457 02/13/19  0436   SODIUM mmol/L 136 133* 135* 132* 134* 136   POTASSIUM mmol/L 4 4 4 2 4 4 4 9 4 5 4 6   CHLORIDE mmol/L 105 104 102 101 102 102   CO2 mmol/L 22 24 24 24 22 22   BUN mg/dL 29* 31* 35* 37* 44* 52*   CREATININE mg/dL 1 07 1 02 0 98 1 04 1 00 1 07   EGFR ml/min/1 73sq m 51 54 57 53 56 51   CALCIUM mg/dL 8 0* 8 2* 8 3 8 7 8 0* 8 0*   AST U/L  --   --   --  32 30 45   ALT U/L  --   --   --  64 71 94*   ALK PHOS U/L  --   --   --  657* 538* 524*

## 2019-02-18 NOTE — OCCUPATIONAL THERAPY NOTE
[address] Progress Note     Patient Name: Keren Suarez  [id number] Date: 2/18/2019  Problem List  Patient Active Problem List   Diagnosis    Benign essential hypertension    Vitamin D deficiency    Type 2 diabetes mellitus ([facility] )    Breast cancer ([facility] )    Carcinoma of head of pancreas ([facility] )    Carcinoma of rectosigmoid junction (HCC)    Acid reflux    Arthritis    Other hemorrhoids    Loose stools    Gait abnormality    Localized edema    Sepsis ([facility] )    ANNE (acute kidney injury) ([place] [address] )    Lactic acidosis    Anemia    Leukocytosis    Pyelonephritis    Ascites           02/18/19 1036   Restrictions/Precautions   Weight Bearing Precautions Per Order No   Other Precautions Fall Risk;Limb alert   General   Response to Previous Treatment Patient with no complaints from previous session   Family/Caregiver Present Pt's granddaughter present for entire session   Lifestyle   Autonomy PTA pt states independence with all aspects of her ADLs, transfers, ambulation--with RW--limited distances; +, +home alone   Reciprocal Relationships 2 children   Service to Others retired bilingual psychotherapist   Intrinsic Gratification watching TV   Pain Assessment   Pain Assessment 0-10   Pain Score 5   Pain Type Acute pain;Chronic pain   Pain Location Foot;Leg   Pain Orientation Bilateral   Patient's Stated Pain Goal No pain   Hospital Pain Intervention(s) Repositioned; Ambulation/increased activity; Emotional support; Rest   ADL   Where Assessed Edge of bed   Grooming Assistance 5  Supervision/Setup   Grooming Deficit Setup;Supervision/safety; Increased time to complete   Grooming Comments  Light grooming tasks completed with Supervision to wash/dry hands while standing at sink  LB Bathing Assistance 3  Moderate Assistance   LB Bathing Deficit Setup;Verbal cueing;Supervision/safety; Increased time to complete;Right lower leg including foot; Left lower leg including foot   LB Bathing Comments 2* decreased functional reach demonstrated    UB Dressing Assistance 5  Supervision/Setup   UB Dressing Deficit Setup;Supervision/safety; Increased time to complete   UB Dressing Comments Supervision required to don/doff robe 2* setup required and increased time to complete  LB Dressing Assistance 3  Moderate Assistance   LB Dressing Deficit Setup;Verbal cueing;Supervision/safety; Increased time to complete; Don/doff R sock; Don/doff L sock   LB Dressing Comments decreased functional reach and increased swelling to don/doff B/L socks    Toileting Assistance  4  Minimal Assistance   Toileting Deficit Setup;Verbal cueing;Supervison/safety; Increased time to complete;Perineal hygiene   Toileting Comments Toileting tasks completed with Min A 2* CGA required when standing for perineal hygiene  Functional Standing Tolerance   Time 4 mins   Activity ADLs standing at sink   Comments No LOB noted   Bed Mobility   Supine to Sit Unable to assess  (Pt seated EOB at start/end of session)   Sit to Supine Unable to assess  (Pt seated EOB at start/end of session)   Additional Comments Pt seated EOB at end of session with call bell and phone within reach  All needs met and pt reports no further questions for OT at this time  Transfers   Sit to Stand 4  Minimal assistance   Additional items Assist x 1; Increased time required;Verbal cues   Stand to Sit 4  Minimal assistance   Additional items Assist x 1; Increased time required;Verbal cues   Toilet transfer 4  Minimal assistance   Additional items Assist x 1; Increased time required;Verbal cues;Standard toilet  (grab bar use on R)   Additional Comments Cues for safe technique and hand placement;  Increased time required to achieve full standing position   Functional Mobility   Functional Mobility 4  Minimal assistance   Additional Comments Assist x1   Additional items Rolling walker   Toilet Transfers   Toilet Transfer From Rolling walker   Toilet Transfer Type To and from   Toilet Transfer to Standard toilet   Toilet Transfer Technique Ambulating   Toilet Transfers Minimal assistance   Toilet Transfers Comments Assist x1 with grab bar use on R   Therapeutic Exercise - ROM   UE-ROM Yes   ROM- Right Upper Extremities   R Shoulder AROM; Flexion; Extension;Horizontal ABduction   R Elbow AROM;Elbow flexion;Elbow extension   R Position Seated  (EOB)   R Weight/Reps/Sets 10 reps x2   RUE ROM Comment No c/o pain or fatigue reported   ROM - Left Upper Extremities    L Shoulder AROM; Flexion; Extension;Horizontal ABduction   L Elbow AROM;Elbow flexion;Elbow extension   L Position Seated  (EOB)   L Weight/Reps/Sets 10 reps x2   LUE ROM Comment No c/o pain or fatigue reported   Cognition   Overall Cognitive Status WFL   Arousal/Participation Alert; Cooperative   Attention Attends with cues to redirect   Orientation Level Oriented X4   Memory Within functional limits   Following Commands Follows one step commands without difficulty   Activity Tolerance   Activity Tolerance Patient limited by fatigue;Patient limited by pain   Medical Staff [address] Daya Gayle, RN   Assessment   Assessment Pt seen for OT treatment session this AM focusing on functional activity tolerance, ADLs, UE ROM/strengthening, and functional mobility/transfers  Pt alert and cooperative throughout session  Pt seated EOB at start of session  Transfers (sit<>stand, RW<>standard toilet) completed with Min A with cues for safe technique and hand placement  Min A required for functional mobility with CGA required for balance/steadying for optimal pt safety  Toileting tasks completed with Min A 2* CGA required when standing for perineal hygiene  Light grooming tasks completed with Supervision to wash/dry hands while standing at sink  Pt returned to EOB for dressing tasks  Supervision required to don/keshav marshall 2* setup required and increased time to complete   LB ADLs completed with Mod A 2* decreased functional reach and increased swelling to don/doff B/L socks and apply lotion to BLEs per pt's request  Pt seated EOB at end of session with call bell and phone within reach  All needs met and pt reports no further questions for OT at this time  Continue to recommend STR upon D/C  OT LTGs remain appropriate at this time, therefore extend 10 additional days  OT to follow pt on caseload  Plan   Treatment Interventions ADL retraining;Functional transfer training;UE strengthening/ROM; Endurance training;Patient/family training;Equipment evaluation/education; Compensatory technique education; Energy conservation; Activityengagement   Goal Expiration Date 02/28/19   Treatment Day 4   OT Frequency 3-5x/wk   Recommendation   OT Discharge Recommendation Short Term Rehab   OT - OK to Discharge Yes  (when medically cleared to rehab)   Barthel Index   Feeding 10   Bathing 0   Grooming Score 5   Dressing Score 5   Bladder Score 10   Bowels Score 10   Toilet Use Score 5   Transfers (Bed/Chair) Score 10   Mobility (Level Surface) Score 0   Stairs Score 0   Barthel Index Score 55   Modified [place] Scale   Modified [place] Scale 4       Meghann Olivo OTR/L

## 2019-02-19 LAB
ANION GAP SERPL CALCULATED.3IONS-SCNC: 8 MMOL/L (ref 4–13)
BASOPHILS # BLD AUTO: 0.07 THOUSANDS/ΜL (ref 0–0.1)
BASOPHILS NFR BLD AUTO: 1 % (ref 0–1)
BUN SERPL-MCNC: 31 MG/DL (ref 5–25)
CALCIUM SERPL-MCNC: 8.1 MG/DL (ref 8.3–10.1)
CHLORIDE SERPL-SCNC: 106 MMOL/L (ref 100–108)
CO2 SERPL-SCNC: 23 MMOL/L (ref 21–32)
CREAT SERPL-MCNC: 1.11 MG/DL (ref 0.6–1.3)
EOSINOPHIL # BLD AUTO: 0.09 THOUSAND/ΜL (ref 0–0.61)
EOSINOPHIL NFR BLD AUTO: 1 % (ref 0–6)
ERYTHROCYTE [DISTWIDTH] IN BLOOD BY AUTOMATED COUNT: 28.6 % (ref 11.6–15.1)
GFR SERPL CREATININE-BSD FRML MDRD: 49 ML/MIN/1.73SQ M
GLUCOSE SERPL-MCNC: 135 MG/DL (ref 65–140)
GLUCOSE SERPL-MCNC: 184 MG/DL (ref 65–140)
GLUCOSE SERPL-MCNC: 58 MG/DL (ref 65–140)
GLUCOSE SERPL-MCNC: 71 MG/DL (ref 65–140)
GLUCOSE SERPL-MCNC: 87 MG/DL (ref 65–140)
HCT VFR BLD AUTO: 23.8 % (ref 34.8–46.1)
HGB BLD-MCNC: 7.2 G/DL (ref 11.5–15.4)
IMM GRANULOCYTES # BLD AUTO: 0.13 THOUSAND/UL (ref 0–0.2)
IMM GRANULOCYTES NFR BLD AUTO: 1 % (ref 0–2)
LYMPHOCYTES # BLD AUTO: 1.88 THOUSANDS/ΜL (ref 0.6–4.47)
LYMPHOCYTES NFR BLD AUTO: 18 % (ref 14–44)
MCH RBC QN AUTO: 24.2 PG (ref 26.8–34.3)
MCHC RBC AUTO-ENTMCNC: 30.3 G/DL (ref 31.4–37.4)
MCV RBC AUTO: 80 FL (ref 82–98)
MONOCYTES # BLD AUTO: 1.15 THOUSAND/ΜL (ref 0.17–1.22)
MONOCYTES NFR BLD AUTO: 11 % (ref 4–12)
NEUTROPHILS # BLD AUTO: 7.44 THOUSANDS/ΜL (ref 1.85–7.62)
NEUTS SEG NFR BLD AUTO: 68 % (ref 43–75)
NRBC BLD AUTO-RTO: 0 /100 WBCS
PLATELET # BLD AUTO: 377 THOUSANDS/UL (ref 149–390)
PMV BLD AUTO: 11.5 FL (ref 8.9–12.7)
POTASSIUM SERPL-SCNC: 4.8 MMOL/L (ref 3.5–5.3)
RBC # BLD AUTO: 2.98 MILLION/UL (ref 3.81–5.12)
SODIUM SERPL-SCNC: 137 MMOL/L (ref 136–145)
WBC # BLD AUTO: 10.76 THOUSAND/UL (ref 4.31–10.16)

## 2019-02-19 PROCEDURE — 85025 COMPLETE CBC W/AUTO DIFF WBC: CPT | Performed by: FAMILY MEDICINE

## 2019-02-19 PROCEDURE — 80048 BASIC METABOLIC PNL TOTAL CA: CPT | Performed by: FAMILY MEDICINE

## 2019-02-19 PROCEDURE — 99232 SBSQ HOSP IP/OBS MODERATE 35: CPT | Performed by: FAMILY MEDICINE

## 2019-02-19 PROCEDURE — 82948 REAGENT STRIP/BLOOD GLUCOSE: CPT

## 2019-02-19 PROCEDURE — 97116 GAIT TRAINING THERAPY: CPT

## 2019-02-19 PROCEDURE — 97530 THERAPEUTIC ACTIVITIES: CPT

## 2019-02-19 RX ORDER — DIPHENHYDRAMINE HCL 25 MG
50 TABLET ORAL EVERY 6 HOURS PRN
Status: DISCONTINUED | OUTPATIENT
Start: 2019-02-19 | End: 2019-02-20 | Stop reason: HOSPADM

## 2019-02-19 RX ADMIN — OXYCODONE HYDROCHLORIDE 5 MG: 5 TABLET ORAL at 20:02

## 2019-02-19 RX ADMIN — INSULIN LISPRO 4 UNITS: 100 INJECTION, SOLUTION INTRAVENOUS; SUBCUTANEOUS at 12:55

## 2019-02-19 RX ADMIN — DIPHENHYDRAMINE HCL 50 MG: 25 TABLET ORAL at 13:00

## 2019-02-19 RX ADMIN — HEPARIN SODIUM 5000 UNITS: 5000 INJECTION INTRAVENOUS; SUBCUTANEOUS at 21:10

## 2019-02-19 RX ADMIN — DOXYCYCLINE HYCLATE 100 MG: 100 CAPSULE ORAL at 09:15

## 2019-02-19 RX ADMIN — DIPHENHYDRAMINE HCL 50 MG: 25 TABLET ORAL at 20:10

## 2019-02-19 RX ADMIN — PANCRELIPASE 48000 UNITS: 24000; 76000; 120000 CAPSULE, DELAYED RELEASE PELLETS ORAL at 12:54

## 2019-02-19 RX ADMIN — PANCRELIPASE 48000 UNITS: 24000; 76000; 120000 CAPSULE, DELAYED RELEASE PELLETS ORAL at 09:14

## 2019-02-19 RX ADMIN — DOXYCYCLINE HYCLATE 100 MG: 100 CAPSULE ORAL at 21:10

## 2019-02-19 RX ADMIN — HEPARIN SODIUM 5000 UNITS: 5000 INJECTION INTRAVENOUS; SUBCUTANEOUS at 14:22

## 2019-02-19 RX ADMIN — INSULIN GLARGINE 20 UNITS: 100 INJECTION, SOLUTION SUBCUTANEOUS at 21:10

## 2019-02-19 RX ADMIN — PANCRELIPASE 48000 UNITS: 24000; 76000; 120000 CAPSULE, DELAYED RELEASE PELLETS ORAL at 17:45

## 2019-02-19 RX ADMIN — FERROUS SULFATE TAB 325 MG (65 MG ELEMENTAL FE) 325 MG: 325 (65 FE) TAB at 09:15

## 2019-02-19 RX ADMIN — HEPARIN SODIUM 5000 UNITS: 5000 INJECTION INTRAVENOUS; SUBCUTANEOUS at 05:20

## 2019-02-19 NOTE — PHYSICAL THERAPY NOTE
PHYSICAL THERAPY NOTE          Patient Name: Teresita AVERYZRN'K Date: 2/19/2019  Time in: 0922  Time out: 1002  Total Time: 40 minutes     02/19/19 1002   Pain Assessment   Pain Assessment 0-10   Pain Score 5   Pain Type Acute pain;Chronic pain   Pain Location Leg   Pain Orientation Bilateral   Pain Descriptors Sore  (muscle soreness)   Effect of Pain on Daily Activities pain increases with activity and gait, but does not limit overall activity; pt reports that she knows that "no pain, no gain"   Hospital Pain Intervention(s) Repositioned; Ambulation/increased activity; Elevated; Emotional support; Rest   Response to Interventions tolerated and satisfied with PT   Restrictions/Precautions   Weight Bearing Precautions Per Order No   Other Precautions Fall Risk;Pain;Limb alert   General   Chart Reviewed Yes   Response to Previous Treatment Patient reporting fatigue but able to participate  Family/Caregiver Present No   Cognition   Overall Cognitive Status WFL   Arousal/Participation Alert; Responsive; Cooperative   Attention Attends with cues to redirect   Orientation Level Oriented X4   Memory Within functional limits   Following Commands Follows one step commands without difficulty   Subjective   Subjective Pt agreeable to participate with therapy  Pt reports that she usually has to go to the bathroom 30 minutes after eating breakfast, so it is at the risk of the therapist and she may need to go to bathroom soon  Bed Mobility   Additional Comments Bed mobility not directly observed 2* patient received sitting EOB finishing breakfast upon PT's arrival    Transfers   Sit to Stand 4  Minimal assistance   Additional items Assist x 1; Increased time required;Verbal cues   Stand to Sit 4  Minimal assistance   Additional items Assist x 1; Increased time required;Verbal cues   Additional Comments Pt provided with verbal cueing and education on safe hand placement, technique, and body mechanics to decrease risk of fall during transfers and decrease assistance level needed for successful transfer  Ambulation/Elevation   Gait pattern Short stride; Forward Flexion;Decreased foot clearance; Improper Weight shift; Excessively slow   Gait Assistance 4  Minimal assist   Additional items Assist x 1;Verbal cues   Assistive Device Rolling walker   Distance Pt ambulated [id number] with RW  Pt provided with verbal cueing for sequencing, step length, safety and foot clearance  Pt provided with education of appropriate posture and body mechanics regarding RW positioning with gait to decrease risk for fall  Balance   Static Sitting Good   Dynamic Sitting Fair +   Static Standing Fair   Dynamic Standing Fair -   Ambulatory Fair -   Endurance Deficit   Endurance Deficit Yes   Endurance Deficit Description weakness, fatigue, and pain   Activity Tolerance   Activity Tolerance Patient limited by fatigue;Patient limited by pain   Nurse Made Aware yes; Franki Rubalcava RN   Exercises   Balance training  Pt requesting theraband for therex  PT educated on appropriate use and safety with theraband  Pt reports to have h/o of using theraband  PT will continued education on exercise and theraband use of HEP limited today 2* pt needing increased time to utilize bathroom  Assessment   Prognosis Good   Problem List Decreased strength;Decreased endurance; Impaired balance;Decreased mobility; Decreased safety awareness;Pain   Assessment PT treatment completed  Pt identified by 2 patient identifiers: name and birth date  Pt agreeable to participate and seen for mobility, gait, exercise, balance, activity tolerance/functional endurance and PT education  Precautions include: fall risk and pain  Pt presents sitting EOB finishing breakfast upon arrival  Pt demonstrates sit<>stand minAx1 w/ VC, gait training minAx1 w/ RW and VC, and education on theraband use with exercise   Pt reports bilateral leg pain  to be 5/10  Pt tolerated treatment without complaints  Pt motivated to keep building strength and go to rehab  Pt would benefit from continued skilled PT to continue progress with  mobility, gait, exercise, balance, activity tolerance/functional endurance and PT education  At this time recommend discharge to short term rehab  Pt would benefit from use of rolling walker  At end of PT treatment pt assisted to bathroom with nursing and RN aware of patient status  No further questions or concerns for PT at this time  Barriers to Discharge Inaccessible home environment   Barriers to Discharge Comments 2 MAU   Goals   Patient Goals "to keep getting stronger and go to rehab "   LTG Expiration Date 03/05/19   Long Term Goal #1 Goals remain applicable and goal expiration date extended to continue progress with physical therapy  In 14 days pt will demonstrate: bed mobility mod (I) to promote OOB, sit<>stand and functional transfers mod (I) w/ RW to promote return to walking, gait training 150ft mod (I) w/ RW to promote return to ambulation/gait, 2 steps mod (I) w/ railing for home entrance, improve BLE by 1/2 grade strength to optimize functional mobility, improve balance by 1 grade to decrease fall risk, improve activity tolerance to >45 minutes w/o rest to optimize participation with therapy to progress towards IP PT goals and pt goals, pt and family education on PT risk, role, benefits, POC, goals, and recommendations to optimize patient outcomes, patient functional, optimize LOS and promote discharge to least restrictive environment  Treatment Day 6   Plan   Treatment/Interventions Functional transfer training;LE strengthening/ROM; Therapeutic exercise; Endurance training;Patient/family training;Bed mobility;Gait training;Spoke to nursing   Progress Progressing toward goals   PT Frequency Other (Comment)  (4-5x/wk)   Recommendation   Recommendation   (short term rehab)   Equipment Recommended Stephanie Garcia PT - OK to Discharge Yes  (yes to rehab once medically stable)   Gita Friday, PT,DPT

## 2019-02-19 NOTE — PLAN OF CARE
Problem: DISCHARGE PLANNING - CARE MANAGEMENT  Goal: Discharge to post-acute care or home with appropriate resources  Description  INTERVENTIONS:  - Conduct assessment to determine patient/family and health care team treatment goals, and need for post-acute services based on payer coverage, community resources, and patient preferences, and barriers to discharge  - Address psychosocial, clinical, and financial barriers to discharge as identified in assessment in conjunction with the patient/family and health care team  - Arrange appropriate level of post-acute services according to patient?s   needs and preference and payer coverage in collaboration with the physician and health care team  - Communicate with and update the patient/family, physician, and health care team regarding progress on the discharge plan  - Arrange appropriate transportation to post-acute venues   Outcome: Progressing  Note:   LOS 15  CM met with patient this AM to review referral choice  Patient informed CM that she would like [facility] to [facility] 4) [facility]  CM will send referrals and follow up with plan for discharge today pending medical stability, SNF bed, and Auth  CM department will follow through discharge

## 2019-02-19 NOTE — SOCIAL WORK
LOS 15 DAYS  JUHI was informed by [address] [place] [address] that they are able to accept patient and patient request auth process be started at this time  Patient was accepted by attending MD Martinez Can  [address] [place] [address]-NPI [id number]  Pending [id number]  JUHI sent clinical information including PT, OT, and last MD notes to [employer] reviewer Oh Alfonso at LDW:[phone]  Oh Alfonso can be reached at [phone]  Cm department will need to wait for auth approval  CM department will continue to follow through discharge

## 2019-02-19 NOTE — PLAN OF CARE
Problem: PHYSICAL THERAPY ADULT  Goal: Performs mobility at highest level of function for planned discharge setting  See evaluation for individualized goals  Description  Treatment/Interventions: Functional transfer training, LE strengthening/ROM, Therapeutic exercise, Endurance training, Patient/family training, Equipment eval/education, Bed mobility, Gait training, Spoke to nursing, OT, Family  Equipment Recommended: Sergio Jean       See flowsheet documentation for full assessment, interventions and recommendations  Outcome: Progressing  Note:   Prognosis: Good  Problem List: Decreased strength, Decreased endurance, Impaired balance, Decreased mobility, Decreased safety awareness, Pain  Assessment: PT treatment completed  Pt identified by 2 patient identifiers: name and birth date  Pt agreeable to participate and seen for mobility, gait, exercise, balance, activity tolerance/functional endurance and PT education  Precautions include: fall risk and pain  Pt presents sitting EOB finishing breakfast upon arrival  Pt demonstrates sit<>stand minAx1 w/ VC, gait training minAx1 w/ RW and VC, and education on theraband use with exercise  Pt reports bilateral leg pain  to be 5/10  Pt tolerated treatment without complaints  Pt motivated to keep building strength and go to rehab  Pt would benefit from continued skilled PT to continue progress with  mobility, gait, exercise, balance, activity tolerance/functional endurance and PT education  At this time recommend discharge to short term rehab  Pt would benefit from use of rolling walker  At end of PT treatment pt assisted to bathroom with nursing and RN aware of patient status  No further questions or concerns for PT at this time  Barriers to Discharge: Inaccessible home environment  Barriers to Discharge Comments: 2 MAU  Recommendation: (short term rehab)     PT - OK to Discharge: Yes(yes to rehab once medically stable)    See flowsheet documentation for full assessment  Alysia Morales, PT, DPT

## 2019-02-19 NOTE — PLAN OF CARE
Problem: Potential for Falls  Goal: Patient will remain free of falls  Description  INTERVENTIONS:  - Assess patient frequently for physical needs  -  Identify cognitive and physical deficits and behaviors that affect risk of falls    -  [place] fall precautions as indicated by assessment   - Educate patient/family on patient safety including physical limitations  - Instruct patient to call for assistance with activity based on assessment  - Modify environment to reduce risk of injury  - Consider OT/PT consult to assist with strengthening/mobility   Outcome: Progressing     Problem: Prexisting or High Potential for Compromised Skin Integrity  Goal: Skin integrity is maintained or improved  Description  INTERVENTIONS:  - Identify patients at risk for skin breakdown  - Assess and monitor skin integrity  - Assess and monitor nutrition and hydration status  - Monitor labs (i e  albumin)  - Assess for incontinence   - Turn and reposition patient  - Assist with mobility/ambulation  - Relieve pressure over bony prominences  - Avoid friction and shearing  - Provide appropriate hygiene as needed including keeping skin clean and dry  - Evaluate need for skin moisturizer/barrier cream  - Collaborate with interdisciplinary team (i e  Nutrition, Rehabilitation, etc )   - Patient/family teaching   Outcome: Progressing     Problem: DISCHARGE PLANNING - CARE MANAGEMENT  Goal: Discharge to post-acute care or home with appropriate resources  Description  INTERVENTIONS:  - Conduct assessment to determine patient/family and health care team treatment goals, and need for post-acute services based on payer coverage, community resources, and patient preferences, and barriers to discharge  - Address psychosocial, clinical, and financial barriers to discharge as identified in assessment in conjunction with the patient/family and health care team  - Arrange appropriate level of post-acute services according to patient?s   needs and preference and payer coverage in collaboration with the physician and health care team  - Communicate with and update the patient/family, physician, and health care team regarding progress on the discharge plan  - Arrange appropriate transportation to post-acute venues   Outcome: Progressing     Problem: PAIN - ADULT  Goal: Verbalizes/displays adequate comfort level or baseline comfort level  Description  Interventions:  - Encourage patient to monitor pain and request assistance  - Assess pain using appropriate pain scale  - Administer analgesics based on type and severity of pain and evaluate response  - Implement non-pharmacological measures as appropriate and evaluate response  - Consider cultural and social influences on pain and pain management  - Notify physician/advanced practitioner if interventions unsuccessful or patient reports new pain   Outcome: Progressing     Problem: INFECTION - ADULT  Goal: Absence or prevention of progression during hospitalization  Description  INTERVENTIONS:  - Assess and monitor for signs and symptoms of infection  - Monitor lab/diagnostic results  - Monitor all insertion sites, i e  indwelling lines, tubes, and drains  - Monitor endotracheal (as able) and nasal secretions for changes in amount and color  - [place] appropriate cooling/warming therapies per order  - Administer medications as ordered  - Instruct and encourage patient and family to use good hand hygiene technique  - Identify and instruct in appropriate isolation precautions for identified infection/condition   Outcome: Progressing  Goal: Absence of fever/infection during neutropenic period  Description  INTERVENTIONS:  - Monitor WBC  - Implement neutropenic guidelines   Outcome: Progressing     Problem: SAFETY ADULT  Goal: Maintain or return to baseline ADL function  Description  INTERVENTIONS:  -  Assess patient's ability to carry out ADLs; assess patient's baseline for ADL function and identify physical deficits which impact ability to perform ADLs (bathing, care of mouth/teeth, toileting, grooming, dressing, etc )  - Assess/evaluate cause of self-care deficits   - Assess range of motion  - Assess patient's mobility; develop plan if impaired  - Assess patient's need for assistive devices and provide as appropriate  - Encourage maximum independence but intervene and supervise when necessary  ¯ Involve family in performance of ADLs  ¯ Assess for home care needs following discharge   ¯ Request OT consult to assist with ADL evaluation and planning for discharge  ¯ Provide patient education as appropriate   Outcome: Progressing  Goal: Maintain or return mobility status to optimal level  Description  INTERVENTIONS:  - Assess patient's baseline mobility status (ambulation, transfers, stairs, etc )    - Identify cognitive and physical deficits and behaviors that affect mobility  - Identify mobility aids required to assist with transfers and/or ambulation (gait belt, sit-to-stand, lift, walker, cane, etc )  - [place] fall precautions as indicated by assessment  - Record patient progress and toleration of activity level on Mobility SBAR; progress patient to next Phase/Stage  - Instruct patient to call for assistance with activity based on assessment  - Request Rehabilitation consult to assist with strengthening/weightbearing, etc    Outcome: Progressing     Problem: DISCHARGE PLANNING  Goal: Discharge to home or other facility with appropriate resources  Description  INTERVENTIONS:  - Identify barriers to discharge w/patient and caregiver  - Arrange for needed discharge resources and transportation as appropriate  - Identify discharge learning needs (meds, wound care, etc )  - Arrange for interpretive services to assist at discharge as needed  - Refer to Case Management Department for coordinating discharge planning if the patient needs post-hospital services based on physician/advanced practitioner order or complex needs related to functional status, cognitive ability, or social support system   Outcome: Progressing     Problem: Knowledge Deficit  Goal: Patient/family/caregiver demonstrates understanding of disease process, treatment plan, medications, and discharge instructions  Description  Complete learning assessment and assess knowledge base  Interventions:  - Provide teaching at level of understanding  - Provide teaching via preferred learning methods   Outcome: Progressing     Problem: Nutrition/Hydration-ADULT  Goal: Nutrient/Hydration intake appropriate for improving, restoring or maintaining nutritional needs  Description  Monitor and assess patient's nutrition/hydration status for malnutrition (ex- brittle hair, bruises, dry skin, pale skin and conjunctiva, muscle wasting, smooth red tongue, and disorientation)  Collaborate with interdisciplinary team and initiate plan and interventions as ordered  Monitor patient's weight and dietary intake as ordered or per policy  Utilize nutrition screening tool and intervene per policy  Determine patient's food preferences and provide high-protein, high-caloric foods as appropriate       INTERVENTIONS:  - Monitor oral intake, urinary output, labs, and treatment plans  - Assess nutrition and hydration status and recommend course of action  - Evaluate amount of meals eaten  - Assist patient with eating if necessary   - Allow adequate time for meals  - Recommend/ encourage appropriate diets, oral nutritional supplements, and vitamin/mineral supplements  - Order, calculate, and assess calorie counts as needed  - Recommend, monitor, and adjust tube feedings and TPN/PPN based on assessed needs  - Assess need for intravenous fluids  - Provide specific nutrition/hydration education as appropriate  - Include patient/family/caregiver in decisions related to nutrition  Outcome: Progressing

## 2019-02-19 NOTE — SOCIAL WORK
LOS 15  CM met with patient this AM to review referral choice  Patient informed CM that she would [facility] to [facility]) [facility] 3)[facility] 4) [facility]  CM will send referrals and follow up with plan for discharge today pending medical stability, SNF bed, and Auth  CM department will follow through discharge

## 2019-02-19 NOTE — ASSESSMENT & PLAN NOTE
Current hemoglobin 7 2  Will maintain low threshold for blood transfusion if hemoglobin drops below 7  Continue to monitor hemoglobin/hematocrit q 12 hours

## 2019-02-19 NOTE — PLAN OF CARE
Problem: Potential for Falls  Goal: Patient will remain free of falls  Description  INTERVENTIONS:  - Assess patient frequently for physical needs  -  Identify cognitive and physical deficits and behaviors that affect risk of falls    -  [place] fall precautions as indicated by assessment   - Educate patient/family on patient safety including physical limitations  - Instruct patient to call for assistance with activity based on assessment  - Modify environment to reduce risk of injury  - Consider OT/PT consult to assist with strengthening/mobility   Outcome: Progressing     Problem: Prexisting or High Potential for Compromised Skin Integrity  Goal: Skin integrity is maintained or improved  Description  INTERVENTIONS:  - Identify patients at risk for skin breakdown  - Assess and monitor skin integrity  - Assess and monitor nutrition and hydration status  - Monitor labs (i e  albumin)  - Assess for incontinence   - Turn and reposition patient  - Assist with mobility/ambulation  - Relieve pressure over bony prominences  - Avoid friction and shearing  - Provide appropriate hygiene as needed including keeping skin clean and dry  - Evaluate need for skin moisturizer/barrier cream  - Collaborate with interdisciplinary team (i e  Nutrition, Rehabilitation, etc )   - Patient/family teaching   Outcome: Progressing     Problem: DISCHARGE PLANNING - CARE MANAGEMENT  Goal: Discharge to post-acute care or home with appropriate resources  Description  INTERVENTIONS:  - Conduct assessment to determine patient/family and health care team treatment goals, and need for post-acute services based on payer coverage, community resources, and patient preferences, and barriers to discharge  - Address psychosocial, clinical, and financial barriers to discharge as identified in assessment in conjunction with the patient/family and health care team  - Arrange appropriate level of post-acute services according to patient?s   needs and preference and payer coverage in collaboration with the physician and health care team  - Communicate with and update the patient/family, physician, and health care team regarding progress on the discharge plan  - Arrange appropriate transportation to post-acute venues   Outcome: Progressing     Problem: PAIN - ADULT  Goal: Verbalizes/displays adequate comfort level or baseline comfort level  Description  Interventions:  - Encourage patient to monitor pain and request assistance  - Assess pain using appropriate pain scale  - Administer analgesics based on type and severity of pain and evaluate response  - Implement non-pharmacological measures as appropriate and evaluate response  - Consider cultural and social influences on pain and pain management  - Notify physician/advanced practitioner if interventions unsuccessful or patient reports new pain   Outcome: Progressing     Problem: INFECTION - ADULT  Goal: Absence or prevention of progression during hospitalization  Description  INTERVENTIONS:  - Assess and monitor for signs and symptoms of infection  - Monitor lab/diagnostic results  - Monitor all insertion sites, i e  indwelling lines, tubes, and drains  - Monitor endotracheal (as able) and nasal secretions for changes in amount and color  - [place] appropriate cooling/warming therapies per order  - Administer medications as ordered  - Instruct and encourage patient and family to use good hand hygiene technique  - Identify and instruct in appropriate isolation precautions for identified infection/condition   Outcome: Progressing  Goal: Absence of fever/infection during neutropenic period  Description  INTERVENTIONS:  - Monitor WBC  - Implement neutropenic guidelines   Outcome: Progressing     Problem: SAFETY ADULT  Goal: Maintain or return to baseline ADL function  Description  INTERVENTIONS:  -  Assess patient's ability to carry out ADLs; assess patient's baseline for ADL function and identify physical deficits which impact ability to perform ADLs (bathing, care of mouth/teeth, toileting, grooming, dressing, etc )  - Assess/evaluate cause of self-care deficits   - Assess range of motion  - Assess patient's mobility; develop plan if impaired  - Assess patient's need for assistive devices and provide as appropriate  - Encourage maximum independence but intervene and supervise when necessary  ¯ Involve family in performance of ADLs  ¯ Assess for home care needs following discharge   ¯ Request OT consult to assist with ADL evaluation and planning for discharge  ¯ Provide patient education as appropriate   Outcome: Progressing  Goal: Maintain or return mobility status to optimal level  Description  INTERVENTIONS:  - Assess patient's baseline mobility status (ambulation, transfers, stairs, etc )    - Identify cognitive and physical deficits and behaviors that affect mobility  - Identify mobility aids required to assist with transfers and/or ambulation (gait belt, sit-to-stand, lift, walker, cane, etc )  - [place] fall precautions as indicated by assessment  - Record patient progress and toleration of activity level on Mobility SBAR; progress patient to next Phase/Stage  - Instruct patient to call for assistance with activity based on assessment  - Request Rehabilitation consult to assist with strengthening/weightbearing, etc    Outcome: Progressing     Problem: DISCHARGE PLANNING  Goal: Discharge to home or other facility with appropriate resources  Description  INTERVENTIONS:  - Identify barriers to discharge w/patient and caregiver  - Arrange for needed discharge resources and transportation as appropriate  - Identify discharge learning needs (meds, wound care, etc )  - Arrange for interpretive services to assist at discharge as needed  - Refer to Case Management Department for coordinating discharge planning if the patient needs post-hospital services based on physician/advanced practitioner order or complex needs related to functional status, cognitive ability, or social support system   Outcome: Progressing     Problem: Knowledge Deficit  Goal: Patient/family/caregiver demonstrates understanding of disease process, treatment plan, medications, and discharge instructions  Description  Complete learning assessment and assess knowledge base  Interventions:  - Provide teaching at level of understanding  - Provide teaching via preferred learning methods   Outcome: Progressing     Problem: Nutrition/Hydration-ADULT  Goal: Nutrient/Hydration intake appropriate for improving, restoring or maintaining nutritional needs  Description  Monitor and assess patient's nutrition/hydration status for malnutrition (ex- brittle hair, bruises, dry skin, pale skin and conjunctiva, muscle wasting, smooth red tongue, and disorientation)  Collaborate with interdisciplinary team and initiate plan and interventions as ordered  Monitor patient's weight and dietary intake as ordered or per policy  Utilize nutrition screening tool and intervene per policy  Determine patient's food preferences and provide high-protein, high-caloric foods as appropriate       INTERVENTIONS:  - Monitor oral intake, urinary output, labs, and treatment plans  - Assess nutrition and hydration status and recommend course of action  - Evaluate amount of meals eaten  - Assist patient with eating if necessary   - Allow adequate time for meals  - Recommend/ encourage appropriate diets, oral nutritional supplements, and vitamin/mineral supplements  - Order, calculate, and assess calorie counts as needed  - Recommend, monitor, and adjust tube feedings and TPN/PPN based on assessed needs  - Assess need for intravenous fluids  - Provide specific nutrition/hydration education as appropriate  - Include patient/family/caregiver in decisions related to nutrition  Outcome: Progressing     Problem: METABOLIC, FLUID AND ELECTROLYTES - ADULT  Goal: Glucose maintained within target range  Description  INTERVENTIONS:  - Monitor Blood Glucose as ordered  - Assess for signs and symptoms of hyperglycemia and hypoglycemia  - Administer ordered medications to maintain glucose within target range  - Assess nutritional intake and initiate nutrition service referral as needed  Outcome: Progressing

## 2019-02-19 NOTE — PROGRESS NOTES
Progress Note - Baltazar Herrera 1945, 75 y o  female MRN: [id number]    Unit/Bed#: [address] [place] [address] [id number] Encounter: [id number]    Primary Care Provider: Cherie Flannery DO   Date and time admitted to hospital: 2/4/2019  8:18 PM        * Severe sepsis [facility])  Assessment & Plan  Patient is much improved clinically as her fever, tachycardia, leukocytosis, and lactic acidosis have all resolved  Patient is tolerating antibiotics without difficulty and liver abscess is being drained with a ADALBERTO drain  Continue oral doxycycline  Repeat CBC and CMP in 2 weeks  Continue supportive care  Bacteremia  Assessment & Plan  This was a polymicrobial bacteremia with possible intra-abdominal or enteric source  CT imaging of the abdomen and pelvis did not reveal a definitive source although did suggest pyelonephritis, but urinalysis and culture studies were nondiagnostic  Transthoracic echocardiogram was negative for valvular vegetation  Bacteremia has cleared and patient has completed 10 days of IV antibiotics  Bacterial liver abscess  Assessment & Plan  Enterococcal liver abscess status post IR drainage of 45 ml of cloudy fluid  Patient still has drainage into the ADALBERTO drain  Continue oral doxycycline until abscess cavities close  Monitor drain output and patient will need outpatient follow-up with Interventional Radiology as an outpatient to determine when repeat drain study should be performed-consider repeating CT of abdomen and pelvis if repeat drain studies do not show resolution  Anemia  Assessment & Plan  Current hemoglobin 7 2  Will maintain low threshold for blood transfusion if hemoglobin drops below 7  Continue to monitor hemoglobin/hematocrit q 12 hours      Type 2 diabetes mellitus [facility])  Assessment & Plan  Lab Results   Component Value Date    HGBA1C 5 7 11/13/2018       Recent Labs     02/18/19  1610 02/18/19  2113 02/19/19  0802 02/19/19  1141   POCGLU 197* 88 71 184*       Blood Sugar Average: Last 72 hrs:  (P) 542 [id number]   Continue Lantus insulin 20 units subcutaneously daily at bedtime with lispro insulin 10 units subcutaneously t i d  With meals  Continue sliding scale insulin  Continue diabetic diet  Transaminitis  Assessment & Plan  Elevated LFTs most likely related to recent sepsis  Continue to trend and monitor  Chronic diastolic CHF (congestive heart failure) (HCC)  Assessment & Plan  Euvolemic and compensated  VTE Pharmacologic Prophylaxis:   Pharmacologic: Heparin  Mechanical VTE Prophylaxis in Place: No    Patient Centered Rounds: I have performed bedside rounds with nursing staff today  Discussions with Specialists or Other Care Team Provider:  No    Education and Discussions with Family / Patient:  Yes    Time Spent for Care: 20 minutes  More than 50% of total time spent on counseling and coordination of care as described above  Current Length of Stay: 15 day(s)    Current Patient Status: Inpatient   Certification Statement: The patient will continue to require additional inpatient hospital stay due to Awaiting placement in short-term rehab  Discharge Plan: To short-term rehab when bed available  Code Status: Level 1 - Full Code      Subjective:   Patient has no complaints at this time  Objective:     Vitals:   Temp (24hrs), Av °F (36 7 °C), Min:97 2 °F (36 2 °C), Max:99 °F (37 2 °C)    Temp:  [97 2 °F (36 2 °C)-99 °F (37 2 °C)] 97 9 °F (36 6 °C)  HR:  [67-93] 67  Resp:  [16-18] 18  BP: (117-131)/(57-69) 117/69  SpO2:  [95 %-99 %] 97 %  Body mass index is 31 29 kg/m²  Input and Output Summary (last 24 hours): Intake/Output Summary (Last 24 hours) at 2019 1320  Last data filed at 2019 0930  Gross per 24 hour   Intake 460 ml   Output 1100 ml   Net -640 ml       Physical Exam:     Physical Exam   Constitutional: She is oriented to person, place, and time  She appears well-developed and well-nourished     HENT:   Head: Normocephalic and atraumatic  Eyes: Pupils are equal, round, and reactive to light  EOM are normal    Neck: Normal range of motion  Neck supple  Cardiovascular: Normal rate, regular rhythm and normal heart sounds  Pulmonary/Chest: Effort normal and breath sounds normal    Abdominal: Soft  Bowel sounds are normal    ADALBERTO drain is in place is clean, dry, intact   Musculoskeletal: She exhibits no edema  Neurological: She is alert and oriented to person, place, and time  Skin: Skin is warm and dry  Additional Data:     Labs:    Results from last 7 days   Lab Units 02/19/19  0455   WBC Thousand/uL 10 76*   HEMOGLOBIN g/dL 7 2*   HEMATOCRIT % 23 8*   PLATELETS Thousands/uL 377   NEUTROS PCT % 68   LYMPHS PCT % 18   MONOS PCT % 11   EOS PCT % 1     Results from last 7 days   Lab Units 02/19/19  0455  02/15/19  0606   SODIUM mmol/L 137   < > 132*   POTASSIUM mmol/L 4 8   < > 4 9   CHLORIDE mmol/L 106   < > 101   CO2 mmol/L 23   < > 24   BUN mg/dL 31*   < > 37*   CREATININE mg/dL 1 11   < > 1 04   ANION GAP mmol/L 8   < > 7   CALCIUM mg/dL 8 1*   < > 8 7   ALBUMIN g/dL  --   --  2 2*   TOTAL BILIRUBIN mg/dL  --   --  0 88   ALK PHOS U/L  --   --  657*   ALT U/L  --   --  64   AST U/L  --   --  32   GLUCOSE RANDOM mg/dL 58*   < > 81    < > = values in this interval not displayed  Results from last 7 days   Lab Units 02/19/19  1141 02/19/19  0802 02/18/19  2113 02/18/19  1610 02/18/19  1100 02/18/19  0908 02/18/19  0707 02/17/19  2115 02/17/19  1557 02/17/19  1124 02/17/19  0755 02/16/19  2110   POC GLUCOSE mg/dl 184* 71 88 197* 214* 167* 110 219* 118 167* 67 176*         Results from last 7 days   Lab Units 02/13/19  0436   PROCALCITONIN ng/ml 8 26*           * I Have Reviewed All Lab Data Listed Above  * Additional Pertinent Lab Tests Reviewed:  Maria Del Rosario Hoang Admission Reviewed    Imaging:    Imaging Reports Reviewed Today Include:  None available  Imaging Personally Reviewed by Myself Includes: None    Recent Cultures (last 7 days):           Last 24 Hours Medication List:     Current Facility-Administered Medications:  acetaminophen 650 mg Oral 4x Daily PRN ESE Heredia   diphenhydrAMINE 50 mg Oral Q6H PRN Wilma Yanes MD   doxycycline hyclate 100 mg Oral Q12H [address] Ashley Nance MD   ferrous sulfate 325 mg Oral Daily With Breakfast ESE Gale   heparin (porcine) 5,000 Units Subcutaneous Q8H [address] ESE Gracia   insulin glargine 20 Units Subcutaneous HS Subha Ambron, DO   insulin lispro 10 Units Subcutaneous TID With Meals Subha Rodriguez, DO   insulin lispro 4-20 Units Subcutaneous TID AC ESE Gale   oxyCODONE 5 mg Oral Q4H PRN ESE Heredia   pancrelipase (Lip-Prot-Amyl) 48,000 Units Oral TID With Meals ESE Phelps        Today, Patient Was Seen By: Wilma Yanes MD    ** Please Note: Dictation voice to text software may have been used in the creation of this document   **

## 2019-02-19 NOTE — PLAN OF CARE
Problem: DISCHARGE PLANNING - CARE MANAGEMENT  Goal: Discharge to post-acute care or home with appropriate resources  Description  INTERVENTIONS:  - Conduct assessment to determine patient/family and health care team treatment goals, and need for post-acute services based on payer coverage, community resources, and patient preferences, and barriers to discharge  - Address psychosocial, clinical, and financial barriers to discharge as identified in assessment in conjunction with the patient/family and health care team  - Arrange appropriate level of post-acute services according to patient?s   needs and preference and payer coverage in collaboration with the physician and health care team  - Communicate with and update the patient/family, physician, and health care team regarding progress on the discharge plan  - Arrange appropriate transportation to post-acute venues   2/19/2019 1209 by Gee Arriaga  Note:   LOS 15 DAYS  CM was informed by [address] [place] [address] that they are able to accept patient and patient request auth process be started at this time  Patient was accepted by attending MD Chirag Johnson  [address] [place] [address]-NPI [id number]  Pending [id number]  JUHI sent clinical information including PT, OT, and last MD notes to [place] horton reviewer Lindsay García at [facility]:[phone]  Lindsay García can be reached at [phone]  Cm department will need to wait for auth approval  CM department will continue to follow through discharge

## 2019-02-19 NOTE — ASSESSMENT & PLAN NOTE
Lab Results   Component Value Date    HGBA1C 5 7 11/13/2018       Recent Labs     02/18/19  1610 02/18/19  2113 02/19/19  0802 02/19/19  1141   POCGLU 197* 88 71 184*       Blood Sugar Average: Last 72 hrs:  (P) 670 [id number]   Continue Lantus insulin 20 units subcutaneously daily at bedtime with lispro insulin 10 units subcutaneously t i d  With meals  Continue sliding scale insulin  Continue diabetic diet

## 2019-02-19 NOTE — SOCIAL WORK
LOS 15 DAYS  CM received response back from all SNF referrals sent this AM  CM was informed that 1[facility] is not accept patients at this time  CM called and spoke with [facility] Jesica Pineda and determined that [facility] is long-term care and not SNF  Patient 3rd choice was [facility]  Cm reviewed with patient that they are willing to accept patient  Patient was agreeable and requested that CM start the auth process  CM notified [address] about patient accepting the bed  CM will work on [employer] and discharge either today or tomorrow pending bed and auth

## 2019-02-19 NOTE — PLAN OF CARE
Problem: DISCHARGE PLANNING - CARE MANAGEMENT  Goal: Discharge to post-acute care or home with appropriate resources  Description  INTERVENTIONS:  - Conduct assessment to determine patient/family and health care team treatment goals, and need for post-acute services based on payer coverage, community resources, and patient preferences, and barriers to discharge  - Address psychosocial, clinical, and financial barriers to discharge as identified in assessment in conjunction with the patient/family and health care team  - Arrange appropriate level of post-acute services according to patient?s   needs and preference and payer coverage in collaboration with the physician and health care team  - Communicate with and update the patient/family, physician, and health care team regarding progress on the discharge plan  - Arrange appropriate transportation to post-acute venues   2/19/2019 1126 by Gee Arriaga  Outcome: Progressing  Note:   LOS 15 DAYS  CM received response back from all SNF referrals sent this AM  CM was informed that 1) [facility] is not accept patients at this time  CM called and spoke with [facility] Devdanielle Kingsleys and determined that [facility] is long-term care and not SNF  Patient 3rd choice was [facility]  Cm reviewed with patient that they are willing to accept patient  Patient was agreeable and requested that CM start the auth process  CM notified [address] about patient accepting the bed  CM will work on [employer] and discharge either today or tomorrow pending bed and auth

## 2019-02-20 VITALS
TEMPERATURE: 98.9 F | HEIGHT: 65 IN | SYSTOLIC BLOOD PRESSURE: 116 MMHG | WEIGHT: 168.43 LBS | RESPIRATION RATE: 19 BRPM | HEART RATE: 72 BPM | OXYGEN SATURATION: 97 % | DIASTOLIC BLOOD PRESSURE: 70 MMHG | BODY MASS INDEX: 28.06 KG/M2

## 2019-02-20 LAB
ALBUMIN SERPL BCP-MCNC: 1.8 G/DL (ref 3.5–5)
ALP SERPL-CCNC: 621 U/L (ref 46–116)
ALT SERPL W P-5'-P-CCNC: 29 U/L (ref 12–78)
ANION GAP SERPL CALCULATED.3IONS-SCNC: 9 MMOL/L (ref 4–13)
AST SERPL W P-5'-P-CCNC: 32 U/L (ref 5–45)
BASOPHILS # BLD AUTO: 0.09 THOUSANDS/ΜL (ref 0–0.1)
BASOPHILS NFR BLD AUTO: 1 % (ref 0–1)
BILIRUB SERPL-MCNC: 1.02 MG/DL (ref 0.2–1)
BUN SERPL-MCNC: 39 MG/DL (ref 5–25)
CALCIUM SERPL-MCNC: 8 MG/DL (ref 8.3–10.1)
CHLORIDE SERPL-SCNC: 106 MMOL/L (ref 100–108)
CO2 SERPL-SCNC: 22 MMOL/L (ref 21–32)
CREAT SERPL-MCNC: 1.23 MG/DL (ref 0.6–1.3)
EOSINOPHIL # BLD AUTO: 0.13 THOUSAND/ΜL (ref 0–0.61)
EOSINOPHIL NFR BLD AUTO: 1 % (ref 0–6)
ERYTHROCYTE [DISTWIDTH] IN BLOOD BY AUTOMATED COUNT: 29.2 % (ref 11.6–15.1)
GFR SERPL CREATININE-BSD FRML MDRD: 43 ML/MIN/1.73SQ M
GLUCOSE SERPL-MCNC: 102 MG/DL (ref 65–140)
GLUCOSE SERPL-MCNC: 102 MG/DL (ref 65–140)
GLUCOSE SERPL-MCNC: 149 MG/DL (ref 65–140)
HCT VFR BLD AUTO: 24.9 % (ref 34.8–46.1)
HGB BLD-MCNC: 7.4 G/DL (ref 11.5–15.4)
IMM GRANULOCYTES # BLD AUTO: 0.09 THOUSAND/UL (ref 0–0.2)
IMM GRANULOCYTES NFR BLD AUTO: 1 % (ref 0–2)
LYMPHOCYTES # BLD AUTO: 2.04 THOUSANDS/ΜL (ref 0.6–4.47)
LYMPHOCYTES NFR BLD AUTO: 18 % (ref 14–44)
MCH RBC QN AUTO: 24.3 PG (ref 26.8–34.3)
MCHC RBC AUTO-ENTMCNC: 29.7 G/DL (ref 31.4–37.4)
MCV RBC AUTO: 82 FL (ref 82–98)
MONOCYTES # BLD AUTO: 1.2 THOUSAND/ΜL (ref 0.17–1.22)
MONOCYTES NFR BLD AUTO: 11 % (ref 4–12)
NEUTROPHILS # BLD AUTO: 7.51 THOUSANDS/ΜL (ref 1.85–7.62)
NEUTS SEG NFR BLD AUTO: 68 % (ref 43–75)
NRBC BLD AUTO-RTO: 0 /100 WBCS
PLATELET # BLD AUTO: 369 THOUSANDS/UL (ref 149–390)
PMV BLD AUTO: 11.5 FL (ref 8.9–12.7)
POTASSIUM SERPL-SCNC: 4.8 MMOL/L (ref 3.5–5.3)
PROT SERPL-MCNC: 4.9 G/DL (ref 6.4–8.2)
RBC # BLD AUTO: 3.05 MILLION/UL (ref 3.81–5.12)
SODIUM SERPL-SCNC: 137 MMOL/L (ref 136–145)
WBC # BLD AUTO: 11.06 THOUSAND/UL (ref 4.31–10.16)

## 2019-02-20 PROCEDURE — 99239 HOSP IP/OBS DSCHRG MGMT >30: CPT | Performed by: FAMILY MEDICINE

## 2019-02-20 PROCEDURE — 85025 COMPLETE CBC W/AUTO DIFF WBC: CPT | Performed by: FAMILY MEDICINE

## 2019-02-20 PROCEDURE — 80053 COMPREHEN METABOLIC PANEL: CPT | Performed by: FAMILY MEDICINE

## 2019-02-20 PROCEDURE — 82948 REAGENT STRIP/BLOOD GLUCOSE: CPT

## 2019-02-20 RX ORDER — FERROUS SULFATE 325(65) MG
325 TABLET ORAL
Qty: 30 TABLET | Refills: 3 | Status: SHIPPED | OUTPATIENT
Start: 2019-02-21 | End: 2019-03-02

## 2019-02-20 RX ORDER — DOXYCYCLINE HYCLATE 100 MG/1
100 CAPSULE ORAL EVERY 12 HOURS SCHEDULED
Qty: 28 CAPSULE | Refills: 0 | Status: SHIPPED | OUTPATIENT
Start: 2019-02-20 | End: 2019-03-02 | Stop reason: SDUPTHER

## 2019-02-20 RX ORDER — OXYCODONE HYDROCHLORIDE 5 MG/1
5 TABLET ORAL EVERY 4 HOURS PRN
Qty: 20 TABLET | Refills: 0 | Status: SHIPPED | OUTPATIENT
Start: 2019-02-20 | End: 2019-03-02

## 2019-02-20 RX ADMIN — OXYCODONE HYDROCHLORIDE 5 MG: 5 TABLET ORAL at 01:36

## 2019-02-20 RX ADMIN — DIPHENHYDRAMINE HCL 50 MG: 25 TABLET ORAL at 08:21

## 2019-02-20 RX ADMIN — HEPARIN SODIUM 5000 UNITS: 5000 INJECTION INTRAVENOUS; SUBCUTANEOUS at 05:46

## 2019-02-20 RX ADMIN — DOXYCYCLINE HYCLATE 100 MG: 100 CAPSULE ORAL at 08:21

## 2019-02-20 RX ADMIN — FERROUS SULFATE TAB 325 MG (65 MG ELEMENTAL FE) 325 MG: 325 (65 FE) TAB at 08:21

## 2019-02-20 RX ADMIN — PANCRELIPASE 48000 UNITS: 24000; 76000; 120000 CAPSULE, DELAYED RELEASE PELLETS ORAL at 08:22

## 2019-02-20 NOTE — SOCIAL WORK
Nursing made covering CM aware that we were waiting on an insurance authorization  Called [facility] to make them aware this was the covering CM today  Within the hour they called with NKQ#[id number]  Level 2  Reviewer will be Harpal Fernandes; (p) [phone], (f) [phone]  Provided that information to [facility]       Transport scheduled with son for 12:30-1:00  Numbers for report in Epic  IMM presented to patient at bedside; copy made for MR bin  Updated: attending, RN, unit clerk, son on phone, facility via [facility], and will inform patient at bedside

## 2019-02-20 NOTE — DISCHARGE SUMMARY
Discharge- Adelia Flores 1945, 75 y o  female MRN: [id number]    Unit/Bed#: [address] [place] [address] 214-01 Encounter: [id number]    Primary Care Provider: Yvonne Diehl DO   Date and time admitted to hospital: 2/4/2019  8:18 PM        * Severe sepsis [facility])  Assessment & Plan  Patient is much improved clinically as her fever, tachycardia, leukocytosis, and lactic acidosis have all resolved  Patient is tolerating antibiotics without difficulty and liver abscess is being drained with a ADALBERTO drain  Continue oral doxycycline  Repeat CBC and CMP in 2 weeks  Continue supportive care  Bacteremia  Assessment & Plan  This was a polymicrobial bacteremia with possible intra-abdominal or enteric source  CT imaging of the abdomen and pelvis did not reveal a definitive source although did suggest pyelonephritis, but urinalysis and culture studies were nondiagnostic  Transthoracic echocardiogram was negative for valvular vegetation  Bacteremia has cleared and patient has completed 10 days of IV antibiotics  Bacterial liver abscess  Assessment & Plan  Enterococcal liver abscess status post IR drainage of 45 ml of cloudy fluid  Patient still has drainage into the ADALBERTO drain  Continue oral doxycycline until abscess cavities close  Monitor drain output and patient will need outpatient follow-up with Interventional Radiology as an outpatient to determine when repeat drain study should be performed-consider repeating CT of abdomen and pelvis if repeat drain studies do not show resolution  Anemia  Assessment & Plan  Current hemoglobin 7 4  Will maintain low threshold for blood transfusion if hemoglobin drops below 7  Continue to monitor hemoglobin/hematocrit q 12 hours      Type 2 diabetes mellitus [facility])  Assessment & Plan  Lab Results   Component Value Date    HGBA1C 5 7 11/13/2018       Recent Labs     02/19/19  1610 02/19/19  2118 02/20/19  0812 02/20/19  1138   POCGLU 87 135 102 149*       Blood Sugar Average: Last 72 hrs:  (P) 67 [id number]   Continue Lantus insulin 20 units subcutaneously daily at bedtime with lispro insulin 10 units subcutaneously t i d  With meals  Continue sliding scale insulin  Continue diabetic diet  Transaminitis  Assessment & Plan  Elevated LFTs most likely related to recent sepsis  Continue to trend and monitor  Chronic diastolic CHF (congestive heart failure) (HCC)  Assessment & Plan  Euvolemic and compensated  Discharging Physician / Practitioner: Balbir Brice MD  PCP: Marii Gould DO  Admission Date:   Admission Orders (From admission, onward)    Ordered        02/04/19 2200  Inpatient Admission (expected length of stay for this patient Order details is greater than two midnights)  Once     Order ID Start Status   [id number] 02/04/19 2201 Completed              Discharge Date: 02/20/19    Resolved Problems  Date Reviewed: 2/20/2019    None          Consultations During Hospital Stay:  · Infectious Diseases, Interventional Radiology, Medical Oncology  Procedures Performed:   · Portable chest x-ray 02/04/2019, CT abdomen and pelvis without contrast 02/04/2019, right upper quadrant ultrasound 02/07/2019, interventional Radiology image guided aspiration and drainage of liver abscess 02/09/2019, 12 lead EKG 02/04/2019, 2D echocardiogram 02/06/2019  Significant Findings / Test Results:   · Portable chest x-ray 2/4-no acute cardiopulmonary disease  · CT abdomen and pelvis without contrast 2/4-hepatic lesion, bilateral adrenal nodules and retroperitoneal lymphadenopathy, compatible with metastatic disease  Suboptimal evaluation in the absence of intravenous contrast   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  Suggestion of soft tissue density in the peripancreatic and right retroperitoneal region could represent lymphadenopathy or tumor  Enlarged left kidney and perinephric fat stranding without evidence of obstructive uropathy    Possible pyelonephritis or renal vein thrombosis  Ultrasound may be helpful for further evaluation  Small hiatal hernia  Contrast distends the proximal small bowel  Remainder of the small bowel and colon are not well visualized  Bilateral small ventral abdominal hernia as containing portions of bowel loops  Significant amount of stool in the sigmoid colon may indicate constipation  Follow-up exam in 1-2 hours, when contrast has reached the distal colon may be helpful  Mild to moderate ascites  · Right upper quadrant ultrasound 2/7-limited study due to overlying bowel gas  Complex 4 2 cm cystic lesion at the posterior margin of the right hepatic lobe, corresponding with the prior CT scan  Atrophic right kidney  Small ascites  Poorly visualized pancreas  · Twelve lead EKG 2/4-atrial fibrillation with rate of 114  · 2D echocardiogram 2/6-normal left ventricular systolic function with ejection fraction estimated to be 60%  There were no regional wall motion abnormalities  Doppler parameters were consistent with abnormal left ventricular relaxation (grade 1 diastolic dysfunction)  No valvular vegetations noted  Incidental Findings:   · None     Test Results Pending at Discharge (will require follow up): · None     Outpatient Tests Requested:  · CBC and CMP in 2 weeks  · ADALBERTO drain study to be performed by Interventional Radiology  Complications:  None    Reason for Admission:  Liver abscess    Hospital Course:     Marta Maria is a 75 y o  female patient who originally presented to the hospital on 2/4/2019 due to weakness and inability to walk  Her symptoms were of abrupt onset  During hospitalization, she was found to be severely septic with polymicrobial bacteremia likely due to liver abscess  She was treated with broad-spectrum antibiotics in consultation with Infectious Diseases  2D echocardiogram was negative for any vegetations  Interventional Radiology was consulted and she had an aspiration of the liver abscess  The patient tolerated the procedure well  She also developed acute kidney injury during hospitalization due to sepsis and prerenal causes  She also had transaminitis likely due to sepsis with levels currently trending down  Patient completed a course of intravenous antibiotics for 10 days and has now been on oral doxycycline  Patient has a ADALBERTO drain as a result of the aspiration of the liver abscess  She is to continue the oral doxycycline on to the abscess cavities close  This will be determined by a repeat drain study to be performed by interventional Radiology in 2 weeks  If there is resolution of the abscess cavities, the oral doxycycline can be discontinued and the ADALBERTO drain removed  The patient will need repeat CMP and CBC in 2 weeks  She was evaluated by Physical therapy and Occupational therapy due to her weakness and they recommended short-term rehab  [email or web address]  She is currently hemodynamically stable for discharge to [facility] rehab today  Please see above list of diagnoses and related plan for additional information  Condition at Discharge: stable     Discharge Day Visit / Exam:     * Please refer to separate progress note for these details *    Discussion with Family:  Yes with son    Discharge instructions/Information to patient and family:   See after visit summary for information provided to patient and family  Provisions for Follow-Up Care:  See after visit summary for information related to follow-up care and any pertinent home health orders  Disposition:     [email or web address]    For Discharges to [facility] SNF:   · Not Applicable to this Patient - Not Applicable to this Patient    Planned Readmission:  Yes to [facility] rehab     Discharge Statement:  I spent 35 minutes discharging the patient  This time was spent on the day of discharge   I had direct contact with the patient on the day of discharge  Greater than 50% of the total time was spent examining patient, answering all patient questions, arranging and discussing plan of care with patient as well as directly providing post-discharge instructions  Additional time then spent on discharge activities  Discharge Medications:  See after visit summary for reconciled discharge medications provided to patient and family        ** Please Note: This note has been constructed using a voice recognition system **

## 2019-02-20 NOTE — PROGRESS NOTES
Progress Note - Jamia Vasquez 1945, 75 y o  female MRN: [id number]    Unit/Bed#: [place] [address] [address] [place] [address] 214-01 Encounter: [id number]    Primary Care Provider: Carmen Proctor DO   Date and time admitted to hospital: 2/4/2019  8:18 PM        * Severe sepsis [facility])  Assessment & Plan  Patient is much improved clinically as her fever, tachycardia, leukocytosis, and lactic acidosis have all resolved  Patient is tolerating antibiotics without difficulty and liver abscess is being drained with a ADALBERTO drain  Continue oral doxycycline  Repeat CBC and CMP in 2 weeks  Continue supportive care  Bacteremia  Assessment & Plan  This was a polymicrobial bacteremia with possible intra-abdominal or enteric source  CT imaging of the abdomen and pelvis did not reveal a definitive source although did suggest pyelonephritis, but urinalysis and culture studies were nondiagnostic  Transthoracic echocardiogram was negative for valvular vegetation  Bacteremia has cleared and patient has completed 10 days of IV antibiotics  Bacterial liver abscess  Assessment & Plan  Enterococcal liver abscess status post IR drainage of 45 ml of cloudy fluid  Patient still has drainage into the ADALBERTO drain  Continue oral doxycycline until abscess cavities close  Monitor drain output and patient will need outpatient follow-up with Interventional Radiology as an outpatient to determine when repeat drain study should be performed-consider repeating CT of abdomen and pelvis if repeat drain studies do not show resolution  Anemia  Assessment & Plan  Current hemoglobin 7 4  Will maintain low threshold for blood transfusion if hemoglobin drops below 7  Continue to monitor hemoglobin/hematocrit q 12 hours      Type 2 diabetes mellitus [facility])  Assessment & Plan  Lab Results   Component Value Date    HGBA1C 5 7 11/13/2018       Recent Labs     02/19/19  1141 02/19/19  1610 02/19/19  2118 02/20/19  0812   POCGLU 184* 87 135 102       Blood Sugar Average: Last 72 hrs:  (P) 137 [id number]   Continue Lantus insulin 20 units subcutaneously daily at bedtime with lispro insulin 10 units subcutaneously t i d  With meals  Continue sliding scale insulin  Continue diabetic diet  Transaminitis  Assessment & Plan  Elevated LFTs most likely related to recent sepsis  Continue to trend and monitor  Chronic diastolic CHF (congestive heart failure) (HCC)  Assessment & Plan  Euvolemic and compensated  VTE Pharmacologic Prophylaxis:   Pharmacologic: Heparin  Mechanical VTE Prophylaxis in Place: No    Patient Centered Rounds: I have performed bedside rounds with nursing staff today  Discussions with Specialists or Other Care Team Provider:  Yes    Education and Discussions with Family / Patient:  Yes    Time Spent for Care: 20 minutes  More than 50% of total time spent on counseling and coordination of care as described above  Current Length of Stay: 16 day(s)    Current Patient Status: Inpatient   Certification Statement: The patient will continue to require additional inpatient hospital stay due to Awaiting short-term rehab placement after insurance authorization    Discharge Plan: To short-term rehab when insurance authorizes placement    Code Status: Level 1 - Full Code      Subjective:   Patient has no complaints at this time  Objective:     Vitals:   Temp (24hrs), Av 9 °F (37 2 °C), Min:98 4 °F (36 9 °C), Max:99 3 °F (37 4 °C)    Temp:  [98 4 °F (36 9 °C)-99 3 °F (37 4 °C)] 98 9 °F (37 2 °C)  HR:  [72-75] 72  Resp:  [18-19] 19  BP: (116-136)/(55-70) 116/70  SpO2:  [97 %-100 %] 97 %  Body mass index is 28 03 kg/m²  Input and Output Summary (last 24 hours): Intake/Output Summary (Last 24 hours) at 2019 1052  Last data filed at 2019 1017  Gross per 24 hour   Intake 250 ml   Output 1695 ml   Net -1445 ml       Physical Exam:     Physical Exam   Constitutional: She is oriented to person, place, and time   She appears well-developed and well-nourished  No distress  HENT:   Head: Normocephalic and atraumatic  Eyes: Pupils are equal, round, and reactive to light  EOM are normal    Cardiovascular: Normal rate, regular rhythm and normal heart sounds  Pulmonary/Chest: Effort normal and breath sounds normal    Abdominal: Soft  Bowel sounds are normal    ADALBERTO drain is clean, dry, and intact  Musculoskeletal: Normal range of motion  She exhibits no edema or tenderness  Neurological: She is alert and oriented to person, place, and time  Skin: She is not diaphoretic  Additional Data:     Labs:    Results from last 7 days   Lab Units 02/20/19  0606   WBC Thousand/uL 11 06*   HEMOGLOBIN g/dL 7 4*   HEMATOCRIT % 24 9*   PLATELETS Thousands/uL 369   NEUTROS PCT % 68   LYMPHS PCT % 18   MONOS PCT % 11   EOS PCT % 1     Results from last 7 days   Lab Units 02/20/19  0602   SODIUM mmol/L 137   POTASSIUM mmol/L 4 8   CHLORIDE mmol/L 106   CO2 mmol/L 22   BUN mg/dL 39*   CREATININE mg/dL 1 23   ANION GAP mmol/L 9   CALCIUM mg/dL 8 0*   ALBUMIN g/dL 1 8*   TOTAL BILIRUBIN mg/dL 1 02*   ALK PHOS U/L 621*   ALT U/L 29   AST U/L 32   GLUCOSE RANDOM mg/dL 102         Results from last 7 days   Lab Units 02/20/19  0812 02/19/19  2118 02/19/19  1610 02/19/19  1141 02/19/19  0802 02/18/19  2113 02/18/19  1610 02/18/19  1100 02/18/19  0908 02/18/19  0707 02/17/19  2115 02/17/19  1557   POC GLUCOSE mg/dl 102 135 87 184* 71 88 197* 214* 167* 110 219* 118                   * I Have Reviewed All Lab Data Listed Above  * Additional Pertinent Lab Tests Reviewed:  Maria [facility] 65 Admission Reviewed    Imaging:    Imaging Reports Reviewed Today Include:  None available  Imaging Personally Reviewed by Myself Includes:  None    Recent Cultures (last 7 days):           Last 24 Hours Medication List:     Current Facility-Administered Medications:  acetaminophen 650 mg Oral 4x Daily PRN ESE Mackay   diphenhydrAMINE 50 mg Oral Q6H PRN Earle Bai MD   doxycycline hyclate 100 mg Oral Q12H [facility] Sai Alva MD   ferrous sulfate 325 mg Oral Daily With Breakfast ESE Gale   heparin (porcine) 5,000 Units Subcutaneous [facility] ESE Mackay   insulin glargine 20 Units Subcutaneous HS Subha Ambron, DO   insulin lispro 10 Units Subcutaneous TID With Meals Subha Ambron, DO   insulin lispro 4-20 Units Subcutaneous TID AC ESE Gale   oxyCODONE 5 mg Oral Q4H PRN ESE Mackay   pancrelipase (Lip-Prot-Amyl) 48,000 Units Oral TID With Meals ESE Elena        Today, Patient Was Seen By: Earle Bai MD    ** Please Note: Dictation voice to text software may have been used in the creation of this document   **

## 2019-02-20 NOTE — PLAN OF CARE
Problem: Potential for Falls  Goal: Patient will remain free of falls  Description  INTERVENTIONS:  - Assess patient frequently for physical needs  -  Identify cognitive and physical deficits and behaviors that affect risk of falls    -  [place] fall precautions as indicated by assessment   - Educate patient/family on patient safety including physical limitations  - Instruct patient to call for assistance with activity based on assessment  - Modify environment to reduce risk of injury  - Consider OT/PT consult to assist with strengthening/mobility   Outcome: Progressing     Problem: Prexisting or High Potential for Compromised Skin Integrity  Goal: Skin integrity is maintained or improved  Description  INTERVENTIONS:  - Identify patients at risk for skin breakdown  - Assess and monitor skin integrity  - Assess and monitor nutrition and hydration status  - Monitor labs (i e  albumin)  - Assess for incontinence   - Turn and reposition patient  - Assist with mobility/ambulation  - Relieve pressure over bony prominences  - Avoid friction and shearing  - Provide appropriate hygiene as needed including keeping skin clean and dry  - Evaluate need for skin moisturizer/barrier cream  - Collaborate with interdisciplinary team (i e  Nutrition, Rehabilitation, etc )   - Patient/family teaching   Outcome: Progressing     Problem: DISCHARGE PLANNING - CARE MANAGEMENT  Goal: Discharge to post-acute care or home with appropriate resources  Description  INTERVENTIONS:  - Conduct assessment to determine patient/family and health care team treatment goals, and need for post-acute services based on payer coverage, community resources, and patient preferences, and barriers to discharge  - Address psychosocial, clinical, and financial barriers to discharge as identified in assessment in conjunction with the patient/family and health care team  - Arrange appropriate level of post-acute services according to patient?s   needs and preference and payer coverage in collaboration with the physician and health care team  - Communicate with and update the patient/family, physician, and health care team regarding progress on the discharge plan  - Arrange appropriate transportation to post-acute venues   Outcome: Progressing     Problem: PAIN - ADULT  Goal: Verbalizes/displays adequate comfort level or baseline comfort level  Description  Interventions:  - Encourage patient to monitor pain and request assistance  - Assess pain using appropriate pain scale  - Administer analgesics based on type and severity of pain and evaluate response  - Implement non-pharmacological measures as appropriate and evaluate response  - Consider cultural and social influences on pain and pain management  - Notify physician/advanced practitioner if interventions unsuccessful or patient reports new pain   Outcome: Progressing     Problem: INFECTION - ADULT  Goal: Absence or prevention of progression during hospitalization  Description  INTERVENTIONS:  - Assess and monitor for signs and symptoms of infection  - Monitor lab/diagnostic results  - Monitor all insertion sites, i e  indwelling lines, tubes, and drains  - Monitor endotracheal (as able) and nasal secretions for changes in amount and color  - [place] appropriate cooling/warming therapies per order  - Administer medications as ordered  - Instruct and encourage patient and family to use good hand hygiene technique  - Identify and instruct in appropriate isolation precautions for identified infection/condition   Outcome: Progressing  Goal: Absence of fever/infection during neutropenic period  Description  INTERVENTIONS:  - Monitor WBC  - Implement neutropenic guidelines   Outcome: Progressing     Problem: SAFETY ADULT  Goal: Maintain or return to baseline ADL function  Description  INTERVENTIONS:  -  Assess patient's ability to carry out ADLs; assess patient's baseline for ADL function and identify physical deficits which impact ability to perform ADLs (bathing, care of mouth/teeth, toileting, grooming, dressing, etc )  - Assess/evaluate cause of self-care deficits   - Assess range of motion  - Assess patient's mobility; develop plan if impaired  - Assess patient's need for assistive devices and provide as appropriate  - Encourage maximum independence but intervene and supervise when necessary  ¯ Involve family in performance of ADLs  ¯ Assess for home care needs following discharge   ¯ Request OT consult to assist with ADL evaluation and planning for discharge  ¯ Provide patient education as appropriate   Outcome: Progressing  Goal: Maintain or return mobility status to optimal level  Description  INTERVENTIONS:  - Assess patient's baseline mobility status (ambulation, transfers, stairs, etc )    - Identify cognitive and physical deficits and behaviors that affect mobility  - Identify mobility aids required to assist with transfers and/or ambulation (gait belt, sit-to-stand, lift, walker, cane, etc )  - [place] fall precautions as indicated by assessment  - Record patient progress and toleration of activity level on Mobility SBAR; progress patient to next Phase/Stage  - Instruct patient to call for assistance with activity based on assessment  - Request Rehabilitation consult to assist with strengthening/weightbearing, etc    Outcome: Progressing     Problem: DISCHARGE PLANNING  Goal: Discharge to home or other facility with appropriate resources  Description  INTERVENTIONS:  - Identify barriers to discharge w/patient and caregiver  - Arrange for needed discharge resources and transportation as appropriate  - Identify discharge learning needs (meds, wound care, etc )  - Arrange for interpretive services to assist at discharge as needed  - Refer to Case Management Department for coordinating discharge planning if the patient needs post-hospital services based on physician/advanced practitioner order or complex needs related to functional status, cognitive ability, or social support system   Outcome: Progressing     Problem: Knowledge Deficit  Goal: Patient/family/caregiver demonstrates understanding of disease process, treatment plan, medications, and discharge instructions  Description  Complete learning assessment and assess knowledge base  Interventions:  - Provide teaching at level of understanding  - Provide teaching via preferred learning methods   Outcome: Progressing     Problem: Nutrition/Hydration-ADULT  Goal: Nutrient/Hydration intake appropriate for improving, restoring or maintaining nutritional needs  Description  Monitor and assess patient's nutrition/hydration status for malnutrition (ex- brittle hair, bruises, dry skin, pale skin and conjunctiva, muscle wasting, smooth red tongue, and disorientation)  Collaborate with interdisciplinary team and initiate plan and interventions as ordered  Monitor patient's weight and dietary intake as ordered or per policy  Utilize nutrition screening tool and intervene per policy  Determine patient's food preferences and provide high-protein, high-caloric foods as appropriate       INTERVENTIONS:  - Monitor oral intake, urinary output, labs, and treatment plans  - Assess nutrition and hydration status and recommend course of action  - Evaluate amount of meals eaten  - Assist patient with eating if necessary   - Allow adequate time for meals  - Recommend/ encourage appropriate diets, oral nutritional supplements, and vitamin/mineral supplements  - Order, calculate, and assess calorie counts as needed  - Recommend, monitor, and adjust tube feedings and TPN/PPN based on assessed needs  - Assess need for intravenous fluids  - Provide specific nutrition/hydration education as appropriate  - Include patient/family/caregiver in decisions related to nutrition  Outcome: Progressing     Problem: METABOLIC, FLUID AND ELECTROLYTES - ADULT  Goal: Glucose maintained within target range  Description  INTERVENTIONS:  - Monitor Blood Glucose as ordered  - Assess for signs and symptoms of hyperglycemia and hypoglycemia  - Administer ordered medications to maintain glucose within target range  - Assess nutritional intake and initiate nutrition service referral as needed  Outcome: Progressing

## 2019-02-20 NOTE — PLAN OF CARE
Problem: Potential for Falls  Goal: Patient will remain free of falls  Description  INTERVENTIONS:  - Assess patient frequently for physical needs  -  Identify cognitive and physical deficits and behaviors that affect risk of falls    -  [place] fall precautions as indicated by assessment   - Educate patient/family on patient safety including physical limitations  - Instruct patient to call for assistance with activity based on assessment  - Modify environment to reduce risk of injury  - Consider OT/PT consult to assist with strengthening/mobility   Outcome: Completed     Problem: Prexisting or High Potential for Compromised Skin Integrity  Goal: Skin integrity is maintained or improved  Description  INTERVENTIONS:  - Identify patients at risk for skin breakdown  - Assess and monitor skin integrity  - Assess and monitor nutrition and hydration status  - Monitor labs (i e  albumin)  - Assess for incontinence   - Turn and reposition patient  - Assist with mobility/ambulation  - Relieve pressure over bony prominences  - Avoid friction and shearing  - Provide appropriate hygiene as needed including keeping skin clean and dry  - Evaluate need for skin moisturizer/barrier cream  - Collaborate with interdisciplinary team (i e  Nutrition, Rehabilitation, etc )   - Patient/family teaching   Outcome: Completed     Problem: DISCHARGE PLANNING - CARE MANAGEMENT  Goal: Discharge to post-acute care or home with appropriate resources  Description  INTERVENTIONS:  - Conduct assessment to determine patient/family and health care team treatment goals, and need for post-acute services based on payer coverage, community resources, and patient preferences, and barriers to discharge  - Address psychosocial, clinical, and financial barriers to discharge as identified in assessment in conjunction with the patient/family and health care team  - Arrange appropriate level of post-acute services according to patient?s   needs and preference and payer coverage in collaboration with the physician and health care team  - Communicate with and update the patient/family, physician, and health care team regarding progress on the discharge plan  - Arrange appropriate transportation to post-acute venues   Outcome: Completed     Problem: PAIN - ADULT  Goal: Verbalizes/displays adequate comfort level or baseline comfort level  Description  Interventions:  - Encourage patient to monitor pain and request assistance  - Assess pain using appropriate pain scale  - Administer analgesics based on type and severity of pain and evaluate response  - Implement non-pharmacological measures as appropriate and evaluate response  - Consider cultural and social influences on pain and pain management  - Notify physician/advanced practitioner if interventions unsuccessful or patient reports new pain   Outcome: Completed     Problem: INFECTION - ADULT  Goal: Absence or prevention of progression during hospitalization  Description  INTERVENTIONS:  - Assess and monitor for signs and symptoms of infection  - Monitor lab/diagnostic results  - Monitor all insertion sites, i e  indwelling lines, tubes, and drains  - Monitor endotracheal (as able) and nasal secretions for changes in amount and color  - [place] appropriate cooling/warming therapies per order  - Administer medications as ordered  - Instruct and encourage patient and family to use good hand hygiene technique  - Identify and instruct in appropriate isolation precautions for identified infection/condition   Outcome: Completed  Goal: Absence of fever/infection during neutropenic period  Description  INTERVENTIONS:  - Monitor WBC  - Implement neutropenic guidelines   Outcome: Completed     Problem: SAFETY ADULT  Goal: Maintain or return to baseline ADL function  Description  INTERVENTIONS:  -  Assess patient's ability to carry out ADLs; assess patient's baseline for ADL function and identify physical deficits which impact ability to perform ADLs (bathing, care of mouth/teeth, toileting, grooming, dressing, etc )  - Assess/evaluate cause of self-care deficits   - Assess range of motion  - Assess patient's mobility; develop plan if impaired  - Assess patient's need for assistive devices and provide as appropriate  - Encourage maximum independence but intervene and supervise when necessary  ¯ Involve family in performance of ADLs  ¯ Assess for home care needs following discharge   ¯ Request OT consult to assist with ADL evaluation and planning for discharge  ¯ Provide patient education as appropriate   Outcome: Completed  Goal: Maintain or return mobility status to optimal level  Description  INTERVENTIONS:  - Assess patient's baseline mobility status (ambulation, transfers, stairs, etc )    - Identify cognitive and physical deficits and behaviors that affect mobility  - Identify mobility aids required to assist with transfers and/or ambulation (gait belt, sit-to-stand, lift, walker, cane, etc )  - [place] fall precautions as indicated by assessment  - Record patient progress and toleration of activity level on Mobility SBAR; progress patient to next Phase/Stage  - Instruct patient to call for assistance with activity based on assessment  - Request Rehabilitation consult to assist with strengthening/weightbearing, etc    Outcome: Completed     Problem: DISCHARGE PLANNING  Goal: Discharge to home or other facility with appropriate resources  Description  INTERVENTIONS:  - Identify barriers to discharge w/patient and caregiver  - Arrange for needed discharge resources and transportation as appropriate  - Identify discharge learning needs (meds, wound care, etc )  - Arrange for interpretive services to assist at discharge as needed  - Refer to Case Management Department for coordinating discharge planning if the patient needs post-hospital services based on physician/advanced practitioner order or complex needs related to functional status, cognitive ability, or social support system   Outcome: Completed     Problem: Knowledge Deficit  Goal: Patient/family/caregiver demonstrates understanding of disease process, treatment plan, medications, and discharge instructions  Description  Complete learning assessment and assess knowledge base  Interventions:  - Provide teaching at level of understanding  - Provide teaching via preferred learning methods   Outcome: Completed     Problem: Nutrition/Hydration-ADULT  Goal: Nutrient/Hydration intake appropriate for improving, restoring or maintaining nutritional needs  Description  Monitor and assess patient's nutrition/hydration status for malnutrition (ex- brittle hair, bruises, dry skin, pale skin and conjunctiva, muscle wasting, smooth red tongue, and disorientation)  Collaborate with interdisciplinary team and initiate plan and interventions as ordered  Monitor patient's weight and dietary intake as ordered or per policy  Utilize nutrition screening tool and intervene per policy  Determine patient's food preferences and provide high-protein, high-caloric foods as appropriate       INTERVENTIONS:  - Monitor oral intake, urinary output, labs, and treatment plans  - Assess nutrition and hydration status and recommend course of action  - Evaluate amount of meals eaten  - Assist patient with eating if necessary   - Allow adequate time for meals  - Recommend/ encourage appropriate diets, oral nutritional supplements, and vitamin/mineral supplements  - Order, calculate, and assess calorie counts as needed  - Recommend, monitor, and adjust tube feedings and TPN/PPN based on assessed needs  - Assess need for intravenous fluids  - Provide specific nutrition/hydration education as appropriate  - Include patient/family/caregiver in decisions related to nutrition  Outcome: Completed     Problem: METABOLIC, FLUID AND ELECTROLYTES - ADULT  Goal: Glucose maintained within target range  Description  INTERVENTIONS:  - Monitor Blood Glucose as ordered  - Assess for signs and symptoms of hyperglycemia and hypoglycemia  - Administer ordered medications to maintain glucose within target range  - Assess nutritional intake and initiate nutrition service referral as needed  Outcome: Completed

## 2019-02-20 NOTE — ASSESSMENT & PLAN NOTE
Lab Results   Component Value Date    HGBA1C 5 7 11/13/2018       Recent Labs     02/19/19  1610 02/19/19  2118 02/20/19  0812 02/20/19  1138   POCGLU 87 135 102 149*       Blood Sugar Average: Last 72 hrs:  (P) 070 [id number]   Continue Lantus insulin 20 units subcutaneously daily at bedtime with lispro insulin 10 units subcutaneously t i d  With meals  Continue sliding scale insulin  Continue diabetic diet

## 2019-02-20 NOTE — ASSESSMENT & PLAN NOTE
Lab Results   Component Value Date    HGBA1C 5 7 11/13/2018       Recent Labs     02/19/19  1141 02/19/19  1610 02/19/19  2118 02/20/19  0812   POCGLU 184* 87 135 102       Blood Sugar Average: Last 72 hrs:  (P) 137 [id number]   Continue Lantus insulin 20 units subcutaneously daily at bedtime with lispro insulin 10 units subcutaneously t i d  With meals  Continue sliding scale insulin  Continue diabetic diet

## 2019-02-20 NOTE — ASSESSMENT & PLAN NOTE
Current hemoglobin 7 4  Will maintain low threshold for blood transfusion if hemoglobin drops below 7  Continue to monitor hemoglobin/hematocrit q 12 hours

## 2019-02-21 DIAGNOSIS — R78.81 BACTEREMIA: Primary | ICD-10-CM

## 2019-02-21 PROBLEM — K86.89 PANCREATIC INSUFFICIENCY: Status: ACTIVE | Noted: 2019-02-21

## 2019-02-21 PROBLEM — R19.5 LOOSE STOOLS: Status: RESOLVED | Noted: 2019-01-07 | Resolved: 2019-02-21

## 2019-02-21 PROBLEM — E87.2 LACTIC ACIDOSIS: Status: RESOLVED | Noted: 2019-02-04 | Resolved: 2019-02-21

## 2019-02-21 PROBLEM — A41.9 SEPSIS (HCC): Status: RESOLVED | Noted: 2019-02-04 | Resolved: 2019-02-21

## 2019-02-21 PROBLEM — D12.6 POLYPOSIS ASSOCIATED WITH HETEROZYGOUS MUTATION IN MUTYH GENE: Status: ACTIVE | Noted: 2017-09-08

## 2019-02-21 PROBLEM — IMO0002 MONOALLELIC MUTATION OF CHEK2 GENE: Status: ACTIVE | Noted: 2017-09-08

## 2019-02-21 PROBLEM — Z90.49 S/P PARTIAL COLECTOMY: Status: ACTIVE | Noted: 2019-02-21

## 2019-02-21 PROBLEM — R60.0 LOCALIZED EDEMA: Status: RESOLVED | Noted: 2019-01-07 | Resolved: 2019-02-21

## 2019-02-21 PROBLEM — R60.1 ANASARCA: Chronic | Status: ACTIVE | Noted: 2019-02-21

## 2019-02-21 PROBLEM — K86.89 PANCREATIC INSUFFICIENCY: Chronic | Status: ACTIVE | Noted: 2019-02-21

## 2019-02-21 PROBLEM — A41.9 SEVERE SEPSIS (HCC): Status: RESOLVED | Noted: 2019-02-18 | Resolved: 2019-02-21

## 2019-02-21 PROBLEM — M19.90 ARTHRITIS: Status: RESOLVED | Noted: 2018-08-01 | Resolved: 2019-02-21

## 2019-02-21 PROBLEM — D72.829 LEUKOCYTOSIS: Status: RESOLVED | Noted: 2019-02-04 | Resolved: 2019-02-21

## 2019-02-21 PROBLEM — R60.1 ANASARCA: Status: ACTIVE | Noted: 2019-02-21

## 2019-02-21 PROBLEM — N12 PYELONEPHRITIS: Status: RESOLVED | Noted: 2019-02-05 | Resolved: 2019-02-21

## 2019-02-21 PROBLEM — R74.01 TRANSAMINITIS: Status: RESOLVED | Noted: 2019-02-18 | Resolved: 2019-02-21

## 2019-02-21 PROBLEM — R26.9 GAIT ABNORMALITY: Status: RESOLVED | Noted: 2019-01-07 | Resolved: 2019-02-21

## 2019-02-21 PROBLEM — K75.0 BACTERIAL LIVER ABSCESS: Chronic | Status: ACTIVE | Noted: 2019-02-18

## 2019-02-21 PROBLEM — N17.9 AKI (ACUTE KIDNEY INJURY) (HCC): Status: RESOLVED | Noted: 2019-02-04 | Resolved: 2019-02-21

## 2019-02-21 PROBLEM — R65.20 SEVERE SEPSIS (HCC): Status: RESOLVED | Noted: 2019-02-18 | Resolved: 2019-02-21

## 2019-02-21 PROBLEM — D64.9 ANEMIA: Chronic | Status: ACTIVE | Noted: 2019-02-04

## 2019-02-22 ENCOUNTER — TRANSITIONAL CARE MANAGEMENT (OUTPATIENT)
Dept: FAMILY MEDICINE CLINIC | Facility: CLINIC | Age: 74
End: 2019-02-22

## 2019-02-25 PROBLEM — R59.0 LYMPHADENOPATHY, RETROPERITONEAL: Status: ACTIVE | Noted: 2019-02-25

## 2019-02-26 ENCOUNTER — HOSPITAL ENCOUNTER (EMERGENCY)
Facility: HOSPITAL | Age: 74
Discharge: HOME | End: 2019-02-26
Attending: EMERGENCY MEDICINE | Admitting: EMERGENCY MEDICINE
Payer: COMMERCIAL

## 2019-02-26 ENCOUNTER — APPOINTMENT (EMERGENCY)
Dept: CT IMAGING | Facility: HOSPITAL | Age: 74
End: 2019-02-26
Payer: COMMERCIAL

## 2019-02-26 ENCOUNTER — TELEPHONE (OUTPATIENT)
Dept: FAMILY MEDICINE CLINIC | Facility: CLINIC | Age: 74
End: 2019-02-26

## 2019-02-26 VITALS
OXYGEN SATURATION: 100 % | DIASTOLIC BLOOD PRESSURE: 70 MMHG | BODY MASS INDEX: 30.3 KG/M2 | HEART RATE: 63 BPM | TEMPERATURE: 97.3 F | WEIGHT: 182.1 LBS | SYSTOLIC BLOOD PRESSURE: 137 MMHG | RESPIRATION RATE: 16 BRPM

## 2019-02-26 DIAGNOSIS — T40.2X5A CONSTIPATION DUE TO OPIOID THERAPY: ICD-10-CM

## 2019-02-26 DIAGNOSIS — K56.2 VOLVULUS OF SIGMOID COLON (HCC): ICD-10-CM

## 2019-02-26 DIAGNOSIS — K59.03 CONSTIPATION DUE TO OPIOID THERAPY: ICD-10-CM

## 2019-02-26 DIAGNOSIS — E16.2 HYPOGLYCEMIA: Primary | ICD-10-CM

## 2019-02-26 DIAGNOSIS — R93.5 ABNORMAL CT OF THE ABDOMEN: ICD-10-CM

## 2019-02-26 LAB
ALBUMIN SERPL BCP-MCNC: 2.2 G/DL (ref 3.5–5)
ALP SERPL-CCNC: 826 U/L (ref 46–116)
ALT SERPL W P-5'-P-CCNC: 32 U/L (ref 12–78)
ANION GAP SERPL CALCULATED.3IONS-SCNC: 8 MMOL/L (ref 4–13)
AST SERPL W P-5'-P-CCNC: 42 U/L (ref 5–45)
ATRIAL RATE: 250 BPM
BACTERIA UR QL AUTO: ABNORMAL /HPF
BASOPHILS # BLD AUTO: 0.04 THOUSANDS/ΜL (ref 0–0.1)
BASOPHILS NFR BLD AUTO: 1 % (ref 0–1)
BILIRUB SERPL-MCNC: 0.47 MG/DL (ref 0.2–1)
BILIRUB UR QL STRIP: NEGATIVE
BUN SERPL-MCNC: 21 MG/DL (ref 5–25)
CALCIUM SERPL-MCNC: 8.2 MG/DL (ref 8.3–10.1)
CHLORIDE SERPL-SCNC: 106 MMOL/L (ref 100–108)
CLARITY UR: CLEAR
CO2 SERPL-SCNC: 22 MMOL/L (ref 21–32)
COLOR UR: YELLOW
CREAT SERPL-MCNC: 0.98 MG/DL (ref 0.6–1.3)
EOSINOPHIL # BLD AUTO: 0.05 THOUSAND/ΜL (ref 0–0.61)
EOSINOPHIL NFR BLD AUTO: 1 % (ref 0–6)
ERYTHROCYTE [DISTWIDTH] IN BLOOD BY AUTOMATED COUNT: 29.6 % (ref 11.6–15.1)
GFR SERPL CREATININE-BSD FRML MDRD: 57 ML/MIN/1.73SQ M
GLUCOSE SERPL-MCNC: 126 MG/DL (ref 65–140)
GLUCOSE SERPL-MCNC: 129 MG/DL (ref 65–140)
GLUCOSE SERPL-MCNC: 144 MG/DL (ref 65–140)
GLUCOSE SERPL-MCNC: 152 MG/DL (ref 65–140)
GLUCOSE UR STRIP-MCNC: ABNORMAL MG/DL
HCT VFR BLD AUTO: 36.7 % (ref 34.8–46.1)
HGB BLD-MCNC: 10.6 G/DL (ref 11.5–15.4)
HGB UR QL STRIP.AUTO: ABNORMAL
IMM GRANULOCYTES # BLD AUTO: 0.05 THOUSAND/UL (ref 0–0.2)
IMM GRANULOCYTES NFR BLD AUTO: 1 % (ref 0–2)
KETONES UR STRIP-MCNC: NEGATIVE MG/DL
LEUKOCYTE ESTERASE UR QL STRIP: ABNORMAL
LYMPHOCYTES # BLD AUTO: 1.07 THOUSANDS/ΜL (ref 0.6–4.47)
LYMPHOCYTES NFR BLD AUTO: 15 % (ref 14–44)
MCH RBC QN AUTO: 25.7 PG (ref 26.8–34.3)
MCHC RBC AUTO-ENTMCNC: 28.9 G/DL (ref 31.4–37.4)
MCV RBC AUTO: 89 FL (ref 82–98)
MONOCYTES # BLD AUTO: 0.74 THOUSAND/ΜL (ref 0.17–1.22)
MONOCYTES NFR BLD AUTO: 10 % (ref 4–12)
NEUTROPHILS # BLD AUTO: 5.35 THOUSANDS/ΜL (ref 1.85–7.62)
NEUTS SEG NFR BLD AUTO: 72 % (ref 43–75)
NITRITE UR QL STRIP: NEGATIVE
NON-SQ EPI CELLS URNS QL MICRO: ABNORMAL /HPF
NRBC BLD AUTO-RTO: 0 /100 WBCS
P AXIS: 90 DEGREES
PH UR STRIP.AUTO: 5.5 [PH] (ref 4.5–8)
PLATELET # BLD AUTO: 292 THOUSANDS/UL (ref 149–390)
PMV BLD AUTO: 12.1 FL (ref 8.9–12.7)
POTASSIUM SERPL-SCNC: 4.5 MMOL/L (ref 3.5–5.3)
PROT SERPL-MCNC: 6 G/DL (ref 6.4–8.2)
PROT UR STRIP-MCNC: NEGATIVE MG/DL
QRS AXIS: 62 DEGREES
QRSD INTERVAL: 80 MS
QT INTERVAL: 458 MS
QTC INTERVAL: 464 MS
RBC # BLD AUTO: 4.12 MILLION/UL (ref 3.81–5.12)
RBC #/AREA URNS AUTO: ABNORMAL /HPF
SODIUM SERPL-SCNC: 136 MMOL/L (ref 136–145)
SP GR UR STRIP.AUTO: 1.02 (ref 1–1.03)
T WAVE AXIS: 57 DEGREES
TSH SERPL DL<=0.05 MIU/L-ACNC: 2.72 UIU/ML (ref 0.36–3.74)
UROBILINOGEN UR QL STRIP.AUTO: 0.2 E.U./DL
VENTRICULAR RATE: 62 BPM
WBC # BLD AUTO: 7.3 THOUSAND/UL (ref 4.31–10.16)
WBC #/AREA URNS AUTO: ABNORMAL /HPF

## 2019-02-26 PROCEDURE — 93010 ELECTROCARDIOGRAM REPORT: CPT | Performed by: INTERNAL MEDICINE

## 2019-02-26 PROCEDURE — 81001 URINALYSIS AUTO W/SCOPE: CPT | Performed by: EMERGENCY MEDICINE

## 2019-02-26 PROCEDURE — 82948 REAGENT STRIP/BLOOD GLUCOSE: CPT

## 2019-02-26 PROCEDURE — 99285 EMERGENCY DEPT VISIT HI MDM: CPT

## 2019-02-26 PROCEDURE — 93005 ELECTROCARDIOGRAM TRACING: CPT

## 2019-02-26 PROCEDURE — 74176 CT ABD & PELVIS W/O CONTRAST: CPT

## 2019-02-26 PROCEDURE — 85025 COMPLETE CBC W/AUTO DIFF WBC: CPT | Performed by: EMERGENCY MEDICINE

## 2019-02-26 PROCEDURE — 80053 COMPREHEN METABOLIC PANEL: CPT | Performed by: EMERGENCY MEDICINE

## 2019-02-26 PROCEDURE — 36415 COLL VENOUS BLD VENIPUNCTURE: CPT | Performed by: EMERGENCY MEDICINE

## 2019-02-26 PROCEDURE — 84443 ASSAY THYROID STIM HORMONE: CPT | Performed by: EMERGENCY MEDICINE

## 2019-02-26 RX ORDER — ACETAMINOPHEN 325 MG/1
650 TABLET ORAL EVERY 4 HOURS PRN
COMMUNITY
End: 2019-03-02

## 2019-02-26 RX ORDER — INSULIN GLARGINE 100 [IU]/ML
20 INJECTION, SOLUTION SUBCUTANEOUS
COMMUNITY
End: 2019-03-02

## 2019-02-26 RX ORDER — MINERAL OIL 100 G/100G
1 OIL RECTAL AS NEEDED
COMMUNITY
End: 2019-03-02

## 2019-02-26 RX ORDER — DOXYCYCLINE HYCLATE 100 MG/1
100 CAPSULE ORAL ONCE
Status: COMPLETED | OUTPATIENT
Start: 2019-02-26 | End: 2019-02-26

## 2019-02-26 RX ADMIN — DOXYCYCLINE HYCLATE 100 MG: 100 CAPSULE ORAL at 14:43

## 2019-02-26 RX ADMIN — IOHEXOL 50 ML: 240 INJECTION, SOLUTION INTRATHECAL; INTRAVASCULAR; INTRAVENOUS; ORAL at 11:50

## 2019-02-26 RX ADMIN — PANCRELIPASE 6000 UNITS: 30000; 6000; 19000 CAPSULE, DELAYED RELEASE PELLETS ORAL at 14:43

## 2019-02-26 NOTE — DISCHARGE INSTRUCTIONS
Bowel Regiment  MiraLAX: 3 times a day for 3 days, then once daily  Colace: 1 pill twice a day  Citrucel: As directed on the bottle  You should drink at least 1 5 L of water per day    If you have been taking narcotic pain medication you should add:  Senokot: 2 tablets at bedtime

## 2019-02-26 NOTE — ED NOTES
RN entered room to discharge pt  Pt refused stating "I need my creon and my doxycycline  I am hours overdue  I can't believe you did not give me my home medications  I need them now!"    RN inquired with pt re how to get back to [facility] and if they have her afternoon doses  Pt stated "I was kicked out of the nursing home today  They won't let me back " Pts son at bedside and 'confirmed' this statement, saying "she has not been home in a month, they have all her meds  We don't have anything   You need to give her her medications "    RN will d/w dr Hayes Gave, RN  02/26/19 0077

## 2019-02-26 NOTE — ED NOTES
Patient OOB to bathroom with assistance of nursing staff and walker  Instructed on call bell use when finished        Chelsea Ramon RN  02/26/19 [id number]

## 2019-02-26 NOTE — ED NOTES
Spoke with Dannielle Meehan, from [facility], wanted an update on patient       Ml Craven RN  02/26/19 0441

## 2019-02-26 NOTE — ED NOTES
NAOMI  spoke with [facility]  and confirmed pt does have bed at their facility  OK for d/c to [facility] with son        Sinai Irwin RN  02/26/19 [id number]
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
## 2019-02-26 NOTE — ED PROVIDER NOTES
History  Chief Complaint   Patient presents with    Hypoglycemia - Symptomatic     Patient arrives via EMS from [employer] [facility]  Per EMS patient was found unconcious with a blood sugar of 41  Given to shots of glucagon and D5W  BS to 212  Patient arrives awake and alert  Diaphoretic      56-year-old female presents for evaluation of unresponsive episode that occurred at [facility]  The patient was found unresponsive  She was given 2 IM shot of glucagon and some D5W with minimal improvement of her blood sugar  Her blood sugar was initially 41 and 1 up to 66  ALS arrived established a peripheral IV and gave an amp of D50 raising her blood sugar to 212  At this time the patient became awake and alert  The patient was then otherwise without complaint  The patient cannot recall any previous events of the morning  Prior to Admission Medications   Prescriptions Last Dose Informant Patient Reported? Taking?    CREON 23807-51277 units  Self No Yes   Sig: TAKE 3 WITH BREAKFAST, 3 WITH LUNCH, 3 WITH DINNER AND 3 WITH SNACKS   Patient taking differently: No sig reported   Menthol 7 6 MG LOZG   No Yes   Sig: Apply 1 lozenge (7 6 mg total) to the mouth or throat every 2 (two) hours as needed (sore throat)   acetaminophen (TYLENOL) 325 mg tablet   Yes Yes   Sig: Take 650 mg by mouth every 4 (four) hours as needed for mild pain   bisacodyl (DULCOLAX) 5 mg EC tablet   Yes Yes   Sig: Take 10 mg by mouth daily as needed for constipation   dimethicone 1 % cream   Yes Yes   Sig: Apply topically 2 (two) times a day as needed for dry skin   diphenhydrAMINE (BENADRYL) 50 MG tablet   Yes Yes   Sig: Take 50 mg by mouth every 6 (six) hours as needed for itching   doxycycline hyclate (VIBRAMYCIN) 100 mg capsule   No Yes   Sig: Take 1 capsule (100 mg total) by mouth every 12 (twelve) hours for 14 days   ferrous sulfate 325 (65 Fe) mg tablet   No Yes   Sig: Take 1 tablet (325 mg total) by mouth daily with breakfast furosemide (LASIX) 40 mg tablet   No No   Si Tab daily p r n  edema   insulin glargine (LANTUS) 100 units/mL subcutaneous injection   Yes Yes   Sig: Inject 20 Units under the skin daily at bedtime   insulin lispro protamine-insulin lispro (HumaLOG 50-50) 100 units/mL   Yes Yes   Sig: Inject 10 Units under the skin 3 (three) times a day before meals   lisinopril (ZESTRIL) 20 mg tablet   No No   Sig: Take 1 tablet (20 mg total) by mouth daily   metolazone (ZAROXOLYN) 5 mg tablet   No No   Sig: Take 1 tab qod   metoprolol succinate (TOPROL-XL) 100 mg 24 hr tablet   No No   Sig: Take 1 tablet (100 mg total) by mouth daily   mineral oil enema   Yes Yes   Sig: Insert 1 enema into the rectum as needed for constipation   oxyCODONE (OXY-IR) 5 MG capsule   No No   Sig: Take 1 capsule (5 mg total) by mouth 2 (two) times a day as needed for moderate pain Max Daily Amount: 10 mg   oxyCODONE (ROXICODONE) 5 mg immediate release tablet   No No   Sig: Take 1 tablet (5 mg total) by mouth every 4 (four) hours as needed for moderate pain for up to 10 daysMax Daily Amount: 30 mg   potassium chloride (MICRO-K) 10 MEQ CR capsule   No No   Sig: Take 2 capsules (20 mEq total) by mouth daily   tuberculin (TUBERSOL) 5 units/0 1 mL   Yes Yes   Sig: Inject 5 Units into the skin once Two times monthly      Facility-Administered Medications: None       Past Medical History:   Diagnosis Date    Cancer [facility])     Breast; Last Assessed: 2016    Carpal tunnel syndrome     unspecified laterality;  Onset: 2012    Cellulitis     Last Assessesd: 2012    Diabetes mellitus out of control ([facility] )     Last Assessed: 11/3/2014    Fatigue     Last Assessed:     Fracture of toe     Last Assessed: 2014    Ovarian cancer ([facility] )     Last Assessed: 2016    Pancreatic insufficiency 2019    Rectal cancer [facility])        Past Surgical History:   Procedure Laterality Date    COLON SURGERY      IR IMAGE GUIDED ASPIRATION / DRAINAGE  2/9/2019    MASTECTOMY      bilateral    PANCREATECTOMY  2010    Proximal Subtotal (whipple procedure)       Family History   Problem Relation Age of Onset    Melanoma Mother     Stroke Father      I have reviewed and agree with the history as documented  Social History     Tobacco Use    Smoking status: Never Smoker    Smokeless tobacco: Never Used   Substance Use Topics    Alcohol use: No    Drug use: No        Review of Systems   Constitutional: Negative for chills and fever  Neurological: Positive for weakness  All other systems reviewed and are negative  Physical Exam  Physical Exam   Constitutional: She is oriented to person, place, and time  She appears well-developed and well-nourished  No distress  HENT:   Head: Normocephalic and atraumatic  Right Ear: External ear normal    Left Ear: External ear normal    Eyes: Pupils are equal, round, and reactive to light  Conjunctivae and EOM are normal  No scleral icterus  Neck: Normal range of motion  Cardiovascular: Normal rate, regular rhythm and normal heart sounds  Pulmonary/Chest: Effort normal and breath sounds normal  No respiratory distress  Abdominal: Soft  Bowel sounds are normal  There is no tenderness  There is no rebound and no guarding  ADALBERTO drain from the ever noted  Clean dry and intact for with serous drainage   Musculoskeletal: Normal range of motion  She exhibits no edema  Neurological: She is alert and oriented to person, place, and time  Skin: Skin is warm and dry  No rash noted  Psychiatric: She has a normal mood and affect  Nursing note and vitals reviewed        Vital Signs  ED Triage Vitals   Temperature Pulse Respirations Blood Pressure SpO2   02/26/19 0913 02/26/19 0856 02/26/19 0856 02/26/19 0856 02/26/19 0856   (!) 96 °F (35 6 °C) 76 18 162/77 100 %      Temp Source Heart Rate Source Patient Position - Orthostatic VS BP Location FiO2 (%)   02/26/19 6179 02/26/19 0856 02/26/19 0856 02/26/19 2486 --   Oral Monitor Lying Right arm       Pain Score       02/26/19 0856       No Pain           Vitals:    02/26/19 0932 02/26/19 1015 02/26/19 1130 02/26/19 1240   BP: 151/70 136/71  137/70   Pulse: (!) 52 (!) 54 56 63   Patient Position - Orthostatic VS: Lying Lying  Lying       Visual Acuity  Visual Acuity      Most Recent Value   L Pupil Size (mm)  3   R Pupil Size (mm)  3          ED Medications  Medications    EMS REPLENISHMENT MED ( Does not apply Given to EMS 2/26/19 0911)   iohexol (OMNIPAQUE) 240 MG/ML solution 50 mL (50 mL Oral Given 2/26/19 1150)   pancrelipase (Lip-Prot-Amyl) (CREON) delayed release capsule 6,000 Units (6,000 Units Oral Given 2/26/19 1443)   doxycycline hyclate (VIBRAMYCIN) capsule 100 mg (100 mg Oral Given 2/26/19 1443)       Diagnostic Studies  Results Reviewed     Procedure Component Value Units Date/Time    Urine Microscopic [[id number]]  (Abnormal) Collected:  02/26/19 1100    Lab Status:  Final result Specimen:  Urine, Clean Catch Updated:  02/26/19 1137     RBC, UA 2-4 /hpf      WBC, UA 10-20 /hpf      Epithelial Cells Occasional /hpf      Bacteria, UA Occasional /hpf     UA w Reflex to Microscopic [[id number]]  (Abnormal) Collected:  02/26/19 1100    Lab Status:  Final result Specimen:  Urine, Clean Catch Updated:  02/26/19 1120     Color, UA Yellow     Clarity, UA Clear     Specific Gravity, UA 1 025     pH, UA 5 5     Leukocytes, UA Trace     Nitrite, UA Negative     Protein, UA Negative mg/dl      Glucose,  (1/10%) mg/dl      Ketones, UA Negative mg/dl      Urobilinogen, UA 0 2 E U /dl      Bilirubin, UA Negative     Blood, UA Moderate    TSH [[id number]]  (Normal) Collected:  02/26/19 0911    Lab Status:  Final result Specimen:  Blood from Arm, Left Updated:  02/26/19 1113     TSH 3RD GENERATON 2 719 uIU/mL     Narrative:       Patients undergoing fluorescein dye angiography may retain small amounts of fluorescein in the body for 48-72 hours post procedure   Samples containing fluorescein can produce falsely depressed TSH values  If the patient had this procedure,a specimen should be resubmitted post fluorescein clearance  Fingerstick Glucose (POCT) [[id number]]  (Normal) Collected:  02/26/19 1005    Lab Status:  Final result Updated:  02/26/19 1006     POC Glucose 126 mg/dl     Comprehensive metabolic panel [[id number]]  (Abnormal) Collected:  02/26/19 0911    Lab Status:  Final result Specimen:  Blood from Arm, Left Updated:  02/26/19 0946     Sodium 136 mmol/L      Potassium 4 5 mmol/L      Chloride 106 mmol/L      CO2 22 mmol/L      ANION GAP 8 mmol/L      BUN 21 mg/dL      Creatinine 0 98 mg/dL      Glucose 129 mg/dL      Calcium 8 2 mg/dL      AST 42 U/L      ALT 32 U/L      Alkaline Phosphatase 826 U/L      Total Protein 6 0 g/dL      Albumin 2 2 g/dL      Total Bilirubin 0 47 mg/dL      eGFR 57 ml/min/1 73sq m     Narrative:       National Kidney Disease Education Program recommendations are as follows:  GFR calculation is accurate only with a steady state creatinine  Chronic Kidney disease less than 60 ml/min/1 73 sq  meters  Kidney failure less than 15 ml/min/1 73 sq  meters      Fingerstick Glucose (POCT) [[id number]]  (Abnormal) Collected:  02/26/19 0931    Lab Status:  Final result Updated:  02/26/19 0933     POC Glucose 144 mg/dl     CBC and differential [[id number]]  (Abnormal) Collected:  02/26/19 0911    Lab Status:  Final result Specimen:  Blood from Arm, Left Updated:  02/26/19 0921     WBC 7 30 Thousand/uL      RBC 4 12 Million/uL      Hemoglobin 10 6 g/dL      Hematocrit 36 7 %      MCV 89 fL      MCH 25 7 pg      MCHC 28 9 g/dL      RDW 29 6 %      MPV 12 1 fL      Platelets 030 Thousands/uL      nRBC 0 /100 WBCs      Neutrophils Relative 72 %      Immat GRANS % 1 %      Lymphocytes Relative 15 %      Monocytes Relative 10 %      Eosinophils Relative 1 %      Basophils Relative 1 %      Neutrophils Absolute 5 35 Thousands/µL      Immature Grans Absolute 0 05 Thousand/uL      Lymphocytes Absolute 1 07 Thousands/µL      Monocytes Absolute 0 74 Thousand/µL      Eosinophils Absolute 0 05 Thousand/µL      Basophils Absolute 0 04 Thousands/µL     Fingerstick Glucose (POCT) [[id number]]  (Abnormal) Collected:  02/26/19 0859    Lab Status:  Final result Updated:  02/26/19 0900     POC Glucose 152 mg/dl                  CT abdomen pelvis wo contrast   Final Result by Myrtle Solano MD (02/26 1300)   1  Sigmoid volvulus without evidence of high-grade obstruction  There is at least some upstream fecalization of bowel contents  The appearance is not significantly changed since 2/4/2019    2   Wall thickening of the splenic flexure may indicate colitis of infectious or inflammatory etiology  3   Pigtail catheter remains within the liver without evidence of a residual drainable collection  3   Unchanged appearance of enlarged left kidney with effacement of the sinus fat which may indicate renal vein thrombosis, pyelonephritis or less likely severe hydronephrosis  4   Soft tissue around the SMA in the post Whipple setting is worrisome  Residual disease is not excluded  5   New small bilateral pleural effusions  6   Severe anasarca  Moderate abdominopelvic ascites has increased  7   Bilateral adrenal masses and prominent retroperitoneal lymph nodes are unchanged  I personally discussed findings regarding sigmoid volvulus with SUSANA BLISS on 2/26/2019 at 12:59 PM                Workstation performed: [id number]                    Procedures  ECG 12 Lead Documentation  Date/Time: 2/26/2019 9:04 AM  Performed by: Donn Boone DO  Authorized by:  Donn Boone DO     Indications / Diagnosis:  Unresponsive  ECG reviewed by me, the ED Provider: yes    Patient location:  ED  Previous ECG:     Previous ECG:  Compared to current    Similarity:  No change  Interpretation:     Interpretation: abnormal    Rate:     ECG rate:  62    ECG rate assessment: normal Rhythm:     Rhythm: sinus rhythm and atrial flutter    Ectopy:     Ectopy: none    QRS:     QRS axis:  Normal    QRS intervals:  Normal  Conduction:     Conduction: normal    ST segments:     ST segments:  Normal  T waves:     T waves: normal             Phone Contacts  ED Phone Contact    ED Course  ED Course as of Feb 26 1509   Tue Feb 26, 2019   1256 D/W Dr India Weinberg, Patient with sigmoid volvulus which is seen on CT previously  N changes in volvulus from previous study  Abscess in right lower quadrant has resolved      1304 On re-evaluation, the patient does not have any abdominal pain on the left side of her abdomen  She is not complaining of any abdominal pain at all this time  Call placed to surgery to discuss the results the findings on CT scan  46 D/w Dr Aylin Trimble                                  MDM  Number of Diagnoses or Management Options  Abnormal CT of the abdomen: new and requires workup  Constipation due to opioid therapy: new and requires workup  Hypoglycemia: new and requires workup  Volvulus of sigmoid colon [facility]): new and requires workup  Diagnosis management comments: Laboratories and a CT abdomen pelvis obtain  The patient's hyperglycemia was managed in the emergency department without recurrent blood draw blood sugar change in mental status  The CT abdomen pelvis was discussed with General surgery who advised outpatient follow-up with GI  The plan is for discharge and return to the skilled nursing facility    The patient (and any family present) verbalized understanding of the discharge instructions and warnings that would necessitate return to the Emergency Department  All questions were answered prior to discharge         Amount and/or Complexity of Data Reviewed  Clinical lab tests: ordered and reviewed  Tests in the radiology section of CPT®: ordered and reviewed  Tests in the medicine section of CPT®: ordered and reviewed  Review and summarize past medical records: yes  Discuss the patient with other providers: yes  Independent visualization of images, tracings, or specimens: yes        Disposition  Final diagnoses:   Hypoglycemia   Abnormal CT of the abdomen   Volvulus of sigmoid colon ([facility] )   Constipation due to opioid therapy     Time reflects when diagnosis was documented in both MDM as applicable and the Disposition within this note     Time User Action Codes Description Comment    2/26/2019  1:25 PM Harpreet Cork Add [E16 2] Hypoglycemia     2/26/2019  1:26 PM Harpreet Cork Add [R93 5] Abnormal CT of the abdomen     2/26/2019  1:26 PM Harpreet Cork Add [K56 2] Volvulus of sigmoid colon ([facility] )     2/26/2019  1:26 PM Harpreet Cork Add [K59 03,  Niya Exton Constipation due to opioid therapy       ED Disposition     ED Disposition Condition Date/Time Comment    Discharge Stable Tue Feb 26, 2019  1:25 PM Anel Severe discharge to SNF              Follow-up Information     Follow up With Specialties Details Why Contact Info Additional Information    David Vazquez DO Family Medicine Schedule an appointment as soon as possible for a visit  For further evaluation      Suhail Green MD  Call  For further evaluation [facility] [id number] [place] [address] [place] [address] Gastroenterology [facility] Gastroenterology  For further evaluation of voluvulus [address]  [address] [facility] [id number]  Imelad Boyer [address] G[facility], [address],  [address], [place], [place], [address]          Patient's Medications   Discharge Prescriptions    No medications on file     No discharge procedures on file      ED Provider  Electronically Signed by           Fannie Painting DO  02/26/19 9481

## 2019-02-27 NOTE — TELEPHONE ENCOUNTER
Pt's son Roberto Olguin ok called the office requesting an order for sliding scale  Pt was in the hospital she was given too much insulin         [employer] number [phone]

## 2019-02-27 NOTE — TELEPHONE ENCOUNTER
Herve carlson needs the order for the original Sliding-Scale  Please fax the order to [phone]    Thank you!

## 2019-02-27 NOTE — TELEPHONE ENCOUNTER
Fax the following sliding scale:(novolog or humolog)    -200:  Give 5 units  -250: Give 10 units  -300: Give 15 units  -350: Give 20 units  -400: Give 25 units  BS >401: Call physician

## 2019-02-28 PROBLEM — Z79.899 POLYPHARMACY: Status: ACTIVE | Noted: 2019-02-28

## 2019-03-02 PROBLEM — R59.0 LYMPHADENOPATHY, RETROPERITONEAL: Chronic | Status: ACTIVE | Noted: 2019-02-25

## 2019-03-04 ENCOUNTER — TELEPHONE (OUTPATIENT)
Dept: FAMILY MEDICINE CLINIC | Facility: CLINIC | Age: 74
End: 2019-03-04

## 2019-03-04 ENCOUNTER — TELEPHONE (OUTPATIENT)
Dept: GERIATRICS | Age: 74
End: 2019-03-04

## 2019-03-04 ENCOUNTER — OFFICE VISIT (OUTPATIENT)
Dept: FAMILY MEDICINE CLINIC | Facility: CLINIC | Age: 74
End: 2019-03-04
Payer: COMMERCIAL

## 2019-03-04 VITALS
SYSTOLIC BLOOD PRESSURE: 118 MMHG | OXYGEN SATURATION: 96 % | RESPIRATION RATE: 16 BRPM | HEART RATE: 68 BPM | DIASTOLIC BLOOD PRESSURE: 70 MMHG | TEMPERATURE: 98.1 F | WEIGHT: 171.8 LBS | BODY MASS INDEX: 28.59 KG/M2

## 2019-03-04 DIAGNOSIS — Z85.3 PERSONAL HISTORY OF BREAST CANCER: ICD-10-CM

## 2019-03-04 DIAGNOSIS — E89.1 DIABETES MELLITUS SECONDARY TO PANCREATECTOMY (HCC): Chronic | ICD-10-CM

## 2019-03-04 DIAGNOSIS — Z85.07 PERSONAL HISTORY OF PANCREATIC CANCER: ICD-10-CM

## 2019-03-04 DIAGNOSIS — A41.9 SEPSIS, DUE TO UNSPECIFIED ORGANISM: Primary | ICD-10-CM

## 2019-03-04 DIAGNOSIS — K75.0 BACTERIAL LIVER ABSCESS: Chronic | ICD-10-CM

## 2019-03-04 DIAGNOSIS — Z85.038 PERSONAL HISTORY OF COLON CANCER, STAGE II: ICD-10-CM

## 2019-03-04 DIAGNOSIS — Z90.410 DIABETES MELLITUS SECONDARY TO PANCREATECTOMY (HCC): Chronic | ICD-10-CM

## 2019-03-04 DIAGNOSIS — E13.9 DIABETES MELLITUS SECONDARY TO PANCREATECTOMY (HCC): Chronic | ICD-10-CM

## 2019-03-04 DIAGNOSIS — N17.9 AKI (ACUTE KIDNEY INJURY) (HCC): ICD-10-CM

## 2019-03-04 DIAGNOSIS — R60.0 LOCALIZED EDEMA: ICD-10-CM

## 2019-03-04 PROBLEM — R60.1 ANASARCA: Chronic | Status: RESOLVED | Noted: 2019-02-21 | Resolved: 2019-03-04

## 2019-03-04 PROBLEM — K86.89 PANCREATIC INSUFFICIENCY: Chronic | Status: RESOLVED | Noted: 2019-02-21 | Resolved: 2019-03-04

## 2019-03-04 PROBLEM — Z79.899 POLYPHARMACY: Status: RESOLVED | Noted: 2019-02-28 | Resolved: 2019-03-04

## 2019-03-04 PROBLEM — K64.8 OTHER HEMORRHOIDS: Status: RESOLVED | Noted: 2018-11-26 | Resolved: 2019-03-04

## 2019-03-04 PROCEDURE — 36415 COLL VENOUS BLD VENIPUNCTURE: CPT | Performed by: FAMILY MEDICINE

## 2019-03-04 PROCEDURE — 1160F RVW MEDS BY RX/DR IN RCRD: CPT | Performed by: FAMILY MEDICINE

## 2019-03-04 PROCEDURE — 99496 TRANSJ CARE MGMT HIGH F2F 7D: CPT | Performed by: FAMILY MEDICINE

## 2019-03-04 NOTE — ASSESSMENT & PLAN NOTE
History of fairly significant localized edema  Currently on torsemide 10 mg daily    Will most likely increase this dosage once I receive his lab results

## 2019-03-04 NOTE — ASSESSMENT & PLAN NOTE
This is a transition of care management visit for patient who was admitted on February 4th due to weakness and inability to walk  This was an abrupt onset  Patient was found to be severely septic with polymicrobial bacteremia likely due to liver abscess  Patient was treated with broad-spectrum antibiotics and was followed by infectious disease  During admission, patient had aspiration of liver abscess  She tolerated this procedure well  Patient also developed acute kidney injury and transaminitis during admission  Patient completed course of IV antibiotics and was switched to oral doxycycline  Patient will be followed by Interventional Radiology to check for resolution of abscess  If abscess resolved then antibiotic will be discontinued and her ADALBERTO drain will be removed  Patient was stabilized and then transferred to [facility] for rehabilitation/PT/OT services  She was discharged there on March 3rd  She is currently much improved, although she still feels fairly weak  Her exam reveals fairly significant lower extremity edema, but her lungs are clear  Will check labs today (CBC, CMP)  Will most likely adjust her torsemide 10 once I get these results    Lastly, will give Rx for hospital bed and refer patient for palliative care consult

## 2019-03-04 NOTE — ASSESSMENT & PLAN NOTE
This was drained percutaneously by Interventional Radiology during patient's admission  Patient currently has ADALBERTO drain which is draining minimal amount of serosanguineous drainage  She will have follow-up imaging to make sure abscess is resolved and then will have drain removed

## 2019-03-04 NOTE — ASSESSMENT & PLAN NOTE
History of acute kidney injury due to sepsis  Last creatinine was normal on February 26th  Will repeat today    If remains stable, will increase torsemide

## 2019-03-04 NOTE — PROGRESS NOTES
[facility]      NAME: Angelo Saez  AGE: 75 y o  SEX: female  : 1945   MRN: [id number]    DATE: 3/4/2019  TIME: 11:51 AM  TCM Call (since 2019)     Date and time call was made  2019  9:53 AM    Hospital care reviewed  Records not available Records release was faxed to PAMELA LAO on 3/4/2019 at 11:39 am to [phone]    Patient was hospitialized at  Other (comment)    [facility]    Date of discharge  19    Diagnosis  Severe Spsis    Disposition  Home    Were the patients medications reviewed and updated  No    Current Symptoms  None      TCM Call (since 2019)     Post hospital issues  Reduced activity    Should patient be enrolled in anticoag monitoring? No    Scheduled for follow up? 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 ([facility] [address] ) (Chronic)     History of excellent control  Currently on sliding scale NovoLog along with Lantus/Tresiba  Bacterial liver abscess (Chronic)     This was drained percutaneously by Interventional Radiology during patient's admission  Patient currently has ADALBERTO drain which is draining minimal amount of serosanguineous drainage  She will have follow-up imaging to make sure abscess is resolved and then will have drain removed           Personal history of breast cancer    Relevant Orders    Bed    Ambulatory referral to Palliative Care    Personal history of pancreatic cancer    Relevant Orders    Bed    Ambulatory referral to Palliative Care    Personal history of colon cancer, stage II    Relevant Orders Bed    Ambulatory referral to Palliative Care    Localized edema     History of fairly significant localized edema  Currently on torsemide 10 mg daily  Will most likely increase this dosage once I receive his lab results         Sepsis [facility]) - Primary     This is a transition of care management visit for patient who was admitted on February 4th due to weakness and inability to walk  This was an abrupt onset  Patient was found to be severely septic with polymicrobial bacteremia likely due to liver abscess  Patient was treated with broad-spectrum antibiotics and was followed by infectious disease  During admission, patient had aspiration of liver abscess  She tolerated this procedure well  Patient also developed acute kidney injury and transaminitis during admission  Patient completed course of IV antibiotics and was switched to oral doxycycline  Patient will be followed by Interventional Radiology to check for resolution of abscess  If abscess resolved then antibiotic will be discontinued and her ADALBERTO drain will be removed  Patient was stabilized and then transferred to [facility] for rehabilitation/PT/OT services  She was discharged there on March 3rd  She is currently much improved, although she still feels fairly weak  Her exam reveals fairly significant lower extremity edema, but her lungs are clear  Will check labs today (CBC, CMP)  Will most likely adjust her torsemide 10 once I get these results  Lastly, will give Rx for hospital bed and refer patient for palliative care consult           Relevant Orders    CBC and differential    Comprehensive metabolic panel    Bed    Ambulatory referral to Palliative Care    ANNE (acute kidney injury) ([facility] [address] )     History of acute kidney injury due to sepsis  Last creatinine was normal on February 26th  Will repeat today  If remains stable, will increase torsemide                   Return to office in:  Will draw CBC/CMP today    Will call with results    Will referred for palliative care consult    Chief Complaint     Chief Complaint   Patient presents with    Transition of Care Management     ED CAROLINE ROMEO then transferred to [facility] 2/21-3/3  Pt still has edema in legs  History of Present Illness     This is a transition of care management visit for patient who was admitted on February 4th due to weakness and inability to walk  This was an abrupt onset  Patient was found to be severely septic with polymicrobial bacteremia likely due to liver abscess  Patient was treated with broad-spectrum antibiotics and was followed by infectious disease  During admission, patient had aspiration of liver abscess  She tolerated this procedure well  Patient also developed acute kidney injury and transaminitis during admission  Patient completed course of IV antibiotics and was switched to oral doxycycline  Patient will be followed by Interventional Radiology to check for resolution of abscess  If abscess resolved then antibiotic will be discontinued and her ADALBERTO drain will be removed  Patient was stabilized and then transferred to [facility] for rehabilitation/PT/OT services  She was discharged there on March 3rd      The following portions of the patient's history were reviewed and updated as appropriate: allergies, current medications, past family history, past medical history, past social history, past surgical history and problem list     Review of Systems   Review of Systems   Constitutional: Positive for fatigue  Negative for fever  Respiratory: Negative  Cardiovascular: Positive for leg swelling  Gastrointestinal: Negative  Genitourinary: Negative          Active Problem List     Patient Active Problem List   Diagnosis    Benign essential hypertension    Vitamin D deficiency    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 ([facility] [address] )    Personal history of breast cancer    Personal history of pancreatic cancer    Personal history of colon cancer, stage II    Localized edema    Sepsis ([facility] [address] )    ANNE (acute kidney injury) ([facility] [address] )    Anemia of acute infection    Ascites    Bacterial liver abscess    Chronic diastolic CHF (congestive heart failure) (HCC)    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    Monoallelic mutation of CHEK2 gene    Cavernous hemangioma of intracranial structure (HCC)    S/P partial colectomy    Lymphadenopathy, retroperitoneal       Objective   /70 (BP Location: Left arm, Patient Position: Sitting, Cuff Size: Adult)   Pulse 68   Temp 98 1 °F (36 7 °C) (Tympanic)   Resp 16   Wt 77 9 kg (171 lb 12 8 oz)   SpO2 96%   BMI 28 59 kg/m²     Physical Exam   Cardiovascular: Normal rate, regular rhythm, normal heart sounds and intact distal pulses  Carotids: no bruits  Ext: no edema   Pulmonary/Chest: Effort normal  No respiratory distress  She has no wheezes  She has no rales  Musculoskeletal: She exhibits edema  Psychiatric: She has a normal mood and affect   Her behavior is normal  Thought content normal        Pertinent Laboratory/Diagnostic Studies:  Hospital records    Current Medications     Current Outpatient Medications:     doxycycline hyclate (VIBRAMYCIN) 100 mg capsule, Take 1 capsule (100 mg total) by mouth every 12 (twelve) hours for 7 doses, Disp: 7 capsule, Rfl: 0    insulin aspart (NOVOLOG FLEXPEN) 100 Units/mL injection pen, Inject 10 Units under the skin 3 (three) times a day with meals, Disp: 3 pen, Rfl: 0    insulin degludec (TRESIBA) 100 units/mL injection pen, Inject 5 Units under the skin daily, Disp: 3 pen, Rfl: 2    pancrelipase, Lip-Prot-Amyl, (CREON) 24,000 units, Take 1 capsule (24,000 Units total) by mouth 3 (three) times a day with meals, Disp: 90 capsule, Rfl: 0    torsemide (DEMADEX) 10 mg tablet, Take 1 tablet (10 mg total) by mouth daily, Disp: 30 tablet, Rfl: 0    Health Maintenance     Health Maintenance   Topic Date Due    SLP PLAN OF CARE 1945    BMI: Followup Plan  03/25/1962    HEPATITIS B VACCINES (1 of 3 - Risk 3-dose series) 03/25/1963    Diabetic Foot Exam  01/12/2018    INFLUENZA VACCINE  01/25/2020 (Originally 7/1/2018)    HEMOGLOBIN A1C  05/13/2019    URINE MICROALBUMIN  11/13/2019    Fall Risk  11/15/2019    Depression Screening PHQ  11/15/2019    Urinary Incontinence Screening  11/15/2019    Medicare Annual Wellness Visit (AWV)  11/15/2019    DM Eye Exam  11/15/2019    BMI: Adult  02/26/2020    DTaP,Tdap,and Td Vaccines (3 - Td) 09/21/2027    CRC Screening: Colonoscopy  12/18/2028    Pneumococcal PPSV23/PCV13 65+ Years / High and Highest Risk  Completed     Immunization History   Administered Date(s) Administered    Pneumococcal Conjugate 13-Valent 11/23/2015    Pneumococcal Polysaccharide PPV23 11/10/2014    Td (adult), adsorbed 10/05/2010    Tdap 09/29/2014, 09/21/2017       DO Rowdy Arreola [facility]

## 2019-03-05 DIAGNOSIS — K75.0 LIVER ABSCESS: ICD-10-CM

## 2019-03-05 RX ORDER — DOXYCYCLINE HYCLATE 100 MG/1
100 CAPSULE ORAL EVERY 12 HOURS SCHEDULED
Qty: 20 CAPSULE | Refills: 0 | Status: CANCELLED | OUTPATIENT
Start: 2019-03-05 | End: 2019-03-15

## 2019-03-05 NOTE — TELEPHONE ENCOUNTER
[facility] from  home care called has some questions regarding pts visit yesterday 3/4/19   Was asking about study images not being ordered, and meds not matching our med list and whats in the home, and pt needs more doxy abx has 4 left and not sure when duc drain is to come out pls advise

## 2019-03-05 NOTE — TELEPHONE ENCOUNTER
Pt called back into the office this morning stated to me that she has some very important questions to ask you  I did let the pt know you are out of the office today and will be returning tomorrow  Pt also stated to me that the code you used will not qualify her for a hospital bed  She stated that she will need a code for knee, hip or lower back pain  [facility] medical supply phone number [phone] Josse Tee) please advise  Thank you!

## 2019-03-06 DIAGNOSIS — R60.0 LOCALIZED EDEMA: Primary | ICD-10-CM

## 2019-03-06 DIAGNOSIS — R60.1 ANASARCA: Chronic | ICD-10-CM

## 2019-03-06 DIAGNOSIS — K75.0 LIVER ABSCESS: ICD-10-CM

## 2019-03-06 RX ORDER — TORSEMIDE 20 MG/1
20 TABLET ORAL DAILY
Qty: 30 TABLET | Refills: 2 | Status: SHIPPED | OUTPATIENT
Start: 2019-03-06 | End: 2019-03-22 | Stop reason: SDUPTHER

## 2019-03-06 RX ORDER — POTASSIUM CHLORIDE 750 MG/1
10 CAPSULE, EXTENDED RELEASE ORAL 2 TIMES DAILY
Qty: 60 CAPSULE | Refills: 5
Start: 2019-03-06 | End: 2019-04-10

## 2019-03-06 RX ORDER — DOXYCYCLINE HYCLATE 100 MG/1
100 CAPSULE ORAL EVERY 12 HOURS SCHEDULED
Qty: 20 CAPSULE | Refills: 0 | Status: SHIPPED | OUTPATIENT
Start: 2019-03-06 | End: 2019-03-10

## 2019-03-06 NOTE — TELEPHONE ENCOUNTER
I spoke with patient:    1) will help to coordinate follow-up with Interventional Radiology so she can have her ADALBERTO drain removed  2) Rx sent for doxycycline (10 additional days)  3) Rx sent for increased dose a torsemide 20 mg daily  4) continue potassium chloride 20 mEq daily  5) will update diagnoses for hospital bed  6) keep upcoming scheduled follow-up appointment with me

## 2019-03-06 NOTE — TELEPHONE ENCOUNTER
Kathie Moreau would like the doxycycline sent  to the [facility] in [facility]    Dr Yin she  would greatly appreciate a call once you are in the office her number is [phone]

## 2019-03-06 NOTE — TELEPHONE ENCOUNTER
Call Xiomara Dueñas from [facility]  Elena's discharge instructions stated that patient is to follow up w/ Interventional Radiology  They will determine how long she needs ADALBERTO drain       As far as doxycycline is concerned, what pharmacy does she want med sent to?

## 2019-03-07 ENCOUNTER — TELEPHONE (OUTPATIENT)
Dept: FAMILY MEDICINE CLINIC | Facility: CLINIC | Age: 74
End: 2019-03-07

## 2019-03-07 DIAGNOSIS — M17.10 OSTEOARTHRITIS OF KNEE, UNSPECIFIED LATERALITY, UNSPECIFIED OSTEOARTHRITIS TYPE: ICD-10-CM

## 2019-03-07 DIAGNOSIS — M47.816 OSTEOARTHRITIS OF LUMBAR SPINE, UNSPECIFIED SPINAL OSTEOARTHRITIS COMPLICATION STATUS: Primary | ICD-10-CM

## 2019-03-07 NOTE — TELEPHONE ENCOUNTER
f from [facility] msg on front line rx sent over for hospital bed diagnosis code does not qualify for pt   Needs to be degenerative joint disease, knee, back if any questions please call

## 2019-03-07 NOTE — TELEPHONE ENCOUNTER
Elena's son Oneyda Robles called and stated that he dialed the number we gave him and it is a cardiologists break room number  I asked him to hold so I could speak with Larissa and she stated that is the number she was given he needs to speak to Cindi Parham  I called and spoke to a staff member and apologized  I was told by Larissa to give them the [facility] in [facility] [address] number and have him ask for radiology  Unfortunately it disconnected and pt's son is now speaking with Ardis Baumgarten  I informed it is the wrong number and what Larissa told me was given to Ardis Baumgarten

## 2019-03-07 NOTE — TELEPHONE ENCOUNTER
----- Message from Ruddy Fabry, DO sent at 3/6/2019 10:55 AM EST -----  Please contact interventional Radiology  Patient was recently hospitalized for liver abscess  She currently has ADALBERTO drain  Her discharge summary states she needs follow-up with them ASAP to determine resolution of abscess and help coordinate removal of ADALBERTO drain

## 2019-03-07 NOTE — TELEPHONE ENCOUNTER
----- Message from Dez Kurtz DO sent at 3/6/2019 10:55 AM EST -----  Please contact interventional Radiology  Patient was recently hospitalized for liver abscess  She currently has ADALBERTO drain  Her discharge summary states she needs follow-up with them ASAP to determine resolution of abscess and help coordinate removal of ADALBERTO drain

## 2019-03-07 NOTE — TELEPHONE ENCOUNTER
Spoke to pt son gave him interventional radiology scheduler phone number Matt Heady [phone] he will call them and schedule appt

## 2019-03-07 NOTE — TELEPHONE ENCOUNTER
Pt called office stating that insurance is refusing to pay for bed with current diagnoses  Pt states she needs order to be linked to localized edema? States that's the primary reason that she needs the bed  I did change diagnose and faxed to [facility]

## 2019-03-07 NOTE — TELEPHONE ENCOUNTER
Spoke with pt's son Sydnie Brooks and apologized to him, don't know where that phone number came from  Pt was given [facility] in [facility] phone #, they are affiliated with [facility]  Sydnie Brooks was informed if he has any issues with them to please contact the hospital they would be able to provide more information for him

## 2019-03-08 NOTE — TELEPHONE ENCOUNTER
I ed did Elena's' hospital bed as requested by Abebe Moore with the following diagnoses he did review prior to faxing back to [facility]'s revised bed , hospital was faxed to [facility] to [phone] on 3/08/2019 at 1:46 am

## 2019-03-08 NOTE — TELEPHONE ENCOUNTER
Shalini Oswald called back from [phone] regarding Elena's script for a hospital bed   The Diagnosis codes are not qualifying codes I informed Shalini Oswald yes we received his message from last night and Dr Gay requested we please resubmit order Rhys's number is [phone]    Fax is [phone]

## 2019-03-11 ENCOUNTER — HOSPITAL ENCOUNTER (OUTPATIENT)
Dept: RADIOLOGY | Facility: HOSPITAL | Age: 74
Discharge: HOME | End: 2019-03-11
Attending: HOSPITALIST

## 2019-03-11 ENCOUNTER — TELEPHONE (OUTPATIENT)
Dept: FAMILY MEDICINE CLINIC | Facility: CLINIC | Age: 74
End: 2019-03-11

## 2019-03-11 DIAGNOSIS — K75.0 LIVER ABSCESS: ICD-10-CM

## 2019-03-11 NOTE — TELEPHONE ENCOUNTER
Randall tran asking if pt needs bw for testing if kidney function is ok  Call him back   [phone]

## 2019-03-11 NOTE — SEDATION DOCUMENTATION
Pt states no drainage x 11 days  Has not been flushing  Drain has been pulled as per Dr Jesse Salazar

## 2019-03-11 NOTE — TELEPHONE ENCOUNTER
If son is on HIPAA  Tell him that his mother's kidney function was ok  It does show that she is malnourished (most likely related to her recent [address] admission)    I would like to see her in 1 month

## 2019-03-12 ENCOUNTER — OFFICE VISIT (OUTPATIENT)
Dept: INFECTIOUS DISEASES | Facility: CLINIC | Age: 74
End: 2019-03-12
Payer: COMMERCIAL

## 2019-03-12 VITALS
DIASTOLIC BLOOD PRESSURE: 78 MMHG | BODY MASS INDEX: 27.99 KG/M2 | HEIGHT: 65 IN | HEART RATE: 62 BPM | TEMPERATURE: 98.2 F | RESPIRATION RATE: 16 BRPM | SYSTOLIC BLOOD PRESSURE: 130 MMHG | WEIGHT: 168 LBS

## 2019-03-12 DIAGNOSIS — K75.0 BACTERIAL LIVER ABSCESS: Primary | Chronic | ICD-10-CM

## 2019-03-12 DIAGNOSIS — R78.81 BACTEREMIA: ICD-10-CM

## 2019-03-12 PROCEDURE — 99213 OFFICE O/P EST LOW 20 MIN: CPT | Performed by: PHYSICIAN ASSISTANT

## 2019-03-12 RX ORDER — DOXYCYCLINE HYCLATE 100 MG/1
100 CAPSULE ORAL EVERY 12 HOURS SCHEDULED
Qty: 28 CAPSULE | Refills: 1 | Status: SHIPPED | OUTPATIENT
Start: 2019-03-12 | End: 2019-03-26

## 2019-03-12 RX ORDER — DOXYCYCLINE HYCLATE 100 MG/1
100 CAPSULE ORAL EVERY 12 HOURS SCHEDULED
Qty: 28 CAPSULE | Refills: 1 | Status: SHIPPED | OUTPATIENT
Start: 2019-03-12 | End: 2019-03-12 | Stop reason: SDUPTHER

## 2019-03-12 RX ORDER — DOXYCYCLINE HYCLATE 100 MG/1
100 CAPSULE ORAL EVERY 12 HOURS SCHEDULED
COMMUNITY
End: 2019-03-12 | Stop reason: SDUPTHER

## 2019-03-12 NOTE — PROGRESS NOTES
Assessment/Plan:    Bacterial liver abscess  Enterococcal liver abscess s/p IR drainage  Maintained on po doxycycline since discharge  IR tube check yesterday resulted in removal of drain but no imaging done  We will continue patient on doxycycline and repeat CT scan without contrast to ensure resolution of abscess  If cavity resolved, we will discontinue doxycycline  RTO 2 weeks  Bacteremia  Polymicrobial bacteremia - resolved  Diagnoses and all orders for this visit:    Bacterial liver abscess  -     CT abdomen pelvis wo contrast; Future  -     doxycycline hyclate (VIBRAMYCIN) 100 mg capsule; Take 1 capsule (100 mg total) by mouth every 12 (twelve) hours for 14 days    Bacteremia    Other orders  -     Discontinue: doxycycline hyclate (VIBRAMYCIN) 100 mg capsule; Take 100 mg by mouth every 12 (twelve) hours          Subjective:      Patient ID: Elizabeth Sanders is a 75 y o  female  HPI  76 y/o female presents for office f/u today regarding enterococcal liver abscess  She underwent IR drainage while hospitalized and has been maintained on oral doxycycline since  She has been tolerating the antibiotic without difficulty  She has mild diarrhea  She denies fever, chills, rash  She went to IR yesterday where they pulled her drain  She states they pulled drain as output was low but she did not have any imaging done  The following portions of the patient's history were reviewed and updated as appropriate: allergies, current medications, past family history, past medical history, past social history, past surgical history and problem list     Review of Systems   Constitutional: Negative for chills and fever  HENT: Negative for mouth sores  Respiratory: Negative for cough and shortness of breath  Cardiovascular: Negative for chest pain, palpitations and leg swelling  Gastrointestinal: Negative for abdominal pain, diarrhea, nausea and vomiting     Genitourinary: Negative for difficulty urinating, dysuria and frequency  Musculoskeletal: Negative for arthralgias, back pain, joint swelling and myalgias  Skin: Negative for rash and wound  Neurological: Negative for headaches  Psychiatric/Behavioral: Negative for behavioral problems and confusion  Objective:      /78   Pulse 62   Temp 98 2 °F (36 8 °C)   Resp 16   Ht 5' 5" (1 651 m)   Wt 76 2 kg (168 lb)   BMI 27 96 kg/m²          Physical Exam   Constitutional: She is oriented to person, place, and time  She appears well-developed and well-nourished  No distress  HENT:   Head: Normocephalic and atraumatic  Right Ear: External ear normal    Left Ear: External ear normal    Nose: Nose normal    Mouth/Throat: Oropharynx is clear and moist  No oropharyngeal exudate  Eyes: Conjunctivae are normal  Right eye exhibits no discharge  Left eye exhibits no discharge  No scleral icterus  Neck: Normal range of motion  Neck supple  Cardiovascular: Normal rate and normal heart sounds  No murmur heard  Pulmonary/Chest: Effort normal  No stridor  No respiratory distress  She has no wheezes  She has no rales  She exhibits no tenderness  Abdominal: Soft  Bowel sounds are normal  She exhibits no distension  There is no tenderness  Musculoskeletal: Normal range of motion  She exhibits edema  Neurological: She is alert and oriented to person, place, and time  Skin: Skin is warm and dry  No rash noted  She is not diaphoretic  No erythema  Psychiatric: She has a normal mood and affect  Her behavior is normal    Nursing note and vitals reviewed        Labs:   3/4/19  Wbc: 10 6  Hgb: 9 8  Plt: 224  Cr: 0 79  Alk phos: 523 (826)  AST: 27  ALT: 24

## 2019-03-12 NOTE — ASSESSMENT & PLAN NOTE
Enterococcal liver abscess s/p IR drainage  Maintained on po doxycycline since discharge  IR tube check yesterday resulted in removal of drain but no imaging done  We will continue patient on doxycycline and repeat CT scan without contrast to ensure resolution of abscess  If cavity resolved, we will discontinue doxycycline  RTO 2 weeks

## 2019-03-13 ENCOUNTER — TELEPHONE (OUTPATIENT)
Dept: FAMILY MEDICINE CLINIC | Facility: CLINIC | Age: 74
End: 2019-03-13

## 2019-03-13 NOTE — TELEPHONE ENCOUNTER
Pt called left msg on front line a sking if you could return her call she has 5 questions to ask you  I did call and let her know you are out of the office today and back in tmw for a short period of time   If you can please call before you start seeing pts

## 2019-03-14 DIAGNOSIS — R60.0 LOCALIZED EDEMA: Primary | ICD-10-CM

## 2019-03-14 NOTE — TELEPHONE ENCOUNTER
I spoke with patient  She is still having ongoing lower extremity edema  Denies any chest pain or shortness of breath     She is compliant with torsemide 20 mg daily  Will recheck CMP in near future to determine possibility of increasing diuretic dosage    Will also refer to lymphedema specialist at [facility] for evaluation and treatment

## 2019-03-14 NOTE — PROGRESS NOTES
I spoke with patient  She is still having ongoing lower extremity edema  Denies any chest pain or shortness of breath     She is compliant with torsemide 20 mg daily  Will recheck CMP in near future to determine possibility of increasing diuretic dosage    Will also refer to lymphedema specialist at [address] [place] [address] for evaluation and treatment

## 2019-03-15 ENCOUNTER — TELEPHONE (OUTPATIENT)
Dept: FAMILY MEDICINE CLINIC | Facility: CLINIC | Age: 74
End: 2019-03-15

## 2019-03-15 ENCOUNTER — TELEPHONE (OUTPATIENT)
Dept: INFECTIOUS DISEASES | Facility: CLINIC | Age: 74
End: 2019-03-15

## 2019-03-15 DIAGNOSIS — K75.0 ABSCESS OF LIVER: Primary | ICD-10-CM

## 2019-03-15 NOTE — TELEPHONE ENCOUNTER
Received a call from Sanchez Luz   At her recent appointment, we orders a CT abdomen/pelvis wo contrast  Dr Amandeep Estrada from [facility] would rather have an MRI ordered with contrast     Per ALMA Fisher to order MRI of abdomen with contrast

## 2019-03-18 NOTE — TELEPHONE ENCOUNTER
Pt notified, states she found Dr Leni Pang in [place] who does home visits  Pt states she does not need referral placed

## 2019-03-18 NOTE — TELEPHONE ENCOUNTER
Call patient  I do not think there are any podiatrist who will make home call visits to cut toenails  But I could refer her to see a podiatrist if she would like (but she would most likely need to go to their office)?

## 2019-03-21 ENCOUNTER — TELEPHONE (OUTPATIENT)
Dept: FAMILY MEDICINE CLINIC | Facility: CLINIC | Age: 74
End: 2019-03-21

## 2019-03-21 NOTE — PROGRESS NOTES
I spoke with home care nurse, Del Wells  Patient has lost approximately 10 lb since increasing torsemide dosage to 20 mg daily  Will attempt to have home care lymphedema specialist see her for treatments  Lastly, they will draw her CMP today  Can adjust diuretics for further if needed

## 2019-03-22 DIAGNOSIS — R60.1 ANASARCA: Chronic | ICD-10-CM

## 2019-03-22 NOTE — TELEPHONE ENCOUNTER
Patient called stating that her RX was send to wrong pharmacy, please send to pharmacy on filed, thank you  MARY this is pt of Doctor Emily Lowe    Last med check 11/15/18,  Next f/u 5/17/19

## 2019-03-23 RX ORDER — TORSEMIDE 20 MG/1
20 TABLET ORAL DAILY
Qty: 30 TABLET | Refills: 2 | Status: SHIPPED | OUTPATIENT
Start: 2019-03-23 | End: 2019-03-25 | Stop reason: SDUPTHER

## 2019-03-25 ENCOUNTER — HOSPITAL ENCOUNTER (OUTPATIENT)
Dept: MRI IMAGING | Facility: HOSPITAL | Age: 74
Discharge: HOME | End: 2019-03-25
Payer: COMMERCIAL

## 2019-03-25 DIAGNOSIS — K75.0 ABSCESS OF LIVER: ICD-10-CM

## 2019-03-25 DIAGNOSIS — R60.1 ANASARCA: Chronic | ICD-10-CM

## 2019-03-25 PROCEDURE — 74181 MRI ABDOMEN W/O CONTRAST: CPT

## 2019-03-25 RX ORDER — TORSEMIDE 20 MG/1
TABLET ORAL
Qty: 45 TABLET | Refills: 2 | Status: SHIPPED | OUTPATIENT
Start: 2019-03-25 | End: 2019-07-05 | Stop reason: HOSPADM

## 2019-04-01 ENCOUNTER — DOCUMENTATION (OUTPATIENT)
Dept: INFECTIOUS DISEASES | Facility: CLINIC | Age: 74
End: 2019-04-01

## 2019-04-04 ENCOUNTER — OFFICE VISIT (OUTPATIENT)
Dept: INFECTIOUS DISEASES | Facility: CLINIC | Age: 74
End: 2019-04-04
Payer: COMMERCIAL

## 2019-04-04 VITALS
DIASTOLIC BLOOD PRESSURE: 75 MMHG | SYSTOLIC BLOOD PRESSURE: 138 MMHG | HEART RATE: 74 BPM | WEIGHT: 162 LBS | HEIGHT: 65 IN | TEMPERATURE: 98.6 F | BODY MASS INDEX: 26.99 KG/M2 | RESPIRATION RATE: 18 BRPM

## 2019-04-04 DIAGNOSIS — K75.0 BACTERIAL LIVER ABSCESS: Chronic | ICD-10-CM

## 2019-04-04 DIAGNOSIS — A41.9 SEPSIS, DUE TO UNSPECIFIED ORGANISM: Primary | ICD-10-CM

## 2019-04-04 DIAGNOSIS — R78.81 BACTEREMIA: ICD-10-CM

## 2019-04-04 PROCEDURE — 99214 OFFICE O/P EST MOD 30 MIN: CPT | Performed by: INTERNAL MEDICINE

## 2019-04-04 RX ORDER — DIPHENHYDRAMINE HCL 25 MG
25 CAPSULE ORAL EVERY 6 HOURS PRN
COMMUNITY

## 2019-04-08 ENCOUNTER — TRANSCRIBE ORDERS (OUTPATIENT)
Dept: ADMINISTRATIVE | Facility: HOSPITAL | Age: 74
End: 2019-04-08

## 2019-04-08 DIAGNOSIS — N28.89 KIDNEY MASS: ICD-10-CM

## 2019-04-08 DIAGNOSIS — N28.89 LEFT KIDNEY MASS: Primary | ICD-10-CM

## 2019-04-10 ENCOUNTER — HOSPITAL ENCOUNTER (INPATIENT)
Facility: HOSPITAL | Age: 74
LOS: 54 days | Discharge: NURSING, REHAB OR LONG-TERM CARE | DRG: 469 | End: 2019-06-03
Attending: EMERGENCY MEDICINE | Admitting: INTERNAL MEDICINE
Payer: COMMERCIAL

## 2019-04-10 ENCOUNTER — APPOINTMENT (EMERGENCY)
Dept: RADIOLOGY | Facility: HOSPITAL | Age: 74
DRG: 469 | End: 2019-04-10
Payer: COMMERCIAL

## 2019-04-10 DIAGNOSIS — R18.8 OTHER ASCITES: ICD-10-CM

## 2019-04-10 DIAGNOSIS — K65.2 SPONTANEOUS BACTERIAL PERITONITIS (HCC): ICD-10-CM

## 2019-04-10 DIAGNOSIS — E80.6 HYPERBILIRUBINEMIA: ICD-10-CM

## 2019-04-10 DIAGNOSIS — R21 RASH: ICD-10-CM

## 2019-04-10 DIAGNOSIS — Z85.07 PERSONAL HISTORY OF PANCREATIC CANCER: ICD-10-CM

## 2019-04-10 DIAGNOSIS — E89.1 DIABETES MELLITUS SECONDARY TO PANCREATECTOMY (HCC): Chronic | ICD-10-CM

## 2019-04-10 DIAGNOSIS — I50.32 CHRONIC DIASTOLIC CHF (CONGESTIVE HEART FAILURE) (HCC): ICD-10-CM

## 2019-04-10 DIAGNOSIS — N17.9 AKI (ACUTE KIDNEY INJURY) (HCC): ICD-10-CM

## 2019-04-10 DIAGNOSIS — I10 BENIGN ESSENTIAL HYPERTENSION: ICD-10-CM

## 2019-04-10 DIAGNOSIS — C56.9 OVARIAN CANCER (HCC): ICD-10-CM

## 2019-04-10 DIAGNOSIS — D72.829 LEUKOCYTOSIS, UNSPECIFIED TYPE: ICD-10-CM

## 2019-04-10 DIAGNOSIS — K65.9 PERITONITIS (HCC): ICD-10-CM

## 2019-04-10 DIAGNOSIS — R74.01 TRANSAMINITIS: ICD-10-CM

## 2019-04-10 DIAGNOSIS — Z90.49 S/P PARTIAL COLECTOMY: ICD-10-CM

## 2019-04-10 DIAGNOSIS — Z90.410 DIABETES MELLITUS SECONDARY TO PANCREATECTOMY (HCC): Chronic | ICD-10-CM

## 2019-04-10 DIAGNOSIS — S72.001A CLOSED FRACTURE OF RIGHT HIP, INITIAL ENCOUNTER (HCC): Primary | ICD-10-CM

## 2019-04-10 DIAGNOSIS — Z85.3 PERSONAL HISTORY OF BREAST CANCER: ICD-10-CM

## 2019-04-10 DIAGNOSIS — E13.9 DIABETES MELLITUS SECONDARY TO PANCREATECTOMY (HCC): Chronic | ICD-10-CM

## 2019-04-10 DIAGNOSIS — D62 ACUTE BLOOD LOSS ANEMIA: ICD-10-CM

## 2019-04-10 DIAGNOSIS — R79.89 ELEVATED LFTS: ICD-10-CM

## 2019-04-10 PROBLEM — M25.559 HIP PAIN: Status: ACTIVE | Noted: 2019-04-10

## 2019-04-10 LAB
ABO GROUP BLD: NORMAL
ANION GAP SERPL CALCULATED.3IONS-SCNC: 11 MMOL/L (ref 4–13)
APTT PPP: 30 SECONDS (ref 26–38)
BASOPHILS # BLD AUTO: 0.04 THOUSANDS/ΜL (ref 0–0.1)
BASOPHILS NFR BLD AUTO: 0 % (ref 0–1)
BLD GP AB SCN SERPL QL: NEGATIVE
BUN SERPL-MCNC: 31 MG/DL (ref 5–25)
CALCIUM SERPL-MCNC: 8.8 MG/DL (ref 8.3–10.1)
CHLORIDE SERPL-SCNC: 104 MMOL/L (ref 100–108)
CO2 SERPL-SCNC: 24 MMOL/L (ref 21–32)
CREAT SERPL-MCNC: 0.93 MG/DL (ref 0.6–1.3)
EOSINOPHIL # BLD AUTO: 0.09 THOUSAND/ΜL (ref 0–0.61)
EOSINOPHIL NFR BLD AUTO: 1 % (ref 0–6)
ERYTHROCYTE [DISTWIDTH] IN BLOOD BY AUTOMATED COUNT: 21.8 % (ref 11.6–15.1)
GFR SERPL CREATININE-BSD FRML MDRD: 60 ML/MIN/1.73SQ M
GLUCOSE SERPL-MCNC: 139 MG/DL (ref 65–140)
GLUCOSE SERPL-MCNC: 141 MG/DL (ref 65–140)
GLUCOSE SERPL-MCNC: 188 MG/DL (ref 65–140)
HCT VFR BLD AUTO: 30.7 % (ref 34.8–46.1)
HGB BLD-MCNC: 9.6 G/DL (ref 11.5–15.4)
IMM GRANULOCYTES # BLD AUTO: 0.37 THOUSAND/UL (ref 0–0.2)
IMM GRANULOCYTES NFR BLD AUTO: 2 % (ref 0–2)
INR PPP: 1.18 (ref 0.86–1.17)
LYMPHOCYTES # BLD AUTO: 0.99 THOUSANDS/ΜL (ref 0.6–4.47)
LYMPHOCYTES NFR BLD AUTO: 5 % (ref 14–44)
MCH RBC QN AUTO: 28.2 PG (ref 26.8–34.3)
MCHC RBC AUTO-ENTMCNC: 31.3 G/DL (ref 31.4–37.4)
MCV RBC AUTO: 90 FL (ref 82–98)
MONOCYTES # BLD AUTO: 1.67 THOUSAND/ΜL (ref 0.17–1.22)
MONOCYTES NFR BLD AUTO: 9 % (ref 4–12)
NEUTROPHILS # BLD AUTO: 15.37 THOUSANDS/ΜL (ref 1.85–7.62)
NEUTS SEG NFR BLD AUTO: 83 % (ref 43–75)
NRBC BLD AUTO-RTO: 0 /100 WBCS
NT-PROBNP SERPL-MCNC: 235 PG/ML
PLATELET # BLD AUTO: 336 THOUSANDS/UL (ref 149–390)
PLATELET # BLD AUTO: 340 THOUSANDS/UL (ref 149–390)
PMV BLD AUTO: 11.4 FL (ref 8.9–12.7)
PMV BLD AUTO: 11.6 FL (ref 8.9–12.7)
POTASSIUM SERPL-SCNC: 3.6 MMOL/L (ref 3.5–5.3)
PROCALCITONIN SERPL-MCNC: 0.37 NG/ML
PROTHROMBIN TIME: 15.1 SECONDS (ref 11.8–14.2)
RBC # BLD AUTO: 3.4 MILLION/UL (ref 3.81–5.12)
RH BLD: POSITIVE
SODIUM SERPL-SCNC: 139 MMOL/L (ref 136–145)
SPECIMEN EXPIRATION DATE: NORMAL
WBC # BLD AUTO: 18.53 THOUSAND/UL (ref 4.31–10.16)

## 2019-04-10 PROCEDURE — 73502 X-RAY EXAM HIP UNI 2-3 VIEWS: CPT

## 2019-04-10 PROCEDURE — 86923 COMPATIBILITY TEST ELECTRIC: CPT

## 2019-04-10 PROCEDURE — 85730 THROMBOPLASTIN TIME PARTIAL: CPT | Performed by: EMERGENCY MEDICINE

## 2019-04-10 PROCEDURE — 83880 ASSAY OF NATRIURETIC PEPTIDE: CPT | Performed by: INTERNAL MEDICINE

## 2019-04-10 PROCEDURE — 93005 ELECTROCARDIOGRAM TRACING: CPT

## 2019-04-10 PROCEDURE — 99285 EMERGENCY DEPT VISIT HI MDM: CPT

## 2019-04-10 PROCEDURE — 36415 COLL VENOUS BLD VENIPUNCTURE: CPT | Performed by: EMERGENCY MEDICINE

## 2019-04-10 PROCEDURE — 85025 COMPLETE CBC W/AUTO DIFF WBC: CPT | Performed by: EMERGENCY MEDICINE

## 2019-04-10 PROCEDURE — 85049 AUTOMATED PLATELET COUNT: CPT | Performed by: INTERNAL MEDICINE

## 2019-04-10 PROCEDURE — 84145 PROCALCITONIN (PCT): CPT | Performed by: INTERNAL MEDICINE

## 2019-04-10 PROCEDURE — 86901 BLOOD TYPING SEROLOGIC RH(D): CPT | Performed by: EMERGENCY MEDICINE

## 2019-04-10 PROCEDURE — 71045 X-RAY EXAM CHEST 1 VIEW: CPT

## 2019-04-10 PROCEDURE — 96374 THER/PROPH/DIAG INJ IV PUSH: CPT

## 2019-04-10 PROCEDURE — 99223 1ST HOSP IP/OBS HIGH 75: CPT | Performed by: INTERNAL MEDICINE

## 2019-04-10 PROCEDURE — 80076 HEPATIC FUNCTION PANEL: CPT | Performed by: INTERNAL MEDICINE

## 2019-04-10 PROCEDURE — 99284 EMERGENCY DEPT VISIT MOD MDM: CPT | Performed by: EMERGENCY MEDICINE

## 2019-04-10 PROCEDURE — 85610 PROTHROMBIN TIME: CPT | Performed by: EMERGENCY MEDICINE

## 2019-04-10 PROCEDURE — 80048 BASIC METABOLIC PNL TOTAL CA: CPT | Performed by: EMERGENCY MEDICINE

## 2019-04-10 PROCEDURE — 86850 RBC ANTIBODY SCREEN: CPT | Performed by: EMERGENCY MEDICINE

## 2019-04-10 PROCEDURE — 86900 BLOOD TYPING SEROLOGIC ABO: CPT | Performed by: EMERGENCY MEDICINE

## 2019-04-10 PROCEDURE — 82948 REAGENT STRIP/BLOOD GLUCOSE: CPT

## 2019-04-10 RX ORDER — MORPHINE SULFATE 4 MG/ML
4 INJECTION, SOLUTION INTRAMUSCULAR; INTRAVENOUS ONCE
Status: COMPLETED | OUTPATIENT
Start: 2019-04-10 | End: 2019-04-10

## 2019-04-10 RX ORDER — FENTANYL CITRATE 50 UG/ML
1 INJECTION, SOLUTION INTRAMUSCULAR; INTRAVENOUS ONCE
Status: DISCONTINUED | OUTPATIENT
Start: 2019-04-10 | End: 2019-04-29

## 2019-04-10 RX ORDER — RANITIDINE 150 MG/1
150 CAPSULE ORAL 2 TIMES DAILY
COMMUNITY
End: 2019-06-03 | Stop reason: HOSPADM

## 2019-04-10 RX ORDER — ACETAMINOPHEN 325 MG/1
650 TABLET ORAL EVERY 6 HOURS PRN
Status: DISCONTINUED | OUTPATIENT
Start: 2019-04-10 | End: 2019-04-12

## 2019-04-10 RX ORDER — MORPHINE SULFATE 4 MG/ML
4 INJECTION, SOLUTION INTRAMUSCULAR; INTRAVENOUS EVERY 4 HOURS PRN
Status: COMPLETED | OUTPATIENT
Start: 2019-04-10 | End: 2019-04-10

## 2019-04-10 RX ORDER — HYDROMORPHONE HCL/PF 1 MG/ML
1 SYRINGE (ML) INJECTION EVERY 4 HOURS PRN
Status: DISCONTINUED | OUTPATIENT
Start: 2019-04-10 | End: 2019-04-15

## 2019-04-10 RX ORDER — INSULIN GLARGINE 100 [IU]/ML
5 INJECTION, SOLUTION SUBCUTANEOUS
Status: DISCONTINUED | OUTPATIENT
Start: 2019-04-10 | End: 2019-04-16 | Stop reason: SDUPTHER

## 2019-04-10 RX ORDER — INSULIN GLARGINE 100 [IU]/ML
5 INJECTION, SOLUTION SUBCUTANEOUS
Status: DISCONTINUED | OUTPATIENT
Start: 2019-04-10 | End: 2019-04-17

## 2019-04-10 RX ORDER — TORSEMIDE 20 MG/1
30 TABLET ORAL DAILY
Status: DISCONTINUED | OUTPATIENT
Start: 2019-04-11 | End: 2019-04-13

## 2019-04-10 RX ORDER — DIPHENHYDRAMINE HCL 25 MG
12.5 TABLET ORAL EVERY 8 HOURS PRN
Status: DISCONTINUED | OUTPATIENT
Start: 2019-04-10 | End: 2019-04-12

## 2019-04-10 RX ADMIN — PANCRELIPASE 24000 UNITS: 24000; 76000; 120000 CAPSULE, DELAYED RELEASE PELLETS ORAL at 21:45

## 2019-04-10 RX ADMIN — MORPHINE SULFATE 4 MG: 4 INJECTION INTRAVENOUS at 20:11

## 2019-04-10 RX ADMIN — HYDROMORPHONE HYDROCHLORIDE 1 MG: 1 INJECTION, SOLUTION INTRAMUSCULAR; INTRAVENOUS; SUBCUTANEOUS at 22:55

## 2019-04-10 RX ADMIN — MORPHINE SULFATE 4 MG: 4 INJECTION INTRAVENOUS at 16:43

## 2019-04-10 RX ADMIN — HYDROMORPHONE HYDROCHLORIDE 1 MG: 1 INJECTION, SOLUTION INTRAMUSCULAR; INTRAVENOUS; SUBCUTANEOUS at 18:37

## 2019-04-10 RX ADMIN — DIPHENHYDRAMINE HCL 12.5 MG: 25 TABLET ORAL at 21:43

## 2019-04-10 RX ADMIN — INSULIN GLARGINE 5 UNITS: 100 INJECTION, SOLUTION SUBCUTANEOUS at 22:55

## 2019-04-10 RX ADMIN — INSULIN GLARGINE 5 UNITS: 100 INJECTION, SOLUTION SUBCUTANEOUS at 21:43

## 2019-04-11 ENCOUNTER — ANESTHESIA (INPATIENT)
Dept: PERIOP | Facility: HOSPITAL | Age: 74
DRG: 469 | End: 2019-04-11
Payer: COMMERCIAL

## 2019-04-11 ENCOUNTER — ANESTHESIA EVENT (INPATIENT)
Dept: PERIOP | Facility: HOSPITAL | Age: 74
DRG: 469 | End: 2019-04-11
Payer: COMMERCIAL

## 2019-04-11 ENCOUNTER — APPOINTMENT (INPATIENT)
Dept: RADIOLOGY | Facility: HOSPITAL | Age: 74
DRG: 469 | End: 2019-04-11
Payer: COMMERCIAL

## 2019-04-11 PROBLEM — S72.001A CLOSED FRACTURE OF RIGHT HIP (HCC): Status: ACTIVE | Noted: 2019-04-10

## 2019-04-11 LAB
ABO GROUP BLD BPU: NORMAL
ALBUMIN SERPL BCP-MCNC: 2.3 G/DL (ref 3.5–5)
ALBUMIN SERPL BCP-MCNC: 2.7 G/DL (ref 3.5–5)
ALP SERPL-CCNC: 750 U/L (ref 46–116)
ALP SERPL-CCNC: 836 U/L (ref 46–116)
ALT SERPL W P-5'-P-CCNC: 110 U/L (ref 12–78)
ALT SERPL W P-5'-P-CCNC: 130 U/L (ref 12–78)
ANION GAP SERPL CALCULATED.3IONS-SCNC: 12 MMOL/L (ref 4–13)
AST SERPL W P-5'-P-CCNC: 115 U/L (ref 5–45)
AST SERPL W P-5'-P-CCNC: 75 U/L (ref 5–45)
ATRIAL RATE: 93 BPM
ATRIAL RATE: 97 BPM
BILIRUB DIRECT SERPL-MCNC: 4.29 MG/DL (ref 0–0.2)
BILIRUB SERPL-MCNC: 4.46 MG/DL (ref 0.2–1)
BILIRUB SERPL-MCNC: 5.23 MG/DL (ref 0.2–1)
BILIRUB UR QL STRIP: ABNORMAL
BPU ID: NORMAL
BUN SERPL-MCNC: 35 MG/DL (ref 5–25)
CALCIUM SERPL-MCNC: 8.7 MG/DL (ref 8.3–10.1)
CHLORIDE SERPL-SCNC: 105 MMOL/L (ref 100–108)
CLARITY UR: CLEAR
CO2 SERPL-SCNC: 22 MMOL/L (ref 21–32)
COLOR UR: YELLOW
CREAT SERPL-MCNC: 1.09 MG/DL (ref 0.6–1.3)
CROSSMATCH: NORMAL
ERYTHROCYTE [DISTWIDTH] IN BLOOD BY AUTOMATED COUNT: 21.5 % (ref 11.6–15.1)
GFR SERPL CREATININE-BSD FRML MDRD: 50 ML/MIN/1.73SQ M
GLUCOSE SERPL-MCNC: 111 MG/DL (ref 65–140)
GLUCOSE SERPL-MCNC: 150 MG/DL (ref 65–140)
GLUCOSE SERPL-MCNC: 152 MG/DL (ref 65–140)
GLUCOSE SERPL-MCNC: 162 MG/DL (ref 65–140)
GLUCOSE SERPL-MCNC: 173 MG/DL (ref 65–140)
GLUCOSE UR STRIP-MCNC: NEGATIVE MG/DL
HCT VFR BLD AUTO: 27.1 % (ref 34.8–46.1)
HCT VFR BLD AUTO: 27.2 % (ref 34.8–46.1)
HGB BLD-MCNC: 8.4 G/DL (ref 11.5–15.4)
HGB BLD-MCNC: 8.5 G/DL (ref 11.5–15.4)
HGB UR QL STRIP.AUTO: NEGATIVE
KETONES UR STRIP-MCNC: NEGATIVE MG/DL
LEUKOCYTE ESTERASE UR QL STRIP: NEGATIVE
MCH RBC QN AUTO: 28.3 PG (ref 26.8–34.3)
MCHC RBC AUTO-ENTMCNC: 31.4 G/DL (ref 31.4–37.4)
MCV RBC AUTO: 90 FL (ref 82–98)
NITRITE UR QL STRIP: NEGATIVE
P AXIS: 58 DEGREES
P AXIS: 58 DEGREES
PH UR STRIP.AUTO: 5.5 [PH]
PLATELET # BLD AUTO: 311 THOUSANDS/UL (ref 149–390)
PMV BLD AUTO: 12.2 FL (ref 8.9–12.7)
POTASSIUM SERPL-SCNC: 4.1 MMOL/L (ref 3.5–5.3)
PR INTERVAL: 142 MS
PR INTERVAL: 146 MS
PROT SERPL-MCNC: 5.9 G/DL (ref 6.4–8.2)
PROT SERPL-MCNC: 6.5 G/DL (ref 6.4–8.2)
PROT UR STRIP-MCNC: NEGATIVE MG/DL
QRS AXIS: 50 DEGREES
QRS AXIS: 60 DEGREES
QRSD INTERVAL: 74 MS
QRSD INTERVAL: 76 MS
QT INTERVAL: 340 MS
QT INTERVAL: 352 MS
QTC INTERVAL: 422 MS
QTC INTERVAL: 447 MS
RBC # BLD AUTO: 3 MILLION/UL (ref 3.81–5.12)
SODIUM SERPL-SCNC: 139 MMOL/L (ref 136–145)
SP GR UR STRIP.AUTO: 1.01 (ref 1–1.03)
T WAVE AXIS: 0 DEGREES
T WAVE AXIS: 47 DEGREES
UNIT DISPENSE STATUS: NORMAL
UNIT PRODUCT CODE: NORMAL
UNIT RH: NORMAL
UROBILINOGEN UR QL STRIP.AUTO: 0.2 E.U./DL
VENTRICULAR RATE: 93 BPM
VENTRICULAR RATE: 97 BPM
WBC # BLD AUTO: 15.42 THOUSAND/UL (ref 4.31–10.16)

## 2019-04-11 PROCEDURE — 81003 URINALYSIS AUTO W/O SCOPE: CPT | Performed by: INTERNAL MEDICINE

## 2019-04-11 PROCEDURE — 80053 COMPREHEN METABOLIC PANEL: CPT | Performed by: INTERNAL MEDICINE

## 2019-04-11 PROCEDURE — 85018 HEMOGLOBIN: CPT | Performed by: ANESTHESIOLOGY

## 2019-04-11 PROCEDURE — 93010 ELECTROCARDIOGRAM REPORT: CPT | Performed by: INTERNAL MEDICINE

## 2019-04-11 PROCEDURE — 82948 REAGENT STRIP/BLOOD GLUCOSE: CPT

## 2019-04-11 PROCEDURE — 85014 HEMATOCRIT: CPT | Performed by: ANESTHESIOLOGY

## 2019-04-11 PROCEDURE — 99233 SBSQ HOSP IP/OBS HIGH 50: CPT | Performed by: INTERNAL MEDICINE

## 2019-04-11 PROCEDURE — 99223 1ST HOSP IP/OBS HIGH 75: CPT | Performed by: ORTHOPAEDIC SURGERY

## 2019-04-11 PROCEDURE — C1776 JOINT DEVICE (IMPLANTABLE): HCPCS | Performed by: ORTHOPAEDIC SURGERY

## 2019-04-11 PROCEDURE — 27236 TREAT THIGH FRACTURE: CPT | Performed by: ORTHOPAEDIC SURGERY

## 2019-04-11 PROCEDURE — 85027 COMPLETE CBC AUTOMATED: CPT | Performed by: INTERNAL MEDICINE

## 2019-04-11 PROCEDURE — 93005 ELECTROCARDIOGRAM TRACING: CPT

## 2019-04-11 PROCEDURE — 0SRR03Z REPLACEMENT OF RIGHT HIP JOINT, FEMORAL SURFACE WITH CERAMIC SYNTHETIC SUBSTITUTE, OPEN APPROACH: ICD-10-PCS | Performed by: ORTHOPAEDIC SURGERY

## 2019-04-11 PROCEDURE — 73502 X-RAY EXAM HIP UNI 2-3 VIEWS: CPT

## 2019-04-11 DEVICE — TRI-LOCK BPS FEMORAL STEM 12/14 TAPER TRI-LOCK BPS W/GRIPTION SIZE 5 STD 105MM
Type: IMPLANTABLE DEVICE | Site: HIP | Status: FUNCTIONAL
Brand: TRI-LOCK GRIPTION

## 2019-04-11 DEVICE — BIOLOX DELTA CERAMIC FEMORAL HEAD 28MM DIA +1.5 12/14 TAPER
Type: IMPLANTABLE DEVICE | Site: HIP | Status: FUNCTIONAL
Brand: BIOLOX DELTA

## 2019-04-11 DEVICE — SELF CENTERING BI-POLAR HEAD 28MM ID 45MM OD
Type: IMPLANTABLE DEVICE | Site: HIP | Status: FUNCTIONAL
Brand: SELF CENTERING

## 2019-04-11 RX ORDER — OXYCODONE HYDROCHLORIDE 5 MG/1
2.5 TABLET ORAL EVERY 4 HOURS PRN
Status: DISCONTINUED | OUTPATIENT
Start: 2019-04-11 | End: 2019-06-03 | Stop reason: HOSPADM

## 2019-04-11 RX ORDER — FENTANYL CITRATE 50 UG/ML
INJECTION, SOLUTION INTRAMUSCULAR; INTRAVENOUS AS NEEDED
Status: DISCONTINUED | OUTPATIENT
Start: 2019-04-11 | End: 2019-04-11 | Stop reason: SURG

## 2019-04-11 RX ORDER — GLYCOPYRROLATE 0.2 MG/ML
INJECTION INTRAMUSCULAR; INTRAVENOUS AS NEEDED
Status: DISCONTINUED | OUTPATIENT
Start: 2019-04-11 | End: 2019-04-11 | Stop reason: SURG

## 2019-04-11 RX ORDER — DOCUSATE SODIUM 100 MG/1
100 CAPSULE, LIQUID FILLED ORAL 2 TIMES DAILY
Status: DISCONTINUED | OUTPATIENT
Start: 2019-04-11 | End: 2019-04-29

## 2019-04-11 RX ORDER — PANTOPRAZOLE SODIUM 40 MG/1
40 TABLET, DELAYED RELEASE ORAL DAILY
Status: DISCONTINUED | OUTPATIENT
Start: 2019-04-12 | End: 2019-05-28

## 2019-04-11 RX ORDER — PROPOFOL 10 MG/ML
INJECTION, EMULSION INTRAVENOUS AS NEEDED
Status: DISCONTINUED | OUTPATIENT
Start: 2019-04-11 | End: 2019-04-11 | Stop reason: SURG

## 2019-04-11 RX ORDER — FENTANYL CITRATE/PF 50 MCG/ML
25 SYRINGE (ML) INJECTION
Status: DISCONTINUED | OUTPATIENT
Start: 2019-04-11 | End: 2019-04-11 | Stop reason: HOSPADM

## 2019-04-11 RX ORDER — CALCIUM CARBONATE 200(500)MG
1000 TABLET,CHEWABLE ORAL DAILY PRN
Status: DISCONTINUED | OUTPATIENT
Start: 2019-04-11 | End: 2019-04-29

## 2019-04-11 RX ORDER — OXYCODONE HYDROCHLORIDE 5 MG/1
5 TABLET ORAL EVERY 4 HOURS PRN
Status: DISCONTINUED | OUTPATIENT
Start: 2019-04-11 | End: 2019-04-15

## 2019-04-11 RX ORDER — ROCURONIUM BROMIDE 10 MG/ML
INJECTION, SOLUTION INTRAVENOUS AS NEEDED
Status: DISCONTINUED | OUTPATIENT
Start: 2019-04-11 | End: 2019-04-11 | Stop reason: SURG

## 2019-04-11 RX ORDER — ONDANSETRON 2 MG/ML
INJECTION INTRAMUSCULAR; INTRAVENOUS AS NEEDED
Status: DISCONTINUED | OUTPATIENT
Start: 2019-04-11 | End: 2019-04-11 | Stop reason: SURG

## 2019-04-11 RX ORDER — SENNOSIDES 8.6 MG
1 TABLET ORAL DAILY
Status: DISCONTINUED | OUTPATIENT
Start: 2019-04-12 | End: 2019-04-12

## 2019-04-11 RX ORDER — DIPHENHYDRAMINE HYDROCHLORIDE, ZINC ACETATE 2; .1 G/100G; G/100G
CREAM TOPICAL 3 TIMES DAILY PRN
Status: DISCONTINUED | OUTPATIENT
Start: 2019-04-11 | End: 2019-05-16

## 2019-04-11 RX ORDER — MIDAZOLAM HYDROCHLORIDE 1 MG/ML
INJECTION INTRAMUSCULAR; INTRAVENOUS AS NEEDED
Status: DISCONTINUED | OUTPATIENT
Start: 2019-04-11 | End: 2019-04-11 | Stop reason: SURG

## 2019-04-11 RX ORDER — ONDANSETRON 2 MG/ML
4 INJECTION INTRAMUSCULAR; INTRAVENOUS EVERY 6 HOURS PRN
Status: DISCONTINUED | OUTPATIENT
Start: 2019-04-11 | End: 2019-06-03 | Stop reason: HOSPADM

## 2019-04-11 RX ORDER — CIPROFLOXACIN 2 MG/ML
400 INJECTION, SOLUTION INTRAVENOUS EVERY 12 HOURS
Status: DISCONTINUED | OUTPATIENT
Start: 2019-04-11 | End: 2019-04-11

## 2019-04-11 RX ORDER — SODIUM CHLORIDE, SODIUM LACTATE, POTASSIUM CHLORIDE, CALCIUM CHLORIDE 600; 310; 30; 20 MG/100ML; MG/100ML; MG/100ML; MG/100ML
75 INJECTION, SOLUTION INTRAVENOUS CONTINUOUS
Status: DISCONTINUED | OUTPATIENT
Start: 2019-04-11 | End: 2019-04-12

## 2019-04-11 RX ORDER — SODIUM CHLORIDE 9 MG/ML
INJECTION, SOLUTION INTRAVENOUS AS NEEDED
Status: DISCONTINUED | OUTPATIENT
Start: 2019-04-11 | End: 2019-04-11 | Stop reason: HOSPADM

## 2019-04-11 RX ORDER — NEOSTIGMINE METHYLSULFATE 1 MG/ML
INJECTION INTRAVENOUS AS NEEDED
Status: DISCONTINUED | OUTPATIENT
Start: 2019-04-11 | End: 2019-04-11 | Stop reason: SURG

## 2019-04-11 RX ORDER — HYDROMORPHONE HYDROCHLORIDE 2 MG/ML
INJECTION, SOLUTION INTRAMUSCULAR; INTRAVENOUS; SUBCUTANEOUS AS NEEDED
Status: DISCONTINUED | OUTPATIENT
Start: 2019-04-11 | End: 2019-04-11 | Stop reason: SURG

## 2019-04-11 RX ORDER — CEFAZOLIN SODIUM 1 G/50ML
1000 SOLUTION INTRAVENOUS
Status: COMPLETED | OUTPATIENT
Start: 2019-04-11 | End: 2019-04-11

## 2019-04-11 RX ORDER — CEFAZOLIN SODIUM 1 G/50ML
1000 SOLUTION INTRAVENOUS EVERY 8 HOURS
Status: COMPLETED | OUTPATIENT
Start: 2019-04-11 | End: 2019-04-12

## 2019-04-11 RX ORDER — HYDROMORPHONE HCL/PF 1 MG/ML
0.5 SYRINGE (ML) INJECTION
Status: DISCONTINUED | OUTPATIENT
Start: 2019-04-11 | End: 2019-04-11 | Stop reason: HOSPADM

## 2019-04-11 RX ORDER — SODIUM CHLORIDE 9 MG/ML
75 INJECTION, SOLUTION INTRAVENOUS CONTINUOUS
Status: DISCONTINUED | OUTPATIENT
Start: 2019-04-11 | End: 2019-04-12

## 2019-04-11 RX ORDER — ONDANSETRON 2 MG/ML
4 INJECTION INTRAMUSCULAR; INTRAVENOUS ONCE
Status: DISCONTINUED | OUTPATIENT
Start: 2019-04-11 | End: 2019-04-11 | Stop reason: HOSPADM

## 2019-04-11 RX ADMIN — HYDROMORPHONE HYDROCHLORIDE 1 MG: 1 INJECTION, SOLUTION INTRAMUSCULAR; INTRAVENOUS; SUBCUTANEOUS at 11:29

## 2019-04-11 RX ADMIN — CIPROFLOXACIN 400 MG: 2 INJECTION, SOLUTION INTRAVENOUS at 08:42

## 2019-04-11 RX ADMIN — ONDANSETRON 4 MG: 2 INJECTION INTRAMUSCULAR; INTRAVENOUS at 15:00

## 2019-04-11 RX ADMIN — DOCUSATE SODIUM 100 MG: 100 CAPSULE, LIQUID FILLED ORAL at 17:48

## 2019-04-11 RX ADMIN — MIDAZOLAM 2 MG: 1 INJECTION INTRAMUSCULAR; INTRAVENOUS at 12:30

## 2019-04-11 RX ADMIN — HYDROMORPHONE HYDROCHLORIDE 0.25 MG: 2 INJECTION, SOLUTION INTRAMUSCULAR; INTRAVENOUS; SUBCUTANEOUS at 14:27

## 2019-04-11 RX ADMIN — FENTANYL CITRATE 50 MCG: 50 INJECTION, SOLUTION INTRAMUSCULAR; INTRAVENOUS at 12:34

## 2019-04-11 RX ADMIN — HYDROMORPHONE HYDROCHLORIDE 1 MG: 1 INJECTION, SOLUTION INTRAMUSCULAR; INTRAVENOUS; SUBCUTANEOUS at 04:02

## 2019-04-11 RX ADMIN — ONDANSETRON 4 MG: 2 INJECTION INTRAMUSCULAR; INTRAVENOUS at 14:24

## 2019-04-11 RX ADMIN — DIPHENHYDRAMINE HYDROCHLORIDE, ZINC ACETATE 1 APPLICATION: 2; .1 CREAM TOPICAL at 19:25

## 2019-04-11 RX ADMIN — SODIUM CHLORIDE 125 ML/HR: 0.9 INJECTION, SOLUTION INTRAVENOUS at 08:54

## 2019-04-11 RX ADMIN — SODIUM CHLORIDE, SODIUM LACTATE, POTASSIUM CHLORIDE, AND CALCIUM CHLORIDE 75 ML/HR: .6; .31; .03; .02 INJECTION, SOLUTION INTRAVENOUS at 16:39

## 2019-04-11 RX ADMIN — CEFAZOLIN SODIUM 1000 MG: 1 SOLUTION INTRAVENOUS at 19:24

## 2019-04-11 RX ADMIN — FENTANYL CITRATE 50 MCG: 50 INJECTION, SOLUTION INTRAMUSCULAR; INTRAVENOUS at 13:19

## 2019-04-11 RX ADMIN — ROCURONIUM BROMIDE 40 MG: 10 INJECTION, SOLUTION INTRAVENOUS at 12:37

## 2019-04-11 RX ADMIN — PHENYLEPHRINE HYDROCHLORIDE 100 MCG: 10 INJECTION INTRAVENOUS at 12:49

## 2019-04-11 RX ADMIN — SODIUM CHLORIDE: 0.9 INJECTION, SOLUTION INTRAVENOUS at 13:33

## 2019-04-11 RX ADMIN — HYDROMORPHONE HYDROCHLORIDE 0.25 MG: 2 INJECTION, SOLUTION INTRAMUSCULAR; INTRAVENOUS; SUBCUTANEOUS at 13:33

## 2019-04-11 RX ADMIN — PROPOFOL 120 MG: 10 INJECTION, EMULSION INTRAVENOUS at 12:34

## 2019-04-11 RX ADMIN — LIDOCAINE HYDROCHLORIDE 30 MG: 20 INJECTION, SOLUTION INTRAVENOUS at 12:34

## 2019-04-11 RX ADMIN — INSULIN LISPRO 10 UNITS: 100 INJECTION, SOLUTION INTRAVENOUS; SUBCUTANEOUS at 17:45

## 2019-04-11 RX ADMIN — PANCRELIPASE 48000 UNITS: 24000; 76000; 120000 CAPSULE, DELAYED RELEASE PELLETS ORAL at 17:47

## 2019-04-11 RX ADMIN — NEOSTIGMINE METHYLSULFATE 3.5 MG: 1 INJECTION, SOLUTION INTRAVENOUS at 14:21

## 2019-04-11 RX ADMIN — GLYCOPYRROLATE 0.4 MG: 0.2 INJECTION INTRAMUSCULAR; INTRAVENOUS at 14:20

## 2019-04-11 RX ADMIN — INSULIN GLARGINE 5 UNITS: 100 INJECTION, SOLUTION SUBCUTANEOUS at 22:34

## 2019-04-11 RX ADMIN — CEFAZOLIN SODIUM 1000 MG: 1 SOLUTION INTRAVENOUS at 12:30

## 2019-04-12 LAB
ALBUMIN SERPL BCP-MCNC: 2.1 G/DL (ref 3.5–5)
ALP SERPL-CCNC: 631 U/L (ref 46–116)
ALT SERPL W P-5'-P-CCNC: 78 U/L (ref 12–78)
ANION GAP SERPL CALCULATED.3IONS-SCNC: 13 MMOL/L (ref 4–13)
AST SERPL W P-5'-P-CCNC: 70 U/L (ref 5–45)
BILIRUB DIRECT SERPL-MCNC: 4.35 MG/DL (ref 0–0.2)
BILIRUB SERPL-MCNC: 5.14 MG/DL (ref 0.2–1)
BUN SERPL-MCNC: 38 MG/DL (ref 5–25)
CALCIUM SERPL-MCNC: 8.8 MG/DL (ref 8.3–10.1)
CHLORIDE SERPL-SCNC: 106 MMOL/L (ref 100–108)
CO2 SERPL-SCNC: 20 MMOL/L (ref 21–32)
CREAT SERPL-MCNC: 1.17 MG/DL (ref 0.6–1.3)
ERYTHROCYTE [DISTWIDTH] IN BLOOD BY AUTOMATED COUNT: 21.3 % (ref 11.6–15.1)
GFR SERPL CREATININE-BSD FRML MDRD: 46 ML/MIN/1.73SQ M
GLUCOSE SERPL-MCNC: 109 MG/DL (ref 65–140)
GLUCOSE SERPL-MCNC: 160 MG/DL (ref 65–140)
GLUCOSE SERPL-MCNC: 191 MG/DL (ref 65–140)
GLUCOSE SERPL-MCNC: 197 MG/DL (ref 65–140)
GLUCOSE SERPL-MCNC: 227 MG/DL (ref 65–140)
GLUCOSE SERPL-MCNC: 66 MG/DL (ref 65–140)
HCT VFR BLD AUTO: 26.6 % (ref 34.8–46.1)
HGB BLD-MCNC: 8.3 G/DL (ref 11.5–15.4)
INR PPP: 1.4 (ref 0.86–1.17)
MCH RBC QN AUTO: 28.9 PG (ref 26.8–34.3)
MCHC RBC AUTO-ENTMCNC: 31.2 G/DL (ref 31.4–37.4)
MCV RBC AUTO: 93 FL (ref 82–98)
PLATELET # BLD AUTO: 293 THOUSANDS/UL (ref 149–390)
PMV BLD AUTO: 12.6 FL (ref 8.9–12.7)
POTASSIUM SERPL-SCNC: 4 MMOL/L (ref 3.5–5.3)
PROT SERPL-MCNC: 5.8 G/DL (ref 6.4–8.2)
PROTHROMBIN TIME: 17.3 SECONDS (ref 11.8–14.2)
RBC # BLD AUTO: 2.87 MILLION/UL (ref 3.81–5.12)
SODIUM SERPL-SCNC: 139 MMOL/L (ref 136–145)
WBC # BLD AUTO: 21.18 THOUSAND/UL (ref 4.31–10.16)

## 2019-04-12 PROCEDURE — 99223 1ST HOSP IP/OBS HIGH 75: CPT | Performed by: PHYSICAL MEDICINE & REHABILITATION

## 2019-04-12 PROCEDURE — 99024 POSTOP FOLLOW-UP VISIT: CPT | Performed by: PHYSICIAN ASSISTANT

## 2019-04-12 PROCEDURE — 97163 PT EVAL HIGH COMPLEX 45 MIN: CPT

## 2019-04-12 PROCEDURE — G8988 SELF CARE GOAL STATUS: HCPCS

## 2019-04-12 PROCEDURE — 82948 REAGENT STRIP/BLOOD GLUCOSE: CPT

## 2019-04-12 PROCEDURE — 85610 PROTHROMBIN TIME: CPT | Performed by: PHYSICIAN ASSISTANT

## 2019-04-12 PROCEDURE — G8978 MOBILITY CURRENT STATUS: HCPCS

## 2019-04-12 PROCEDURE — 80076 HEPATIC FUNCTION PANEL: CPT | Performed by: INTERNAL MEDICINE

## 2019-04-12 PROCEDURE — 99222 1ST HOSP IP/OBS MODERATE 55: CPT | Performed by: NURSE PRACTITIONER

## 2019-04-12 PROCEDURE — 80048 BASIC METABOLIC PNL TOTAL CA: CPT | Performed by: PHYSICIAN ASSISTANT

## 2019-04-12 PROCEDURE — G8987 SELF CARE CURRENT STATUS: HCPCS

## 2019-04-12 PROCEDURE — 30233N1 TRANSFUSION OF NONAUTOLOGOUS RED BLOOD CELLS INTO PERIPHERAL VEIN, PERCUTANEOUS APPROACH: ICD-10-PCS | Performed by: INTERNAL MEDICINE

## 2019-04-12 PROCEDURE — 97535 SELF CARE MNGMENT TRAINING: CPT

## 2019-04-12 PROCEDURE — 99222 1ST HOSP IP/OBS MODERATE 55: CPT | Performed by: INTERNAL MEDICINE

## 2019-04-12 PROCEDURE — 85027 COMPLETE CBC AUTOMATED: CPT | Performed by: PHYSICIAN ASSISTANT

## 2019-04-12 PROCEDURE — 99232 SBSQ HOSP IP/OBS MODERATE 35: CPT | Performed by: INTERNAL MEDICINE

## 2019-04-12 PROCEDURE — G8979 MOBILITY GOAL STATUS: HCPCS

## 2019-04-12 PROCEDURE — 97167 OT EVAL HIGH COMPLEX 60 MIN: CPT

## 2019-04-12 RX ORDER — ACETAMINOPHEN 325 MG/1
650 TABLET ORAL EVERY 6 HOURS PRN
Status: DISCONTINUED | OUTPATIENT
Start: 2019-04-12 | End: 2019-06-03 | Stop reason: HOSPADM

## 2019-04-12 RX ORDER — SENNOSIDES 8.6 MG
2 TABLET ORAL DAILY
Status: DISCONTINUED | OUTPATIENT
Start: 2019-04-13 | End: 2019-04-29

## 2019-04-12 RX ORDER — ACETAMINOPHEN 325 MG/1
650 TABLET ORAL 2 TIMES DAILY
Status: DISPENSED | OUTPATIENT
Start: 2019-04-12 | End: 2019-04-17

## 2019-04-12 RX ORDER — POLYETHYLENE GLYCOL 3350 17 G/17G
17 POWDER, FOR SOLUTION ORAL DAILY PRN
Status: DISCONTINUED | OUTPATIENT
Start: 2019-04-12 | End: 2019-06-03 | Stop reason: HOSPADM

## 2019-04-12 RX ADMIN — ACETAMINOPHEN 650 MG: 325 TABLET, FILM COATED ORAL at 17:30

## 2019-04-12 RX ADMIN — INSULIN GLARGINE 5 UNITS: 100 INJECTION, SOLUTION SUBCUTANEOUS at 21:10

## 2019-04-12 RX ADMIN — DOCUSATE SODIUM 100 MG: 100 CAPSULE, LIQUID FILLED ORAL at 17:31

## 2019-04-12 RX ADMIN — PANCRELIPASE 48000 UNITS: 24000; 76000; 120000 CAPSULE, DELAYED RELEASE PELLETS ORAL at 17:31

## 2019-04-12 RX ADMIN — INSULIN LISPRO 10 UNITS: 100 INJECTION, SOLUTION INTRAVENOUS; SUBCUTANEOUS at 17:32

## 2019-04-12 RX ADMIN — PANCRELIPASE 48000 UNITS: 24000; 76000; 120000 CAPSULE, DELAYED RELEASE PELLETS ORAL at 08:23

## 2019-04-12 RX ADMIN — ENOXAPARIN SODIUM 40 MG: 40 INJECTION SUBCUTANEOUS at 09:58

## 2019-04-12 RX ADMIN — CEFAZOLIN SODIUM 1000 MG: 1 SOLUTION INTRAVENOUS at 11:29

## 2019-04-12 RX ADMIN — SENNOSIDES 8.6 MG: 8.6 TABLET, FILM COATED ORAL at 09:55

## 2019-04-12 RX ADMIN — CEFAZOLIN SODIUM 1000 MG: 1 SOLUTION INTRAVENOUS at 04:59

## 2019-04-12 RX ADMIN — ONDANSETRON 4 MG: 2 INJECTION INTRAMUSCULAR; INTRAVENOUS at 01:56

## 2019-04-12 RX ADMIN — DOCUSATE SODIUM 100 MG: 100 CAPSULE, LIQUID FILLED ORAL at 09:54

## 2019-04-12 RX ADMIN — TORSEMIDE 30 MG: 20 TABLET ORAL at 09:55

## 2019-04-12 RX ADMIN — PANCRELIPASE 48000 UNITS: 24000; 76000; 120000 CAPSULE, DELAYED RELEASE PELLETS ORAL at 13:51

## 2019-04-12 RX ADMIN — PANTOPRAZOLE SODIUM 40 MG: 40 TABLET, DELAYED RELEASE ORAL at 06:23

## 2019-04-12 RX ADMIN — INSULIN LISPRO 10 UNITS: 100 INJECTION, SOLUTION INTRAVENOUS; SUBCUTANEOUS at 13:51

## 2019-04-12 RX ADMIN — INSULIN LISPRO 10 UNITS: 100 INJECTION, SOLUTION INTRAVENOUS; SUBCUTANEOUS at 08:24

## 2019-04-12 RX ADMIN — POLYETHYLENE GLYCOL 3350 17 G: 17 POWDER, FOR SOLUTION ORAL at 23:46

## 2019-04-13 ENCOUNTER — APPOINTMENT (INPATIENT)
Dept: RADIOLOGY | Facility: HOSPITAL | Age: 74
DRG: 469 | End: 2019-04-13
Payer: COMMERCIAL

## 2019-04-13 LAB
ALBUMIN SERPL BCP-MCNC: 1.7 G/DL (ref 3.5–5)
ALP SERPL-CCNC: 451 U/L (ref 46–116)
ALT SERPL W P-5'-P-CCNC: 42 U/L (ref 12–78)
ANION GAP SERPL CALCULATED.3IONS-SCNC: 11 MMOL/L (ref 4–13)
AST SERPL W P-5'-P-CCNC: 41 U/L (ref 5–45)
BASOPHILS # BLD AUTO: 0.05 THOUSANDS/ΜL (ref 0–0.1)
BASOPHILS NFR BLD AUTO: 0 % (ref 0–1)
BILIRUB SERPL-MCNC: 3.39 MG/DL (ref 0.2–1)
BUN SERPL-MCNC: 52 MG/DL (ref 5–25)
CALCIUM SERPL-MCNC: 8.4 MG/DL (ref 8.3–10.1)
CHLORIDE SERPL-SCNC: 103 MMOL/L (ref 100–108)
CO2 SERPL-SCNC: 23 MMOL/L (ref 21–32)
CREAT SERPL-MCNC: 1.31 MG/DL (ref 0.6–1.3)
EOSINOPHIL # BLD AUTO: 0.03 THOUSAND/ΜL (ref 0–0.61)
EOSINOPHIL NFR BLD AUTO: 0 % (ref 0–6)
ERYTHROCYTE [DISTWIDTH] IN BLOOD BY AUTOMATED COUNT: 20.5 % (ref 11.6–15.1)
GFR SERPL CREATININE-BSD FRML MDRD: 40 ML/MIN/1.73SQ M
GLUCOSE SERPL-MCNC: 173 MG/DL (ref 65–140)
GLUCOSE SERPL-MCNC: 178 MG/DL (ref 65–140)
GLUCOSE SERPL-MCNC: 191 MG/DL (ref 65–140)
GLUCOSE SERPL-MCNC: 223 MG/DL (ref 65–140)
GLUCOSE SERPL-MCNC: 249 MG/DL (ref 65–140)
HCT VFR BLD AUTO: 23 % (ref 34.8–46.1)
HGB BLD-MCNC: 7.5 G/DL (ref 11.5–15.4)
IMM GRANULOCYTES # BLD AUTO: 0.33 THOUSAND/UL (ref 0–0.2)
IMM GRANULOCYTES NFR BLD AUTO: 1 % (ref 0–2)
LYMPHOCYTES # BLD AUTO: 1.31 THOUSANDS/ΜL (ref 0.6–4.47)
LYMPHOCYTES NFR BLD AUTO: 4 % (ref 14–44)
MCH RBC QN AUTO: 29 PG (ref 26.8–34.3)
MCHC RBC AUTO-ENTMCNC: 32.6 G/DL (ref 31.4–37.4)
MCV RBC AUTO: 89 FL (ref 82–98)
MONOCYTES # BLD AUTO: 2.19 THOUSAND/ΜL (ref 0.17–1.22)
MONOCYTES NFR BLD AUTO: 7 % (ref 4–12)
NEUTROPHILS # BLD AUTO: 26.21 THOUSANDS/ΜL (ref 1.85–7.62)
NEUTS SEG NFR BLD AUTO: 88 % (ref 43–75)
NRBC BLD AUTO-RTO: 0 /100 WBCS
PLATELET # BLD AUTO: 308 THOUSANDS/UL (ref 149–390)
PMV BLD AUTO: 12.1 FL (ref 8.9–12.7)
POTASSIUM SERPL-SCNC: 3.8 MMOL/L (ref 3.5–5.3)
PROCALCITONIN SERPL-MCNC: 1.71 NG/ML
PROCALCITONIN SERPL-MCNC: 1.94 NG/ML
PROT SERPL-MCNC: 5.2 G/DL (ref 6.4–8.2)
RBC # BLD AUTO: 2.59 MILLION/UL (ref 3.81–5.12)
SODIUM SERPL-SCNC: 137 MMOL/L (ref 136–145)
WBC # BLD AUTO: 30.12 THOUSAND/UL (ref 4.31–10.16)

## 2019-04-13 PROCEDURE — 99232 SBSQ HOSP IP/OBS MODERATE 35: CPT | Performed by: INTERNAL MEDICINE

## 2019-04-13 PROCEDURE — 86301 IMMUNOASSAY TUMOR CA 19-9: CPT | Performed by: PHYSICIAN ASSISTANT

## 2019-04-13 PROCEDURE — 97112 NEUROMUSCULAR REEDUCATION: CPT | Performed by: PHYSICAL THERAPIST

## 2019-04-13 PROCEDURE — 97116 GAIT TRAINING THERAPY: CPT | Performed by: PHYSICAL THERAPIST

## 2019-04-13 PROCEDURE — 97530 THERAPEUTIC ACTIVITIES: CPT | Performed by: PHYSICAL THERAPIST

## 2019-04-13 PROCEDURE — 82948 REAGENT STRIP/BLOOD GLUCOSE: CPT

## 2019-04-13 PROCEDURE — 80053 COMPREHEN METABOLIC PANEL: CPT | Performed by: INTERNAL MEDICINE

## 2019-04-13 PROCEDURE — 71045 X-RAY EXAM CHEST 1 VIEW: CPT

## 2019-04-13 PROCEDURE — 85025 COMPLETE CBC W/AUTO DIFF WBC: CPT | Performed by: INTERNAL MEDICINE

## 2019-04-13 PROCEDURE — 97535 SELF CARE MNGMENT TRAINING: CPT

## 2019-04-13 PROCEDURE — 84145 PROCALCITONIN (PCT): CPT | Performed by: INTERNAL MEDICINE

## 2019-04-13 PROCEDURE — 84145 PROCALCITONIN (PCT): CPT | Performed by: PHYSICIAN ASSISTANT

## 2019-04-13 PROCEDURE — 97530 THERAPEUTIC ACTIVITIES: CPT

## 2019-04-13 PROCEDURE — 99024 POSTOP FOLLOW-UP VISIT: CPT | Performed by: PHYSICIAN ASSISTANT

## 2019-04-13 RX ORDER — SODIUM CHLORIDE 9 MG/ML
75 INJECTION, SOLUTION INTRAVENOUS CONTINUOUS
Status: DISPENSED | OUTPATIENT
Start: 2019-04-13 | End: 2019-04-13

## 2019-04-13 RX ADMIN — INSULIN LISPRO 10 UNITS: 100 INJECTION, SOLUTION INTRAVENOUS; SUBCUTANEOUS at 14:40

## 2019-04-13 RX ADMIN — METRONIDAZOLE 500 MG: 500 INJECTION, SOLUTION INTRAVENOUS at 13:09

## 2019-04-13 RX ADMIN — ENOXAPARIN SODIUM 40 MG: 40 INJECTION SUBCUTANEOUS at 08:18

## 2019-04-13 RX ADMIN — DOCUSATE SODIUM 100 MG: 100 CAPSULE, LIQUID FILLED ORAL at 08:16

## 2019-04-13 RX ADMIN — PANCRELIPASE 48000 UNITS: 24000; 76000; 120000 CAPSULE, DELAYED RELEASE PELLETS ORAL at 08:17

## 2019-04-13 RX ADMIN — INSULIN LISPRO 10 UNITS: 100 INJECTION, SOLUTION INTRAVENOUS; SUBCUTANEOUS at 19:02

## 2019-04-13 RX ADMIN — METRONIDAZOLE 500 MG: 500 INJECTION, SOLUTION INTRAVENOUS at 21:05

## 2019-04-13 RX ADMIN — ACETAMINOPHEN 650 MG: 325 TABLET, FILM COATED ORAL at 08:16

## 2019-04-13 RX ADMIN — TORSEMIDE 30 MG: 20 TABLET ORAL at 08:15

## 2019-04-13 RX ADMIN — PANCRELIPASE 48000 UNITS: 24000; 76000; 120000 CAPSULE, DELAYED RELEASE PELLETS ORAL at 19:02

## 2019-04-13 RX ADMIN — ACETAMINOPHEN 650 MG: 325 TABLET, FILM COATED ORAL at 18:01

## 2019-04-13 RX ADMIN — SENNOSIDES 17.2 MG: 8.6 TABLET, FILM COATED ORAL at 08:16

## 2019-04-13 RX ADMIN — SODIUM CHLORIDE 75 ML/HR: 0.9 INJECTION, SOLUTION INTRAVENOUS at 08:18

## 2019-04-13 RX ADMIN — INSULIN LISPRO 10 UNITS: 100 INJECTION, SOLUTION INTRAVENOUS; SUBCUTANEOUS at 08:21

## 2019-04-13 RX ADMIN — INSULIN GLARGINE 5 UNITS: 100 INJECTION, SOLUTION SUBCUTANEOUS at 21:08

## 2019-04-13 RX ADMIN — PANTOPRAZOLE SODIUM 40 MG: 40 TABLET, DELAYED RELEASE ORAL at 06:28

## 2019-04-13 RX ADMIN — PANCRELIPASE 48000 UNITS: 24000; 76000; 120000 CAPSULE, DELAYED RELEASE PELLETS ORAL at 13:09

## 2019-04-13 RX ADMIN — CEFTRIAXONE SODIUM 1000 MG: 10 INJECTION, POWDER, FOR SOLUTION INTRAVENOUS at 13:54

## 2019-04-14 LAB
ALBUMIN SERPL BCP-MCNC: 1.7 G/DL (ref 3.5–5)
ALP SERPL-CCNC: 476 U/L (ref 46–116)
ALT SERPL W P-5'-P-CCNC: 35 U/L (ref 12–78)
ANION GAP SERPL CALCULATED.3IONS-SCNC: 12 MMOL/L (ref 4–13)
AST SERPL W P-5'-P-CCNC: 41 U/L (ref 5–45)
BILIRUB SERPL-MCNC: 3.21 MG/DL (ref 0.2–1)
BUN SERPL-MCNC: 52 MG/DL (ref 5–25)
CALCIUM SERPL-MCNC: 8.4 MG/DL (ref 8.3–10.1)
CHLORIDE SERPL-SCNC: 104 MMOL/L (ref 100–108)
CO2 SERPL-SCNC: 23 MMOL/L (ref 21–32)
CREAT SERPL-MCNC: 1.16 MG/DL (ref 0.6–1.3)
ERYTHROCYTE [DISTWIDTH] IN BLOOD BY AUTOMATED COUNT: 20.6 % (ref 11.6–15.1)
GFR SERPL CREATININE-BSD FRML MDRD: 46 ML/MIN/1.73SQ M
GLUCOSE SERPL-MCNC: 116 MG/DL (ref 65–140)
GLUCOSE SERPL-MCNC: 126 MG/DL (ref 65–140)
GLUCOSE SERPL-MCNC: 141 MG/DL (ref 65–140)
GLUCOSE SERPL-MCNC: 169 MG/DL (ref 65–140)
GLUCOSE SERPL-MCNC: 239 MG/DL (ref 65–140)
HCT VFR BLD AUTO: 23.5 % (ref 34.8–46.1)
HGB BLD-MCNC: 7.7 G/DL (ref 11.5–15.4)
MCH RBC QN AUTO: 29.2 PG (ref 26.8–34.3)
MCHC RBC AUTO-ENTMCNC: 32.8 G/DL (ref 31.4–37.4)
MCV RBC AUTO: 89 FL (ref 82–98)
PLATELET # BLD AUTO: 306 THOUSANDS/UL (ref 149–390)
PMV BLD AUTO: 11.8 FL (ref 8.9–12.7)
POTASSIUM SERPL-SCNC: 3.2 MMOL/L (ref 3.5–5.3)
PROT SERPL-MCNC: 5.1 G/DL (ref 6.4–8.2)
RBC # BLD AUTO: 2.64 MILLION/UL (ref 3.81–5.12)
SODIUM SERPL-SCNC: 139 MMOL/L (ref 136–145)
WBC # BLD AUTO: 28.85 THOUSAND/UL (ref 4.31–10.16)

## 2019-04-14 PROCEDURE — 80053 COMPREHEN METABOLIC PANEL: CPT | Performed by: INTERNAL MEDICINE

## 2019-04-14 PROCEDURE — 97535 SELF CARE MNGMENT TRAINING: CPT

## 2019-04-14 PROCEDURE — 82948 REAGENT STRIP/BLOOD GLUCOSE: CPT

## 2019-04-14 PROCEDURE — 99232 SBSQ HOSP IP/OBS MODERATE 35: CPT | Performed by: INTERNAL MEDICINE

## 2019-04-14 PROCEDURE — 97530 THERAPEUTIC ACTIVITIES: CPT | Performed by: PHYSICAL THERAPIST

## 2019-04-14 PROCEDURE — 97116 GAIT TRAINING THERAPY: CPT | Performed by: PHYSICAL THERAPIST

## 2019-04-14 PROCEDURE — 99024 POSTOP FOLLOW-UP VISIT: CPT | Performed by: PHYSICIAN ASSISTANT

## 2019-04-14 PROCEDURE — 97112 NEUROMUSCULAR REEDUCATION: CPT | Performed by: PHYSICAL THERAPIST

## 2019-04-14 PROCEDURE — 85027 COMPLETE CBC AUTOMATED: CPT | Performed by: PHYSICIAN ASSISTANT

## 2019-04-14 RX ORDER — POTASSIUM CHLORIDE 20 MEQ/1
40 TABLET, EXTENDED RELEASE ORAL ONCE
Status: COMPLETED | OUTPATIENT
Start: 2019-04-14 | End: 2019-04-14

## 2019-04-14 RX ADMIN — ACETAMINOPHEN 650 MG: 325 TABLET, FILM COATED ORAL at 18:20

## 2019-04-14 RX ADMIN — INSULIN LISPRO 10 UNITS: 100 INJECTION, SOLUTION INTRAVENOUS; SUBCUTANEOUS at 14:31

## 2019-04-14 RX ADMIN — POTASSIUM CHLORIDE 40 MEQ: 1500 TABLET, EXTENDED RELEASE ORAL at 08:55

## 2019-04-14 RX ADMIN — SENNOSIDES 17.2 MG: 8.6 TABLET, FILM COATED ORAL at 08:55

## 2019-04-14 RX ADMIN — PANTOPRAZOLE SODIUM 40 MG: 40 TABLET, DELAYED RELEASE ORAL at 05:33

## 2019-04-14 RX ADMIN — CEFTRIAXONE SODIUM 1000 MG: 10 INJECTION, POWDER, FOR SOLUTION INTRAVENOUS at 14:24

## 2019-04-14 RX ADMIN — INSULIN LISPRO 10 UNITS: 100 INJECTION, SOLUTION INTRAVENOUS; SUBCUTANEOUS at 08:58

## 2019-04-14 RX ADMIN — PANCRELIPASE 48000 UNITS: 24000; 76000; 120000 CAPSULE, DELAYED RELEASE PELLETS ORAL at 08:55

## 2019-04-14 RX ADMIN — METRONIDAZOLE 500 MG: 500 INJECTION, SOLUTION INTRAVENOUS at 21:15

## 2019-04-14 RX ADMIN — ENOXAPARIN SODIUM 40 MG: 40 INJECTION SUBCUTANEOUS at 08:55

## 2019-04-14 RX ADMIN — PANCRELIPASE 48000 UNITS: 24000; 76000; 120000 CAPSULE, DELAYED RELEASE PELLETS ORAL at 14:30

## 2019-04-14 RX ADMIN — METRONIDAZOLE 500 MG: 500 INJECTION, SOLUTION INTRAVENOUS at 13:22

## 2019-04-14 RX ADMIN — METRONIDAZOLE 500 MG: 500 INJECTION, SOLUTION INTRAVENOUS at 05:32

## 2019-04-14 RX ADMIN — ACETAMINOPHEN 650 MG: 325 TABLET, FILM COATED ORAL at 08:55

## 2019-04-14 RX ADMIN — PANCRELIPASE 48000 UNITS: 24000; 76000; 120000 CAPSULE, DELAYED RELEASE PELLETS ORAL at 19:06

## 2019-04-14 RX ADMIN — DOCUSATE SODIUM 100 MG: 100 CAPSULE, LIQUID FILLED ORAL at 08:55

## 2019-04-14 RX ADMIN — INSULIN GLARGINE 5 UNITS: 100 INJECTION, SOLUTION SUBCUTANEOUS at 21:15

## 2019-04-14 RX ADMIN — INSULIN LISPRO 10 UNITS: 100 INJECTION, SOLUTION INTRAVENOUS; SUBCUTANEOUS at 19:09

## 2019-04-15 PROBLEM — D62 ACUTE BLOOD LOSS ANEMIA: Status: ACTIVE | Noted: 2019-04-15

## 2019-04-15 PROBLEM — R60.0 BILATERAL LOWER EXTREMITY EDEMA: Status: ACTIVE | Noted: 2019-04-15

## 2019-04-15 PROBLEM — N17.0 ACUTE RENAL FAILURE WITH ACUTE TUBULAR NECROSIS SUPERIMPOSED ON STAGE 3 CHRONIC KIDNEY DISEASE (HCC): Status: ACTIVE | Noted: 2019-04-15

## 2019-04-15 PROBLEM — R79.89 ELEVATED LFTS: Status: ACTIVE | Noted: 2019-04-15

## 2019-04-15 PROBLEM — N18.30 ACUTE RENAL FAILURE WITH ACUTE TUBULAR NECROSIS SUPERIMPOSED ON STAGE 3 CHRONIC KIDNEY DISEASE (HCC): Status: ACTIVE | Noted: 2019-04-15

## 2019-04-15 LAB
ALBUMIN SERPL BCP-MCNC: 1.6 G/DL (ref 3.5–5)
ALP SERPL-CCNC: 544 U/L (ref 46–116)
ALT SERPL W P-5'-P-CCNC: 36 U/L (ref 12–78)
ANION GAP SERPL CALCULATED.3IONS-SCNC: 11 MMOL/L (ref 4–13)
AST SERPL W P-5'-P-CCNC: 50 U/L (ref 5–45)
BASOPHILS # BLD AUTO: 0.04 THOUSANDS/ΜL (ref 0–0.1)
BASOPHILS NFR BLD AUTO: 0 % (ref 0–1)
BILIRUB SERPL-MCNC: 4.62 MG/DL (ref 0.2–1)
BUN SERPL-MCNC: 51 MG/DL (ref 5–25)
CALCIUM SERPL-MCNC: 8.4 MG/DL (ref 8.3–10.1)
CHLORIDE SERPL-SCNC: 105 MMOL/L (ref 100–108)
CO2 SERPL-SCNC: 23 MMOL/L (ref 21–32)
CREAT SERPL-MCNC: 1.06 MG/DL (ref 0.6–1.3)
EOSINOPHIL # BLD AUTO: 0.25 THOUSAND/ΜL (ref 0–0.61)
EOSINOPHIL NFR BLD AUTO: 1 % (ref 0–6)
ERYTHROCYTE [DISTWIDTH] IN BLOOD BY AUTOMATED COUNT: 21 % (ref 11.6–15.1)
GFR SERPL CREATININE-BSD FRML MDRD: 51 ML/MIN/1.73SQ M
GLUCOSE SERPL-MCNC: 105 MG/DL (ref 65–140)
GLUCOSE SERPL-MCNC: 142 MG/DL (ref 65–140)
GLUCOSE SERPL-MCNC: 146 MG/DL (ref 65–140)
GLUCOSE SERPL-MCNC: 94 MG/DL (ref 65–140)
HCT VFR BLD AUTO: 25.3 % (ref 34.8–46.1)
HGB BLD-MCNC: 8.2 G/DL (ref 11.5–15.4)
IMM GRANULOCYTES # BLD AUTO: 0.27 THOUSAND/UL (ref 0–0.2)
IMM GRANULOCYTES NFR BLD AUTO: 1 % (ref 0–2)
LYMPHOCYTES # BLD AUTO: 1.37 THOUSANDS/ΜL (ref 0.6–4.47)
LYMPHOCYTES NFR BLD AUTO: 6 % (ref 14–44)
MCH RBC QN AUTO: 28.7 PG (ref 26.8–34.3)
MCHC RBC AUTO-ENTMCNC: 32.4 G/DL (ref 31.4–37.4)
MCV RBC AUTO: 89 FL (ref 82–98)
MONOCYTES # BLD AUTO: 2.05 THOUSAND/ΜL (ref 0.17–1.22)
MONOCYTES NFR BLD AUTO: 9 % (ref 4–12)
NEUTROPHILS # BLD AUTO: 18.4 THOUSANDS/ΜL (ref 1.85–7.62)
NEUTS SEG NFR BLD AUTO: 83 % (ref 43–75)
NRBC BLD AUTO-RTO: 0 /100 WBCS
PLATELET # BLD AUTO: 317 THOUSANDS/UL (ref 149–390)
PMV BLD AUTO: 11.6 FL (ref 8.9–12.7)
POTASSIUM SERPL-SCNC: 3.7 MMOL/L (ref 3.5–5.3)
PROT SERPL-MCNC: 5.1 G/DL (ref 6.4–8.2)
RBC # BLD AUTO: 2.86 MILLION/UL (ref 3.81–5.12)
SODIUM SERPL-SCNC: 139 MMOL/L (ref 136–145)
WBC # BLD AUTO: 22.38 THOUSAND/UL (ref 4.31–10.16)

## 2019-04-15 PROCEDURE — 97530 THERAPEUTIC ACTIVITIES: CPT

## 2019-04-15 PROCEDURE — 99024 POSTOP FOLLOW-UP VISIT: CPT | Performed by: PHYSICIAN ASSISTANT

## 2019-04-15 PROCEDURE — 99232 SBSQ HOSP IP/OBS MODERATE 35: CPT | Performed by: FAMILY MEDICINE

## 2019-04-15 PROCEDURE — 99233 SBSQ HOSP IP/OBS HIGH 50: CPT | Performed by: INTERNAL MEDICINE

## 2019-04-15 PROCEDURE — 80053 COMPREHEN METABOLIC PANEL: CPT | Performed by: INTERNAL MEDICINE

## 2019-04-15 PROCEDURE — 85025 COMPLETE CBC W/AUTO DIFF WBC: CPT | Performed by: INTERNAL MEDICINE

## 2019-04-15 PROCEDURE — 97110 THERAPEUTIC EXERCISES: CPT

## 2019-04-15 PROCEDURE — 82948 REAGENT STRIP/BLOOD GLUCOSE: CPT

## 2019-04-15 PROCEDURE — 99232 SBSQ HOSP IP/OBS MODERATE 35: CPT | Performed by: INTERNAL MEDICINE

## 2019-04-15 RX ORDER — HYDROMORPHONE HCL/PF 1 MG/ML
1 SYRINGE (ML) INJECTION EVERY 4 HOURS PRN
Status: DISCONTINUED | OUTPATIENT
Start: 2019-04-15 | End: 2019-06-03 | Stop reason: HOSPADM

## 2019-04-15 RX ORDER — OXYCODONE HYDROCHLORIDE 5 MG/1
5 TABLET ORAL EVERY 4 HOURS PRN
Status: DISCONTINUED | OUTPATIENT
Start: 2019-04-15 | End: 2019-05-13

## 2019-04-15 RX ORDER — TORSEMIDE 20 MG/1
20 TABLET ORAL DAILY
Status: DISCONTINUED | OUTPATIENT
Start: 2019-04-16 | End: 2019-04-16

## 2019-04-15 RX ORDER — METRONIDAZOLE 500 MG/1
500 TABLET ORAL EVERY 8 HOURS SCHEDULED
Status: DISCONTINUED | OUTPATIENT
Start: 2019-04-15 | End: 2019-04-24

## 2019-04-15 RX ORDER — SODIUM CHLORIDE 9 MG/ML
75 INJECTION, SOLUTION INTRAVENOUS CONTINUOUS
Status: DISCONTINUED | OUTPATIENT
Start: 2019-04-15 | End: 2019-04-17

## 2019-04-15 RX ORDER — DIPHENHYDRAMINE HCL 25 MG
50 TABLET ORAL ONCE
Status: COMPLETED | OUTPATIENT
Start: 2019-04-16 | End: 2019-04-16

## 2019-04-15 RX ORDER — HYDROMORPHONE HCL/PF 1 MG/ML
1 SYRINGE (ML) INJECTION EVERY 4 HOURS PRN
Status: DISCONTINUED | OUTPATIENT
Start: 2019-04-15 | End: 2019-04-15 | Stop reason: SDUPTHER

## 2019-04-15 RX ADMIN — METRONIDAZOLE 500 MG: 500 TABLET, FILM COATED ORAL at 21:30

## 2019-04-15 RX ADMIN — INSULIN LISPRO 10 UNITS: 100 INJECTION, SOLUTION INTRAVENOUS; SUBCUTANEOUS at 20:09

## 2019-04-15 RX ADMIN — PANCRELIPASE 48000 UNITS: 24000; 76000; 120000 CAPSULE, DELAYED RELEASE PELLETS ORAL at 15:11

## 2019-04-15 RX ADMIN — INSULIN LISPRO 10 UNITS: 100 INJECTION, SOLUTION INTRAVENOUS; SUBCUTANEOUS at 15:11

## 2019-04-15 RX ADMIN — ACETAMINOPHEN 650 MG: 325 TABLET, FILM COATED ORAL at 20:13

## 2019-04-15 RX ADMIN — ENOXAPARIN SODIUM 40 MG: 40 INJECTION SUBCUTANEOUS at 08:23

## 2019-04-15 RX ADMIN — CEFTRIAXONE SODIUM 1000 MG: 10 INJECTION, POWDER, FOR SOLUTION INTRAVENOUS at 13:00

## 2019-04-15 RX ADMIN — PREDNISONE 50 MG: 5 TABLET ORAL at 20:09

## 2019-04-15 RX ADMIN — INSULIN GLARGINE 5 UNITS: 100 INJECTION, SOLUTION SUBCUTANEOUS at 21:30

## 2019-04-15 RX ADMIN — ACETAMINOPHEN 650 MG: 325 TABLET, FILM COATED ORAL at 08:30

## 2019-04-15 RX ADMIN — DOCUSATE SODIUM 100 MG: 100 CAPSULE, LIQUID FILLED ORAL at 20:13

## 2019-04-15 RX ADMIN — INSULIN LISPRO 10 UNITS: 100 INJECTION, SOLUTION INTRAVENOUS; SUBCUTANEOUS at 08:20

## 2019-04-15 RX ADMIN — SODIUM CHLORIDE 75 ML/HR: 0.9 INJECTION, SOLUTION INTRAVENOUS at 19:02

## 2019-04-15 RX ADMIN — PANTOPRAZOLE SODIUM 40 MG: 40 TABLET, DELAYED RELEASE ORAL at 06:29

## 2019-04-15 RX ADMIN — METRONIDAZOLE 500 MG: 500 TABLET, FILM COATED ORAL at 15:14

## 2019-04-15 RX ADMIN — METRONIDAZOLE 500 MG: 500 INJECTION, SOLUTION INTRAVENOUS at 05:14

## 2019-04-15 RX ADMIN — PANCRELIPASE 48000 UNITS: 24000; 76000; 120000 CAPSULE, DELAYED RELEASE PELLETS ORAL at 20:09

## 2019-04-15 RX ADMIN — PANCRELIPASE 48000 UNITS: 24000; 76000; 120000 CAPSULE, DELAYED RELEASE PELLETS ORAL at 08:19

## 2019-04-16 ENCOUNTER — APPOINTMENT (INPATIENT)
Dept: CT IMAGING | Facility: HOSPITAL | Age: 74
DRG: 469 | End: 2019-04-16
Payer: COMMERCIAL

## 2019-04-16 LAB
ALBUMIN SERPL BCP-MCNC: 1.6 G/DL (ref 3.5–5)
ALP SERPL-CCNC: 555 U/L (ref 46–116)
ALT SERPL W P-5'-P-CCNC: 34 U/L (ref 12–78)
ANION GAP SERPL CALCULATED.3IONS-SCNC: 10 MMOL/L (ref 4–13)
AST SERPL W P-5'-P-CCNC: 43 U/L (ref 5–45)
BASOPHILS # BLD AUTO: 0.02 THOUSANDS/ΜL (ref 0–0.1)
BASOPHILS NFR BLD AUTO: 0 % (ref 0–1)
BILIRUB SERPL-MCNC: 4.93 MG/DL (ref 0.2–1)
BUN SERPL-MCNC: 43 MG/DL (ref 5–25)
CALCIUM SERPL-MCNC: 8.3 MG/DL (ref 8.3–10.1)
CANCER AG19-9 SERPL-ACNC: 61 U/ML (ref 0–35)
CHLORIDE SERPL-SCNC: 104 MMOL/L (ref 100–108)
CO2 SERPL-SCNC: 22 MMOL/L (ref 21–32)
CREAT SERPL-MCNC: 0.96 MG/DL (ref 0.6–1.3)
EOSINOPHIL # BLD AUTO: 0 THOUSAND/ΜL (ref 0–0.61)
EOSINOPHIL NFR BLD AUTO: 0 % (ref 0–6)
ERYTHROCYTE [DISTWIDTH] IN BLOOD BY AUTOMATED COUNT: 20.8 % (ref 11.6–15.1)
GFR SERPL CREATININE-BSD FRML MDRD: 58 ML/MIN/1.73SQ M
GLUCOSE SERPL-MCNC: 207 MG/DL (ref 65–140)
GLUCOSE SERPL-MCNC: 260 MG/DL (ref 65–140)
GLUCOSE SERPL-MCNC: 278 MG/DL (ref 65–140)
GLUCOSE SERPL-MCNC: 315 MG/DL (ref 65–140)
GLUCOSE SERPL-MCNC: 352 MG/DL (ref 65–140)
GLUCOSE SERPL-MCNC: 378 MG/DL (ref 65–140)
GLUCOSE SERPL-MCNC: 383 MG/DL (ref 65–140)
HCT VFR BLD AUTO: 24.5 % (ref 34.8–46.1)
HGB BLD-MCNC: 7.9 G/DL (ref 11.5–15.4)
IMM GRANULOCYTES # BLD AUTO: 0.31 THOUSAND/UL (ref 0–0.2)
IMM GRANULOCYTES NFR BLD AUTO: 2 % (ref 0–2)
LYMPHOCYTES # BLD AUTO: 0.71 THOUSANDS/ΜL (ref 0.6–4.47)
LYMPHOCYTES NFR BLD AUTO: 4 % (ref 14–44)
MCH RBC QN AUTO: 28.7 PG (ref 26.8–34.3)
MCHC RBC AUTO-ENTMCNC: 32.2 G/DL (ref 31.4–37.4)
MCV RBC AUTO: 89 FL (ref 82–98)
MONOCYTES # BLD AUTO: 0.32 THOUSAND/ΜL (ref 0.17–1.22)
MONOCYTES NFR BLD AUTO: 2 % (ref 4–12)
NEUTROPHILS # BLD AUTO: 18.64 THOUSANDS/ΜL (ref 1.85–7.62)
NEUTS SEG NFR BLD AUTO: 92 % (ref 43–75)
NRBC BLD AUTO-RTO: 0 /100 WBCS
PLATELET # BLD AUTO: 340 THOUSANDS/UL (ref 149–390)
PMV BLD AUTO: 11.9 FL (ref 8.9–12.7)
POTASSIUM SERPL-SCNC: 4.9 MMOL/L (ref 3.5–5.3)
PROCALCITONIN SERPL-MCNC: 0.7 NG/ML
PROT SERPL-MCNC: 5.1 G/DL (ref 6.4–8.2)
RBC # BLD AUTO: 2.75 MILLION/UL (ref 3.81–5.12)
SODIUM SERPL-SCNC: 136 MMOL/L (ref 136–145)
WBC # BLD AUTO: 20 THOUSAND/UL (ref 4.31–10.16)

## 2019-04-16 PROCEDURE — 97535 SELF CARE MNGMENT TRAINING: CPT

## 2019-04-16 PROCEDURE — 99232 SBSQ HOSP IP/OBS MODERATE 35: CPT | Performed by: INTERNAL MEDICINE

## 2019-04-16 PROCEDURE — 99232 SBSQ HOSP IP/OBS MODERATE 35: CPT | Performed by: FAMILY MEDICINE

## 2019-04-16 PROCEDURE — 97530 THERAPEUTIC ACTIVITIES: CPT

## 2019-04-16 PROCEDURE — 71260 CT THORAX DX C+: CPT

## 2019-04-16 PROCEDURE — 74177 CT ABD & PELVIS W/CONTRAST: CPT

## 2019-04-16 PROCEDURE — 85025 COMPLETE CBC W/AUTO DIFF WBC: CPT | Performed by: FAMILY MEDICINE

## 2019-04-16 PROCEDURE — 84145 PROCALCITONIN (PCT): CPT | Performed by: FAMILY MEDICINE

## 2019-04-16 PROCEDURE — 97110 THERAPEUTIC EXERCISES: CPT

## 2019-04-16 PROCEDURE — 97116 GAIT TRAINING THERAPY: CPT

## 2019-04-16 PROCEDURE — 82948 REAGENT STRIP/BLOOD GLUCOSE: CPT

## 2019-04-16 PROCEDURE — 80053 COMPREHEN METABOLIC PANEL: CPT | Performed by: FAMILY MEDICINE

## 2019-04-16 RX ORDER — INSULIN GLARGINE 100 [IU]/ML
15 INJECTION, SOLUTION SUBCUTANEOUS ONCE
Status: COMPLETED | OUTPATIENT
Start: 2019-04-17 | End: 2019-04-17

## 2019-04-16 RX ADMIN — INSULIN LISPRO 6 UNITS: 100 INJECTION, SOLUTION INTRAVENOUS; SUBCUTANEOUS at 21:54

## 2019-04-16 RX ADMIN — INSULIN LISPRO 10 UNITS: 100 INJECTION, SOLUTION INTRAVENOUS; SUBCUTANEOUS at 16:27

## 2019-04-16 RX ADMIN — INSULIN LISPRO 15 UNITS: 100 INJECTION, SOLUTION INTRAVENOUS; SUBCUTANEOUS at 18:44

## 2019-04-16 RX ADMIN — METRONIDAZOLE 500 MG: 500 TABLET, FILM COATED ORAL at 05:50

## 2019-04-16 RX ADMIN — IOHEXOL 50 ML: 240 INJECTION, SOLUTION INTRATHECAL; INTRAVASCULAR; INTRAVENOUS; ORAL at 14:07

## 2019-04-16 RX ADMIN — PREDNISONE 50 MG: 5 TABLET ORAL at 01:39

## 2019-04-16 RX ADMIN — METRONIDAZOLE 500 MG: 500 TABLET, FILM COATED ORAL at 14:55

## 2019-04-16 RX ADMIN — PANCRELIPASE 48000 UNITS: 24000; 76000; 120000 CAPSULE, DELAYED RELEASE PELLETS ORAL at 16:32

## 2019-04-16 RX ADMIN — ACETAMINOPHEN 650 MG: 325 TABLET, FILM COATED ORAL at 17:31

## 2019-04-16 RX ADMIN — ACETAMINOPHEN 650 MG: 325 TABLET, FILM COATED ORAL at 18:28

## 2019-04-16 RX ADMIN — IODIXANOL 100 ML: 320 INJECTION, SOLUTION INTRAVASCULAR at 14:07

## 2019-04-16 RX ADMIN — SODIUM CHLORIDE 75 ML/HR: 0.9 INJECTION, SOLUTION INTRAVENOUS at 13:04

## 2019-04-16 RX ADMIN — PANTOPRAZOLE SODIUM 40 MG: 40 TABLET, DELAYED RELEASE ORAL at 06:04

## 2019-04-16 RX ADMIN — INSULIN GLARGINE 5 UNITS: 100 INJECTION, SOLUTION SUBCUTANEOUS at 21:55

## 2019-04-16 RX ADMIN — METRONIDAZOLE 500 MG: 500 TABLET, FILM COATED ORAL at 21:54

## 2019-04-16 RX ADMIN — DIPHENHYDRAMINE HCL 50 MG: 25 TABLET ORAL at 12:58

## 2019-04-16 RX ADMIN — ENOXAPARIN SODIUM 40 MG: 40 INJECTION SUBCUTANEOUS at 08:40

## 2019-04-16 RX ADMIN — SENNOSIDES 17.2 MG: 8.6 TABLET, FILM COATED ORAL at 08:39

## 2019-04-16 RX ADMIN — DOCUSATE SODIUM 100 MG: 100 CAPSULE, LIQUID FILLED ORAL at 08:40

## 2019-04-16 RX ADMIN — ACETAMINOPHEN 650 MG: 325 TABLET, FILM COATED ORAL at 08:40

## 2019-04-16 RX ADMIN — CEFTRIAXONE SODIUM 1000 MG: 10 INJECTION, POWDER, FOR SOLUTION INTRAVENOUS at 13:01

## 2019-04-16 RX ADMIN — PREDNISONE 50 MG: 20 TABLET ORAL at 11:33

## 2019-04-17 LAB
ALBUMIN SERPL BCP-MCNC: 1.6 G/DL (ref 3.5–5)
ALP SERPL-CCNC: 502 U/L (ref 46–116)
ALT SERPL W P-5'-P-CCNC: 32 U/L (ref 12–78)
ANION GAP SERPL CALCULATED.3IONS-SCNC: 9 MMOL/L (ref 4–13)
AST SERPL W P-5'-P-CCNC: 29 U/L (ref 5–45)
BASOPHILS # BLD AUTO: 0.04 THOUSANDS/ΜL (ref 0–0.1)
BASOPHILS NFR BLD AUTO: 0 % (ref 0–1)
BILIRUB SERPL-MCNC: 2.71 MG/DL (ref 0.2–1)
BUN SERPL-MCNC: 48 MG/DL (ref 5–25)
CALCIUM SERPL-MCNC: 8.2 MG/DL (ref 8.3–10.1)
CHLORIDE SERPL-SCNC: 104 MMOL/L (ref 100–108)
CO2 SERPL-SCNC: 21 MMOL/L (ref 21–32)
CREAT SERPL-MCNC: 1.1 MG/DL (ref 0.6–1.3)
EOSINOPHIL # BLD AUTO: 0 THOUSAND/ΜL (ref 0–0.61)
EOSINOPHIL NFR BLD AUTO: 0 % (ref 0–6)
ERYTHROCYTE [DISTWIDTH] IN BLOOD BY AUTOMATED COUNT: 20.1 % (ref 11.6–15.1)
GFR SERPL CREATININE-BSD FRML MDRD: 49 ML/MIN/1.73SQ M
GLUCOSE SERPL-MCNC: 219 MG/DL (ref 65–140)
GLUCOSE SERPL-MCNC: 257 MG/DL (ref 65–140)
GLUCOSE SERPL-MCNC: 262 MG/DL (ref 65–140)
GLUCOSE SERPL-MCNC: 269 MG/DL (ref 65–140)
GLUCOSE SERPL-MCNC: 274 MG/DL (ref 65–140)
HCT VFR BLD AUTO: 24.4 % (ref 34.8–46.1)
HGB BLD-MCNC: 7.9 G/DL (ref 11.5–15.4)
IMM GRANULOCYTES # BLD AUTO: >0.5 THOUSAND/UL (ref 0–0.2)
IMM GRANULOCYTES NFR BLD AUTO: 2 % (ref 0–2)
LYMPHOCYTES # BLD AUTO: 1.17 THOUSANDS/ΜL (ref 0.6–4.47)
LYMPHOCYTES NFR BLD AUTO: 4 % (ref 14–44)
MCH RBC QN AUTO: 28.7 PG (ref 26.8–34.3)
MCHC RBC AUTO-ENTMCNC: 32.4 G/DL (ref 31.4–37.4)
MCV RBC AUTO: 89 FL (ref 82–98)
MONOCYTES # BLD AUTO: 2.15 THOUSAND/ΜL (ref 0.17–1.22)
MONOCYTES NFR BLD AUTO: 7 % (ref 4–12)
NEUTROPHILS # BLD AUTO: 25.72 THOUSANDS/ΜL (ref 1.85–7.62)
NEUTS SEG NFR BLD AUTO: 87 % (ref 43–75)
NRBC BLD AUTO-RTO: 0 /100 WBCS
PLATELET # BLD AUTO: 391 THOUSANDS/UL (ref 149–390)
PMV BLD AUTO: 11.7 FL (ref 8.9–12.7)
POTASSIUM SERPL-SCNC: 4.4 MMOL/L (ref 3.5–5.3)
PROT SERPL-MCNC: 4.8 G/DL (ref 6.4–8.2)
RBC # BLD AUTO: 2.75 MILLION/UL (ref 3.81–5.12)
SODIUM SERPL-SCNC: 134 MMOL/L (ref 136–145)
WBC # BLD AUTO: 29.67 THOUSAND/UL (ref 4.31–10.16)

## 2019-04-17 PROCEDURE — 82948 REAGENT STRIP/BLOOD GLUCOSE: CPT

## 2019-04-17 PROCEDURE — 99024 POSTOP FOLLOW-UP VISIT: CPT | Performed by: PHYSICIAN ASSISTANT

## 2019-04-17 PROCEDURE — 85025 COMPLETE CBC W/AUTO DIFF WBC: CPT | Performed by: FAMILY MEDICINE

## 2019-04-17 PROCEDURE — 80053 COMPREHEN METABOLIC PANEL: CPT | Performed by: FAMILY MEDICINE

## 2019-04-17 PROCEDURE — 99232 SBSQ HOSP IP/OBS MODERATE 35: CPT | Performed by: INTERNAL MEDICINE

## 2019-04-17 PROCEDURE — 99232 SBSQ HOSP IP/OBS MODERATE 35: CPT | Performed by: FAMILY MEDICINE

## 2019-04-17 RX ORDER — TORSEMIDE 20 MG/1
20 TABLET ORAL
Status: DISCONTINUED | OUTPATIENT
Start: 2019-04-17 | End: 2019-04-18

## 2019-04-17 RX ORDER — INSULIN GLARGINE 100 [IU]/ML
15 INJECTION, SOLUTION SUBCUTANEOUS
Status: DISCONTINUED | OUTPATIENT
Start: 2019-04-17 | End: 2019-04-17

## 2019-04-17 RX ORDER — TORSEMIDE 20 MG/1
20 TABLET ORAL DAILY
Status: DISCONTINUED | OUTPATIENT
Start: 2019-04-17 | End: 2019-04-17

## 2019-04-17 RX ORDER — INSULIN GLARGINE 100 [IU]/ML
20 INJECTION, SOLUTION SUBCUTANEOUS
Status: DISCONTINUED | OUTPATIENT
Start: 2019-04-17 | End: 2019-05-03

## 2019-04-17 RX ADMIN — ENOXAPARIN SODIUM 40 MG: 40 INJECTION SUBCUTANEOUS at 09:40

## 2019-04-17 RX ADMIN — PANTOPRAZOLE SODIUM 40 MG: 40 TABLET, DELAYED RELEASE ORAL at 06:16

## 2019-04-17 RX ADMIN — INSULIN LISPRO 3 UNITS: 100 INJECTION, SOLUTION INTRAVENOUS; SUBCUTANEOUS at 13:34

## 2019-04-17 RX ADMIN — METRONIDAZOLE 500 MG: 500 TABLET, FILM COATED ORAL at 15:03

## 2019-04-17 RX ADMIN — PANCRELIPASE 48000 UNITS: 24000; 76000; 120000 CAPSULE, DELAYED RELEASE PELLETS ORAL at 13:37

## 2019-04-17 RX ADMIN — INSULIN LISPRO 10 UNITS: 100 INJECTION, SOLUTION INTRAVENOUS; SUBCUTANEOUS at 18:29

## 2019-04-17 RX ADMIN — PANCRELIPASE 48000 UNITS: 24000; 76000; 120000 CAPSULE, DELAYED RELEASE PELLETS ORAL at 16:48

## 2019-04-17 RX ADMIN — INSULIN LISPRO 10 UNITS: 100 INJECTION, SOLUTION INTRAVENOUS; SUBCUTANEOUS at 07:49

## 2019-04-17 RX ADMIN — METRONIDAZOLE 500 MG: 500 TABLET, FILM COATED ORAL at 06:16

## 2019-04-17 RX ADMIN — TORSEMIDE 20 MG: 20 TABLET ORAL at 09:40

## 2019-04-17 RX ADMIN — INSULIN GLARGINE 20 UNITS: 100 INJECTION, SOLUTION SUBCUTANEOUS at 22:23

## 2019-04-17 RX ADMIN — SODIUM CHLORIDE 75 ML/HR: 0.9 INJECTION, SOLUTION INTRAVENOUS at 06:17

## 2019-04-17 RX ADMIN — CEFTRIAXONE SODIUM 1000 MG: 10 INJECTION, POWDER, FOR SOLUTION INTRAVENOUS at 13:38

## 2019-04-17 RX ADMIN — INSULIN LISPRO 3 UNITS: 100 INJECTION, SOLUTION INTRAVENOUS; SUBCUTANEOUS at 18:29

## 2019-04-17 RX ADMIN — INSULIN LISPRO 2 UNITS: 100 INJECTION, SOLUTION INTRAVENOUS; SUBCUTANEOUS at 22:23

## 2019-04-17 RX ADMIN — PANCRELIPASE 48000 UNITS: 24000; 76000; 120000 CAPSULE, DELAYED RELEASE PELLETS ORAL at 07:48

## 2019-04-17 RX ADMIN — INSULIN GLARGINE 15 UNITS: 100 INJECTION, SOLUTION SUBCUTANEOUS at 00:10

## 2019-04-17 RX ADMIN — INSULIN LISPRO 10 UNITS: 100 INJECTION, SOLUTION INTRAVENOUS; SUBCUTANEOUS at 13:35

## 2019-04-17 RX ADMIN — METRONIDAZOLE 500 MG: 500 TABLET, FILM COATED ORAL at 22:23

## 2019-04-17 RX ADMIN — TORSEMIDE 20 MG: 20 TABLET ORAL at 16:50

## 2019-04-17 RX ADMIN — INSULIN LISPRO 4 UNITS: 100 INJECTION, SOLUTION INTRAVENOUS; SUBCUTANEOUS at 07:49

## 2019-04-18 LAB
ALBUMIN SERPL BCP-MCNC: 1.7 G/DL (ref 3.5–5)
ALP SERPL-CCNC: 798 U/L (ref 46–116)
ALT SERPL W P-5'-P-CCNC: 71 U/L (ref 12–78)
ANION GAP SERPL CALCULATED.3IONS-SCNC: 10 MMOL/L (ref 4–13)
ANISOCYTOSIS BLD QL SMEAR: PRESENT
AST SERPL W P-5'-P-CCNC: 110 U/L (ref 5–45)
BASOPHILS # BLD MANUAL: 0 THOUSAND/UL (ref 0–0.1)
BASOPHILS NFR MAR MANUAL: 0 % (ref 0–1)
BILIRUB SERPL-MCNC: 5.17 MG/DL (ref 0.2–1)
BUN SERPL-MCNC: 49 MG/DL (ref 5–25)
CALCIUM SERPL-MCNC: 8.4 MG/DL (ref 8.3–10.1)
CHLORIDE SERPL-SCNC: 103 MMOL/L (ref 100–108)
CO2 SERPL-SCNC: 22 MMOL/L (ref 21–32)
CREAT SERPL-MCNC: 1.06 MG/DL (ref 0.6–1.3)
EOSINOPHIL # BLD MANUAL: 0.2 THOUSAND/UL (ref 0–0.4)
EOSINOPHIL NFR BLD MANUAL: 1 % (ref 0–6)
ERYTHROCYTE [DISTWIDTH] IN BLOOD BY AUTOMATED COUNT: 20.5 % (ref 11.6–15.1)
GFR SERPL CREATININE-BSD FRML MDRD: 51 ML/MIN/1.73SQ M
GLUCOSE SERPL-MCNC: 139 MG/DL (ref 65–140)
GLUCOSE SERPL-MCNC: 157 MG/DL (ref 65–140)
GLUCOSE SERPL-MCNC: 172 MG/DL (ref 65–140)
GLUCOSE SERPL-MCNC: 68 MG/DL (ref 65–140)
GLUCOSE SERPL-MCNC: 83 MG/DL (ref 65–140)
HCT VFR BLD AUTO: 28.9 % (ref 34.8–46.1)
HGB BLD-MCNC: 9.2 G/DL (ref 11.5–15.4)
LG PLATELETS BLD QL SMEAR: PRESENT
LYMPHOCYTES # BLD AUTO: 0.81 THOUSAND/UL (ref 0.6–4.47)
LYMPHOCYTES # BLD AUTO: 4 % (ref 14–44)
MCH RBC QN AUTO: 28.4 PG (ref 26.8–34.3)
MCHC RBC AUTO-ENTMCNC: 31.8 G/DL (ref 31.4–37.4)
MCV RBC AUTO: 89 FL (ref 82–98)
MONOCYTES # BLD AUTO: 1.42 THOUSAND/UL (ref 0–1.22)
MONOCYTES NFR BLD: 7 % (ref 4–12)
MYELOCYTES NFR BLD MANUAL: 1 % (ref 0–1)
NEUTROPHILS # BLD MANUAL: 17.69 THOUSAND/UL (ref 1.85–7.62)
NEUTS BAND NFR BLD MANUAL: 7 % (ref 0–8)
NEUTS SEG NFR BLD AUTO: 80 % (ref 43–75)
NRBC BLD AUTO-RTO: 0 /100 WBCS
PLATELET # BLD AUTO: 190 THOUSANDS/UL (ref 149–390)
PLATELET BLD QL SMEAR: ADEQUATE
PMV BLD AUTO: 10.8 FL (ref 8.9–12.7)
POTASSIUM SERPL-SCNC: 3.8 MMOL/L (ref 3.5–5.3)
PROCALCITONIN SERPL-MCNC: 0.68 NG/ML
PROT SERPL-MCNC: 5.2 G/DL (ref 6.4–8.2)
RBC # BLD AUTO: 3.24 MILLION/UL (ref 3.81–5.12)
RBC MORPH BLD: PRESENT
SODIUM SERPL-SCNC: 135 MMOL/L (ref 136–145)
TOTAL CELLS COUNTED SPEC: 100
WBC # BLD AUTO: 20.33 THOUSAND/UL (ref 4.31–10.16)

## 2019-04-18 PROCEDURE — 97535 SELF CARE MNGMENT TRAINING: CPT

## 2019-04-18 PROCEDURE — 85027 COMPLETE CBC AUTOMATED: CPT | Performed by: FAMILY MEDICINE

## 2019-04-18 PROCEDURE — 80053 COMPREHEN METABOLIC PANEL: CPT | Performed by: FAMILY MEDICINE

## 2019-04-18 PROCEDURE — 82948 REAGENT STRIP/BLOOD GLUCOSE: CPT

## 2019-04-18 PROCEDURE — 85007 BL SMEAR W/DIFF WBC COUNT: CPT | Performed by: FAMILY MEDICINE

## 2019-04-18 PROCEDURE — 99232 SBSQ HOSP IP/OBS MODERATE 35: CPT | Performed by: FAMILY MEDICINE

## 2019-04-18 PROCEDURE — 99232 SBSQ HOSP IP/OBS MODERATE 35: CPT | Performed by: INTERNAL MEDICINE

## 2019-04-18 PROCEDURE — 84145 PROCALCITONIN (PCT): CPT | Performed by: FAMILY MEDICINE

## 2019-04-18 PROCEDURE — 97530 THERAPEUTIC ACTIVITIES: CPT

## 2019-04-18 PROCEDURE — 97110 THERAPEUTIC EXERCISES: CPT

## 2019-04-18 PROCEDURE — 97116 GAIT TRAINING THERAPY: CPT

## 2019-04-18 RX ORDER — TORSEMIDE 20 MG/1
20 TABLET ORAL
Status: DISCONTINUED | OUTPATIENT
Start: 2019-04-18 | End: 2019-04-30

## 2019-04-18 RX ORDER — POTASSIUM CHLORIDE 20 MEQ/1
40 TABLET, EXTENDED RELEASE ORAL DAILY
Status: DISCONTINUED | OUTPATIENT
Start: 2019-04-18 | End: 2019-05-06

## 2019-04-18 RX ORDER — CIPROFLOXACIN 500 MG/1
500 TABLET, FILM COATED ORAL EVERY 12 HOURS SCHEDULED
Status: DISCONTINUED | OUTPATIENT
Start: 2019-04-18 | End: 2019-04-23

## 2019-04-18 RX ADMIN — INSULIN LISPRO 10 UNITS: 100 INJECTION, SOLUTION INTRAVENOUS; SUBCUTANEOUS at 18:49

## 2019-04-18 RX ADMIN — INSULIN GLARGINE 10 UNITS: 100 INJECTION, SOLUTION SUBCUTANEOUS at 21:57

## 2019-04-18 RX ADMIN — METRONIDAZOLE 500 MG: 500 TABLET, FILM COATED ORAL at 21:53

## 2019-04-18 RX ADMIN — METRONIDAZOLE 500 MG: 500 TABLET, FILM COATED ORAL at 06:37

## 2019-04-18 RX ADMIN — TORSEMIDE 20 MG: 20 TABLET ORAL at 14:22

## 2019-04-18 RX ADMIN — ENOXAPARIN SODIUM 40 MG: 40 INJECTION SUBCUTANEOUS at 09:07

## 2019-04-18 RX ADMIN — PANCRELIPASE 48000 UNITS: 24000; 76000; 120000 CAPSULE, DELAYED RELEASE PELLETS ORAL at 14:25

## 2019-04-18 RX ADMIN — CIPROFLOXACIN HYDROCHLORIDE 500 MG: 500 TABLET, FILM COATED ORAL at 21:53

## 2019-04-18 RX ADMIN — PANCRELIPASE 48000 UNITS: 24000; 76000; 120000 CAPSULE, DELAYED RELEASE PELLETS ORAL at 09:07

## 2019-04-18 RX ADMIN — ACETAMINOPHEN 650 MG: 325 TABLET, FILM COATED ORAL at 03:19

## 2019-04-18 RX ADMIN — PANCRELIPASE 48000 UNITS: 24000; 76000; 120000 CAPSULE, DELAYED RELEASE PELLETS ORAL at 18:50

## 2019-04-18 RX ADMIN — TORSEMIDE 20 MG: 20 TABLET ORAL at 09:08

## 2019-04-18 RX ADMIN — INSULIN LISPRO 1 UNITS: 100 INJECTION, SOLUTION INTRAVENOUS; SUBCUTANEOUS at 21:58

## 2019-04-18 RX ADMIN — PANTOPRAZOLE SODIUM 40 MG: 40 TABLET, DELAYED RELEASE ORAL at 06:37

## 2019-04-18 RX ADMIN — INSULIN LISPRO 10 UNITS: 100 INJECTION, SOLUTION INTRAVENOUS; SUBCUTANEOUS at 14:28

## 2019-04-18 RX ADMIN — INSULIN LISPRO 1 UNITS: 100 INJECTION, SOLUTION INTRAVENOUS; SUBCUTANEOUS at 17:28

## 2019-04-18 RX ADMIN — POTASSIUM CHLORIDE 40 MEQ: 1500 TABLET, EXTENDED RELEASE ORAL at 11:34

## 2019-04-18 RX ADMIN — METRONIDAZOLE 500 MG: 500 TABLET, FILM COATED ORAL at 14:22

## 2019-04-19 PROBLEM — E87.6 HYPOKALEMIA: Status: ACTIVE | Noted: 2019-04-19

## 2019-04-19 LAB
ALBUMIN SERPL BCP-MCNC: 1.8 G/DL (ref 3.5–5)
ALP SERPL-CCNC: 812 U/L (ref 46–116)
ALT SERPL W P-5'-P-CCNC: 80 U/L (ref 12–78)
ANION GAP SERPL CALCULATED.3IONS-SCNC: 11 MMOL/L (ref 4–13)
AST SERPL W P-5'-P-CCNC: 118 U/L (ref 5–45)
BASOPHILS # BLD MANUAL: 0.04 THOUSAND/UL (ref 0–0.1)
BASOPHILS NFR MAR MANUAL: 0 % (ref 0–1)
BILIRUB SERPL-MCNC: 6.57 MG/DL (ref 0.2–1)
BUN SERPL-MCNC: 49 MG/DL (ref 5–25)
CALCIUM SERPL-MCNC: 8.3 MG/DL (ref 8.3–10.1)
CHLORIDE SERPL-SCNC: 102 MMOL/L (ref 100–108)
CO2 SERPL-SCNC: 25 MMOL/L (ref 21–32)
CREAT SERPL-MCNC: 1.15 MG/DL (ref 0.6–1.3)
EOSINOPHIL # BLD MANUAL: 0.16 THOUSAND/UL (ref 0–0.4)
EOSINOPHIL NFR BLD MANUAL: 1 % (ref 0–6)
ERYTHROCYTE [DISTWIDTH] IN BLOOD BY AUTOMATED COUNT: 20 % (ref 11.6–15.1)
GFR SERPL CREATININE-BSD FRML MDRD: 47 ML/MIN/1.73SQ M
GLUCOSE SERPL-MCNC: 107 MG/DL (ref 65–140)
GLUCOSE SERPL-MCNC: 168 MG/DL (ref 65–140)
GLUCOSE SERPL-MCNC: 181 MG/DL (ref 65–140)
GLUCOSE SERPL-MCNC: 241 MG/DL (ref 65–140)
GLUCOSE SERPL-MCNC: 88 MG/DL (ref 65–140)
GLUCOSE SERPL-MCNC: 90 MG/DL (ref 65–140)
HCT VFR BLD AUTO: 28.4 % (ref 34.8–46.1)
HGB BLD-MCNC: 9.5 G/DL (ref 11.5–15.4)
LYMPHOCYTES # BLD AUTO: 1.52 THOUSAND/UL (ref 0.6–4.47)
LYMPHOCYTES # BLD AUTO: 7 % (ref 14–44)
MCH RBC QN AUTO: 28.8 PG (ref 26.8–34.3)
MCHC RBC AUTO-ENTMCNC: 33.5 G/DL (ref 31.4–37.4)
MCV RBC AUTO: 86 FL (ref 82–98)
MONOCYTES # BLD AUTO: 1.78 THOUSAND/UL (ref 0–1.22)
MONOCYTES NFR BLD: 9 % (ref 4–12)
NEUTROPHILS # BLD MANUAL: 16.48 THOUSAND/UL (ref 1.85–7.62)
NEUTS BAND NFR BLD MANUAL: 4 % (ref 0–8)
NEUTS SEG NFR BLD AUTO: 79 % (ref 43–75)
NRBC BLD AUTO-RTO: 0 /100 WBCS
PLATELET # BLD AUTO: 380 THOUSANDS/UL (ref 149–390)
PLATELET BLD QL SMEAR: ADEQUATE
PMV BLD AUTO: 11.4 FL (ref 8.9–12.7)
POTASSIUM SERPL-SCNC: 3.4 MMOL/L (ref 3.5–5.3)
PROT SERPL-MCNC: 5.2 G/DL (ref 6.4–8.2)
RBC # BLD AUTO: 3.3 MILLION/UL (ref 3.81–5.12)
SODIUM SERPL-SCNC: 138 MMOL/L (ref 136–145)
TOTAL CELLS COUNTED SPEC: 100
WBC # BLD AUTO: 20.88 THOUSAND/UL (ref 4.31–10.16)

## 2019-04-19 PROCEDURE — 99232 SBSQ HOSP IP/OBS MODERATE 35: CPT | Performed by: FAMILY MEDICINE

## 2019-04-19 PROCEDURE — 99232 SBSQ HOSP IP/OBS MODERATE 35: CPT | Performed by: INTERNAL MEDICINE

## 2019-04-19 PROCEDURE — 99024 POSTOP FOLLOW-UP VISIT: CPT | Performed by: PHYSICIAN ASSISTANT

## 2019-04-19 PROCEDURE — 80053 COMPREHEN METABOLIC PANEL: CPT | Performed by: FAMILY MEDICINE

## 2019-04-19 PROCEDURE — 82948 REAGENT STRIP/BLOOD GLUCOSE: CPT

## 2019-04-19 PROCEDURE — 85027 COMPLETE CBC AUTOMATED: CPT | Performed by: FAMILY MEDICINE

## 2019-04-19 PROCEDURE — 85007 BL SMEAR W/DIFF WBC COUNT: CPT | Performed by: FAMILY MEDICINE

## 2019-04-19 RX ORDER — POTASSIUM CHLORIDE 20 MEQ/1
20 TABLET, EXTENDED RELEASE ORAL ONCE
Status: COMPLETED | OUTPATIENT
Start: 2019-04-19 | End: 2019-04-19

## 2019-04-19 RX ADMIN — INSULIN LISPRO 10 UNITS: 100 INJECTION, SOLUTION INTRAVENOUS; SUBCUTANEOUS at 18:17

## 2019-04-19 RX ADMIN — METRONIDAZOLE 500 MG: 500 TABLET, FILM COATED ORAL at 05:52

## 2019-04-19 RX ADMIN — INSULIN GLARGINE 20 UNITS: 100 INJECTION, SOLUTION SUBCUTANEOUS at 21:26

## 2019-04-19 RX ADMIN — METRONIDAZOLE 500 MG: 500 TABLET, FILM COATED ORAL at 21:26

## 2019-04-19 RX ADMIN — TORSEMIDE 20 MG: 20 TABLET ORAL at 09:21

## 2019-04-19 RX ADMIN — DOCUSATE SODIUM 100 MG: 100 CAPSULE, LIQUID FILLED ORAL at 09:21

## 2019-04-19 RX ADMIN — PANTOPRAZOLE SODIUM 40 MG: 40 TABLET, DELAYED RELEASE ORAL at 06:04

## 2019-04-19 RX ADMIN — DIPHENHYDRAMINE HYDROCHLORIDE, ZINC ACETATE: 2; .1 CREAM TOPICAL at 00:07

## 2019-04-19 RX ADMIN — TORSEMIDE 20 MG: 20 TABLET ORAL at 13:35

## 2019-04-19 RX ADMIN — DIPHENHYDRAMINE HYDROCHLORIDE, ZINC ACETATE: 2; .1 CREAM TOPICAL at 21:29

## 2019-04-19 RX ADMIN — POTASSIUM CHLORIDE 20 MEQ: 1500 TABLET, EXTENDED RELEASE ORAL at 13:35

## 2019-04-19 RX ADMIN — INSULIN LISPRO 10 UNITS: 100 INJECTION, SOLUTION INTRAVENOUS; SUBCUTANEOUS at 13:36

## 2019-04-19 RX ADMIN — ENOXAPARIN SODIUM 40 MG: 40 INJECTION SUBCUTANEOUS at 09:21

## 2019-04-19 RX ADMIN — PANCRELIPASE 48000 UNITS: 24000; 76000; 120000 CAPSULE, DELAYED RELEASE PELLETS ORAL at 18:17

## 2019-04-19 RX ADMIN — POTASSIUM CHLORIDE 40 MEQ: 1500 TABLET, EXTENDED RELEASE ORAL at 09:21

## 2019-04-19 RX ADMIN — INSULIN LISPRO 1 UNITS: 100 INJECTION, SOLUTION INTRAVENOUS; SUBCUTANEOUS at 13:36

## 2019-04-19 RX ADMIN — INSULIN LISPRO 3 UNITS: 100 INJECTION, SOLUTION INTRAVENOUS; SUBCUTANEOUS at 18:17

## 2019-04-19 RX ADMIN — METRONIDAZOLE 500 MG: 500 TABLET, FILM COATED ORAL at 13:35

## 2019-04-19 RX ADMIN — INSULIN LISPRO 1 UNITS: 100 INJECTION, SOLUTION INTRAVENOUS; SUBCUTANEOUS at 21:27

## 2019-04-19 RX ADMIN — SENNOSIDES 17.2 MG: 8.6 TABLET, FILM COATED ORAL at 09:21

## 2019-04-19 RX ADMIN — CIPROFLOXACIN HYDROCHLORIDE 500 MG: 500 TABLET, FILM COATED ORAL at 21:26

## 2019-04-19 RX ADMIN — PANCRELIPASE 48000 UNITS: 24000; 76000; 120000 CAPSULE, DELAYED RELEASE PELLETS ORAL at 09:21

## 2019-04-19 RX ADMIN — CIPROFLOXACIN HYDROCHLORIDE 500 MG: 500 TABLET, FILM COATED ORAL at 09:21

## 2019-04-19 RX ADMIN — PANCRELIPASE 48000 UNITS: 24000; 76000; 120000 CAPSULE, DELAYED RELEASE PELLETS ORAL at 13:36

## 2019-04-20 ENCOUNTER — APPOINTMENT (INPATIENT)
Dept: RADIOLOGY | Facility: HOSPITAL | Age: 74
DRG: 469 | End: 2019-04-20
Payer: COMMERCIAL

## 2019-04-20 LAB
GLUCOSE SERPL-MCNC: 179 MG/DL (ref 65–140)
GLUCOSE SERPL-MCNC: 242 MG/DL (ref 65–140)
GLUCOSE SERPL-MCNC: 51 MG/DL (ref 65–140)
GLUCOSE SERPL-MCNC: 54 MG/DL (ref 65–140)
GLUCOSE SERPL-MCNC: 69 MG/DL (ref 65–140)
GLUCOSE SERPL-MCNC: 79 MG/DL (ref 65–140)
GLUCOSE SERPL-MCNC: 89 MG/DL (ref 65–140)
GLUCOSE SERPL-MCNC: 89 MG/DL (ref 65–140)
GLUCOSE SERPL-MCNC: 90 MG/DL (ref 65–140)

## 2019-04-20 PROCEDURE — C1769 GUIDE WIRE: HCPCS

## 2019-04-20 PROCEDURE — 99152 MOD SED SAME PHYS/QHP 5/>YRS: CPT | Performed by: RADIOLOGY

## 2019-04-20 PROCEDURE — 36573 INSJ PICC RS&I 5 YR+: CPT | Performed by: RADIOLOGY

## 2019-04-20 PROCEDURE — NC001 PR NO CHARGE: Performed by: RADIOLOGY

## 2019-04-20 PROCEDURE — 02HV33Z INSERTION OF INFUSION DEVICE INTO SUPERIOR VENA CAVA, PERCUTANEOUS APPROACH: ICD-10-PCS | Performed by: INTERNAL MEDICINE

## 2019-04-20 PROCEDURE — 99153 MOD SED SAME PHYS/QHP EA: CPT

## 2019-04-20 PROCEDURE — 99232 SBSQ HOSP IP/OBS MODERATE 35: CPT | Performed by: FAMILY MEDICINE

## 2019-04-20 PROCEDURE — 99152 MOD SED SAME PHYS/QHP 5/>YRS: CPT

## 2019-04-20 PROCEDURE — 47533 PLMT BILIARY DRAINAGE CATH: CPT

## 2019-04-20 PROCEDURE — C1729 CATH, DRAINAGE: HCPCS

## 2019-04-20 PROCEDURE — 76942 ECHO GUIDE FOR BIOPSY: CPT

## 2019-04-20 PROCEDURE — 0F9940Z DRAINAGE OF COMMON BILE DUCT WITH DRAINAGE DEVICE, PERCUTANEOUS ENDOSCOPIC APPROACH: ICD-10-PCS | Performed by: INTERNAL MEDICINE

## 2019-04-20 PROCEDURE — 47533 PLMT BILIARY DRAINAGE CATH: CPT | Performed by: RADIOLOGY

## 2019-04-20 PROCEDURE — 82948 REAGENT STRIP/BLOOD GLUCOSE: CPT

## 2019-04-20 PROCEDURE — 36573 INSJ PICC RS&I 5 YR+: CPT

## 2019-04-20 PROCEDURE — 99232 SBSQ HOSP IP/OBS MODERATE 35: CPT | Performed by: INTERNAL MEDICINE

## 2019-04-20 PROCEDURE — C1751 CATH, INF, PER/CENT/MIDLINE: HCPCS

## 2019-04-20 RX ORDER — DIPHENHYDRAMINE HCL 25 MG
25 TABLET ORAL EVERY 6 HOURS PRN
Status: DISCONTINUED | OUTPATIENT
Start: 2019-04-20 | End: 2019-04-20

## 2019-04-20 RX ORDER — DIPHENHYDRAMINE HCL 25 MG
50 TABLET ORAL EVERY 6 HOURS PRN
Status: COMPLETED | OUTPATIENT
Start: 2019-04-20 | End: 2019-04-21

## 2019-04-20 RX ORDER — FENTANYL CITRATE 50 UG/ML
INJECTION, SOLUTION INTRAMUSCULAR; INTRAVENOUS CODE/TRAUMA/SEDATION MEDICATION
Status: COMPLETED | OUTPATIENT
Start: 2019-04-20 | End: 2019-04-20

## 2019-04-20 RX ORDER — MIDAZOLAM HYDROCHLORIDE 1 MG/ML
INJECTION INTRAMUSCULAR; INTRAVENOUS CODE/TRAUMA/SEDATION MEDICATION
Status: COMPLETED | OUTPATIENT
Start: 2019-04-20 | End: 2019-04-20

## 2019-04-20 RX ADMIN — FENTANYL CITRATE 50 MCG: 50 INJECTION, SOLUTION INTRAMUSCULAR; INTRAVENOUS at 14:55

## 2019-04-20 RX ADMIN — METRONIDAZOLE 500 MG: 500 TABLET, FILM COATED ORAL at 06:59

## 2019-04-20 RX ADMIN — METRONIDAZOLE 500 MG: 500 TABLET, FILM COATED ORAL at 17:21

## 2019-04-20 RX ADMIN — MIDAZOLAM 1 MG: 1 INJECTION INTRAMUSCULAR; INTRAVENOUS at 15:00

## 2019-04-20 RX ADMIN — GLUCAGON HYDROCHLORIDE 1 MG: KIT at 12:08

## 2019-04-20 RX ADMIN — GLUCAGON HYDROCHLORIDE 1 MG: KIT at 08:31

## 2019-04-20 RX ADMIN — METRONIDAZOLE 500 MG: 500 TABLET, FILM COATED ORAL at 22:01

## 2019-04-20 RX ADMIN — INSULIN LISPRO 3 UNITS: 100 INJECTION, SOLUTION INTRAVENOUS; SUBCUTANEOUS at 22:02

## 2019-04-20 RX ADMIN — FENTANYL CITRATE 50 MCG: 50 INJECTION, SOLUTION INTRAMUSCULAR; INTRAVENOUS at 15:15

## 2019-04-20 RX ADMIN — INSULIN GLARGINE 15 UNITS: 100 INJECTION, SOLUTION SUBCUTANEOUS at 22:01

## 2019-04-20 RX ADMIN — PANCRELIPASE 48000 UNITS: 24000; 76000; 120000 CAPSULE, DELAYED RELEASE PELLETS ORAL at 17:22

## 2019-04-20 RX ADMIN — DIPHENHYDRAMINE HCL 25 MG: 25 TABLET, FILM COATED ORAL at 10:13

## 2019-04-20 RX ADMIN — IOHEXOL 10 ML: 300 INJECTION, SOLUTION INTRAVENOUS at 15:38

## 2019-04-20 RX ADMIN — CIPROFLOXACIN HYDROCHLORIDE 500 MG: 500 TABLET, FILM COATED ORAL at 22:01

## 2019-04-20 RX ADMIN — CIPROFLOXACIN HYDROCHLORIDE 500 MG: 500 TABLET, FILM COATED ORAL at 10:13

## 2019-04-20 RX ADMIN — GLUCAGON HYDROCHLORIDE 1 MG: KIT at 07:37

## 2019-04-20 RX ADMIN — TORSEMIDE 20 MG: 20 TABLET ORAL at 17:22

## 2019-04-20 RX ADMIN — DIPHENHYDRAMINE HCL 25 MG: 25 TABLET, FILM COATED ORAL at 01:29

## 2019-04-20 RX ADMIN — FENTANYL CITRATE 50 MCG: 50 INJECTION, SOLUTION INTRAMUSCULAR; INTRAVENOUS at 14:58

## 2019-04-20 RX ADMIN — FENTANYL CITRATE 50 MCG: 50 INJECTION, SOLUTION INTRAMUSCULAR; INTRAVENOUS at 15:21

## 2019-04-21 LAB
ALBUMIN SERPL BCP-MCNC: 1.5 G/DL (ref 3.5–5)
ALP SERPL-CCNC: 882 U/L (ref 46–116)
ALT SERPL W P-5'-P-CCNC: 110 U/L (ref 12–78)
ANION GAP SERPL CALCULATED.3IONS-SCNC: 11 MMOL/L (ref 4–13)
AST SERPL W P-5'-P-CCNC: 194 U/L (ref 5–45)
BASOPHILS # BLD AUTO: 0.05 THOUSANDS/ΜL (ref 0–0.1)
BASOPHILS NFR BLD AUTO: 0 % (ref 0–1)
BILIRUB SERPL-MCNC: 7.6 MG/DL (ref 0.2–1)
BUN SERPL-MCNC: 50 MG/DL (ref 5–25)
CALCIUM SERPL-MCNC: 8.1 MG/DL (ref 8.3–10.1)
CHLORIDE SERPL-SCNC: 100 MMOL/L (ref 100–108)
CO2 SERPL-SCNC: 24 MMOL/L (ref 21–32)
CREAT SERPL-MCNC: 1.17 MG/DL (ref 0.6–1.3)
EOSINOPHIL # BLD AUTO: 0.16 THOUSAND/ΜL (ref 0–0.61)
EOSINOPHIL NFR BLD AUTO: 1 % (ref 0–6)
ERYTHROCYTE [DISTWIDTH] IN BLOOD BY AUTOMATED COUNT: 19.9 % (ref 11.6–15.1)
GFR SERPL CREATININE-BSD FRML MDRD: 46 ML/MIN/1.73SQ M
GLUCOSE SERPL-MCNC: 179 MG/DL (ref 65–140)
GLUCOSE SERPL-MCNC: 221 MG/DL (ref 65–140)
GLUCOSE SERPL-MCNC: 241 MG/DL (ref 65–140)
GLUCOSE SERPL-MCNC: 263 MG/DL (ref 65–140)
GLUCOSE SERPL-MCNC: 291 MG/DL (ref 65–140)
HCT VFR BLD AUTO: 23.5 % (ref 34.8–46.1)
HGB BLD-MCNC: 7.7 G/DL (ref 11.5–15.4)
IMM GRANULOCYTES # BLD AUTO: >0.5 THOUSAND/UL (ref 0–0.2)
IMM GRANULOCYTES NFR BLD AUTO: 4 % (ref 0–2)
LYMPHOCYTES # BLD AUTO: 1.36 THOUSANDS/ΜL (ref 0.6–4.47)
LYMPHOCYTES NFR BLD AUTO: 6 % (ref 14–44)
MCH RBC QN AUTO: 28.2 PG (ref 26.8–34.3)
MCHC RBC AUTO-ENTMCNC: 32.8 G/DL (ref 31.4–37.4)
MCV RBC AUTO: 86 FL (ref 82–98)
MONOCYTES # BLD AUTO: 1.43 THOUSAND/ΜL (ref 0.17–1.22)
MONOCYTES NFR BLD AUTO: 7 % (ref 4–12)
NEUTROPHILS # BLD AUTO: 17.72 THOUSANDS/ΜL (ref 1.85–7.62)
NEUTS SEG NFR BLD AUTO: 82 % (ref 43–75)
NRBC BLD AUTO-RTO: 0 /100 WBCS
PLATELET # BLD AUTO: 348 THOUSANDS/UL (ref 149–390)
PMV BLD AUTO: 12 FL (ref 8.9–12.7)
POTASSIUM SERPL-SCNC: 3.5 MMOL/L (ref 3.5–5.3)
PROT SERPL-MCNC: 4.5 G/DL (ref 6.4–8.2)
RBC # BLD AUTO: 2.73 MILLION/UL (ref 3.81–5.12)
SODIUM SERPL-SCNC: 135 MMOL/L (ref 136–145)
WBC # BLD AUTO: 21.67 THOUSAND/UL (ref 4.31–10.16)

## 2019-04-21 PROCEDURE — 97116 GAIT TRAINING THERAPY: CPT

## 2019-04-21 PROCEDURE — 80053 COMPREHEN METABOLIC PANEL: CPT | Performed by: FAMILY MEDICINE

## 2019-04-21 PROCEDURE — 97110 THERAPEUTIC EXERCISES: CPT

## 2019-04-21 PROCEDURE — 99232 SBSQ HOSP IP/OBS MODERATE 35: CPT | Performed by: FAMILY MEDICINE

## 2019-04-21 PROCEDURE — 82948 REAGENT STRIP/BLOOD GLUCOSE: CPT

## 2019-04-21 PROCEDURE — 85025 COMPLETE CBC W/AUTO DIFF WBC: CPT | Performed by: FAMILY MEDICINE

## 2019-04-21 PROCEDURE — 99232 SBSQ HOSP IP/OBS MODERATE 35: CPT | Performed by: INTERNAL MEDICINE

## 2019-04-21 RX ORDER — DIPHENHYDRAMINE HCL 25 MG
50 TABLET ORAL EVERY 6 HOURS PRN
Status: DISCONTINUED | OUTPATIENT
Start: 2019-04-21 | End: 2019-04-29

## 2019-04-21 RX ADMIN — TORSEMIDE 20 MG: 20 TABLET ORAL at 09:16

## 2019-04-21 RX ADMIN — METRONIDAZOLE 500 MG: 500 TABLET, FILM COATED ORAL at 06:32

## 2019-04-21 RX ADMIN — DIPHENHYDRAMINE HCL 50 MG: 25 TABLET ORAL at 10:54

## 2019-04-21 RX ADMIN — INSULIN LISPRO 10 UNITS: 100 INJECTION, SOLUTION INTRAVENOUS; SUBCUTANEOUS at 09:16

## 2019-04-21 RX ADMIN — INSULIN LISPRO 10 UNITS: 100 INJECTION, SOLUTION INTRAVENOUS; SUBCUTANEOUS at 17:29

## 2019-04-21 RX ADMIN — PANCRELIPASE 48000 UNITS: 24000; 76000; 120000 CAPSULE, DELAYED RELEASE PELLETS ORAL at 09:17

## 2019-04-21 RX ADMIN — DIPHENHYDRAMINE HCL 50 MG: 25 TABLET, FILM COATED ORAL at 04:14

## 2019-04-21 RX ADMIN — TORSEMIDE 20 MG: 20 TABLET ORAL at 14:10

## 2019-04-21 RX ADMIN — ENOXAPARIN SODIUM 40 MG: 40 INJECTION SUBCUTANEOUS at 09:13

## 2019-04-21 RX ADMIN — CIPROFLOXACIN HYDROCHLORIDE 500 MG: 500 TABLET, FILM COATED ORAL at 09:10

## 2019-04-21 RX ADMIN — SENNOSIDES 17.2 MG: 8.6 TABLET, FILM COATED ORAL at 09:10

## 2019-04-21 RX ADMIN — METRONIDAZOLE 500 MG: 500 TABLET, FILM COATED ORAL at 21:21

## 2019-04-21 RX ADMIN — PANCRELIPASE 48000 UNITS: 24000; 76000; 120000 CAPSULE, DELAYED RELEASE PELLETS ORAL at 17:30

## 2019-04-21 RX ADMIN — POTASSIUM CHLORIDE 40 MEQ: 1500 TABLET, EXTENDED RELEASE ORAL at 09:10

## 2019-04-21 RX ADMIN — INSULIN LISPRO 3 UNITS: 100 INJECTION, SOLUTION INTRAVENOUS; SUBCUTANEOUS at 21:23

## 2019-04-21 RX ADMIN — PANTOPRAZOLE SODIUM 40 MG: 40 TABLET, DELAYED RELEASE ORAL at 06:32

## 2019-04-21 RX ADMIN — METRONIDAZOLE 500 MG: 500 TABLET, FILM COATED ORAL at 14:10

## 2019-04-21 RX ADMIN — INSULIN LISPRO 2 UNITS: 100 INJECTION, SOLUTION INTRAVENOUS; SUBCUTANEOUS at 09:16

## 2019-04-21 RX ADMIN — DIPHENHYDRAMINE HCL 50 MG: 25 TABLET ORAL at 23:21

## 2019-04-21 RX ADMIN — CIPROFLOXACIN HYDROCHLORIDE 500 MG: 500 TABLET, FILM COATED ORAL at 21:21

## 2019-04-21 RX ADMIN — INSULIN LISPRO 3 UNITS: 100 INJECTION, SOLUTION INTRAVENOUS; SUBCUTANEOUS at 17:18

## 2019-04-21 RX ADMIN — INSULIN GLARGINE 20 UNITS: 100 INJECTION, SOLUTION SUBCUTANEOUS at 21:26

## 2019-04-22 ENCOUNTER — APPOINTMENT (INPATIENT)
Dept: RADIOLOGY | Facility: HOSPITAL | Age: 74
DRG: 469 | End: 2019-04-22
Attending: INTERNAL MEDICINE
Payer: COMMERCIAL

## 2019-04-22 LAB
ALBUMIN SERPL BCP-MCNC: 1.9 G/DL (ref 3.5–5)
ALP SERPL-CCNC: 835 U/L (ref 46–116)
ALT SERPL W P-5'-P-CCNC: 90 U/L (ref 12–78)
ANION GAP SERPL CALCULATED.3IONS-SCNC: 9 MMOL/L (ref 4–13)
AST SERPL W P-5'-P-CCNC: 98 U/L (ref 5–45)
BASOPHILS # BLD AUTO: 0.06 THOUSANDS/ΜL (ref 0–0.1)
BASOPHILS NFR BLD AUTO: 0 % (ref 0–1)
BILIRUB SERPL-MCNC: 8.89 MG/DL (ref 0.2–1)
BUN SERPL-MCNC: 43 MG/DL (ref 5–25)
CALCIUM SERPL-MCNC: 8.6 MG/DL (ref 8.3–10.1)
CHLORIDE SERPL-SCNC: 101 MMOL/L (ref 100–108)
CO2 SERPL-SCNC: 27 MMOL/L (ref 21–32)
CREAT SERPL-MCNC: 1.06 MG/DL (ref 0.6–1.3)
EOSINOPHIL # BLD AUTO: 0.24 THOUSAND/ΜL (ref 0–0.61)
EOSINOPHIL NFR BLD AUTO: 1 % (ref 0–6)
ERYTHROCYTE [DISTWIDTH] IN BLOOD BY AUTOMATED COUNT: 19.6 % (ref 11.6–15.1)
GFR SERPL CREATININE-BSD FRML MDRD: 51 ML/MIN/1.73SQ M
GLUCOSE SERPL-MCNC: 148 MG/DL (ref 65–140)
GLUCOSE SERPL-MCNC: 154 MG/DL (ref 65–140)
GLUCOSE SERPL-MCNC: 230 MG/DL (ref 65–140)
GLUCOSE SERPL-MCNC: 244 MG/DL (ref 65–140)
GLUCOSE SERPL-MCNC: 261 MG/DL (ref 65–140)
HCT VFR BLD AUTO: 26.4 % (ref 34.8–46.1)
HGB BLD-MCNC: 8.7 G/DL (ref 11.5–15.4)
IMM GRANULOCYTES # BLD AUTO: >0.5 THOUSAND/UL (ref 0–0.2)
IMM GRANULOCYTES NFR BLD AUTO: 5 % (ref 0–2)
LYMPHOCYTES # BLD AUTO: 1.57 THOUSANDS/ΜL (ref 0.6–4.47)
LYMPHOCYTES NFR BLD AUTO: 7 % (ref 14–44)
MCH RBC QN AUTO: 28.4 PG (ref 26.8–34.3)
MCHC RBC AUTO-ENTMCNC: 33 G/DL (ref 31.4–37.4)
MCV RBC AUTO: 86 FL (ref 82–98)
MONOCYTES # BLD AUTO: 1.65 THOUSAND/ΜL (ref 0.17–1.22)
MONOCYTES NFR BLD AUTO: 8 % (ref 4–12)
NEUTROPHILS # BLD AUTO: 17.32 THOUSANDS/ΜL (ref 1.85–7.62)
NEUTS SEG NFR BLD AUTO: 79 % (ref 43–75)
NRBC BLD AUTO-RTO: 0 /100 WBCS
PLATELET # BLD AUTO: 413 THOUSANDS/UL (ref 149–390)
PMV BLD AUTO: 11.2 FL (ref 8.9–12.7)
POTASSIUM SERPL-SCNC: 3.4 MMOL/L (ref 3.5–5.3)
PROCALCITONIN SERPL-MCNC: 6.48 NG/ML
PROT SERPL-MCNC: 5.2 G/DL (ref 6.4–8.2)
RBC # BLD AUTO: 3.06 MILLION/UL (ref 3.81–5.12)
SODIUM SERPL-SCNC: 137 MMOL/L (ref 136–145)
WBC # BLD AUTO: 21.9 THOUSAND/UL (ref 4.31–10.16)

## 2019-04-22 PROCEDURE — 47531 INJECTION FOR CHOLANGIOGRAM: CPT | Performed by: RADIOLOGY

## 2019-04-22 PROCEDURE — 85025 COMPLETE CBC W/AUTO DIFF WBC: CPT | Performed by: FAMILY MEDICINE

## 2019-04-22 PROCEDURE — 49424 ASSESS CYST CONTRAST INJECT: CPT

## 2019-04-22 PROCEDURE — 99232 SBSQ HOSP IP/OBS MODERATE 35: CPT | Performed by: INTERNAL MEDICINE

## 2019-04-22 PROCEDURE — 82948 REAGENT STRIP/BLOOD GLUCOSE: CPT

## 2019-04-22 PROCEDURE — 0F9940Z DRAINAGE OF COMMON BILE DUCT WITH DRAINAGE DEVICE, PERCUTANEOUS ENDOSCOPIC APPROACH: ICD-10-PCS | Performed by: RADIOLOGY

## 2019-04-22 PROCEDURE — 99024 POSTOP FOLLOW-UP VISIT: CPT | Performed by: PHYSICIAN ASSISTANT

## 2019-04-22 PROCEDURE — 84145 PROCALCITONIN (PCT): CPT | Performed by: INTERNAL MEDICINE

## 2019-04-22 PROCEDURE — NC001 PR NO CHARGE: Performed by: RADIOLOGY

## 2019-04-22 PROCEDURE — 80053 COMPREHEN METABOLIC PANEL: CPT | Performed by: FAMILY MEDICINE

## 2019-04-22 PROCEDURE — 0F794ZZ DILATION OF COMMON BILE DUCT, PERCUTANEOUS ENDOSCOPIC APPROACH: ICD-10-PCS | Performed by: RADIOLOGY

## 2019-04-22 PROCEDURE — 76080 X-RAY EXAM OF FISTULA: CPT

## 2019-04-22 RX ORDER — LIDOCAINE 50 MG/G
1 PATCH TOPICAL DAILY
Status: DISCONTINUED | OUTPATIENT
Start: 2019-04-22 | End: 2019-06-03 | Stop reason: HOSPADM

## 2019-04-22 RX ADMIN — PANCRELIPASE 48000 UNITS: 24000; 76000; 120000 CAPSULE, DELAYED RELEASE PELLETS ORAL at 12:26

## 2019-04-22 RX ADMIN — LIDOCAINE 1 PATCH: 50 PATCH CUTANEOUS at 18:13

## 2019-04-22 RX ADMIN — INSULIN LISPRO 10 UNITS: 100 INJECTION, SOLUTION INTRAVENOUS; SUBCUTANEOUS at 18:14

## 2019-04-22 RX ADMIN — ENOXAPARIN SODIUM 40 MG: 40 INJECTION SUBCUTANEOUS at 09:00

## 2019-04-22 RX ADMIN — PANCRELIPASE 48000 UNITS: 24000; 76000; 120000 CAPSULE, DELAYED RELEASE PELLETS ORAL at 18:12

## 2019-04-22 RX ADMIN — PANCRELIPASE 48000 UNITS: 24000; 76000; 120000 CAPSULE, DELAYED RELEASE PELLETS ORAL at 08:55

## 2019-04-22 RX ADMIN — DIPHENHYDRAMINE HCL 50 MG: 25 TABLET ORAL at 21:11

## 2019-04-22 RX ADMIN — INSULIN LISPRO 10 UNITS: 100 INJECTION, SOLUTION INTRAVENOUS; SUBCUTANEOUS at 12:27

## 2019-04-22 RX ADMIN — TORSEMIDE 20 MG: 20 TABLET ORAL at 13:19

## 2019-04-22 RX ADMIN — SENNOSIDES 17.2 MG: 8.6 TABLET, FILM COATED ORAL at 08:55

## 2019-04-22 RX ADMIN — DOCUSATE SODIUM 100 MG: 100 CAPSULE, LIQUID FILLED ORAL at 08:55

## 2019-04-22 RX ADMIN — OXYCODONE HYDROCHLORIDE 5 MG: 5 TABLET ORAL at 13:20

## 2019-04-22 RX ADMIN — METRONIDAZOLE 500 MG: 500 TABLET, FILM COATED ORAL at 21:11

## 2019-04-22 RX ADMIN — POTASSIUM CHLORIDE 40 MEQ: 1500 TABLET, EXTENDED RELEASE ORAL at 09:00

## 2019-04-22 RX ADMIN — INSULIN LISPRO 10 UNITS: 100 INJECTION, SOLUTION INTRAVENOUS; SUBCUTANEOUS at 08:56

## 2019-04-22 RX ADMIN — METRONIDAZOLE 500 MG: 500 TABLET, FILM COATED ORAL at 13:19

## 2019-04-22 RX ADMIN — INSULIN LISPRO 3 UNITS: 100 INJECTION, SOLUTION INTRAVENOUS; SUBCUTANEOUS at 18:13

## 2019-04-22 RX ADMIN — TORSEMIDE 20 MG: 20 TABLET ORAL at 08:55

## 2019-04-22 RX ADMIN — PANTOPRAZOLE SODIUM 40 MG: 40 TABLET, DELAYED RELEASE ORAL at 06:20

## 2019-04-22 RX ADMIN — CIPROFLOXACIN HYDROCHLORIDE 500 MG: 500 TABLET, FILM COATED ORAL at 21:11

## 2019-04-22 RX ADMIN — INSULIN LISPRO 3 UNITS: 100 INJECTION, SOLUTION INTRAVENOUS; SUBCUTANEOUS at 21:46

## 2019-04-22 RX ADMIN — METRONIDAZOLE 500 MG: 500 TABLET, FILM COATED ORAL at 05:54

## 2019-04-22 RX ADMIN — INSULIN GLARGINE 20 UNITS: 100 INJECTION, SOLUTION SUBCUTANEOUS at 21:46

## 2019-04-22 RX ADMIN — INSULIN LISPRO 3 UNITS: 100 INJECTION, SOLUTION INTRAVENOUS; SUBCUTANEOUS at 12:26

## 2019-04-22 RX ADMIN — DOCUSATE SODIUM 100 MG: 100 CAPSULE, LIQUID FILLED ORAL at 18:14

## 2019-04-22 RX ADMIN — CIPROFLOXACIN HYDROCHLORIDE 500 MG: 500 TABLET, FILM COATED ORAL at 08:59

## 2019-04-23 LAB
ALBUMIN SERPL BCP-MCNC: 1.9 G/DL (ref 3.5–5)
ALP SERPL-CCNC: 701 U/L (ref 46–116)
ALT SERPL W P-5'-P-CCNC: 67 U/L (ref 12–78)
ANION GAP SERPL CALCULATED.3IONS-SCNC: 8 MMOL/L (ref 4–13)
AST SERPL W P-5'-P-CCNC: 62 U/L (ref 5–45)
BASOPHILS # BLD AUTO: 0.06 THOUSANDS/ΜL (ref 0–0.1)
BASOPHILS NFR BLD AUTO: 0 % (ref 0–1)
BILIRUB SERPL-MCNC: 8.42 MG/DL (ref 0.2–1)
BUN SERPL-MCNC: 41 MG/DL (ref 5–25)
CALCIUM SERPL-MCNC: 8.2 MG/DL (ref 8.3–10.1)
CHLORIDE SERPL-SCNC: 100 MMOL/L (ref 100–108)
CO2 SERPL-SCNC: 28 MMOL/L (ref 21–32)
CREAT SERPL-MCNC: 0.98 MG/DL (ref 0.6–1.3)
EOSINOPHIL # BLD AUTO: 0.25 THOUSAND/ΜL (ref 0–0.61)
EOSINOPHIL NFR BLD AUTO: 1 % (ref 0–6)
ERYTHROCYTE [DISTWIDTH] IN BLOOD BY AUTOMATED COUNT: 19.9 % (ref 11.6–15.1)
GFR SERPL CREATININE-BSD FRML MDRD: 57 ML/MIN/1.73SQ M
GLUCOSE SERPL-MCNC: 184 MG/DL (ref 65–140)
GLUCOSE SERPL-MCNC: 198 MG/DL (ref 65–140)
GLUCOSE SERPL-MCNC: 204 MG/DL (ref 65–140)
GLUCOSE SERPL-MCNC: 83 MG/DL (ref 65–140)
GLUCOSE SERPL-MCNC: 83 MG/DL (ref 65–140)
HCT VFR BLD AUTO: 26.6 % (ref 34.8–46.1)
HGB BLD-MCNC: 8.6 G/DL (ref 11.5–15.4)
IMM GRANULOCYTES # BLD AUTO: >0.5 THOUSAND/UL (ref 0–0.2)
IMM GRANULOCYTES NFR BLD AUTO: 5 % (ref 0–2)
LYMPHOCYTES # BLD AUTO: 1.72 THOUSANDS/ΜL (ref 0.6–4.47)
LYMPHOCYTES NFR BLD AUTO: 8 % (ref 14–44)
MCH RBC QN AUTO: 28.7 PG (ref 26.8–34.3)
MCHC RBC AUTO-ENTMCNC: 32.3 G/DL (ref 31.4–37.4)
MCV RBC AUTO: 89 FL (ref 82–98)
MONOCYTES # BLD AUTO: 1.91 THOUSAND/ΜL (ref 0.17–1.22)
MONOCYTES NFR BLD AUTO: 9 % (ref 4–12)
NEUTROPHILS # BLD AUTO: 17.3 THOUSANDS/ΜL (ref 1.85–7.62)
NEUTS SEG NFR BLD AUTO: 77 % (ref 43–75)
NRBC BLD AUTO-RTO: 0 /100 WBCS
PLATELET # BLD AUTO: 366 THOUSANDS/UL (ref 149–390)
PMV BLD AUTO: 11.2 FL (ref 8.9–12.7)
POTASSIUM SERPL-SCNC: 3.2 MMOL/L (ref 3.5–5.3)
PROT SERPL-MCNC: 5.4 G/DL (ref 6.4–8.2)
RBC # BLD AUTO: 3 MILLION/UL (ref 3.81–5.12)
SODIUM SERPL-SCNC: 136 MMOL/L (ref 136–145)
WBC # BLD AUTO: 22.43 THOUSAND/UL (ref 4.31–10.16)

## 2019-04-23 PROCEDURE — 97110 THERAPEUTIC EXERCISES: CPT

## 2019-04-23 PROCEDURE — 99222 1ST HOSP IP/OBS MODERATE 55: CPT | Performed by: INTERNAL MEDICINE

## 2019-04-23 PROCEDURE — 99232 SBSQ HOSP IP/OBS MODERATE 35: CPT | Performed by: INTERNAL MEDICINE

## 2019-04-23 PROCEDURE — 97116 GAIT TRAINING THERAPY: CPT

## 2019-04-23 PROCEDURE — 80053 COMPREHEN METABOLIC PANEL: CPT | Performed by: INTERNAL MEDICINE

## 2019-04-23 PROCEDURE — 87040 BLOOD CULTURE FOR BACTERIA: CPT | Performed by: INTERNAL MEDICINE

## 2019-04-23 PROCEDURE — 82948 REAGENT STRIP/BLOOD GLUCOSE: CPT

## 2019-04-23 PROCEDURE — 97530 THERAPEUTIC ACTIVITIES: CPT

## 2019-04-23 PROCEDURE — 99232 SBSQ HOSP IP/OBS MODERATE 35: CPT | Performed by: PHYSICAL MEDICINE & REHABILITATION

## 2019-04-23 PROCEDURE — 85025 COMPLETE CBC W/AUTO DIFF WBC: CPT | Performed by: INTERNAL MEDICINE

## 2019-04-23 RX ORDER — DIPHENHYDRAMINE HCL 25 MG
50 TABLET ORAL
Status: DISCONTINUED | OUTPATIENT
Start: 2019-04-24 | End: 2019-04-27

## 2019-04-23 RX ORDER — DIPHENHYDRAMINE HCL 25 MG
50 TABLET ORAL
Status: DISCONTINUED | OUTPATIENT
Start: 2019-04-23 | End: 2019-04-23

## 2019-04-23 RX ORDER — METHYLPREDNISOLONE 16 MG/1
32 TABLET ORAL
Status: COMPLETED | OUTPATIENT
Start: 2019-04-23 | End: 2019-04-23

## 2019-04-23 RX ADMIN — INSULIN LISPRO 2 UNITS: 100 INJECTION, SOLUTION INTRAVENOUS; SUBCUTANEOUS at 22:58

## 2019-04-23 RX ADMIN — METHYLPREDNISOLONE 32 MG: 16 TABLET ORAL at 12:57

## 2019-04-23 RX ADMIN — METRONIDAZOLE 500 MG: 500 TABLET, FILM COATED ORAL at 13:00

## 2019-04-23 RX ADMIN — LIDOCAINE 1 PATCH: 50 PATCH CUTANEOUS at 09:23

## 2019-04-23 RX ADMIN — PANCRELIPASE 48000 UNITS: 24000; 76000; 120000 CAPSULE, DELAYED RELEASE PELLETS ORAL at 18:03

## 2019-04-23 RX ADMIN — TORSEMIDE 20 MG: 20 TABLET ORAL at 13:00

## 2019-04-23 RX ADMIN — PANTOPRAZOLE SODIUM 40 MG: 40 TABLET, DELAYED RELEASE ORAL at 06:47

## 2019-04-23 RX ADMIN — PANCRELIPASE 48000 UNITS: 24000; 76000; 120000 CAPSULE, DELAYED RELEASE PELLETS ORAL at 12:56

## 2019-04-23 RX ADMIN — INSULIN LISPRO 10 UNITS: 100 INJECTION, SOLUTION INTRAVENOUS; SUBCUTANEOUS at 12:57

## 2019-04-23 RX ADMIN — METRONIDAZOLE 500 MG: 500 TABLET, FILM COATED ORAL at 22:56

## 2019-04-23 RX ADMIN — SENNOSIDES 17.2 MG: 8.6 TABLET, FILM COATED ORAL at 09:22

## 2019-04-23 RX ADMIN — TORSEMIDE 20 MG: 20 TABLET ORAL at 09:23

## 2019-04-23 RX ADMIN — POTASSIUM CHLORIDE 40 MEQ: 1500 TABLET, EXTENDED RELEASE ORAL at 09:22

## 2019-04-23 RX ADMIN — INSULIN LISPRO 1 UNITS: 100 INJECTION, SOLUTION INTRAVENOUS; SUBCUTANEOUS at 12:57

## 2019-04-23 RX ADMIN — ENOXAPARIN SODIUM 40 MG: 40 INJECTION SUBCUTANEOUS at 09:22

## 2019-04-23 RX ADMIN — DIPHENHYDRAMINE HCL 50 MG: 25 TABLET ORAL at 06:47

## 2019-04-23 RX ADMIN — DOCUSATE SODIUM 100 MG: 100 CAPSULE, LIQUID FILLED ORAL at 09:23

## 2019-04-23 RX ADMIN — METHYLPREDNISOLONE 32 MG: 16 TABLET ORAL at 22:57

## 2019-04-23 RX ADMIN — INSULIN GLARGINE 20 UNITS: 100 INJECTION, SOLUTION SUBCUTANEOUS at 22:59

## 2019-04-23 RX ADMIN — INSULIN LISPRO 2 UNITS: 100 INJECTION, SOLUTION INTRAVENOUS; SUBCUTANEOUS at 18:02

## 2019-04-23 RX ADMIN — INSULIN LISPRO 10 UNITS: 100 INJECTION, SOLUTION INTRAVENOUS; SUBCUTANEOUS at 18:02

## 2019-04-23 RX ADMIN — PANCRELIPASE 48000 UNITS: 24000; 76000; 120000 CAPSULE, DELAYED RELEASE PELLETS ORAL at 09:22

## 2019-04-23 RX ADMIN — CIPROFLOXACIN HYDROCHLORIDE 500 MG: 500 TABLET, FILM COATED ORAL at 09:22

## 2019-04-23 RX ADMIN — METRONIDAZOLE 500 MG: 500 TABLET, FILM COATED ORAL at 06:47

## 2019-04-23 RX ADMIN — INSULIN LISPRO 10 UNITS: 100 INJECTION, SOLUTION INTRAVENOUS; SUBCUTANEOUS at 09:23

## 2019-04-24 ENCOUNTER — APPOINTMENT (INPATIENT)
Dept: RADIOLOGY | Facility: HOSPITAL | Age: 74
DRG: 469 | End: 2019-04-24
Attending: RADIOLOGY
Payer: COMMERCIAL

## 2019-04-24 LAB
GLUCOSE SERPL-MCNC: 165 MG/DL (ref 65–140)
GLUCOSE SERPL-MCNC: 168 MG/DL (ref 65–140)
GLUCOSE SERPL-MCNC: 179 MG/DL (ref 65–140)
GLUCOSE SERPL-MCNC: 202 MG/DL (ref 65–140)
GLUCOSE SERPL-MCNC: 218 MG/DL (ref 65–140)
GLUCOSE SERPL-MCNC: 283 MG/DL (ref 65–140)
PROCALCITONIN SERPL-MCNC: 2.58 NG/ML

## 2019-04-24 PROCEDURE — 47538 PERQ PLMT BILE DUCT STENT: CPT | Performed by: RADIOLOGY

## 2019-04-24 PROCEDURE — 99232 SBSQ HOSP IP/OBS MODERATE 35: CPT | Performed by: INTERNAL MEDICINE

## 2019-04-24 PROCEDURE — 99153 MOD SED SAME PHYS/QHP EA: CPT

## 2019-04-24 PROCEDURE — 97110 THERAPEUTIC EXERCISES: CPT

## 2019-04-24 PROCEDURE — BF101ZZ FLUOROSCOPY OF BILE DUCTS USING LOW OSMOLAR CONTRAST: ICD-10-PCS | Performed by: RADIOLOGY

## 2019-04-24 PROCEDURE — 82948 REAGENT STRIP/BLOOD GLUCOSE: CPT

## 2019-04-24 PROCEDURE — C1769 GUIDE WIRE: HCPCS

## 2019-04-24 PROCEDURE — 99152 MOD SED SAME PHYS/QHP 5/>YRS: CPT | Performed by: RADIOLOGY

## 2019-04-24 PROCEDURE — 97530 THERAPEUTIC ACTIVITIES: CPT

## 2019-04-24 PROCEDURE — 84145 PROCALCITONIN (PCT): CPT | Performed by: INTERNAL MEDICINE

## 2019-04-24 PROCEDURE — C1894 INTRO/SHEATH, NON-LASER: HCPCS

## 2019-04-24 PROCEDURE — 99152 MOD SED SAME PHYS/QHP 5/>YRS: CPT

## 2019-04-24 PROCEDURE — 97116 GAIT TRAINING THERAPY: CPT

## 2019-04-24 PROCEDURE — C1729 CATH, DRAINAGE: HCPCS

## 2019-04-24 RX ORDER — MIDAZOLAM HYDROCHLORIDE 1 MG/ML
INJECTION INTRAMUSCULAR; INTRAVENOUS CODE/TRAUMA/SEDATION MEDICATION
Status: COMPLETED | OUTPATIENT
Start: 2019-04-24 | End: 2019-04-24

## 2019-04-24 RX ORDER — CLINDAMYCIN PHOSPHATE 600 MG/50ML
600 INJECTION INTRAVENOUS ONCE
Status: COMPLETED | OUTPATIENT
Start: 2019-04-24 | End: 2019-04-24

## 2019-04-24 RX ORDER — FENTANYL CITRATE 50 UG/ML
INJECTION, SOLUTION INTRAMUSCULAR; INTRAVENOUS CODE/TRAUMA/SEDATION MEDICATION
Status: COMPLETED | OUTPATIENT
Start: 2019-04-24 | End: 2019-04-24

## 2019-04-24 RX ORDER — LANOLIN ALCOHOL/MO/W.PET/CERES
6 CREAM (GRAM) TOPICAL
Status: DISCONTINUED | OUTPATIENT
Start: 2019-04-24 | End: 2019-04-27

## 2019-04-24 RX ADMIN — TORSEMIDE 20 MG: 20 TABLET ORAL at 13:37

## 2019-04-24 RX ADMIN — METRONIDAZOLE 500 MG: 500 TABLET, FILM COATED ORAL at 05:56

## 2019-04-24 RX ADMIN — FENTANYL CITRATE 50 MCG: 50 INJECTION, SOLUTION INTRAMUSCULAR; INTRAVENOUS at 16:10

## 2019-04-24 RX ADMIN — TORSEMIDE 20 MG: 20 TABLET ORAL at 09:21

## 2019-04-24 RX ADMIN — IOHEXOL 35 ML: 300 INJECTION, SOLUTION INTRAVENOUS at 17:00

## 2019-04-24 RX ADMIN — CLINDAMYCIN PHOSPHATE 600 MG: 600 INJECTION, SOLUTION INTRAVENOUS at 15:57

## 2019-04-24 RX ADMIN — LIDOCAINE 1 PATCH: 50 PATCH CUTANEOUS at 09:22

## 2019-04-24 RX ADMIN — INSULIN LISPRO 1 UNITS: 100 INJECTION, SOLUTION INTRAVENOUS; SUBCUTANEOUS at 18:30

## 2019-04-24 RX ADMIN — MELATONIN TAB 3 MG 6 MG: 3 TAB at 22:12

## 2019-04-24 RX ADMIN — MIDAZOLAM 1 MG: 1 INJECTION INTRAMUSCULAR; INTRAVENOUS at 16:10

## 2019-04-24 RX ADMIN — INSULIN LISPRO 4 UNITS: 100 INJECTION, SOLUTION INTRAVENOUS; SUBCUTANEOUS at 21:35

## 2019-04-24 RX ADMIN — FENTANYL CITRATE 50 MCG: 50 INJECTION, SOLUTION INTRAMUSCULAR; INTRAVENOUS at 16:05

## 2019-04-24 RX ADMIN — POTASSIUM CHLORIDE 40 MEQ: 1500 TABLET, EXTENDED RELEASE ORAL at 09:22

## 2019-04-24 RX ADMIN — DIPHENHYDRAMINE HCL 50 MG: 25 TABLET ORAL at 13:36

## 2019-04-24 RX ADMIN — PANTOPRAZOLE SODIUM 40 MG: 40 TABLET, DELAYED RELEASE ORAL at 05:56

## 2019-04-24 RX ADMIN — MIDAZOLAM 1 MG: 1 INJECTION INTRAMUSCULAR; INTRAVENOUS at 16:05

## 2019-04-24 RX ADMIN — PANCRELIPASE 48000 UNITS: 24000; 76000; 120000 CAPSULE, DELAYED RELEASE PELLETS ORAL at 18:27

## 2019-04-24 RX ADMIN — INSULIN LISPRO 10 UNITS: 100 INJECTION, SOLUTION INTRAVENOUS; SUBCUTANEOUS at 18:29

## 2019-04-24 RX ADMIN — INSULIN GLARGINE 20 UNITS: 100 INJECTION, SOLUTION SUBCUTANEOUS at 21:35

## 2019-04-25 ENCOUNTER — APPOINTMENT (INPATIENT)
Dept: CT IMAGING | Facility: HOSPITAL | Age: 74
DRG: 469 | End: 2019-04-25
Payer: COMMERCIAL

## 2019-04-25 ENCOUNTER — TELEPHONE (OUTPATIENT)
Dept: NEPHROLOGY | Facility: CLINIC | Age: 74
End: 2019-04-25

## 2019-04-25 LAB
ALBUMIN SERPL BCP-MCNC: 1.8 G/DL (ref 3.5–5)
ALP SERPL-CCNC: 539 U/L (ref 46–116)
ALT SERPL W P-5'-P-CCNC: 49 U/L (ref 12–78)
ANION GAP SERPL CALCULATED.3IONS-SCNC: 8 MMOL/L (ref 4–13)
AST SERPL W P-5'-P-CCNC: 51 U/L (ref 5–45)
BASOPHILS # BLD MANUAL: 0 THOUSAND/UL (ref 0–0.1)
BASOPHILS NFR MAR MANUAL: 0 % (ref 0–1)
BILIRUB SERPL-MCNC: 6.22 MG/DL (ref 0.2–1)
BUN SERPL-MCNC: 52 MG/DL (ref 5–25)
CALCIUM SERPL-MCNC: 8.4 MG/DL (ref 8.3–10.1)
CHLORIDE SERPL-SCNC: 99 MMOL/L (ref 100–108)
CO2 SERPL-SCNC: 27 MMOL/L (ref 21–32)
CREAT SERPL-MCNC: 1.35 MG/DL (ref 0.6–1.3)
EOSINOPHIL # BLD MANUAL: 0 THOUSAND/UL (ref 0–0.4)
EOSINOPHIL NFR BLD MANUAL: 0 % (ref 0–6)
ERYTHROCYTE [DISTWIDTH] IN BLOOD BY AUTOMATED COUNT: 20.9 % (ref 11.6–15.1)
GFR SERPL CREATININE-BSD FRML MDRD: 38 ML/MIN/1.73SQ M
GLUCOSE SERPL-MCNC: 117 MG/DL (ref 65–140)
GLUCOSE SERPL-MCNC: 180 MG/DL (ref 65–140)
GLUCOSE SERPL-MCNC: 190 MG/DL (ref 65–140)
GLUCOSE SERPL-MCNC: 211 MG/DL (ref 65–140)
GLUCOSE SERPL-MCNC: 219 MG/DL (ref 65–140)
HCT VFR BLD AUTO: 24.6 % (ref 34.8–46.1)
HGB BLD-MCNC: 8 G/DL (ref 11.5–15.4)
LYMPHOCYTES # BLD AUTO: 10 % (ref 14–44)
LYMPHOCYTES # BLD AUTO: 2.8 THOUSAND/UL (ref 0.6–4.47)
MCH RBC QN AUTO: 29 PG (ref 26.8–34.3)
MCHC RBC AUTO-ENTMCNC: 32.5 G/DL (ref 31.4–37.4)
MCV RBC AUTO: 89 FL (ref 82–98)
MONOCYTES # BLD AUTO: 1.68 THOUSAND/UL (ref 0–1.22)
MONOCYTES NFR BLD: 6 % (ref 4–12)
NEUTROPHILS # BLD MANUAL: 23.51 THOUSAND/UL (ref 1.85–7.62)
NEUTS BAND NFR BLD MANUAL: 2 % (ref 0–8)
NEUTS SEG NFR BLD AUTO: 82 % (ref 43–75)
NRBC BLD AUTO-RTO: 0 /100 WBCS
PLATELET # BLD AUTO: 433 THOUSANDS/UL (ref 149–390)
PLATELET BLD QL SMEAR: ABNORMAL
PMV BLD AUTO: 10.8 FL (ref 8.9–12.7)
POTASSIUM SERPL-SCNC: 3.7 MMOL/L (ref 3.5–5.3)
PROT SERPL-MCNC: 5.2 G/DL (ref 6.4–8.2)
RBC # BLD AUTO: 2.76 MILLION/UL (ref 3.81–5.12)
RBC MORPH BLD: NORMAL
SODIUM SERPL-SCNC: 134 MMOL/L (ref 136–145)
TOTAL CELLS COUNTED SPEC: 100
WBC # BLD AUTO: 27.99 THOUSAND/UL (ref 4.31–10.16)

## 2019-04-25 PROCEDURE — 85007 BL SMEAR W/DIFF WBC COUNT: CPT | Performed by: INTERNAL MEDICINE

## 2019-04-25 PROCEDURE — 99232 SBSQ HOSP IP/OBS MODERATE 35: CPT | Performed by: INTERNAL MEDICINE

## 2019-04-25 PROCEDURE — 85027 COMPLETE CBC AUTOMATED: CPT | Performed by: INTERNAL MEDICINE

## 2019-04-25 PROCEDURE — 99233 SBSQ HOSP IP/OBS HIGH 50: CPT | Performed by: INTERNAL MEDICINE

## 2019-04-25 PROCEDURE — 99024 POSTOP FOLLOW-UP VISIT: CPT | Performed by: PHYSICIAN ASSISTANT

## 2019-04-25 PROCEDURE — 82948 REAGENT STRIP/BLOOD GLUCOSE: CPT

## 2019-04-25 PROCEDURE — 80053 COMPREHEN METABOLIC PANEL: CPT | Performed by: INTERNAL MEDICINE

## 2019-04-25 PROCEDURE — 74176 CT ABD & PELVIS W/O CONTRAST: CPT

## 2019-04-25 RX ORDER — ZOLPIDEM TARTRATE 5 MG/1
5 TABLET ORAL ONCE
Status: COMPLETED | OUTPATIENT
Start: 2019-04-25 | End: 2019-04-25

## 2019-04-25 RX ADMIN — INSULIN LISPRO 1 UNITS: 100 INJECTION, SOLUTION INTRAVENOUS; SUBCUTANEOUS at 16:29

## 2019-04-25 RX ADMIN — INSULIN LISPRO 2 UNITS: 100 INJECTION, SOLUTION INTRAVENOUS; SUBCUTANEOUS at 08:35

## 2019-04-25 RX ADMIN — TORSEMIDE 20 MG: 20 TABLET ORAL at 08:35

## 2019-04-25 RX ADMIN — INSULIN LISPRO 10 UNITS: 100 INJECTION, SOLUTION INTRAVENOUS; SUBCUTANEOUS at 08:36

## 2019-04-25 RX ADMIN — PANCRELIPASE 48000 UNITS: 24000; 76000; 120000 CAPSULE, DELAYED RELEASE PELLETS ORAL at 12:36

## 2019-04-25 RX ADMIN — PANCRELIPASE 48000 UNITS: 24000; 76000; 120000 CAPSULE, DELAYED RELEASE PELLETS ORAL at 08:35

## 2019-04-25 RX ADMIN — ZOLPIDEM TARTRATE 5 MG: 5 TABLET, FILM COATED ORAL at 23:08

## 2019-04-25 RX ADMIN — IOHEXOL 50 ML: 240 INJECTION, SOLUTION INTRATHECAL; INTRAVASCULAR; INTRAVENOUS; ORAL at 13:50

## 2019-04-25 RX ADMIN — INSULIN LISPRO 10 UNITS: 100 INJECTION, SOLUTION INTRAVENOUS; SUBCUTANEOUS at 12:36

## 2019-04-25 RX ADMIN — INSULIN LISPRO 10 UNITS: 100 INJECTION, SOLUTION INTRAVENOUS; SUBCUTANEOUS at 16:29

## 2019-04-25 RX ADMIN — TORSEMIDE 20 MG: 20 TABLET ORAL at 14:24

## 2019-04-25 RX ADMIN — ENOXAPARIN SODIUM 40 MG: 40 INJECTION SUBCUTANEOUS at 08:35

## 2019-04-25 RX ADMIN — INSULIN GLARGINE 20 UNITS: 100 INJECTION, SOLUTION SUBCUTANEOUS at 22:38

## 2019-04-25 RX ADMIN — PANTOPRAZOLE SODIUM 40 MG: 40 TABLET, DELAYED RELEASE ORAL at 05:35

## 2019-04-25 RX ADMIN — PANCRELIPASE 48000 UNITS: 24000; 76000; 120000 CAPSULE, DELAYED RELEASE PELLETS ORAL at 16:29

## 2019-04-25 RX ADMIN — POTASSIUM CHLORIDE 40 MEQ: 1500 TABLET, EXTENDED RELEASE ORAL at 08:35

## 2019-04-25 RX ADMIN — INSULIN LISPRO 2 UNITS: 100 INJECTION, SOLUTION INTRAVENOUS; SUBCUTANEOUS at 11:30

## 2019-04-26 LAB
ALBUMIN SERPL BCP-MCNC: 1.9 G/DL (ref 3.5–5)
ALP SERPL-CCNC: 515 U/L (ref 46–116)
ALT SERPL W P-5'-P-CCNC: 49 U/L (ref 12–78)
ANION GAP SERPL CALCULATED.3IONS-SCNC: 7 MMOL/L (ref 4–13)
AST SERPL W P-5'-P-CCNC: 59 U/L (ref 5–45)
BASOPHILS # BLD AUTO: 0.03 THOUSANDS/ΜL (ref 0–0.1)
BASOPHILS NFR BLD AUTO: 0 % (ref 0–1)
BILIRUB SERPL-MCNC: 8.03 MG/DL (ref 0.2–1)
BUN SERPL-MCNC: 51 MG/DL (ref 5–25)
CALCIUM SERPL-MCNC: 8.6 MG/DL (ref 8.3–10.1)
CHLORIDE SERPL-SCNC: 100 MMOL/L (ref 100–108)
CO2 SERPL-SCNC: 29 MMOL/L (ref 21–32)
CREAT SERPL-MCNC: 1.27 MG/DL (ref 0.6–1.3)
EOSINOPHIL # BLD AUTO: 0.07 THOUSAND/ΜL (ref 0–0.61)
EOSINOPHIL NFR BLD AUTO: 0 % (ref 0–6)
ERYTHROCYTE [DISTWIDTH] IN BLOOD BY AUTOMATED COUNT: 21.2 % (ref 11.6–15.1)
GFR SERPL CREATININE-BSD FRML MDRD: 41 ML/MIN/1.73SQ M
GLUCOSE SERPL-MCNC: 101 MG/DL (ref 65–140)
GLUCOSE SERPL-MCNC: 176 MG/DL (ref 65–140)
GLUCOSE SERPL-MCNC: 190 MG/DL (ref 65–140)
GLUCOSE SERPL-MCNC: 232 MG/DL (ref 65–140)
GLUCOSE SERPL-MCNC: 97 MG/DL (ref 65–140)
HCT VFR BLD AUTO: 27.1 % (ref 34.8–46.1)
HGB BLD-MCNC: 8.8 G/DL (ref 11.5–15.4)
IMM GRANULOCYTES # BLD AUTO: >0.5 THOUSAND/UL (ref 0–0.2)
IMM GRANULOCYTES NFR BLD AUTO: 3 % (ref 0–2)
LYMPHOCYTES # BLD AUTO: 1.35 THOUSANDS/ΜL (ref 0.6–4.47)
LYMPHOCYTES NFR BLD AUTO: 7 % (ref 14–44)
MCH RBC QN AUTO: 29 PG (ref 26.8–34.3)
MCHC RBC AUTO-ENTMCNC: 32.5 G/DL (ref 31.4–37.4)
MCV RBC AUTO: 89 FL (ref 82–98)
MONOCYTES # BLD AUTO: 1.44 THOUSAND/ΜL (ref 0.17–1.22)
MONOCYTES NFR BLD AUTO: 8 % (ref 4–12)
NEUTROPHILS # BLD AUTO: 14.76 THOUSANDS/ΜL (ref 1.85–7.62)
NEUTS SEG NFR BLD AUTO: 82 % (ref 43–75)
NRBC BLD AUTO-RTO: 0 /100 WBCS
PLATELET # BLD AUTO: 404 THOUSANDS/UL (ref 149–390)
PMV BLD AUTO: 10.9 FL (ref 8.9–12.7)
POTASSIUM SERPL-SCNC: 3.4 MMOL/L (ref 3.5–5.3)
PROT SERPL-MCNC: 5.1 G/DL (ref 6.4–8.2)
RBC # BLD AUTO: 3.03 MILLION/UL (ref 3.81–5.12)
SODIUM SERPL-SCNC: 136 MMOL/L (ref 136–145)
WBC # BLD AUTO: 18.17 THOUSAND/UL (ref 4.31–10.16)

## 2019-04-26 PROCEDURE — 99232 SBSQ HOSP IP/OBS MODERATE 35: CPT | Performed by: INTERNAL MEDICINE

## 2019-04-26 PROCEDURE — 97530 THERAPEUTIC ACTIVITIES: CPT

## 2019-04-26 PROCEDURE — 85025 COMPLETE CBC W/AUTO DIFF WBC: CPT | Performed by: INTERNAL MEDICINE

## 2019-04-26 PROCEDURE — 97535 SELF CARE MNGMENT TRAINING: CPT

## 2019-04-26 PROCEDURE — 82948 REAGENT STRIP/BLOOD GLUCOSE: CPT

## 2019-04-26 PROCEDURE — 97116 GAIT TRAINING THERAPY: CPT

## 2019-04-26 PROCEDURE — 80053 COMPREHEN METABOLIC PANEL: CPT | Performed by: INTERNAL MEDICINE

## 2019-04-26 PROCEDURE — 97110 THERAPEUTIC EXERCISES: CPT

## 2019-04-26 RX ORDER — ALBUMIN, HUMAN INJ 5% 5 %
12.5 SOLUTION INTRAVENOUS ONCE
Status: COMPLETED | OUTPATIENT
Start: 2019-04-26 | End: 2019-04-26

## 2019-04-26 RX ORDER — SPIRONOLACTONE 25 MG/1
25 TABLET ORAL DAILY
Status: DISCONTINUED | OUTPATIENT
Start: 2019-04-26 | End: 2019-04-30

## 2019-04-26 RX ADMIN — INSULIN GLARGINE 20 UNITS: 100 INJECTION, SOLUTION SUBCUTANEOUS at 22:00

## 2019-04-26 RX ADMIN — TORSEMIDE 20 MG: 20 TABLET ORAL at 14:00

## 2019-04-26 RX ADMIN — INSULIN LISPRO 3 UNITS: 100 INJECTION, SOLUTION INTRAVENOUS; SUBCUTANEOUS at 17:59

## 2019-04-26 RX ADMIN — POTASSIUM CHLORIDE 40 MEQ: 1500 TABLET, EXTENDED RELEASE ORAL at 08:44

## 2019-04-26 RX ADMIN — INSULIN LISPRO 2 UNITS: 100 INJECTION, SOLUTION INTRAVENOUS; SUBCUTANEOUS at 22:00

## 2019-04-26 RX ADMIN — MELATONIN TAB 3 MG 6 MG: 3 TAB at 23:31

## 2019-04-26 RX ADMIN — INSULIN LISPRO 10 UNITS: 100 INJECTION, SOLUTION INTRAVENOUS; SUBCUTANEOUS at 12:17

## 2019-04-26 RX ADMIN — TORSEMIDE 20 MG: 20 TABLET ORAL at 08:43

## 2019-04-26 RX ADMIN — ALBUMIN (HUMAN) 12.5 G: 12.5 SOLUTION INTRAVENOUS at 12:16

## 2019-04-26 RX ADMIN — PANCRELIPASE 48000 UNITS: 24000; 76000; 120000 CAPSULE, DELAYED RELEASE PELLETS ORAL at 08:44

## 2019-04-26 RX ADMIN — INSULIN LISPRO 10 UNITS: 100 INJECTION, SOLUTION INTRAVENOUS; SUBCUTANEOUS at 17:59

## 2019-04-26 RX ADMIN — PANTOPRAZOLE SODIUM 40 MG: 40 TABLET, DELAYED RELEASE ORAL at 06:12

## 2019-04-26 RX ADMIN — SPIRONOLACTONE 25 MG: 25 TABLET ORAL at 12:16

## 2019-04-26 RX ADMIN — INSULIN LISPRO 1 UNITS: 100 INJECTION, SOLUTION INTRAVENOUS; SUBCUTANEOUS at 12:17

## 2019-04-26 RX ADMIN — PANCRELIPASE 48000 UNITS: 24000; 76000; 120000 CAPSULE, DELAYED RELEASE PELLETS ORAL at 12:16

## 2019-04-26 RX ADMIN — PANCRELIPASE 48000 UNITS: 24000; 76000; 120000 CAPSULE, DELAYED RELEASE PELLETS ORAL at 18:00

## 2019-04-26 RX ADMIN — ENOXAPARIN SODIUM 40 MG: 40 INJECTION SUBCUTANEOUS at 08:43

## 2019-04-26 RX ADMIN — INSULIN LISPRO 10 UNITS: 100 INJECTION, SOLUTION INTRAVENOUS; SUBCUTANEOUS at 08:44

## 2019-04-27 LAB
ALBUMIN SERPL BCP-MCNC: 1.9 G/DL (ref 3.5–5)
ALP SERPL-CCNC: 414 U/L (ref 46–116)
ALT SERPL W P-5'-P-CCNC: 42 U/L (ref 12–78)
ANION GAP SERPL CALCULATED.3IONS-SCNC: 7 MMOL/L (ref 4–13)
AST SERPL W P-5'-P-CCNC: 56 U/L (ref 5–45)
BASOPHILS # BLD AUTO: 0.03 THOUSANDS/ΜL (ref 0–0.1)
BASOPHILS NFR BLD AUTO: 0 % (ref 0–1)
BILIRUB SERPL-MCNC: 8.07 MG/DL (ref 0.2–1)
BUN SERPL-MCNC: 50 MG/DL (ref 5–25)
CALCIUM SERPL-MCNC: 8.3 MG/DL (ref 8.3–10.1)
CHLORIDE SERPL-SCNC: 100 MMOL/L (ref 100–108)
CO2 SERPL-SCNC: 30 MMOL/L (ref 21–32)
CREAT SERPL-MCNC: 1.26 MG/DL (ref 0.6–1.3)
EOSINOPHIL # BLD AUTO: 0.07 THOUSAND/ΜL (ref 0–0.61)
EOSINOPHIL NFR BLD AUTO: 1 % (ref 0–6)
ERYTHROCYTE [DISTWIDTH] IN BLOOD BY AUTOMATED COUNT: 21.7 % (ref 11.6–15.1)
GFR SERPL CREATININE-BSD FRML MDRD: 42 ML/MIN/1.73SQ M
GLUCOSE SERPL-MCNC: 205 MG/DL (ref 65–140)
GLUCOSE SERPL-MCNC: 220 MG/DL (ref 65–140)
GLUCOSE SERPL-MCNC: 252 MG/DL (ref 65–140)
GLUCOSE SERPL-MCNC: 257 MG/DL (ref 65–140)
GLUCOSE SERPL-MCNC: 76 MG/DL (ref 65–140)
GLUCOSE SERPL-MCNC: 77 MG/DL (ref 65–140)
HCT VFR BLD AUTO: 22.9 % (ref 34.8–46.1)
HGB BLD-MCNC: 7.5 G/DL (ref 11.5–15.4)
IMM GRANULOCYTES # BLD AUTO: 0.36 THOUSAND/UL (ref 0–0.2)
IMM GRANULOCYTES NFR BLD AUTO: 2 % (ref 0–2)
LYMPHOCYTES # BLD AUTO: 1.13 THOUSANDS/ΜL (ref 0.6–4.47)
LYMPHOCYTES NFR BLD AUTO: 8 % (ref 14–44)
MCH RBC QN AUTO: 29.3 PG (ref 26.8–34.3)
MCHC RBC AUTO-ENTMCNC: 32.8 G/DL (ref 31.4–37.4)
MCV RBC AUTO: 90 FL (ref 82–98)
MONOCYTES # BLD AUTO: 1.34 THOUSAND/ΜL (ref 0.17–1.22)
MONOCYTES NFR BLD AUTO: 9 % (ref 4–12)
NEUTROPHILS # BLD AUTO: 11.81 THOUSANDS/ΜL (ref 1.85–7.62)
NEUTS SEG NFR BLD AUTO: 80 % (ref 43–75)
NRBC BLD AUTO-RTO: 0 /100 WBCS
PLATELET # BLD AUTO: 318 THOUSANDS/UL (ref 149–390)
PMV BLD AUTO: 10.4 FL (ref 8.9–12.7)
POTASSIUM SERPL-SCNC: 3.5 MMOL/L (ref 3.5–5.3)
PROT SERPL-MCNC: 4.7 G/DL (ref 6.4–8.2)
RBC # BLD AUTO: 2.56 MILLION/UL (ref 3.81–5.12)
SODIUM SERPL-SCNC: 137 MMOL/L (ref 136–145)
WBC # BLD AUTO: 14.74 THOUSAND/UL (ref 4.31–10.16)

## 2019-04-27 PROCEDURE — 85025 COMPLETE CBC W/AUTO DIFF WBC: CPT | Performed by: INTERNAL MEDICINE

## 2019-04-27 PROCEDURE — 99232 SBSQ HOSP IP/OBS MODERATE 35: CPT | Performed by: INTERNAL MEDICINE

## 2019-04-27 PROCEDURE — 82948 REAGENT STRIP/BLOOD GLUCOSE: CPT

## 2019-04-27 PROCEDURE — 80053 COMPREHEN METABOLIC PANEL: CPT | Performed by: INTERNAL MEDICINE

## 2019-04-27 RX ORDER — LANOLIN ALCOHOL/MO/W.PET/CERES
9 CREAM (GRAM) TOPICAL
Status: DISCONTINUED | OUTPATIENT
Start: 2019-04-27 | End: 2019-06-03 | Stop reason: HOSPADM

## 2019-04-27 RX ADMIN — PANCRELIPASE 48000 UNITS: 24000; 76000; 120000 CAPSULE, DELAYED RELEASE PELLETS ORAL at 09:50

## 2019-04-27 RX ADMIN — POTASSIUM CHLORIDE 40 MEQ: 1500 TABLET, EXTENDED RELEASE ORAL at 09:47

## 2019-04-27 RX ADMIN — ENOXAPARIN SODIUM 40 MG: 40 INJECTION SUBCUTANEOUS at 09:47

## 2019-04-27 RX ADMIN — INSULIN LISPRO 10 UNITS: 100 INJECTION, SOLUTION INTRAVENOUS; SUBCUTANEOUS at 13:59

## 2019-04-27 RX ADMIN — TORSEMIDE 20 MG: 20 TABLET ORAL at 12:46

## 2019-04-27 RX ADMIN — TORSEMIDE 20 MG: 20 TABLET ORAL at 09:46

## 2019-04-27 RX ADMIN — PANCRELIPASE 48000 UNITS: 24000; 76000; 120000 CAPSULE, DELAYED RELEASE PELLETS ORAL at 17:18

## 2019-04-27 RX ADMIN — INSULIN LISPRO 10 UNITS: 100 INJECTION, SOLUTION INTRAVENOUS; SUBCUTANEOUS at 10:06

## 2019-04-27 RX ADMIN — LIDOCAINE 1 PATCH: 50 PATCH CUTANEOUS at 09:47

## 2019-04-27 RX ADMIN — SENNOSIDES 17.2 MG: 8.6 TABLET, FILM COATED ORAL at 09:46

## 2019-04-27 RX ADMIN — DOCUSATE SODIUM 100 MG: 100 CAPSULE, LIQUID FILLED ORAL at 09:46

## 2019-04-27 RX ADMIN — DIPHENHYDRAMINE HCL 50 MG: 25 TABLET ORAL at 23:38

## 2019-04-27 RX ADMIN — MELATONIN TAB 3 MG 9 MG: 3 TAB at 23:38

## 2019-04-27 RX ADMIN — INSULIN GLARGINE 20 UNITS: 100 INJECTION, SOLUTION SUBCUTANEOUS at 22:04

## 2019-04-27 RX ADMIN — INSULIN LISPRO 3 UNITS: 100 INJECTION, SOLUTION INTRAVENOUS; SUBCUTANEOUS at 17:21

## 2019-04-27 RX ADMIN — INSULIN LISPRO 3 UNITS: 100 INJECTION, SOLUTION INTRAVENOUS; SUBCUTANEOUS at 23:37

## 2019-04-27 RX ADMIN — PANCRELIPASE 48000 UNITS: 24000; 76000; 120000 CAPSULE, DELAYED RELEASE PELLETS ORAL at 12:45

## 2019-04-27 RX ADMIN — PANTOPRAZOLE SODIUM 40 MG: 40 TABLET, DELAYED RELEASE ORAL at 06:08

## 2019-04-27 RX ADMIN — INSULIN LISPRO 2 UNITS: 100 INJECTION, SOLUTION INTRAVENOUS; SUBCUTANEOUS at 12:44

## 2019-04-27 RX ADMIN — INSULIN LISPRO 10 UNITS: 100 INJECTION, SOLUTION INTRAVENOUS; SUBCUTANEOUS at 17:20

## 2019-04-27 RX ADMIN — SPIRONOLACTONE 25 MG: 25 TABLET ORAL at 09:45

## 2019-04-28 LAB
ALBUMIN SERPL BCP-MCNC: 1.9 G/DL (ref 3.5–5)
ALP SERPL-CCNC: 418 U/L (ref 46–116)
ALT SERPL W P-5'-P-CCNC: 47 U/L (ref 12–78)
AST SERPL W P-5'-P-CCNC: 62 U/L (ref 5–45)
BACTERIA BLD CULT: NORMAL
BASOPHILS # BLD AUTO: 0.02 THOUSANDS/ΜL (ref 0–0.1)
BASOPHILS NFR BLD AUTO: 0 % (ref 0–1)
BILIRUB DIRECT SERPL-MCNC: 7.42 MG/DL (ref 0–0.2)
BILIRUB SERPL-MCNC: 8.67 MG/DL (ref 0.2–1)
EOSINOPHIL # BLD AUTO: 0.1 THOUSAND/ΜL (ref 0–0.61)
EOSINOPHIL NFR BLD AUTO: 1 % (ref 0–6)
ERYTHROCYTE [DISTWIDTH] IN BLOOD BY AUTOMATED COUNT: 21.3 % (ref 11.6–15.1)
GLUCOSE SERPL-MCNC: 113 MG/DL (ref 65–140)
GLUCOSE SERPL-MCNC: 178 MG/DL (ref 65–140)
GLUCOSE SERPL-MCNC: 203 MG/DL (ref 65–140)
GLUCOSE SERPL-MCNC: 259 MG/DL (ref 65–140)
HCT VFR BLD AUTO: 24.4 % (ref 34.8–46.1)
HGB BLD-MCNC: 8 G/DL (ref 11.5–15.4)
IMM GRANULOCYTES # BLD AUTO: 0.3 THOUSAND/UL (ref 0–0.2)
IMM GRANULOCYTES NFR BLD AUTO: 2 % (ref 0–2)
LYMPHOCYTES # BLD AUTO: 1.05 THOUSANDS/ΜL (ref 0.6–4.47)
LYMPHOCYTES NFR BLD AUTO: 7 % (ref 14–44)
MCH RBC QN AUTO: 29.5 PG (ref 26.8–34.3)
MCHC RBC AUTO-ENTMCNC: 32.8 G/DL (ref 31.4–37.4)
MCV RBC AUTO: 90 FL (ref 82–98)
MONOCYTES # BLD AUTO: 1.26 THOUSAND/ΜL (ref 0.17–1.22)
MONOCYTES NFR BLD AUTO: 9 % (ref 4–12)
NEUTROPHILS # BLD AUTO: 12.07 THOUSANDS/ΜL (ref 1.85–7.62)
NEUTS SEG NFR BLD AUTO: 81 % (ref 43–75)
NRBC BLD AUTO-RTO: 0 /100 WBCS
PLATELET # BLD AUTO: 327 THOUSANDS/UL (ref 149–390)
PMV BLD AUTO: 11.2 FL (ref 8.9–12.7)
PROT SERPL-MCNC: 5 G/DL (ref 6.4–8.2)
RBC # BLD AUTO: 2.71 MILLION/UL (ref 3.81–5.12)
WBC # BLD AUTO: 14.8 THOUSAND/UL (ref 4.31–10.16)

## 2019-04-28 PROCEDURE — 82948 REAGENT STRIP/BLOOD GLUCOSE: CPT

## 2019-04-28 PROCEDURE — 99232 SBSQ HOSP IP/OBS MODERATE 35: CPT | Performed by: INTERNAL MEDICINE

## 2019-04-28 PROCEDURE — 85025 COMPLETE CBC W/AUTO DIFF WBC: CPT | Performed by: INTERNAL MEDICINE

## 2019-04-28 PROCEDURE — 80076 HEPATIC FUNCTION PANEL: CPT | Performed by: INTERNAL MEDICINE

## 2019-04-28 RX ADMIN — ENOXAPARIN SODIUM 40 MG: 40 INJECTION SUBCUTANEOUS at 09:05

## 2019-04-28 RX ADMIN — INSULIN LISPRO 10 UNITS: 100 INJECTION, SOLUTION INTRAVENOUS; SUBCUTANEOUS at 11:23

## 2019-04-28 RX ADMIN — INSULIN LISPRO 1 UNITS: 100 INJECTION, SOLUTION INTRAVENOUS; SUBCUTANEOUS at 17:06

## 2019-04-28 RX ADMIN — INSULIN GLARGINE 20 UNITS: 100 INJECTION, SOLUTION SUBCUTANEOUS at 21:24

## 2019-04-28 RX ADMIN — INSULIN LISPRO 10 UNITS: 100 INJECTION, SOLUTION INTRAVENOUS; SUBCUTANEOUS at 17:06

## 2019-04-28 RX ADMIN — PANTOPRAZOLE SODIUM 40 MG: 40 TABLET, DELAYED RELEASE ORAL at 06:18

## 2019-04-28 RX ADMIN — POTASSIUM CHLORIDE 40 MEQ: 1500 TABLET, EXTENDED RELEASE ORAL at 09:06

## 2019-04-28 RX ADMIN — MELATONIN TAB 3 MG 9 MG: 3 TAB at 23:28

## 2019-04-28 RX ADMIN — LIDOCAINE 1 PATCH: 50 PATCH CUTANEOUS at 09:06

## 2019-04-28 RX ADMIN — SPIRONOLACTONE 25 MG: 25 TABLET ORAL at 09:06

## 2019-04-28 RX ADMIN — INSULIN LISPRO 10 UNITS: 100 INJECTION, SOLUTION INTRAVENOUS; SUBCUTANEOUS at 09:05

## 2019-04-28 RX ADMIN — PANCRELIPASE 48000 UNITS: 24000; 76000; 120000 CAPSULE, DELAYED RELEASE PELLETS ORAL at 17:07

## 2019-04-28 RX ADMIN — PANCRELIPASE 48000 UNITS: 24000; 76000; 120000 CAPSULE, DELAYED RELEASE PELLETS ORAL at 11:23

## 2019-04-28 RX ADMIN — INSULIN LISPRO 3 UNITS: 100 INJECTION, SOLUTION INTRAVENOUS; SUBCUTANEOUS at 21:24

## 2019-04-28 RX ADMIN — TORSEMIDE 20 MG: 20 TABLET ORAL at 09:06

## 2019-04-28 RX ADMIN — TORSEMIDE 20 MG: 20 TABLET ORAL at 16:23

## 2019-04-28 RX ADMIN — DIPHENHYDRAMINE HCL 50 MG: 25 TABLET ORAL at 23:28

## 2019-04-28 RX ADMIN — PANCRELIPASE 48000 UNITS: 24000; 76000; 120000 CAPSULE, DELAYED RELEASE PELLETS ORAL at 09:06

## 2019-04-28 RX ADMIN — INSULIN LISPRO 2 UNITS: 100 INJECTION, SOLUTION INTRAVENOUS; SUBCUTANEOUS at 11:22

## 2019-04-29 LAB
25(OH)D3 SERPL-MCNC: 12.6 NG/ML (ref 30–100)
ALBUMIN SERPL BCP-MCNC: 1.8 G/DL (ref 3.5–5)
ALP SERPL-CCNC: 397 U/L (ref 46–116)
ALT SERPL W P-5'-P-CCNC: 56 U/L (ref 12–78)
ANION GAP SERPL CALCULATED.3IONS-SCNC: 8 MMOL/L (ref 4–13)
AST SERPL W P-5'-P-CCNC: 71 U/L (ref 5–45)
BASOPHILS # BLD AUTO: 0.04 THOUSANDS/ΜL (ref 0–0.1)
BASOPHILS NFR BLD AUTO: 0 % (ref 0–1)
BILIRUB SERPL-MCNC: 9.36 MG/DL (ref 0.2–1)
BUN SERPL-MCNC: 47 MG/DL (ref 5–25)
CALCIUM SERPL-MCNC: 8.5 MG/DL (ref 8.3–10.1)
CHLORIDE SERPL-SCNC: 99 MMOL/L (ref 100–108)
CO2 SERPL-SCNC: 28 MMOL/L (ref 21–32)
CREAT SERPL-MCNC: 1.29 MG/DL (ref 0.6–1.3)
EOSINOPHIL # BLD AUTO: 0.14 THOUSAND/ΜL (ref 0–0.61)
EOSINOPHIL NFR BLD AUTO: 1 % (ref 0–6)
ERYTHROCYTE [DISTWIDTH] IN BLOOD BY AUTOMATED COUNT: 21.7 % (ref 11.6–15.1)
GFR SERPL CREATININE-BSD FRML MDRD: 41 ML/MIN/1.73SQ M
GLUCOSE SERPL-MCNC: 158 MG/DL (ref 65–140)
GLUCOSE SERPL-MCNC: 191 MG/DL (ref 65–140)
GLUCOSE SERPL-MCNC: 206 MG/DL (ref 65–140)
GLUCOSE SERPL-MCNC: 264 MG/DL (ref 65–140)
GLUCOSE SERPL-MCNC: 90 MG/DL (ref 65–140)
HCT VFR BLD AUTO: 23.7 % (ref 34.8–46.1)
HGB BLD-MCNC: 7.7 G/DL (ref 11.5–15.4)
IMM GRANULOCYTES # BLD AUTO: 0.24 THOUSAND/UL (ref 0–0.2)
IMM GRANULOCYTES NFR BLD AUTO: 2 % (ref 0–2)
LYMPHOCYTES # BLD AUTO: 1.02 THOUSANDS/ΜL (ref 0.6–4.47)
LYMPHOCYTES NFR BLD AUTO: 6 % (ref 14–44)
MCH RBC QN AUTO: 29.6 PG (ref 26.8–34.3)
MCHC RBC AUTO-ENTMCNC: 32.5 G/DL (ref 31.4–37.4)
MCV RBC AUTO: 91 FL (ref 82–98)
MONOCYTES # BLD AUTO: 1.19 THOUSAND/ΜL (ref 0.17–1.22)
MONOCYTES NFR BLD AUTO: 8 % (ref 4–12)
NEUTROPHILS # BLD AUTO: 13.29 THOUSANDS/ΜL (ref 1.85–7.62)
NEUTS SEG NFR BLD AUTO: 83 % (ref 43–75)
NRBC BLD AUTO-RTO: 0 /100 WBCS
PLATELET # BLD AUTO: 313 THOUSANDS/UL (ref 149–390)
PMV BLD AUTO: 11.1 FL (ref 8.9–12.7)
POTASSIUM SERPL-SCNC: 4.2 MMOL/L (ref 3.5–5.3)
PROT SERPL-MCNC: 5 G/DL (ref 6.4–8.2)
RBC # BLD AUTO: 2.6 MILLION/UL (ref 3.81–5.12)
SODIUM SERPL-SCNC: 135 MMOL/L (ref 136–145)
WBC # BLD AUTO: 15.92 THOUSAND/UL (ref 4.31–10.16)

## 2019-04-29 PROCEDURE — 97530 THERAPEUTIC ACTIVITIES: CPT

## 2019-04-29 PROCEDURE — 97110 THERAPEUTIC EXERCISES: CPT

## 2019-04-29 PROCEDURE — 99232 SBSQ HOSP IP/OBS MODERATE 35: CPT | Performed by: INTERNAL MEDICINE

## 2019-04-29 PROCEDURE — 82948 REAGENT STRIP/BLOOD GLUCOSE: CPT

## 2019-04-29 PROCEDURE — 80053 COMPREHEN METABOLIC PANEL: CPT | Performed by: INTERNAL MEDICINE

## 2019-04-29 PROCEDURE — 85025 COMPLETE CBC W/AUTO DIFF WBC: CPT | Performed by: INTERNAL MEDICINE

## 2019-04-29 PROCEDURE — 82306 VITAMIN D 25 HYDROXY: CPT | Performed by: INTERNAL MEDICINE

## 2019-04-29 RX ORDER — ERGOCALCIFEROL 1.25 MG/1
50000 CAPSULE ORAL 3 TIMES WEEKLY
Status: DISCONTINUED | OUTPATIENT
Start: 2019-04-29 | End: 2019-06-03 | Stop reason: HOSPADM

## 2019-04-29 RX ORDER — DIPHENHYDRAMINE HCL 25 MG
25 TABLET ORAL EVERY 6 HOURS PRN
Status: DISCONTINUED | OUTPATIENT
Start: 2019-04-29 | End: 2019-06-03 | Stop reason: HOSPADM

## 2019-04-29 RX ORDER — SENNOSIDES 8.6 MG
2 TABLET ORAL
Status: DISCONTINUED | OUTPATIENT
Start: 2019-04-29 | End: 2019-06-03 | Stop reason: HOSPADM

## 2019-04-29 RX ADMIN — PANCRELIPASE 48000 UNITS: 24000; 76000; 120000 CAPSULE, DELAYED RELEASE PELLETS ORAL at 18:37

## 2019-04-29 RX ADMIN — INSULIN LISPRO 3 UNITS: 100 INJECTION, SOLUTION INTRAVENOUS; SUBCUTANEOUS at 18:37

## 2019-04-29 RX ADMIN — INSULIN GLARGINE 20 UNITS: 100 INJECTION, SOLUTION SUBCUTANEOUS at 21:48

## 2019-04-29 RX ADMIN — MELATONIN TAB 3 MG 9 MG: 3 TAB at 21:48

## 2019-04-29 RX ADMIN — PANTOPRAZOLE SODIUM 40 MG: 40 TABLET, DELAYED RELEASE ORAL at 06:11

## 2019-04-29 RX ADMIN — LIDOCAINE 1 PATCH: 50 PATCH CUTANEOUS at 09:13

## 2019-04-29 RX ADMIN — INSULIN LISPRO 10 UNITS: 100 INJECTION, SOLUTION INTRAVENOUS; SUBCUTANEOUS at 18:37

## 2019-04-29 RX ADMIN — INSULIN LISPRO 10 UNITS: 100 INJECTION, SOLUTION INTRAVENOUS; SUBCUTANEOUS at 14:04

## 2019-04-29 RX ADMIN — TORSEMIDE 20 MG: 20 TABLET ORAL at 09:18

## 2019-04-29 RX ADMIN — PANCRELIPASE 48000 UNITS: 24000; 76000; 120000 CAPSULE, DELAYED RELEASE PELLETS ORAL at 14:04

## 2019-04-29 RX ADMIN — ENOXAPARIN SODIUM 40 MG: 40 INJECTION SUBCUTANEOUS at 09:17

## 2019-04-29 RX ADMIN — PANCRELIPASE 48000 UNITS: 24000; 76000; 120000 CAPSULE, DELAYED RELEASE PELLETS ORAL at 09:17

## 2019-04-29 RX ADMIN — TORSEMIDE 20 MG: 20 TABLET ORAL at 14:04

## 2019-04-29 RX ADMIN — INSULIN LISPRO 2 UNITS: 100 INJECTION, SOLUTION INTRAVENOUS; SUBCUTANEOUS at 14:04

## 2019-04-29 RX ADMIN — INSULIN LISPRO 2 UNITS: 100 INJECTION, SOLUTION INTRAVENOUS; SUBCUTANEOUS at 21:48

## 2019-04-29 RX ADMIN — INSULIN LISPRO 10 UNITS: 100 INJECTION, SOLUTION INTRAVENOUS; SUBCUTANEOUS at 09:17

## 2019-04-29 RX ADMIN — POTASSIUM CHLORIDE 40 MEQ: 1500 TABLET, EXTENDED RELEASE ORAL at 09:18

## 2019-04-29 RX ADMIN — OXYCODONE HYDROCHLORIDE 5 MG: 5 TABLET ORAL at 23:46

## 2019-04-29 RX ADMIN — ERGOCALCIFEROL 50000 UNITS: 1.25 CAPSULE ORAL at 18:37

## 2019-04-29 RX ADMIN — SPIRONOLACTONE 25 MG: 25 TABLET ORAL at 09:17

## 2019-04-29 RX ADMIN — DIPHENHYDRAMINE HCL 25 MG: 25 TABLET, FILM COATED ORAL at 21:48

## 2019-04-30 LAB
BASOPHILS # BLD AUTO: 0.02 THOUSANDS/ΜL (ref 0–0.1)
BASOPHILS NFR BLD AUTO: 0 % (ref 0–1)
EOSINOPHIL # BLD AUTO: 0.11 THOUSAND/ΜL (ref 0–0.61)
EOSINOPHIL NFR BLD AUTO: 1 % (ref 0–6)
ERYTHROCYTE [DISTWIDTH] IN BLOOD BY AUTOMATED COUNT: 21.3 % (ref 11.6–15.1)
GLUCOSE SERPL-MCNC: 165 MG/DL (ref 65–140)
GLUCOSE SERPL-MCNC: 179 MG/DL (ref 65–140)
GLUCOSE SERPL-MCNC: 189 MG/DL (ref 65–140)
GLUCOSE SERPL-MCNC: 233 MG/DL (ref 65–140)
GLUCOSE SERPL-MCNC: 271 MG/DL (ref 65–140)
GLUCOSE SERPL-MCNC: 89 MG/DL (ref 65–140)
HCT VFR BLD AUTO: 25.4 % (ref 34.8–46.1)
HGB BLD-MCNC: 8.3 G/DL (ref 11.5–15.4)
IMM GRANULOCYTES # BLD AUTO: 0.15 THOUSAND/UL (ref 0–0.2)
IMM GRANULOCYTES NFR BLD AUTO: 2 % (ref 0–2)
LYMPHOCYTES # BLD AUTO: 0.98 THOUSANDS/ΜL (ref 0.6–4.47)
LYMPHOCYTES NFR BLD AUTO: 10 % (ref 14–44)
MCH RBC QN AUTO: 29.4 PG (ref 26.8–34.3)
MCHC RBC AUTO-ENTMCNC: 32.7 G/DL (ref 31.4–37.4)
MCV RBC AUTO: 90 FL (ref 82–98)
MONOCYTES # BLD AUTO: 0.9 THOUSAND/ΜL (ref 0.17–1.22)
MONOCYTES NFR BLD AUTO: 9 % (ref 4–12)
NEUTROPHILS # BLD AUTO: 8.1 THOUSANDS/ΜL (ref 1.85–7.62)
NEUTS SEG NFR BLD AUTO: 78 % (ref 43–75)
NRBC BLD AUTO-RTO: 0 /100 WBCS
PLATELET # BLD AUTO: 301 THOUSANDS/UL (ref 149–390)
PMV BLD AUTO: 11.5 FL (ref 8.9–12.7)
RBC # BLD AUTO: 2.82 MILLION/UL (ref 3.81–5.12)
WBC # BLD AUTO: 10.26 THOUSAND/UL (ref 4.31–10.16)

## 2019-04-30 PROCEDURE — 82948 REAGENT STRIP/BLOOD GLUCOSE: CPT

## 2019-04-30 PROCEDURE — 97535 SELF CARE MNGMENT TRAINING: CPT

## 2019-04-30 PROCEDURE — 97110 THERAPEUTIC EXERCISES: CPT

## 2019-04-30 PROCEDURE — 99232 SBSQ HOSP IP/OBS MODERATE 35: CPT | Performed by: INTERNAL MEDICINE

## 2019-04-30 PROCEDURE — 85025 COMPLETE CBC W/AUTO DIFF WBC: CPT | Performed by: INTERNAL MEDICINE

## 2019-04-30 PROCEDURE — 97116 GAIT TRAINING THERAPY: CPT

## 2019-04-30 PROCEDURE — 97530 THERAPEUTIC ACTIVITIES: CPT

## 2019-04-30 RX ORDER — SPIRONOLACTONE 25 MG/1
50 TABLET ORAL DAILY
Status: DISCONTINUED | OUTPATIENT
Start: 2019-05-01 | End: 2019-05-07

## 2019-04-30 RX ORDER — TORSEMIDE 20 MG/1
40 TABLET ORAL
Status: DISCONTINUED | OUTPATIENT
Start: 2019-05-01 | End: 2019-05-06

## 2019-04-30 RX ADMIN — INSULIN LISPRO 4 UNITS: 100 INJECTION, SOLUTION INTRAVENOUS; SUBCUTANEOUS at 19:28

## 2019-04-30 RX ADMIN — INSULIN GLARGINE 20 UNITS: 100 INJECTION, SOLUTION SUBCUTANEOUS at 22:16

## 2019-04-30 RX ADMIN — INSULIN LISPRO 10 UNITS: 100 INJECTION, SOLUTION INTRAVENOUS; SUBCUTANEOUS at 19:28

## 2019-04-30 RX ADMIN — POTASSIUM CHLORIDE 40 MEQ: 1500 TABLET, EXTENDED RELEASE ORAL at 08:47

## 2019-04-30 RX ADMIN — ENOXAPARIN SODIUM 40 MG: 40 INJECTION SUBCUTANEOUS at 08:59

## 2019-04-30 RX ADMIN — INSULIN LISPRO 1 UNITS: 100 INJECTION, SOLUTION INTRAVENOUS; SUBCUTANEOUS at 13:46

## 2019-04-30 RX ADMIN — PANCRELIPASE 48000 UNITS: 24000; 76000; 120000 CAPSULE, DELAYED RELEASE PELLETS ORAL at 13:46

## 2019-04-30 RX ADMIN — TORSEMIDE 20 MG: 20 TABLET ORAL at 08:47

## 2019-04-30 RX ADMIN — INSULIN LISPRO 3 UNITS: 100 INJECTION, SOLUTION INTRAVENOUS; SUBCUTANEOUS at 22:15

## 2019-04-30 RX ADMIN — SPIRONOLACTONE 25 MG: 25 TABLET ORAL at 08:46

## 2019-04-30 RX ADMIN — PANCRELIPASE 48000 UNITS: 24000; 76000; 120000 CAPSULE, DELAYED RELEASE PELLETS ORAL at 19:27

## 2019-04-30 RX ADMIN — IRON SUCROSE 200 MG: 20 INJECTION, SOLUTION INTRAVENOUS at 08:47

## 2019-04-30 RX ADMIN — PANCRELIPASE 48000 UNITS: 24000; 76000; 120000 CAPSULE, DELAYED RELEASE PELLETS ORAL at 08:51

## 2019-04-30 RX ADMIN — PANTOPRAZOLE SODIUM 40 MG: 40 TABLET, DELAYED RELEASE ORAL at 06:25

## 2019-04-30 RX ADMIN — TORSEMIDE 20 MG: 20 TABLET ORAL at 13:47

## 2019-04-30 RX ADMIN — LIDOCAINE 1 PATCH: 50 PATCH CUTANEOUS at 09:48

## 2019-04-30 RX ADMIN — INSULIN LISPRO 10 UNITS: 100 INJECTION, SOLUTION INTRAVENOUS; SUBCUTANEOUS at 08:45

## 2019-04-30 RX ADMIN — INSULIN LISPRO 10 UNITS: 100 INJECTION, SOLUTION INTRAVENOUS; SUBCUTANEOUS at 13:46

## 2019-05-01 LAB
ALBUMIN SERPL BCP-MCNC: 1.8 G/DL (ref 3.5–5)
ALP SERPL-CCNC: 383 U/L (ref 46–116)
ALT SERPL W P-5'-P-CCNC: 56 U/L (ref 12–78)
ANION GAP SERPL CALCULATED.3IONS-SCNC: 7 MMOL/L (ref 4–13)
AST SERPL W P-5'-P-CCNC: 70 U/L (ref 5–45)
BASOPHILS # BLD AUTO: 0.03 THOUSANDS/ΜL (ref 0–0.1)
BASOPHILS NFR BLD AUTO: 0 % (ref 0–1)
BILIRUB SERPL-MCNC: 9.3 MG/DL (ref 0.2–1)
BUN SERPL-MCNC: 49 MG/DL (ref 5–25)
CALCIUM SERPL-MCNC: 8.8 MG/DL (ref 8.3–10.1)
CHLORIDE SERPL-SCNC: 98 MMOL/L (ref 100–108)
CO2 SERPL-SCNC: 30 MMOL/L (ref 21–32)
CREAT SERPL-MCNC: 1.24 MG/DL (ref 0.6–1.3)
EOSINOPHIL # BLD AUTO: 0.12 THOUSAND/ΜL (ref 0–0.61)
EOSINOPHIL NFR BLD AUTO: 1 % (ref 0–6)
ERYTHROCYTE [DISTWIDTH] IN BLOOD BY AUTOMATED COUNT: 21.1 % (ref 11.6–15.1)
GFR SERPL CREATININE-BSD FRML MDRD: 43 ML/MIN/1.73SQ M
GLUCOSE SERPL-MCNC: 129 MG/DL (ref 65–140)
GLUCOSE SERPL-MCNC: 136 MG/DL (ref 65–140)
GLUCOSE SERPL-MCNC: 216 MG/DL (ref 65–140)
GLUCOSE SERPL-MCNC: 272 MG/DL (ref 65–140)
GLUCOSE SERPL-MCNC: 285 MG/DL (ref 65–140)
HCT VFR BLD AUTO: 23.3 % (ref 34.8–46.1)
HGB BLD-MCNC: 7.5 G/DL (ref 11.5–15.4)
IMM GRANULOCYTES # BLD AUTO: 0.16 THOUSAND/UL (ref 0–0.2)
IMM GRANULOCYTES NFR BLD AUTO: 2 % (ref 0–2)
LYMPHOCYTES # BLD AUTO: 0.95 THOUSANDS/ΜL (ref 0.6–4.47)
LYMPHOCYTES NFR BLD AUTO: 9 % (ref 14–44)
MCH RBC QN AUTO: 29.1 PG (ref 26.8–34.3)
MCHC RBC AUTO-ENTMCNC: 32.2 G/DL (ref 31.4–37.4)
MCV RBC AUTO: 90 FL (ref 82–98)
MONOCYTES # BLD AUTO: 1.03 THOUSAND/ΜL (ref 0.17–1.22)
MONOCYTES NFR BLD AUTO: 10 % (ref 4–12)
NEUTROPHILS # BLD AUTO: 8 THOUSANDS/ΜL (ref 1.85–7.62)
NEUTS SEG NFR BLD AUTO: 78 % (ref 43–75)
NRBC BLD AUTO-RTO: 0 /100 WBCS
PLATELET # BLD AUTO: 282 THOUSANDS/UL (ref 149–390)
PMV BLD AUTO: 11.1 FL (ref 8.9–12.7)
POTASSIUM SERPL-SCNC: 3.9 MMOL/L (ref 3.5–5.3)
PROT SERPL-MCNC: 5.1 G/DL (ref 6.4–8.2)
RBC # BLD AUTO: 2.58 MILLION/UL (ref 3.81–5.12)
SODIUM SERPL-SCNC: 135 MMOL/L (ref 136–145)
WBC # BLD AUTO: 10.29 THOUSAND/UL (ref 4.31–10.16)

## 2019-05-01 PROCEDURE — 80053 COMPREHEN METABOLIC PANEL: CPT | Performed by: INTERNAL MEDICINE

## 2019-05-01 PROCEDURE — 97116 GAIT TRAINING THERAPY: CPT

## 2019-05-01 PROCEDURE — 82948 REAGENT STRIP/BLOOD GLUCOSE: CPT

## 2019-05-01 PROCEDURE — 99232 SBSQ HOSP IP/OBS MODERATE 35: CPT | Performed by: INTERNAL MEDICINE

## 2019-05-01 PROCEDURE — 85025 COMPLETE CBC W/AUTO DIFF WBC: CPT | Performed by: INTERNAL MEDICINE

## 2019-05-01 PROCEDURE — 97110 THERAPEUTIC EXERCISES: CPT

## 2019-05-01 PROCEDURE — 97530 THERAPEUTIC ACTIVITIES: CPT

## 2019-05-01 RX ADMIN — INSULIN LISPRO 4 UNITS: 100 INJECTION, SOLUTION INTRAVENOUS; SUBCUTANEOUS at 13:27

## 2019-05-01 RX ADMIN — TORSEMIDE 40 MG: 20 TABLET ORAL at 09:05

## 2019-05-01 RX ADMIN — LIDOCAINE 1 PATCH: 50 PATCH CUTANEOUS at 09:04

## 2019-05-01 RX ADMIN — SPIRONOLACTONE 50 MG: 25 TABLET ORAL at 09:05

## 2019-05-01 RX ADMIN — DIPHENHYDRAMINE HCL 25 MG: 25 TABLET, FILM COATED ORAL at 22:12

## 2019-05-01 RX ADMIN — INSULIN LISPRO 10 UNITS: 100 INJECTION, SOLUTION INTRAVENOUS; SUBCUTANEOUS at 13:27

## 2019-05-01 RX ADMIN — INSULIN GLARGINE 20 UNITS: 100 INJECTION, SOLUTION SUBCUTANEOUS at 22:12

## 2019-05-01 RX ADMIN — INSULIN LISPRO 2 UNITS: 100 INJECTION, SOLUTION INTRAVENOUS; SUBCUTANEOUS at 18:53

## 2019-05-01 RX ADMIN — IRON SUCROSE 200 MG: 20 INJECTION, SOLUTION INTRAVENOUS at 09:10

## 2019-05-01 RX ADMIN — PANCRELIPASE 48000 UNITS: 24000; 76000; 120000 CAPSULE, DELAYED RELEASE PELLETS ORAL at 09:04

## 2019-05-01 RX ADMIN — PANCRELIPASE 48000 UNITS: 24000; 76000; 120000 CAPSULE, DELAYED RELEASE PELLETS ORAL at 18:52

## 2019-05-01 RX ADMIN — INSULIN LISPRO 10 UNITS: 100 INJECTION, SOLUTION INTRAVENOUS; SUBCUTANEOUS at 09:06

## 2019-05-01 RX ADMIN — TORSEMIDE 40 MG: 20 TABLET ORAL at 18:52

## 2019-05-01 RX ADMIN — MELATONIN TAB 3 MG 9 MG: 3 TAB at 22:12

## 2019-05-01 RX ADMIN — PANTOPRAZOLE SODIUM 40 MG: 40 TABLET, DELAYED RELEASE ORAL at 07:24

## 2019-05-01 RX ADMIN — POTASSIUM CHLORIDE 40 MEQ: 1500 TABLET, EXTENDED RELEASE ORAL at 09:05

## 2019-05-01 RX ADMIN — INSULIN LISPRO 4 UNITS: 100 INJECTION, SOLUTION INTRAVENOUS; SUBCUTANEOUS at 22:12

## 2019-05-01 RX ADMIN — ENOXAPARIN SODIUM 40 MG: 40 INJECTION SUBCUTANEOUS at 09:05

## 2019-05-01 RX ADMIN — INSULIN LISPRO 10 UNITS: 100 INJECTION, SOLUTION INTRAVENOUS; SUBCUTANEOUS at 18:53

## 2019-05-01 RX ADMIN — ERGOCALCIFEROL 50000 UNITS: 1.25 CAPSULE ORAL at 09:05

## 2019-05-01 RX ADMIN — PANCRELIPASE 48000 UNITS: 24000; 76000; 120000 CAPSULE, DELAYED RELEASE PELLETS ORAL at 12:26

## 2019-05-02 ENCOUNTER — APPOINTMENT (INPATIENT)
Dept: RADIOLOGY | Facility: HOSPITAL | Age: 74
DRG: 469 | End: 2019-05-02
Payer: COMMERCIAL

## 2019-05-02 LAB
ALBUMIN FLD-MCNC: 0.9 G/DL
APPEARANCE FLD: NORMAL
COLOR FLD: YELLOW
GLUCOSE SERPL-MCNC: 205 MG/DL (ref 65–140)
GLUCOSE SERPL-MCNC: 212 MG/DL (ref 65–140)
GLUCOSE SERPL-MCNC: 277 MG/DL (ref 65–140)
GLUCOSE SERPL-MCNC: 69 MG/DL (ref 65–140)
GLUCOSE SERPL-MCNC: 91 MG/DL (ref 65–140)
INR PPP: 1.35 (ref 0.86–1.17)
LYMPHOCYTES NFR BLD AUTO: 19 %
MONOCYTES NFR BLD AUTO: 35 %
NEUTS SEG NFR BLD AUTO: 46 %
PROT FLD-MCNC: <2 G/DL
PROTHROMBIN TIME: 16.8 SECONDS (ref 11.8–14.2)
SITE: NORMAL
TOTAL CELLS COUNTED SPEC: 100
WBC # FLD MANUAL: 1182 /UL

## 2019-05-02 PROCEDURE — 99232 SBSQ HOSP IP/OBS MODERATE 35: CPT | Performed by: INTERNAL MEDICINE

## 2019-05-02 PROCEDURE — 82948 REAGENT STRIP/BLOOD GLUCOSE: CPT

## 2019-05-02 PROCEDURE — 82042 OTHER SOURCE ALBUMIN QUAN EA: CPT | Performed by: PHYSICIAN ASSISTANT

## 2019-05-02 PROCEDURE — 49083 ABD PARACENTESIS W/IMAGING: CPT

## 2019-05-02 PROCEDURE — 87186 SC STD MICRODIL/AGAR DIL: CPT | Performed by: PHYSICIAN ASSISTANT

## 2019-05-02 PROCEDURE — 88305 TISSUE EXAM BY PATHOLOGIST: CPT | Performed by: PATHOLOGY

## 2019-05-02 PROCEDURE — 88112 CYTOPATH CELL ENHANCE TECH: CPT | Performed by: PATHOLOGY

## 2019-05-02 PROCEDURE — 49083 ABD PARACENTESIS W/IMAGING: CPT | Performed by: RADIOLOGY

## 2019-05-02 PROCEDURE — 87070 CULTURE OTHR SPECIMN AEROBIC: CPT | Performed by: PHYSICIAN ASSISTANT

## 2019-05-02 PROCEDURE — 47531 INJECTION FOR CHOLANGIOGRAM: CPT | Performed by: RADIOLOGY

## 2019-05-02 PROCEDURE — 97535 SELF CARE MNGMENT TRAINING: CPT

## 2019-05-02 PROCEDURE — 85610 PROTHROMBIN TIME: CPT | Performed by: INTERNAL MEDICINE

## 2019-05-02 PROCEDURE — 0W9G3ZZ DRAINAGE OF PERITONEAL CAVITY, PERCUTANEOUS APPROACH: ICD-10-PCS | Performed by: RADIOLOGY

## 2019-05-02 PROCEDURE — 47531 INJECTION FOR CHOLANGIOGRAM: CPT

## 2019-05-02 PROCEDURE — 87077 CULTURE AEROBIC IDENTIFY: CPT | Performed by: PHYSICIAN ASSISTANT

## 2019-05-02 PROCEDURE — 89051 BODY FLUID CELL COUNT: CPT | Performed by: PHYSICIAN ASSISTANT

## 2019-05-02 PROCEDURE — 87205 SMEAR GRAM STAIN: CPT | Performed by: PHYSICIAN ASSISTANT

## 2019-05-02 PROCEDURE — 84157 ASSAY OF PROTEIN OTHER: CPT | Performed by: PHYSICIAN ASSISTANT

## 2019-05-02 RX ORDER — LIDOCAINE HYDROCHLORIDE 10 MG/ML
INJECTION, SOLUTION INFILTRATION; PERINEURAL CODE/TRAUMA/SEDATION MEDICATION
Status: COMPLETED | OUTPATIENT
Start: 2019-05-02 | End: 2019-05-02

## 2019-05-02 RX ADMIN — PANCRELIPASE 48000 UNITS: 24000; 76000; 120000 CAPSULE, DELAYED RELEASE PELLETS ORAL at 07:34

## 2019-05-02 RX ADMIN — IODIXANOL 10 ML: 320 INJECTION, SOLUTION INTRAVASCULAR at 16:30

## 2019-05-02 RX ADMIN — PANCRELIPASE 48000 UNITS: 24000; 76000; 120000 CAPSULE, DELAYED RELEASE PELLETS ORAL at 18:19

## 2019-05-02 RX ADMIN — POTASSIUM CHLORIDE 40 MEQ: 1500 TABLET, EXTENDED RELEASE ORAL at 09:51

## 2019-05-02 RX ADMIN — SPIRONOLACTONE 50 MG: 25 TABLET ORAL at 09:52

## 2019-05-02 RX ADMIN — INSULIN LISPRO 10 UNITS: 100 INJECTION, SOLUTION INTRAVENOUS; SUBCUTANEOUS at 13:11

## 2019-05-02 RX ADMIN — INSULIN LISPRO 4 UNITS: 100 INJECTION, SOLUTION INTRAVENOUS; SUBCUTANEOUS at 13:11

## 2019-05-02 RX ADMIN — INSULIN GLARGINE 20 UNITS: 100 INJECTION, SOLUTION SUBCUTANEOUS at 21:37

## 2019-05-02 RX ADMIN — INSULIN LISPRO 2 UNITS: 100 INJECTION, SOLUTION INTRAVENOUS; SUBCUTANEOUS at 18:19

## 2019-05-02 RX ADMIN — INSULIN LISPRO 10 UNITS: 100 INJECTION, SOLUTION INTRAVENOUS; SUBCUTANEOUS at 18:19

## 2019-05-02 RX ADMIN — OXYCODONE HYDROCHLORIDE 2.5 MG: 5 TABLET ORAL at 23:18

## 2019-05-02 RX ADMIN — TORSEMIDE 40 MG: 20 TABLET ORAL at 13:12

## 2019-05-02 RX ADMIN — LIDOCAINE 1 PATCH: 50 PATCH CUTANEOUS at 09:52

## 2019-05-02 RX ADMIN — PANCRELIPASE 48000 UNITS: 24000; 76000; 120000 CAPSULE, DELAYED RELEASE PELLETS ORAL at 13:12

## 2019-05-02 RX ADMIN — LIDOCAINE HYDROCHLORIDE 5 ML: 10 INJECTION, SOLUTION INFILTRATION; PERINEURAL at 16:29

## 2019-05-02 RX ADMIN — IRON SUCROSE 200 MG: 20 INJECTION, SOLUTION INTRAVENOUS at 09:52

## 2019-05-02 RX ADMIN — TORSEMIDE 40 MG: 20 TABLET ORAL at 09:00

## 2019-05-02 RX ADMIN — ENOXAPARIN SODIUM 40 MG: 40 INJECTION SUBCUTANEOUS at 09:54

## 2019-05-02 RX ADMIN — PANTOPRAZOLE SODIUM 40 MG: 40 TABLET, DELAYED RELEASE ORAL at 07:34

## 2019-05-03 ENCOUNTER — APPOINTMENT (INPATIENT)
Dept: RADIOLOGY | Facility: HOSPITAL | Age: 74
DRG: 469 | End: 2019-05-03
Attending: RADIOLOGY
Payer: COMMERCIAL

## 2019-05-03 LAB
ALBUMIN SERPL BCP-MCNC: 1.7 G/DL (ref 3.5–5)
ALP SERPL-CCNC: 379 U/L (ref 46–116)
ALT SERPL W P-5'-P-CCNC: 61 U/L (ref 12–78)
ANION GAP SERPL CALCULATED.3IONS-SCNC: 8 MMOL/L (ref 4–13)
AST SERPL W P-5'-P-CCNC: 74 U/L (ref 5–45)
BASOPHILS # BLD AUTO: 0.03 THOUSANDS/ΜL (ref 0–0.1)
BASOPHILS NFR BLD AUTO: 0 % (ref 0–1)
BILIRUB SERPL-MCNC: 8.57 MG/DL (ref 0.2–1)
BUN SERPL-MCNC: 45 MG/DL (ref 5–25)
CALCIUM SERPL-MCNC: 8.5 MG/DL (ref 8.3–10.1)
CHLORIDE SERPL-SCNC: 98 MMOL/L (ref 100–108)
CO2 SERPL-SCNC: 29 MMOL/L (ref 21–32)
CREAT SERPL-MCNC: 1.25 MG/DL (ref 0.6–1.3)
EOSINOPHIL # BLD AUTO: 0.13 THOUSAND/ΜL (ref 0–0.61)
EOSINOPHIL NFR BLD AUTO: 1 % (ref 0–6)
ERYTHROCYTE [DISTWIDTH] IN BLOOD BY AUTOMATED COUNT: 21.5 % (ref 11.6–15.1)
GFR SERPL CREATININE-BSD FRML MDRD: 42 ML/MIN/1.73SQ M
GLUCOSE SERPL-MCNC: 222 MG/DL (ref 65–140)
GLUCOSE SERPL-MCNC: 223 MG/DL (ref 65–140)
GLUCOSE SERPL-MCNC: 94 MG/DL (ref 65–140)
GLUCOSE SERPL-MCNC: 97 MG/DL (ref 65–140)
GLUCOSE SERPL-MCNC: 99 MG/DL (ref 65–140)
HCT VFR BLD AUTO: 22.2 % (ref 34.8–46.1)
HGB BLD-MCNC: 7.3 G/DL (ref 11.5–15.4)
IMM GRANULOCYTES # BLD AUTO: 0.26 THOUSAND/UL (ref 0–0.2)
IMM GRANULOCYTES NFR BLD AUTO: 2 % (ref 0–2)
LYMPHOCYTES # BLD AUTO: 1.04 THOUSANDS/ΜL (ref 0.6–4.47)
LYMPHOCYTES NFR BLD AUTO: 9 % (ref 14–44)
MCH RBC QN AUTO: 29.7 PG (ref 26.8–34.3)
MCHC RBC AUTO-ENTMCNC: 32.9 G/DL (ref 31.4–37.4)
MCV RBC AUTO: 90 FL (ref 82–98)
MONOCYTES # BLD AUTO: 1.11 THOUSAND/ΜL (ref 0.17–1.22)
MONOCYTES NFR BLD AUTO: 10 % (ref 4–12)
NEUTROPHILS # BLD AUTO: 9.07 THOUSANDS/ΜL (ref 1.85–7.62)
NEUTS SEG NFR BLD AUTO: 78 % (ref 43–75)
NRBC BLD AUTO-RTO: 0 /100 WBCS
PLATELET # BLD AUTO: 278 THOUSANDS/UL (ref 149–390)
PMV BLD AUTO: 11.3 FL (ref 8.9–12.7)
POTASSIUM SERPL-SCNC: 4 MMOL/L (ref 3.5–5.3)
PROT SERPL-MCNC: 4.9 G/DL (ref 6.4–8.2)
RBC # BLD AUTO: 2.46 MILLION/UL (ref 3.81–5.12)
SODIUM SERPL-SCNC: 135 MMOL/L (ref 136–145)
WBC # BLD AUTO: 11.64 THOUSAND/UL (ref 4.31–10.16)

## 2019-05-03 PROCEDURE — C1894 INTRO/SHEATH, NON-LASER: HCPCS

## 2019-05-03 PROCEDURE — C1725 CATH, TRANSLUMIN NON-LASER: HCPCS

## 2019-05-03 PROCEDURE — 99232 SBSQ HOSP IP/OBS MODERATE 35: CPT | Performed by: INTERNAL MEDICINE

## 2019-05-03 PROCEDURE — 0F9940Z DRAINAGE OF COMMON BILE DUCT WITH DRAINAGE DEVICE, PERCUTANEOUS ENDOSCOPIC APPROACH: ICD-10-PCS | Performed by: RADIOLOGY

## 2019-05-03 PROCEDURE — 80053 COMPREHEN METABOLIC PANEL: CPT | Performed by: INTERNAL MEDICINE

## 2019-05-03 PROCEDURE — 47542 DILATE BILIARY DUCT/AMPULLA: CPT | Performed by: RADIOLOGY

## 2019-05-03 PROCEDURE — C1769 GUIDE WIRE: HCPCS

## 2019-05-03 PROCEDURE — 99152 MOD SED SAME PHYS/QHP 5/>YRS: CPT | Performed by: RADIOLOGY

## 2019-05-03 PROCEDURE — 85025 COMPLETE CBC W/AUTO DIFF WBC: CPT | Performed by: INTERNAL MEDICINE

## 2019-05-03 PROCEDURE — 0F753ZZ DILATION OF RIGHT HEPATIC DUCT, PERCUTANEOUS APPROACH: ICD-10-PCS | Performed by: RADIOLOGY

## 2019-05-03 PROCEDURE — 99152 MOD SED SAME PHYS/QHP 5/>YRS: CPT

## 2019-05-03 PROCEDURE — 47535 CONVERSION EXT BIL DRG CATH: CPT | Performed by: RADIOLOGY

## 2019-05-03 PROCEDURE — 47535 CONVERSION EXT BIL DRG CATH: CPT

## 2019-05-03 PROCEDURE — 99153 MOD SED SAME PHYS/QHP EA: CPT

## 2019-05-03 PROCEDURE — 0F794ZZ DILATION OF COMMON BILE DUCT, PERCUTANEOUS ENDOSCOPIC APPROACH: ICD-10-PCS | Performed by: SURGERY

## 2019-05-03 PROCEDURE — 82948 REAGENT STRIP/BLOOD GLUCOSE: CPT

## 2019-05-03 RX ORDER — INSULIN GLARGINE 100 [IU]/ML
18 INJECTION, SOLUTION SUBCUTANEOUS
Status: DISCONTINUED | OUTPATIENT
Start: 2019-05-03 | End: 2019-05-08

## 2019-05-03 RX ORDER — MIDAZOLAM HYDROCHLORIDE 1 MG/ML
INJECTION INTRAMUSCULAR; INTRAVENOUS CODE/TRAUMA/SEDATION MEDICATION
Status: COMPLETED | OUTPATIENT
Start: 2019-05-03 | End: 2019-05-03

## 2019-05-03 RX ORDER — VANCOMYCIN HYDROCHLORIDE 500 MG/100ML
INJECTION, SOLUTION INTRAVENOUS
Status: DISCONTINUED | OUTPATIENT
Start: 2019-05-03 | End: 2019-05-06 | Stop reason: ALTCHOICE

## 2019-05-03 RX ORDER — FENTANYL CITRATE 50 UG/ML
INJECTION, SOLUTION INTRAMUSCULAR; INTRAVENOUS CODE/TRAUMA/SEDATION MEDICATION
Status: COMPLETED | OUTPATIENT
Start: 2019-05-03 | End: 2019-05-03

## 2019-05-03 RX ORDER — CIPROFLOXACIN 2 MG/ML
400 INJECTION, SOLUTION INTRAVENOUS EVERY 12 HOURS
Status: DISCONTINUED | OUTPATIENT
Start: 2019-05-03 | End: 2019-05-04

## 2019-05-03 RX ORDER — LIDOCAINE HYDROCHLORIDE 10 MG/ML
INJECTION, SOLUTION INFILTRATION; PERINEURAL CODE/TRAUMA/SEDATION MEDICATION
Status: COMPLETED | OUTPATIENT
Start: 2019-05-03 | End: 2019-05-03

## 2019-05-03 RX ORDER — VANCOMYCIN HYDROCHLORIDE 1 G/200ML
12.5 INJECTION, SOLUTION INTRAVENOUS ONCE
Status: COMPLETED | OUTPATIENT
Start: 2019-05-03 | End: 2019-05-03

## 2019-05-03 RX ADMIN — INSULIN LISPRO 10 UNITS: 100 INJECTION, SOLUTION INTRAVENOUS; SUBCUTANEOUS at 18:17

## 2019-05-03 RX ADMIN — TORSEMIDE 40 MG: 20 TABLET ORAL at 09:00

## 2019-05-03 RX ADMIN — PANTOPRAZOLE SODIUM 40 MG: 40 TABLET, DELAYED RELEASE ORAL at 05:53

## 2019-05-03 RX ADMIN — VANCOMYCIN HYDROCHLORIDE 1000 MG: 500 INJECTION, SOLUTION INTRAVENOUS at 12:14

## 2019-05-03 RX ADMIN — CIPROFLOXACIN 400 MG: 2 INJECTION, SOLUTION INTRAVENOUS at 16:00

## 2019-05-03 RX ADMIN — INSULIN LISPRO 3 UNITS: 100 INJECTION, SOLUTION INTRAVENOUS; SUBCUTANEOUS at 18:16

## 2019-05-03 RX ADMIN — ERGOCALCIFEROL 50000 UNITS: 1.25 CAPSULE ORAL at 09:04

## 2019-05-03 RX ADMIN — PANCRELIPASE 48000 UNITS: 24000; 76000; 120000 CAPSULE, DELAYED RELEASE PELLETS ORAL at 18:16

## 2019-05-03 RX ADMIN — VANCOMYCIN HYDROCHLORIDE 1000 MG: 1 INJECTION, SOLUTION INTRAVENOUS at 12:30

## 2019-05-03 RX ADMIN — IRON SUCROSE 200 MG: 20 INJECTION, SOLUTION INTRAVENOUS at 09:01

## 2019-05-03 RX ADMIN — SPIRONOLACTONE 50 MG: 25 TABLET ORAL at 09:13

## 2019-05-03 RX ADMIN — TORSEMIDE 40 MG: 20 TABLET ORAL at 15:12

## 2019-05-03 RX ADMIN — MIDAZOLAM 1 MG: 1 INJECTION INTRAMUSCULAR; INTRAVENOUS at 12:06

## 2019-05-03 RX ADMIN — LIDOCAINE HYDROCHLORIDE 1 ML: 10 INJECTION, SOLUTION INFILTRATION; PERINEURAL at 12:25

## 2019-05-03 RX ADMIN — INSULIN GLARGINE 18 UNITS: 100 INJECTION, SOLUTION SUBCUTANEOUS at 21:28

## 2019-05-03 RX ADMIN — FENTANYL CITRATE 50 MCG: 50 INJECTION, SOLUTION INTRAMUSCULAR; INTRAVENOUS at 11:49

## 2019-05-03 RX ADMIN — POTASSIUM CHLORIDE 40 MEQ: 1500 TABLET, EXTENDED RELEASE ORAL at 09:00

## 2019-05-03 RX ADMIN — FENTANYL CITRATE 50 MCG: 50 INJECTION, SOLUTION INTRAMUSCULAR; INTRAVENOUS at 12:06

## 2019-05-03 RX ADMIN — MIDAZOLAM 1 MG: 1 INJECTION INTRAMUSCULAR; INTRAVENOUS at 11:48

## 2019-05-03 RX ADMIN — IOHEXOL 15 ML: 300 INJECTION, SOLUTION INTRAVENOUS at 12:41

## 2019-05-03 RX ADMIN — DIPHENHYDRAMINE HCL 25 MG: 25 TABLET, FILM COATED ORAL at 05:53

## 2019-05-04 LAB
GLUCOSE SERPL-MCNC: 172 MG/DL (ref 65–140)
GLUCOSE SERPL-MCNC: 252 MG/DL (ref 65–140)
GLUCOSE SERPL-MCNC: 259 MG/DL (ref 65–140)
GLUCOSE SERPL-MCNC: 286 MG/DL (ref 65–140)

## 2019-05-04 PROCEDURE — 97530 THERAPEUTIC ACTIVITIES: CPT

## 2019-05-04 PROCEDURE — 82948 REAGENT STRIP/BLOOD GLUCOSE: CPT

## 2019-05-04 PROCEDURE — 97535 SELF CARE MNGMENT TRAINING: CPT

## 2019-05-04 PROCEDURE — 99232 SBSQ HOSP IP/OBS MODERATE 35: CPT | Performed by: INTERNAL MEDICINE

## 2019-05-04 RX ORDER — CLINDAMYCIN PHOSPHATE 600 MG/50ML
600 INJECTION INTRAVENOUS EVERY 8 HOURS
Status: DISCONTINUED | OUTPATIENT
Start: 2019-05-04 | End: 2019-05-05

## 2019-05-04 RX ORDER — CLINDAMYCIN PHOSPHATE 150 MG/ML
600 INJECTION, SOLUTION INTRAVENOUS EVERY 8 HOURS
Status: DISCONTINUED | OUTPATIENT
Start: 2019-05-04 | End: 2019-05-04 | Stop reason: SDUPTHER

## 2019-05-04 RX ADMIN — LIDOCAINE 1 PATCH: 50 PATCH CUTANEOUS at 08:59

## 2019-05-04 RX ADMIN — INSULIN LISPRO 1 UNITS: 100 INJECTION, SOLUTION INTRAVENOUS; SUBCUTANEOUS at 08:58

## 2019-05-04 RX ADMIN — SPIRONOLACTONE 50 MG: 25 TABLET ORAL at 08:56

## 2019-05-04 RX ADMIN — PANCRELIPASE 48000 UNITS: 24000; 76000; 120000 CAPSULE, DELAYED RELEASE PELLETS ORAL at 19:20

## 2019-05-04 RX ADMIN — OXYCODONE HYDROCHLORIDE 5 MG: 5 TABLET ORAL at 03:18

## 2019-05-04 RX ADMIN — MELATONIN TAB 3 MG 9 MG: 3 TAB at 21:31

## 2019-05-04 RX ADMIN — TORSEMIDE 40 MG: 20 TABLET ORAL at 15:15

## 2019-05-04 RX ADMIN — INSULIN GLARGINE 18 UNITS: 100 INJECTION, SOLUTION SUBCUTANEOUS at 21:32

## 2019-05-04 RX ADMIN — CLINDAMYCIN PHOSPHATE 600 MG: 600 INJECTION, SOLUTION INTRAVENOUS at 23:06

## 2019-05-04 RX ADMIN — TORSEMIDE 40 MG: 20 TABLET ORAL at 08:57

## 2019-05-04 RX ADMIN — INSULIN LISPRO 10 UNITS: 100 INJECTION, SOLUTION INTRAVENOUS; SUBCUTANEOUS at 08:59

## 2019-05-04 RX ADMIN — PANTOPRAZOLE SODIUM 40 MG: 40 TABLET, DELAYED RELEASE ORAL at 06:40

## 2019-05-04 RX ADMIN — CIPROFLOXACIN 400 MG: 2 INJECTION, SOLUTION INTRAVENOUS at 03:02

## 2019-05-04 RX ADMIN — POTASSIUM CHLORIDE 40 MEQ: 1500 TABLET, EXTENDED RELEASE ORAL at 08:57

## 2019-05-04 RX ADMIN — CLINDAMYCIN PHOSPHATE 600 MG: 600 INJECTION, SOLUTION INTRAVENOUS at 15:14

## 2019-05-04 RX ADMIN — OXYCODONE HYDROCHLORIDE 5 MG: 5 TABLET ORAL at 21:32

## 2019-05-04 RX ADMIN — INSULIN LISPRO 4 UNITS: 100 INJECTION, SOLUTION INTRAVENOUS; SUBCUTANEOUS at 19:21

## 2019-05-04 RX ADMIN — INSULIN LISPRO 3 UNITS: 100 INJECTION, SOLUTION INTRAVENOUS; SUBCUTANEOUS at 13:44

## 2019-05-04 RX ADMIN — PANCRELIPASE 48000 UNITS: 24000; 76000; 120000 CAPSULE, DELAYED RELEASE PELLETS ORAL at 13:44

## 2019-05-04 RX ADMIN — INSULIN LISPRO 10 UNITS: 100 INJECTION, SOLUTION INTRAVENOUS; SUBCUTANEOUS at 19:20

## 2019-05-04 RX ADMIN — ENOXAPARIN SODIUM 40 MG: 40 INJECTION SUBCUTANEOUS at 08:58

## 2019-05-04 RX ADMIN — INSULIN LISPRO 10 UNITS: 100 INJECTION, SOLUTION INTRAVENOUS; SUBCUTANEOUS at 13:45

## 2019-05-04 RX ADMIN — DIPHENHYDRAMINE HCL 25 MG: 25 TABLET, FILM COATED ORAL at 21:31

## 2019-05-04 RX ADMIN — PANCRELIPASE 48000 UNITS: 24000; 76000; 120000 CAPSULE, DELAYED RELEASE PELLETS ORAL at 08:57

## 2019-05-05 LAB
ALBUMIN SERPL BCP-MCNC: 1.8 G/DL (ref 3.5–5)
ALP SERPL-CCNC: 511 U/L (ref 46–116)
ALT SERPL W P-5'-P-CCNC: 69 U/L (ref 12–78)
ANION GAP SERPL CALCULATED.3IONS-SCNC: 6 MMOL/L (ref 4–13)
ANISOCYTOSIS BLD QL SMEAR: PRESENT
AST SERPL W P-5'-P-CCNC: 75 U/L (ref 5–45)
BACTERIA SPEC BFLD CULT: ABNORMAL
BASOPHILS # BLD MANUAL: 0 THOUSAND/UL (ref 0–0.1)
BASOPHILS NFR MAR MANUAL: 0 % (ref 0–1)
BILIRUB SERPL-MCNC: 10.25 MG/DL (ref 0.2–1)
BUN SERPL-MCNC: 45 MG/DL (ref 5–25)
CALCIUM SERPL-MCNC: 8.5 MG/DL (ref 8.3–10.1)
CHLORIDE SERPL-SCNC: 96 MMOL/L (ref 100–108)
CO2 SERPL-SCNC: 29 MMOL/L (ref 21–32)
CREAT SERPL-MCNC: 1.37 MG/DL (ref 0.6–1.3)
EOSINOPHIL # BLD MANUAL: 0 THOUSAND/UL (ref 0–0.4)
EOSINOPHIL NFR BLD MANUAL: 0 % (ref 0–6)
ERYTHROCYTE [DISTWIDTH] IN BLOOD BY AUTOMATED COUNT: 22.5 % (ref 11.6–15.1)
GFR SERPL CREATININE-BSD FRML MDRD: 38 ML/MIN/1.73SQ M
GLUCOSE SERPL-MCNC: 258 MG/DL (ref 65–140)
GLUCOSE SERPL-MCNC: 322 MG/DL (ref 65–140)
GLUCOSE SERPL-MCNC: 69 MG/DL (ref 65–140)
GLUCOSE SERPL-MCNC: 82 MG/DL (ref 65–140)
GLUCOSE SERPL-MCNC: 86 MG/DL (ref 65–140)
GRAM STN SPEC: ABNORMAL
GRAM STN SPEC: ABNORMAL
HCT VFR BLD AUTO: 25.3 % (ref 34.8–46.1)
HGB BLD-MCNC: 8.2 G/DL (ref 11.5–15.4)
LYMPHOCYTES # BLD AUTO: 1.1 THOUSAND/UL (ref 0.6–4.47)
LYMPHOCYTES # BLD AUTO: 8 % (ref 14–44)
MCH RBC QN AUTO: 30 PG (ref 26.8–34.3)
MCHC RBC AUTO-ENTMCNC: 32.4 G/DL (ref 31.4–37.4)
MCV RBC AUTO: 93 FL (ref 82–98)
MONOCYTES # BLD AUTO: 1.23 THOUSAND/UL (ref 0–1.22)
MONOCYTES NFR BLD: 9 % (ref 4–12)
NEUTROPHILS # BLD MANUAL: 11.39 THOUSAND/UL (ref 1.85–7.62)
NEUTS SEG NFR BLD AUTO: 83 % (ref 43–75)
NRBC BLD AUTO-RTO: 0 /100 WBCS
OVALOCYTES BLD QL SMEAR: PRESENT
PLATELET # BLD AUTO: 327 THOUSANDS/UL (ref 149–390)
PLATELET BLD QL SMEAR: ADEQUATE
PMV BLD AUTO: 11.5 FL (ref 8.9–12.7)
POTASSIUM SERPL-SCNC: 4.1 MMOL/L (ref 3.5–5.3)
PROT SERPL-MCNC: 5.3 G/DL (ref 6.4–8.2)
RBC # BLD AUTO: 2.73 MILLION/UL (ref 3.81–5.12)
SODIUM SERPL-SCNC: 131 MMOL/L (ref 136–145)
TARGETS BLD QL SMEAR: PRESENT
TOTAL CELLS COUNTED SPEC: 100
WBC # BLD AUTO: 13.72 THOUSAND/UL (ref 4.31–10.16)

## 2019-05-05 PROCEDURE — 82948 REAGENT STRIP/BLOOD GLUCOSE: CPT

## 2019-05-05 PROCEDURE — 99233 SBSQ HOSP IP/OBS HIGH 50: CPT | Performed by: INTERNAL MEDICINE

## 2019-05-05 PROCEDURE — 85027 COMPLETE CBC AUTOMATED: CPT | Performed by: INTERNAL MEDICINE

## 2019-05-05 PROCEDURE — 80053 COMPREHEN METABOLIC PANEL: CPT | Performed by: INTERNAL MEDICINE

## 2019-05-05 PROCEDURE — NC001 PR NO CHARGE: Performed by: INTERNAL MEDICINE

## 2019-05-05 PROCEDURE — 85007 BL SMEAR W/DIFF WBC COUNT: CPT | Performed by: INTERNAL MEDICINE

## 2019-05-05 PROCEDURE — 99232 SBSQ HOSP IP/OBS MODERATE 35: CPT | Performed by: INTERNAL MEDICINE

## 2019-05-05 RX ORDER — VANCOMYCIN HYDROCHLORIDE 1 G/200ML
15 INJECTION, SOLUTION INTRAVENOUS ONCE
Status: COMPLETED | OUTPATIENT
Start: 2019-05-05 | End: 2019-05-06

## 2019-05-05 RX ORDER — VANCOMYCIN HYDROCHLORIDE 1 G/200ML
15 INJECTION, SOLUTION INTRAVENOUS EVERY 24 HOURS
Status: DISCONTINUED | OUTPATIENT
Start: 2019-05-06 | End: 2019-05-06

## 2019-05-05 RX ADMIN — SPIRONOLACTONE 50 MG: 25 TABLET ORAL at 08:16

## 2019-05-05 RX ADMIN — DIPHENHYDRAMINE HCL 25 MG: 25 TABLET, FILM COATED ORAL at 20:31

## 2019-05-05 RX ADMIN — PANTOPRAZOLE SODIUM 40 MG: 40 TABLET, DELAYED RELEASE ORAL at 06:01

## 2019-05-05 RX ADMIN — PANCRELIPASE 48000 UNITS: 24000; 76000; 120000 CAPSULE, DELAYED RELEASE PELLETS ORAL at 16:14

## 2019-05-05 RX ADMIN — INSULIN GLARGINE 18 UNITS: 100 INJECTION, SOLUTION SUBCUTANEOUS at 21:07

## 2019-05-05 RX ADMIN — VANCOMYCIN HYDROCHLORIDE 1000 MG: 1 INJECTION, SOLUTION INTRAVENOUS at 11:41

## 2019-05-05 RX ADMIN — CLINDAMYCIN PHOSPHATE 600 MG: 600 INJECTION, SOLUTION INTRAVENOUS at 06:01

## 2019-05-05 RX ADMIN — TORSEMIDE 40 MG: 20 TABLET ORAL at 08:16

## 2019-05-05 RX ADMIN — TORSEMIDE 40 MG: 20 TABLET ORAL at 12:55

## 2019-05-05 RX ADMIN — ENOXAPARIN SODIUM 40 MG: 40 INJECTION SUBCUTANEOUS at 08:16

## 2019-05-05 RX ADMIN — OXYCODONE HYDROCHLORIDE 5 MG: 5 TABLET ORAL at 20:31

## 2019-05-05 RX ADMIN — PANCRELIPASE 48000 UNITS: 24000; 76000; 120000 CAPSULE, DELAYED RELEASE PELLETS ORAL at 08:16

## 2019-05-05 RX ADMIN — MELATONIN TAB 3 MG 9 MG: 3 TAB at 20:31

## 2019-05-05 RX ADMIN — INSULIN LISPRO 5 UNITS: 100 INJECTION, SOLUTION INTRAVENOUS; SUBCUTANEOUS at 17:43

## 2019-05-05 RX ADMIN — POTASSIUM CHLORIDE 40 MEQ: 1500 TABLET, EXTENDED RELEASE ORAL at 08:16

## 2019-05-05 RX ADMIN — INSULIN LISPRO 10 UNITS: 100 INJECTION, SOLUTION INTRAVENOUS; SUBCUTANEOUS at 17:44

## 2019-05-05 RX ADMIN — PANCRELIPASE 48000 UNITS: 24000; 76000; 120000 CAPSULE, DELAYED RELEASE PELLETS ORAL at 12:54

## 2019-05-05 RX ADMIN — INSULIN LISPRO 10 UNITS: 100 INJECTION, SOLUTION INTRAVENOUS; SUBCUTANEOUS at 09:15

## 2019-05-05 RX ADMIN — LIDOCAINE 1 PATCH: 50 PATCH CUTANEOUS at 08:17

## 2019-05-05 RX ADMIN — INSULIN LISPRO 10 UNITS: 100 INJECTION, SOLUTION INTRAVENOUS; SUBCUTANEOUS at 12:55

## 2019-05-06 ENCOUNTER — APPOINTMENT (INPATIENT)
Dept: ULTRASOUND IMAGING | Facility: HOSPITAL | Age: 74
DRG: 469 | End: 2019-05-06
Payer: COMMERCIAL

## 2019-05-06 LAB
ANION GAP SERPL CALCULATED.3IONS-SCNC: 6 MMOL/L (ref 4–13)
BUN SERPL-MCNC: 48 MG/DL (ref 5–25)
CALCIUM SERPL-MCNC: 8.3 MG/DL (ref 8.3–10.1)
CHLORIDE SERPL-SCNC: 96 MMOL/L (ref 100–108)
CO2 SERPL-SCNC: 30 MMOL/L (ref 21–32)
CREAT SERPL-MCNC: 1.51 MG/DL (ref 0.6–1.3)
GFR SERPL CREATININE-BSD FRML MDRD: 34 ML/MIN/1.73SQ M
GLUCOSE SERPL-MCNC: 153 MG/DL (ref 65–140)
GLUCOSE SERPL-MCNC: 156 MG/DL (ref 65–140)
GLUCOSE SERPL-MCNC: 163 MG/DL (ref 65–140)
GLUCOSE SERPL-MCNC: 168 MG/DL (ref 65–140)
GLUCOSE SERPL-MCNC: 223 MG/DL (ref 65–140)
POTASSIUM SERPL-SCNC: 4.2 MMOL/L (ref 3.5–5.3)
SODIUM SERPL-SCNC: 132 MMOL/L (ref 136–145)

## 2019-05-06 PROCEDURE — 80048 BASIC METABOLIC PNL TOTAL CA: CPT | Performed by: INTERNAL MEDICINE

## 2019-05-06 PROCEDURE — 97116 GAIT TRAINING THERAPY: CPT

## 2019-05-06 PROCEDURE — 82948 REAGENT STRIP/BLOOD GLUCOSE: CPT

## 2019-05-06 PROCEDURE — 99232 SBSQ HOSP IP/OBS MODERATE 35: CPT | Performed by: INTERNAL MEDICINE

## 2019-05-06 PROCEDURE — 97530 THERAPEUTIC ACTIVITIES: CPT

## 2019-05-06 PROCEDURE — 76705 ECHO EXAM OF ABDOMEN: CPT

## 2019-05-06 PROCEDURE — 97535 SELF CARE MNGMENT TRAINING: CPT

## 2019-05-06 RX ORDER — HEPARIN SODIUM 5000 [USP'U]/ML
5000 INJECTION, SOLUTION INTRAVENOUS; SUBCUTANEOUS EVERY 8 HOURS SCHEDULED
Status: DISCONTINUED | OUTPATIENT
Start: 2019-05-07 | End: 2019-05-10

## 2019-05-06 RX ORDER — ALBUMIN (HUMAN) 12.5 G/50ML
25 SOLUTION INTRAVENOUS EVERY 6 HOURS
Status: DISCONTINUED | OUTPATIENT
Start: 2019-05-06 | End: 2019-05-12

## 2019-05-06 RX ADMIN — PANTOPRAZOLE SODIUM 40 MG: 40 TABLET, DELAYED RELEASE ORAL at 05:35

## 2019-05-06 RX ADMIN — INSULIN LISPRO 10 UNITS: 100 INJECTION, SOLUTION INTRAVENOUS; SUBCUTANEOUS at 08:51

## 2019-05-06 RX ADMIN — ALBUMIN (HUMAN) 25 G: 5 SOLUTION INTRAVENOUS at 17:19

## 2019-05-06 RX ADMIN — INSULIN GLARGINE 18 UNITS: 100 INJECTION, SOLUTION SUBCUTANEOUS at 21:26

## 2019-05-06 RX ADMIN — VANCOMYCIN HYDROCHLORIDE 1000 MG: 1 INJECTION, SOLUTION INTRAVENOUS at 12:00

## 2019-05-06 RX ADMIN — PANCRELIPASE 48000 UNITS: 24000; 76000; 120000 CAPSULE, DELAYED RELEASE PELLETS ORAL at 14:04

## 2019-05-06 RX ADMIN — INSULIN LISPRO 10 UNITS: 100 INJECTION, SOLUTION INTRAVENOUS; SUBCUTANEOUS at 14:05

## 2019-05-06 RX ADMIN — DIPHENHYDRAMINE HCL 25 MG: 25 TABLET, FILM COATED ORAL at 03:26

## 2019-05-06 RX ADMIN — OXYCODONE HYDROCHLORIDE 5 MG: 5 TABLET ORAL at 22:39

## 2019-05-06 RX ADMIN — LIDOCAINE 1 PATCH: 50 PATCH CUTANEOUS at 10:16

## 2019-05-06 RX ADMIN — SPIRONOLACTONE 50 MG: 25 TABLET ORAL at 08:50

## 2019-05-06 RX ADMIN — MELATONIN TAB 3 MG 9 MG: 3 TAB at 22:40

## 2019-05-06 RX ADMIN — INSULIN LISPRO 2 UNITS: 100 INJECTION, SOLUTION INTRAVENOUS; SUBCUTANEOUS at 14:04

## 2019-05-06 RX ADMIN — ERGOCALCIFEROL 50000 UNITS: 1.25 CAPSULE ORAL at 10:15

## 2019-05-06 RX ADMIN — ALBUMIN (HUMAN) 25 G: 5 SOLUTION INTRAVENOUS at 22:27

## 2019-05-06 RX ADMIN — PANCRELIPASE 48000 UNITS: 24000; 76000; 120000 CAPSULE, DELAYED RELEASE PELLETS ORAL at 08:50

## 2019-05-06 RX ADMIN — TORSEMIDE 40 MG: 20 TABLET ORAL at 08:50

## 2019-05-06 RX ADMIN — ALBUMIN (HUMAN) 25 G: 5 SOLUTION INTRAVENOUS at 12:31

## 2019-05-06 RX ADMIN — ENOXAPARIN SODIUM 40 MG: 40 INJECTION SUBCUTANEOUS at 08:50

## 2019-05-06 RX ADMIN — POTASSIUM CHLORIDE 40 MEQ: 1500 TABLET, EXTENDED RELEASE ORAL at 08:50

## 2019-05-06 RX ADMIN — DIPHENHYDRAMINE HCL 25 MG: 25 TABLET, FILM COATED ORAL at 22:40

## 2019-05-06 RX ADMIN — AMPICILLIN SODIUM 2000 MG: 2 INJECTION, POWDER, FOR SOLUTION INTRAMUSCULAR; INTRAVENOUS at 14:49

## 2019-05-06 RX ADMIN — AMPICILLIN SODIUM 2000 MG: 2 INJECTION, POWDER, FOR SOLUTION INTRAMUSCULAR; INTRAVENOUS at 21:27

## 2019-05-06 RX ADMIN — INSULIN LISPRO 1 UNITS: 100 INJECTION, SOLUTION INTRAVENOUS; SUBCUTANEOUS at 08:51

## 2019-05-07 ENCOUNTER — APPOINTMENT (INPATIENT)
Dept: NON INVASIVE DIAGNOSTICS | Facility: HOSPITAL | Age: 74
DRG: 469 | End: 2019-05-07
Payer: COMMERCIAL

## 2019-05-07 LAB
ALBUMIN SERPL BCP-MCNC: 2.4 G/DL (ref 3.5–5)
ALP SERPL-CCNC: 447 U/L (ref 46–116)
ALT SERPL W P-5'-P-CCNC: 49 U/L (ref 12–78)
ANION GAP SERPL CALCULATED.3IONS-SCNC: 9 MMOL/L (ref 4–13)
AST SERPL W P-5'-P-CCNC: 58 U/L (ref 5–45)
BILIRUB SERPL-MCNC: 8.26 MG/DL (ref 0.2–1)
BUN SERPL-MCNC: 48 MG/DL (ref 5–25)
CALCIUM SERPL-MCNC: 8.5 MG/DL (ref 8.3–10.1)
CHLORIDE SERPL-SCNC: 96 MMOL/L (ref 100–108)
CO2 SERPL-SCNC: 27 MMOL/L (ref 21–32)
CREAT SERPL-MCNC: 1.33 MG/DL (ref 0.6–1.3)
ERYTHROCYTE [DISTWIDTH] IN BLOOD BY AUTOMATED COUNT: 23.9 % (ref 11.6–15.1)
GFR SERPL CREATININE-BSD FRML MDRD: 39 ML/MIN/1.73SQ M
GLUCOSE SERPL-MCNC: 129 MG/DL (ref 65–140)
GLUCOSE SERPL-MCNC: 141 MG/DL (ref 65–140)
GLUCOSE SERPL-MCNC: 148 MG/DL (ref 65–140)
GLUCOSE SERPL-MCNC: 163 MG/DL (ref 65–140)
GLUCOSE SERPL-MCNC: 165 MG/DL (ref 65–140)
HCT VFR BLD AUTO: 21 % (ref 34.8–46.1)
HCT VFR BLD AUTO: 31.2 % (ref 34.8–46.1)
HGB BLD-MCNC: 10 G/DL (ref 11.5–15.4)
HGB BLD-MCNC: 6.8 G/DL (ref 11.5–15.4)
MCH RBC QN AUTO: 30.2 PG (ref 26.8–34.3)
MCHC RBC AUTO-ENTMCNC: 32.4 G/DL (ref 31.4–37.4)
MCV RBC AUTO: 93 FL (ref 82–98)
PLATELET # BLD AUTO: 264 THOUSANDS/UL (ref 149–390)
PMV BLD AUTO: 11.6 FL (ref 8.9–12.7)
POTASSIUM SERPL-SCNC: 4.1 MMOL/L (ref 3.5–5.3)
PROT SERPL-MCNC: 5 G/DL (ref 6.4–8.2)
RBC # BLD AUTO: 2.25 MILLION/UL (ref 3.81–5.12)
SODIUM SERPL-SCNC: 132 MMOL/L (ref 136–145)
WBC # BLD AUTO: 9.93 THOUSAND/UL (ref 4.31–10.16)

## 2019-05-07 PROCEDURE — 99233 SBSQ HOSP IP/OBS HIGH 50: CPT | Performed by: INTERNAL MEDICINE

## 2019-05-07 PROCEDURE — 80053 COMPREHEN METABOLIC PANEL: CPT | Performed by: INTERNAL MEDICINE

## 2019-05-07 PROCEDURE — 85027 COMPLETE CBC AUTOMATED: CPT | Performed by: INTERNAL MEDICINE

## 2019-05-07 PROCEDURE — 93306 TTE W/DOPPLER COMPLETE: CPT | Performed by: INTERNAL MEDICINE

## 2019-05-07 PROCEDURE — 87040 BLOOD CULTURE FOR BACTERIA: CPT | Performed by: INTERNAL MEDICINE

## 2019-05-07 PROCEDURE — 85018 HEMOGLOBIN: CPT | Performed by: INTERNAL MEDICINE

## 2019-05-07 PROCEDURE — 85014 HEMATOCRIT: CPT | Performed by: INTERNAL MEDICINE

## 2019-05-07 PROCEDURE — 93306 TTE W/DOPPLER COMPLETE: CPT

## 2019-05-07 PROCEDURE — 82948 REAGENT STRIP/BLOOD GLUCOSE: CPT

## 2019-05-07 RX ORDER — SPIRONOLACTONE 25 MG/1
25 TABLET ORAL DAILY
Status: DISCONTINUED | OUTPATIENT
Start: 2019-05-07 | End: 2019-05-08

## 2019-05-07 RX ADMIN — HEPARIN SODIUM 5000 UNITS: 5000 INJECTION INTRAVENOUS; SUBCUTANEOUS at 14:00

## 2019-05-07 RX ADMIN — INSULIN LISPRO 1 UNITS: 100 INJECTION, SOLUTION INTRAVENOUS; SUBCUTANEOUS at 13:50

## 2019-05-07 RX ADMIN — PANCRELIPASE 48000 UNITS: 24000; 76000; 120000 CAPSULE, DELAYED RELEASE PELLETS ORAL at 18:36

## 2019-05-07 RX ADMIN — AMPICILLIN SODIUM 2000 MG: 2 INJECTION, POWDER, FOR SOLUTION INTRAMUSCULAR; INTRAVENOUS at 21:25

## 2019-05-07 RX ADMIN — AMPICILLIN SODIUM 2000 MG: 2 INJECTION, POWDER, FOR SOLUTION INTRAMUSCULAR; INTRAVENOUS at 14:02

## 2019-05-07 RX ADMIN — MELATONIN TAB 3 MG 9 MG: 3 TAB at 22:06

## 2019-05-07 RX ADMIN — SPIRONOLACTONE 25 MG: 25 TABLET ORAL at 08:48

## 2019-05-07 RX ADMIN — PANCRELIPASE 48000 UNITS: 24000; 76000; 120000 CAPSULE, DELAYED RELEASE PELLETS ORAL at 13:50

## 2019-05-07 RX ADMIN — INSULIN LISPRO 10 UNITS: 100 INJECTION, SOLUTION INTRAVENOUS; SUBCUTANEOUS at 09:33

## 2019-05-07 RX ADMIN — ALBUMIN (HUMAN) 25 G: 5 SOLUTION INTRAVENOUS at 05:30

## 2019-05-07 RX ADMIN — AMPICILLIN SODIUM 2000 MG: 2 INJECTION, POWDER, FOR SOLUTION INTRAMUSCULAR; INTRAVENOUS at 06:03

## 2019-05-07 RX ADMIN — ALBUMIN (HUMAN) 25 G: 5 SOLUTION INTRAVENOUS at 11:58

## 2019-05-07 RX ADMIN — LIDOCAINE 1 PATCH: 50 PATCH CUTANEOUS at 09:33

## 2019-05-07 RX ADMIN — HEPARIN SODIUM 5000 UNITS: 5000 INJECTION INTRAVENOUS; SUBCUTANEOUS at 05:30

## 2019-05-07 RX ADMIN — HEPARIN SODIUM 5000 UNITS: 5000 INJECTION INTRAVENOUS; SUBCUTANEOUS at 21:25

## 2019-05-07 RX ADMIN — PANCRELIPASE 48000 UNITS: 24000; 76000; 120000 CAPSULE, DELAYED RELEASE PELLETS ORAL at 08:48

## 2019-05-07 RX ADMIN — ALBUMIN (HUMAN) 25 G: 5 SOLUTION INTRAVENOUS at 17:01

## 2019-05-07 RX ADMIN — PANTOPRAZOLE SODIUM 40 MG: 40 TABLET, DELAYED RELEASE ORAL at 06:03

## 2019-05-07 RX ADMIN — INSULIN LISPRO 10 UNITS: 100 INJECTION, SOLUTION INTRAVENOUS; SUBCUTANEOUS at 18:36

## 2019-05-07 RX ADMIN — DIPHENHYDRAMINE HCL 25 MG: 25 TABLET, FILM COATED ORAL at 05:30

## 2019-05-07 RX ADMIN — INSULIN GLARGINE 18 UNITS: 100 INJECTION, SOLUTION SUBCUTANEOUS at 21:25

## 2019-05-07 RX ADMIN — INSULIN LISPRO 10 UNITS: 100 INJECTION, SOLUTION INTRAVENOUS; SUBCUTANEOUS at 13:51

## 2019-05-08 ENCOUNTER — APPOINTMENT (INPATIENT)
Dept: CT IMAGING | Facility: HOSPITAL | Age: 74
DRG: 469 | End: 2019-05-08
Payer: COMMERCIAL

## 2019-05-08 LAB
ALBUMIN SERPL BCP-MCNC: 3.4 G/DL (ref 3.5–5)
ALP SERPL-CCNC: 353 U/L (ref 46–116)
ALT SERPL W P-5'-P-CCNC: 41 U/L (ref 12–78)
ANION GAP SERPL CALCULATED.3IONS-SCNC: 10 MMOL/L (ref 4–13)
AST SERPL W P-5'-P-CCNC: 43 U/L (ref 5–45)
BILIRUB SERPL-MCNC: 8.09 MG/DL (ref 0.2–1)
BUN SERPL-MCNC: 50 MG/DL (ref 5–25)
CALCIUM SERPL-MCNC: 9 MG/DL (ref 8.3–10.1)
CHLORIDE SERPL-SCNC: 97 MMOL/L (ref 100–108)
CO2 SERPL-SCNC: 26 MMOL/L (ref 21–32)
CREAT SERPL-MCNC: 1.38 MG/DL (ref 0.6–1.3)
ERYTHROCYTE [DISTWIDTH] IN BLOOD BY AUTOMATED COUNT: 23.9 % (ref 11.6–15.1)
GFR SERPL CREATININE-BSD FRML MDRD: 37 ML/MIN/1.73SQ M
GLUCOSE SERPL-MCNC: 129 MG/DL (ref 65–140)
GLUCOSE SERPL-MCNC: 160 MG/DL (ref 65–140)
GLUCOSE SERPL-MCNC: 177 MG/DL (ref 65–140)
GLUCOSE SERPL-MCNC: 185 MG/DL (ref 65–140)
GLUCOSE SERPL-MCNC: 197 MG/DL (ref 65–140)
HCT VFR BLD AUTO: 22.7 % (ref 34.8–46.1)
HGB BLD-MCNC: 7.2 G/DL (ref 11.5–15.4)
MCH RBC QN AUTO: 30.5 PG (ref 26.8–34.3)
MCHC RBC AUTO-ENTMCNC: 31.7 G/DL (ref 31.4–37.4)
MCV RBC AUTO: 96 FL (ref 82–98)
PLATELET # BLD AUTO: 204 THOUSANDS/UL (ref 149–390)
PMV BLD AUTO: 11.8 FL (ref 8.9–12.7)
POTASSIUM SERPL-SCNC: 4.2 MMOL/L (ref 3.5–5.3)
PROT SERPL-MCNC: 5.9 G/DL (ref 6.4–8.2)
RBC # BLD AUTO: 2.36 MILLION/UL (ref 3.81–5.12)
SODIUM SERPL-SCNC: 133 MMOL/L (ref 136–145)
WBC # BLD AUTO: 8.45 THOUSAND/UL (ref 4.31–10.16)

## 2019-05-08 PROCEDURE — 97535 SELF CARE MNGMENT TRAINING: CPT

## 2019-05-08 PROCEDURE — 97116 GAIT TRAINING THERAPY: CPT

## 2019-05-08 PROCEDURE — 99232 SBSQ HOSP IP/OBS MODERATE 35: CPT | Performed by: INTERNAL MEDICINE

## 2019-05-08 PROCEDURE — NC001 PR NO CHARGE: Performed by: RADIOLOGY

## 2019-05-08 PROCEDURE — 74176 CT ABD & PELVIS W/O CONTRAST: CPT

## 2019-05-08 PROCEDURE — 97110 THERAPEUTIC EXERCISES: CPT

## 2019-05-08 PROCEDURE — 85027 COMPLETE CBC AUTOMATED: CPT | Performed by: INTERNAL MEDICINE

## 2019-05-08 PROCEDURE — 97530 THERAPEUTIC ACTIVITIES: CPT

## 2019-05-08 PROCEDURE — 82948 REAGENT STRIP/BLOOD GLUCOSE: CPT

## 2019-05-08 PROCEDURE — 99233 SBSQ HOSP IP/OBS HIGH 50: CPT | Performed by: INTERNAL MEDICINE

## 2019-05-08 PROCEDURE — 80053 COMPREHEN METABOLIC PANEL: CPT | Performed by: INTERNAL MEDICINE

## 2019-05-08 RX ORDER — INSULIN GLARGINE 100 [IU]/ML
10 INJECTION, SOLUTION SUBCUTANEOUS
Status: DISCONTINUED | OUTPATIENT
Start: 2019-05-08 | End: 2019-05-09

## 2019-05-08 RX ADMIN — INSULIN LISPRO 10 UNITS: 100 INJECTION, SOLUTION INTRAVENOUS; SUBCUTANEOUS at 14:03

## 2019-05-08 RX ADMIN — ALBUMIN (HUMAN) 25 G: 5 SOLUTION INTRAVENOUS at 04:15

## 2019-05-08 RX ADMIN — HEPARIN SODIUM 5000 UNITS: 5000 INJECTION INTRAVENOUS; SUBCUTANEOUS at 06:17

## 2019-05-08 RX ADMIN — SPIRONOLACTONE 25 MG: 25 TABLET ORAL at 08:53

## 2019-05-08 RX ADMIN — AMPICILLIN SODIUM 2000 MG: 2 INJECTION, POWDER, FOR SOLUTION INTRAMUSCULAR; INTRAVENOUS at 21:43

## 2019-05-08 RX ADMIN — MELATONIN TAB 3 MG 9 MG: 3 TAB at 21:51

## 2019-05-08 RX ADMIN — AMPICILLIN SODIUM 2000 MG: 2 INJECTION, POWDER, FOR SOLUTION INTRAMUSCULAR; INTRAVENOUS at 14:08

## 2019-05-08 RX ADMIN — HEPARIN SODIUM 5000 UNITS: 5000 INJECTION INTRAVENOUS; SUBCUTANEOUS at 14:03

## 2019-05-08 RX ADMIN — INSULIN LISPRO 10 UNITS: 100 INJECTION, SOLUTION INTRAVENOUS; SUBCUTANEOUS at 08:50

## 2019-05-08 RX ADMIN — PANCRELIPASE 48000 UNITS: 24000; 76000; 120000 CAPSULE, DELAYED RELEASE PELLETS ORAL at 14:04

## 2019-05-08 RX ADMIN — INSULIN GLARGINE 10 UNITS: 100 INJECTION, SOLUTION SUBCUTANEOUS at 21:44

## 2019-05-08 RX ADMIN — IOHEXOL 50 ML: 240 INJECTION, SOLUTION INTRATHECAL; INTRAVASCULAR; INTRAVENOUS; ORAL at 11:00

## 2019-05-08 RX ADMIN — ERGOCALCIFEROL 50000 UNITS: 1.25 CAPSULE ORAL at 10:36

## 2019-05-08 RX ADMIN — ALBUMIN (HUMAN) 25 G: 5 SOLUTION INTRAVENOUS at 16:15

## 2019-05-08 RX ADMIN — PANCRELIPASE 48000 UNITS: 24000; 76000; 120000 CAPSULE, DELAYED RELEASE PELLETS ORAL at 19:16

## 2019-05-08 RX ADMIN — PANCRELIPASE 48000 UNITS: 24000; 76000; 120000 CAPSULE, DELAYED RELEASE PELLETS ORAL at 08:53

## 2019-05-08 RX ADMIN — INSULIN LISPRO 1 UNITS: 100 INJECTION, SOLUTION INTRAVENOUS; SUBCUTANEOUS at 14:03

## 2019-05-08 RX ADMIN — INSULIN LISPRO 10 UNITS: 100 INJECTION, SOLUTION INTRAVENOUS; SUBCUTANEOUS at 19:16

## 2019-05-08 RX ADMIN — INSULIN LISPRO 1 UNITS: 100 INJECTION, SOLUTION INTRAVENOUS; SUBCUTANEOUS at 08:50

## 2019-05-08 RX ADMIN — ALBUMIN (HUMAN) 25 G: 5 SOLUTION INTRAVENOUS at 00:42

## 2019-05-08 RX ADMIN — AMPICILLIN SODIUM 2000 MG: 2 INJECTION, POWDER, FOR SOLUTION INTRAMUSCULAR; INTRAVENOUS at 06:17

## 2019-05-08 RX ADMIN — HEPARIN SODIUM 5000 UNITS: 5000 INJECTION INTRAVENOUS; SUBCUTANEOUS at 21:43

## 2019-05-08 RX ADMIN — ALBUMIN (HUMAN) 25 G: 5 SOLUTION INTRAVENOUS at 10:39

## 2019-05-08 RX ADMIN — PANTOPRAZOLE SODIUM 40 MG: 40 TABLET, DELAYED RELEASE ORAL at 06:17

## 2019-05-09 ENCOUNTER — APPOINTMENT (INPATIENT)
Dept: RADIOLOGY | Facility: HOSPITAL | Age: 74
DRG: 469 | End: 2019-05-09
Attending: INTERNAL MEDICINE
Payer: COMMERCIAL

## 2019-05-09 ENCOUNTER — ANESTHESIA (INPATIENT)
Dept: RADIOLOGY | Facility: HOSPITAL | Age: 74
DRG: 469 | End: 2019-05-09
Payer: COMMERCIAL

## 2019-05-09 ENCOUNTER — ANESTHESIA EVENT (INPATIENT)
Dept: RADIOLOGY | Facility: HOSPITAL | Age: 74
DRG: 469 | End: 2019-05-09
Payer: COMMERCIAL

## 2019-05-09 LAB
ABO GROUP BLD: NORMAL
ALBUMIN SERPL BCP-MCNC: 3.6 G/DL (ref 3.5–5)
ALP SERPL-CCNC: 267 U/L (ref 46–116)
ALT SERPL W P-5'-P-CCNC: 38 U/L (ref 12–78)
ANION GAP SERPL CALCULATED.3IONS-SCNC: 9 MMOL/L (ref 4–13)
AST SERPL W P-5'-P-CCNC: 37 U/L (ref 5–45)
BILIRUB SERPL-MCNC: 7.58 MG/DL (ref 0.2–1)
BLD GP AB SCN SERPL QL: NEGATIVE
BUN SERPL-MCNC: 48 MG/DL (ref 5–25)
CALCIUM SERPL-MCNC: 8.9 MG/DL (ref 8.3–10.1)
CHLORIDE SERPL-SCNC: 99 MMOL/L (ref 100–108)
CO2 SERPL-SCNC: 25 MMOL/L (ref 21–32)
CREAT SERPL-MCNC: 1.3 MG/DL (ref 0.6–1.3)
ERYTHROCYTE [DISTWIDTH] IN BLOOD BY AUTOMATED COUNT: 23.9 % (ref 11.6–15.1)
GFR SERPL CREATININE-BSD FRML MDRD: 40 ML/MIN/1.73SQ M
GLUCOSE SERPL-MCNC: 146 MG/DL (ref 65–140)
GLUCOSE SERPL-MCNC: 163 MG/DL (ref 65–140)
GLUCOSE SERPL-MCNC: 211 MG/DL (ref 65–140)
GLUCOSE SERPL-MCNC: 224 MG/DL (ref 65–140)
GLUCOSE SERPL-MCNC: 225 MG/DL (ref 65–140)
HCT VFR BLD AUTO: 19.9 % (ref 34.8–46.1)
HCT VFR BLD AUTO: 20.5 % (ref 34.8–46.1)
HGB BLD-MCNC: 6.4 G/DL (ref 11.5–15.4)
HGB BLD-MCNC: 6.5 G/DL (ref 11.5–15.4)
MCH RBC QN AUTO: 30.8 PG (ref 26.8–34.3)
MCHC RBC AUTO-ENTMCNC: 31.2 G/DL (ref 31.4–37.4)
MCV RBC AUTO: 99 FL (ref 82–98)
PLATELET # BLD AUTO: 221 THOUSANDS/UL (ref 149–390)
PMV BLD AUTO: 11.5 FL (ref 8.9–12.7)
POTASSIUM SERPL-SCNC: 4.3 MMOL/L (ref 3.5–5.3)
PROT SERPL-MCNC: 5.9 G/DL (ref 6.4–8.2)
RBC # BLD AUTO: 2.08 MILLION/UL (ref 3.81–5.12)
RH BLD: POSITIVE
SODIUM SERPL-SCNC: 133 MMOL/L (ref 136–145)
SPECIMEN EXPIRATION DATE: NORMAL
WBC # BLD AUTO: 8.37 THOUSAND/UL (ref 4.31–10.16)

## 2019-05-09 PROCEDURE — C1769 GUIDE WIRE: HCPCS

## 2019-05-09 PROCEDURE — 88305 TISSUE EXAM BY PATHOLOGIST: CPT | Performed by: PATHOLOGY

## 2019-05-09 PROCEDURE — 0FD93ZX EXTRACTION OF COMMON BILE DUCT, PERCUTANEOUS APPROACH, DIAGNOSTIC: ICD-10-PCS | Performed by: RADIOLOGY

## 2019-05-09 PROCEDURE — 99232 SBSQ HOSP IP/OBS MODERATE 35: CPT | Performed by: INTERNAL MEDICINE

## 2019-05-09 PROCEDURE — 82948 REAGENT STRIP/BLOOD GLUCOSE: CPT

## 2019-05-09 PROCEDURE — 88112 CYTOPATH CELL ENHANCE TECH: CPT | Performed by: PATHOLOGY

## 2019-05-09 PROCEDURE — 86850 RBC ANTIBODY SCREEN: CPT | Performed by: INTERNAL MEDICINE

## 2019-05-09 PROCEDURE — C1894 INTRO/SHEATH, NON-LASER: HCPCS

## 2019-05-09 PROCEDURE — 86900 BLOOD TYPING SEROLOGIC ABO: CPT | Performed by: INTERNAL MEDICINE

## 2019-05-09 PROCEDURE — 80053 COMPREHEN METABOLIC PANEL: CPT | Performed by: INTERNAL MEDICINE

## 2019-05-09 PROCEDURE — 47542 DILATE BILIARY DUCT/AMPULLA: CPT | Performed by: RADIOLOGY

## 2019-05-09 PROCEDURE — 0F754ZZ DILATION OF RIGHT HEPATIC DUCT, PERCUTANEOUS ENDOSCOPIC APPROACH: ICD-10-PCS | Performed by: RADIOLOGY

## 2019-05-09 PROCEDURE — 87205 SMEAR GRAM STAIN: CPT | Performed by: INTERNAL MEDICINE

## 2019-05-09 PROCEDURE — C1729 CATH, DRAINAGE: HCPCS

## 2019-05-09 PROCEDURE — 86901 BLOOD TYPING SEROLOGIC RH(D): CPT | Performed by: INTERNAL MEDICINE

## 2019-05-09 PROCEDURE — C1725 CATH, TRANSLUMIN NON-LASER: HCPCS

## 2019-05-09 PROCEDURE — 85018 HEMOGLOBIN: CPT | Performed by: INTERNAL MEDICINE

## 2019-05-09 PROCEDURE — P9016 RBC LEUKOCYTES REDUCED: HCPCS

## 2019-05-09 PROCEDURE — 86301 IMMUNOASSAY TUMOR CA 19-9: CPT | Performed by: INTERNAL MEDICINE

## 2019-05-09 PROCEDURE — 47540 PERQ PLMT BILE DUCT STENT: CPT

## 2019-05-09 PROCEDURE — NC001 PR NO CHARGE: Performed by: RADIOLOGY

## 2019-05-09 PROCEDURE — 85014 HEMATOCRIT: CPT | Performed by: INTERNAL MEDICINE

## 2019-05-09 PROCEDURE — 47536 EXCHANGE BILIARY DRG CATH: CPT | Performed by: RADIOLOGY

## 2019-05-09 PROCEDURE — 86923 COMPATIBILITY TEST ELECTRIC: CPT

## 2019-05-09 PROCEDURE — 47536 EXCHANGE BILIARY DRG CATH: CPT

## 2019-05-09 PROCEDURE — 49083 ABD PARACENTESIS W/IMAGING: CPT | Performed by: RADIOLOGY

## 2019-05-09 PROCEDURE — 85027 COMPLETE CBC AUTOMATED: CPT | Performed by: INTERNAL MEDICINE

## 2019-05-09 PROCEDURE — 47543 ENDOLUMINAL BX BILIARY TREE: CPT | Performed by: RADIOLOGY

## 2019-05-09 PROCEDURE — 87070 CULTURE OTHR SPECIMN AEROBIC: CPT | Performed by: INTERNAL MEDICINE

## 2019-05-09 PROCEDURE — 76942 ECHO GUIDE FOR BIOPSY: CPT

## 2019-05-09 RX ORDER — INSULIN GLARGINE 100 [IU]/ML
18 INJECTION, SOLUTION SUBCUTANEOUS
Status: DISCONTINUED | OUTPATIENT
Start: 2019-05-09 | End: 2019-05-18

## 2019-05-09 RX ORDER — ONDANSETRON 2 MG/ML
4 INJECTION INTRAMUSCULAR; INTRAVENOUS EVERY 6 HOURS PRN
Status: DISCONTINUED | OUTPATIENT
Start: 2019-05-09 | End: 2019-05-09 | Stop reason: HOSPADM

## 2019-05-09 RX ORDER — PROPOFOL 10 MG/ML
INJECTION, EMULSION INTRAVENOUS CONTINUOUS PRN
Status: DISCONTINUED | OUTPATIENT
Start: 2019-05-09 | End: 2019-05-09 | Stop reason: SURG

## 2019-05-09 RX ORDER — AMPICILLIN 2 G/1
INJECTION, POWDER, FOR SOLUTION INTRAVENOUS AS NEEDED
Status: DISCONTINUED | OUTPATIENT
Start: 2019-05-09 | End: 2019-05-09

## 2019-05-09 RX ORDER — SODIUM CHLORIDE 9 MG/ML
INJECTION, SOLUTION INTRAVENOUS CONTINUOUS PRN
Status: DISCONTINUED | OUTPATIENT
Start: 2019-05-09 | End: 2019-05-09 | Stop reason: SURG

## 2019-05-09 RX ORDER — KETAMINE HYDROCHLORIDE 50 MG/ML
INJECTION, SOLUTION, CONCENTRATE INTRAMUSCULAR; INTRAVENOUS AS NEEDED
Status: DISCONTINUED | OUTPATIENT
Start: 2019-05-09 | End: 2019-05-09 | Stop reason: SURG

## 2019-05-09 RX ORDER — MIDAZOLAM HYDROCHLORIDE 1 MG/ML
INJECTION INTRAMUSCULAR; INTRAVENOUS AS NEEDED
Status: DISCONTINUED | OUTPATIENT
Start: 2019-05-09 | End: 2019-05-09 | Stop reason: SURG

## 2019-05-09 RX ADMIN — KETAMINE HYDROCHLORIDE 5 MG: 50 INJECTION INTRAMUSCULAR; INTRAVENOUS at 14:10

## 2019-05-09 RX ADMIN — ALBUMIN (HUMAN) 25 G: 5 SOLUTION INTRAVENOUS at 17:50

## 2019-05-09 RX ADMIN — MELATONIN TAB 3 MG 9 MG: 3 TAB at 23:53

## 2019-05-09 RX ADMIN — INSULIN LISPRO 10 UNITS: 100 INJECTION, SOLUTION INTRAVENOUS; SUBCUTANEOUS at 18:34

## 2019-05-09 RX ADMIN — SODIUM CHLORIDE: 0.9 INJECTION, SOLUTION INTRAVENOUS at 13:46

## 2019-05-09 RX ADMIN — KETAMINE HYDROCHLORIDE 5 MG: 50 INJECTION INTRAMUSCULAR; INTRAVENOUS at 15:08

## 2019-05-09 RX ADMIN — ALBUMIN (HUMAN) 25 G: 5 SOLUTION INTRAVENOUS at 04:55

## 2019-05-09 RX ADMIN — KETAMINE HYDROCHLORIDE 10 MG: 50 INJECTION INTRAMUSCULAR; INTRAVENOUS at 13:54

## 2019-05-09 RX ADMIN — MIDAZOLAM 2 MG: 1 INJECTION INTRAMUSCULAR; INTRAVENOUS at 13:47

## 2019-05-09 RX ADMIN — PROPOFOL 50 MCG/KG/MIN: 10 INJECTION, EMULSION INTRAVENOUS at 13:49

## 2019-05-09 RX ADMIN — AMPICILLIN SODIUM 2000 MG: 2 INJECTION, POWDER, FOR SOLUTION INTRAMUSCULAR; INTRAVENOUS at 13:57

## 2019-05-09 RX ADMIN — INSULIN GLARGINE 18 UNITS: 100 INJECTION, SOLUTION SUBCUTANEOUS at 21:25

## 2019-05-09 RX ADMIN — ALBUMIN (HUMAN) 25 G: 5 SOLUTION INTRAVENOUS at 10:43

## 2019-05-09 RX ADMIN — PANTOPRAZOLE SODIUM 40 MG: 40 TABLET, DELAYED RELEASE ORAL at 06:00

## 2019-05-09 RX ADMIN — KETAMINE HYDROCHLORIDE 5 MG: 50 INJECTION INTRAMUSCULAR; INTRAVENOUS at 14:24

## 2019-05-09 RX ADMIN — OXYCODONE HYDROCHLORIDE 5 MG: 5 TABLET ORAL at 22:38

## 2019-05-09 RX ADMIN — AMPICILLIN SODIUM 2000 MG: 2 INJECTION, POWDER, FOR SOLUTION INTRAMUSCULAR; INTRAVENOUS at 05:02

## 2019-05-09 RX ADMIN — ALBUMIN (HUMAN) 25 G: 5 SOLUTION INTRAVENOUS at 23:49

## 2019-05-09 RX ADMIN — INSULIN LISPRO 1 UNITS: 100 INJECTION, SOLUTION INTRAVENOUS; SUBCUTANEOUS at 18:34

## 2019-05-09 RX ADMIN — IOHEXOL 60 ML: 300 INJECTION, SOLUTION INTRAVENOUS at 17:12

## 2019-05-09 RX ADMIN — AMPICILLIN SODIUM 2000 MG: 2 INJECTION, POWDER, FOR SOLUTION INTRAMUSCULAR; INTRAVENOUS at 21:22

## 2019-05-09 RX ADMIN — ALBUMIN (HUMAN) 25 G: 5 SOLUTION INTRAVENOUS at 00:15

## 2019-05-09 RX ADMIN — KETAMINE HYDROCHLORIDE 5 MG: 50 INJECTION INTRAMUSCULAR; INTRAVENOUS at 14:46

## 2019-05-09 RX ADMIN — OXYCODONE HYDROCHLORIDE 5 MG: 5 TABLET ORAL at 18:37

## 2019-05-09 RX ADMIN — HEPARIN SODIUM 5000 UNITS: 5000 INJECTION INTRAVENOUS; SUBCUTANEOUS at 21:25

## 2019-05-09 RX ADMIN — PANCRELIPASE 48000 UNITS: 24000; 76000; 120000 CAPSULE, DELAYED RELEASE PELLETS ORAL at 18:34

## 2019-05-10 LAB
ABO GROUP BLD BPU: NORMAL
ALBUMIN SERPL BCP-MCNC: 3.7 G/DL (ref 3.5–5)
ALP SERPL-CCNC: 215 U/L (ref 46–116)
ALT SERPL W P-5'-P-CCNC: 49 U/L (ref 12–78)
ANION GAP SERPL CALCULATED.3IONS-SCNC: 9 MMOL/L (ref 4–13)
AST SERPL W P-5'-P-CCNC: 59 U/L (ref 5–45)
BILIRUB SERPL-MCNC: 7.78 MG/DL (ref 0.2–1)
BPU ID: NORMAL
BUN SERPL-MCNC: 43 MG/DL (ref 5–25)
CALCIUM SERPL-MCNC: 9.3 MG/DL (ref 8.3–10.1)
CANCER AG19-9 SERPL-ACNC: 143 U/ML (ref 0–35)
CHLORIDE SERPL-SCNC: 103 MMOL/L (ref 100–108)
CO2 SERPL-SCNC: 25 MMOL/L (ref 21–32)
CREAT SERPL-MCNC: 1.11 MG/DL (ref 0.6–1.3)
CROSSMATCH: NORMAL
ERYTHROCYTE [DISTWIDTH] IN BLOOD BY AUTOMATED COUNT: 22.6 % (ref 11.6–15.1)
GFR SERPL CREATININE-BSD FRML MDRD: 49 ML/MIN/1.73SQ M
GLUCOSE SERPL-MCNC: 110 MG/DL (ref 65–140)
GLUCOSE SERPL-MCNC: 118 MG/DL (ref 65–140)
GLUCOSE SERPL-MCNC: 150 MG/DL (ref 65–140)
GLUCOSE SERPL-MCNC: 96 MG/DL (ref 65–140)
GLUCOSE SERPL-MCNC: 98 MG/DL (ref 65–140)
HCT VFR BLD AUTO: 24.5 % (ref 34.8–46.1)
HGB BLD-MCNC: 7.7 G/DL (ref 11.5–15.4)
INR PPP: 1.51 (ref 0.86–1.17)
MCH RBC QN AUTO: 30.6 PG (ref 26.8–34.3)
MCHC RBC AUTO-ENTMCNC: 31.4 G/DL (ref 31.4–37.4)
MCV RBC AUTO: 97 FL (ref 82–98)
PLATELET # BLD AUTO: 229 THOUSANDS/UL (ref 149–390)
PMV BLD AUTO: 11.5 FL (ref 8.9–12.7)
POTASSIUM SERPL-SCNC: 3.9 MMOL/L (ref 3.5–5.3)
PROT SERPL-MCNC: 5.8 G/DL (ref 6.4–8.2)
PROTHROMBIN TIME: 18.3 SECONDS (ref 11.8–14.2)
RBC # BLD AUTO: 2.52 MILLION/UL (ref 3.81–5.12)
SODIUM SERPL-SCNC: 137 MMOL/L (ref 136–145)
UNIT DISPENSE STATUS: NORMAL
UNIT PRODUCT CODE: NORMAL
UNIT RH: NORMAL
WBC # BLD AUTO: 11.35 THOUSAND/UL (ref 4.31–10.16)

## 2019-05-10 PROCEDURE — 97530 THERAPEUTIC ACTIVITIES: CPT

## 2019-05-10 PROCEDURE — 97110 THERAPEUTIC EXERCISES: CPT

## 2019-05-10 PROCEDURE — 97116 GAIT TRAINING THERAPY: CPT

## 2019-05-10 PROCEDURE — 85610 PROTHROMBIN TIME: CPT | Performed by: INTERNAL MEDICINE

## 2019-05-10 PROCEDURE — 99232 SBSQ HOSP IP/OBS MODERATE 35: CPT | Performed by: INTERNAL MEDICINE

## 2019-05-10 PROCEDURE — 85027 COMPLETE CBC AUTOMATED: CPT | Performed by: INTERNAL MEDICINE

## 2019-05-10 PROCEDURE — 82948 REAGENT STRIP/BLOOD GLUCOSE: CPT

## 2019-05-10 PROCEDURE — 80053 COMPREHEN METABOLIC PANEL: CPT | Performed by: INTERNAL MEDICINE

## 2019-05-10 RX ORDER — SPIRONOLACTONE 25 MG/1
12.5 TABLET ORAL DAILY
Status: DISCONTINUED | OUTPATIENT
Start: 2019-05-10 | End: 2019-05-12

## 2019-05-10 RX ADMIN — INSULIN LISPRO 1 UNITS: 100 INJECTION, SOLUTION INTRAVENOUS; SUBCUTANEOUS at 13:37

## 2019-05-10 RX ADMIN — INSULIN LISPRO 10 UNITS: 100 INJECTION, SOLUTION INTRAVENOUS; SUBCUTANEOUS at 18:35

## 2019-05-10 RX ADMIN — OXYCODONE HYDROCHLORIDE 5 MG: 5 TABLET ORAL at 21:57

## 2019-05-10 RX ADMIN — ALBUMIN (HUMAN) 25 G: 5 SOLUTION INTRAVENOUS at 11:52

## 2019-05-10 RX ADMIN — PANTOPRAZOLE SODIUM 40 MG: 40 TABLET, DELAYED RELEASE ORAL at 06:00

## 2019-05-10 RX ADMIN — INSULIN LISPRO 10 UNITS: 100 INJECTION, SOLUTION INTRAVENOUS; SUBCUTANEOUS at 09:16

## 2019-05-10 RX ADMIN — PANCRELIPASE 48000 UNITS: 24000; 76000; 120000 CAPSULE, DELAYED RELEASE PELLETS ORAL at 09:16

## 2019-05-10 RX ADMIN — SPIRONOLACTONE 12.5 MG: 25 TABLET ORAL at 13:40

## 2019-05-10 RX ADMIN — AMPICILLIN SODIUM 2000 MG: 2 INJECTION, POWDER, FOR SOLUTION INTRAMUSCULAR; INTRAVENOUS at 14:29

## 2019-05-10 RX ADMIN — PANCRELIPASE 48000 UNITS: 24000; 76000; 120000 CAPSULE, DELAYED RELEASE PELLETS ORAL at 13:36

## 2019-05-10 RX ADMIN — INSULIN GLARGINE 18 UNITS: 100 INJECTION, SOLUTION SUBCUTANEOUS at 21:48

## 2019-05-10 RX ADMIN — ERGOCALCIFEROL 50000 UNITS: 1.25 CAPSULE ORAL at 09:16

## 2019-05-10 RX ADMIN — ALBUMIN (HUMAN) 25 G: 5 SOLUTION INTRAVENOUS at 17:49

## 2019-05-10 RX ADMIN — OXYCODONE HYDROCHLORIDE 5 MG: 5 TABLET ORAL at 02:55

## 2019-05-10 RX ADMIN — AMPICILLIN SODIUM 2000 MG: 2 INJECTION, POWDER, FOR SOLUTION INTRAMUSCULAR; INTRAVENOUS at 05:56

## 2019-05-10 RX ADMIN — LIDOCAINE 1 PATCH: 50 PATCH CUTANEOUS at 21:37

## 2019-05-10 RX ADMIN — INSULIN LISPRO 10 UNITS: 100 INJECTION, SOLUTION INTRAVENOUS; SUBCUTANEOUS at 13:37

## 2019-05-10 RX ADMIN — AMPICILLIN SODIUM 2000 MG: 2 INJECTION, POWDER, FOR SOLUTION INTRAMUSCULAR; INTRAVENOUS at 21:48

## 2019-05-10 RX ADMIN — ALBUMIN (HUMAN) 25 G: 5 SOLUTION INTRAVENOUS at 23:40

## 2019-05-10 RX ADMIN — ALBUMIN (HUMAN) 25 G: 5 SOLUTION INTRAVENOUS at 05:04

## 2019-05-10 RX ADMIN — PANCRELIPASE 48000 UNITS: 24000; 76000; 120000 CAPSULE, DELAYED RELEASE PELLETS ORAL at 17:49

## 2019-05-10 RX ADMIN — HEPARIN SODIUM 5000 UNITS: 5000 INJECTION INTRAVENOUS; SUBCUTANEOUS at 05:58

## 2019-05-11 LAB
ALBUMIN SERPL BCP-MCNC: 3.9 G/DL (ref 3.5–5)
ALP SERPL-CCNC: 172 U/L (ref 46–116)
ALT SERPL W P-5'-P-CCNC: 42 U/L (ref 12–78)
ANION GAP SERPL CALCULATED.3IONS-SCNC: 13 MMOL/L (ref 4–13)
AST SERPL W P-5'-P-CCNC: 40 U/L (ref 5–45)
BASOPHILS # BLD AUTO: 0.03 THOUSANDS/ΜL (ref 0–0.1)
BASOPHILS NFR BLD AUTO: 0 % (ref 0–1)
BILIRUB SERPL-MCNC: 6.86 MG/DL (ref 0.2–1)
BUN SERPL-MCNC: 43 MG/DL (ref 5–25)
CALCIUM SERPL-MCNC: 9.3 MG/DL (ref 8.3–10.1)
CHLORIDE SERPL-SCNC: 101 MMOL/L (ref 100–108)
CO2 SERPL-SCNC: 22 MMOL/L (ref 21–32)
CREAT SERPL-MCNC: 1.14 MG/DL (ref 0.6–1.3)
EOSINOPHIL # BLD AUTO: 0.19 THOUSAND/ΜL (ref 0–0.61)
EOSINOPHIL NFR BLD AUTO: 1 % (ref 0–6)
ERYTHROCYTE [DISTWIDTH] IN BLOOD BY AUTOMATED COUNT: 22.3 % (ref 11.6–15.1)
GFR SERPL CREATININE-BSD FRML MDRD: 47 ML/MIN/1.73SQ M
GLUCOSE SERPL-MCNC: 200 MG/DL (ref 65–140)
GLUCOSE SERPL-MCNC: 277 MG/DL (ref 65–140)
GLUCOSE SERPL-MCNC: 311 MG/DL (ref 65–140)
GLUCOSE SERPL-MCNC: 75 MG/DL (ref 65–140)
HCT VFR BLD AUTO: 24.6 % (ref 34.8–46.1)
HGB BLD-MCNC: 7.6 G/DL (ref 11.5–15.4)
IMM GRANULOCYTES # BLD AUTO: 0.26 THOUSAND/UL (ref 0–0.2)
IMM GRANULOCYTES NFR BLD AUTO: 2 % (ref 0–2)
LYMPHOCYTES # BLD AUTO: 1.32 THOUSANDS/ΜL (ref 0.6–4.47)
LYMPHOCYTES NFR BLD AUTO: 10 % (ref 14–44)
MCH RBC QN AUTO: 30.2 PG (ref 26.8–34.3)
MCHC RBC AUTO-ENTMCNC: 30.9 G/DL (ref 31.4–37.4)
MCV RBC AUTO: 98 FL (ref 82–98)
MONOCYTES # BLD AUTO: 1.14 THOUSAND/ΜL (ref 0.17–1.22)
MONOCYTES NFR BLD AUTO: 9 % (ref 4–12)
NEUTROPHILS # BLD AUTO: 10.5 THOUSANDS/ΜL (ref 1.85–7.62)
NEUTS SEG NFR BLD AUTO: 78 % (ref 43–75)
NRBC BLD AUTO-RTO: 0 /100 WBCS
PLATELET # BLD AUTO: 230 THOUSANDS/UL (ref 149–390)
PMV BLD AUTO: 12 FL (ref 8.9–12.7)
POTASSIUM SERPL-SCNC: 4.2 MMOL/L (ref 3.5–5.3)
PROT SERPL-MCNC: 6.2 G/DL (ref 6.4–8.2)
RBC # BLD AUTO: 2.52 MILLION/UL (ref 3.81–5.12)
SODIUM SERPL-SCNC: 136 MMOL/L (ref 136–145)
WBC # BLD AUTO: 13.44 THOUSAND/UL (ref 4.31–10.16)

## 2019-05-11 PROCEDURE — 99232 SBSQ HOSP IP/OBS MODERATE 35: CPT | Performed by: INTERNAL MEDICINE

## 2019-05-11 PROCEDURE — 82948 REAGENT STRIP/BLOOD GLUCOSE: CPT

## 2019-05-11 PROCEDURE — 80053 COMPREHEN METABOLIC PANEL: CPT | Performed by: INTERNAL MEDICINE

## 2019-05-11 PROCEDURE — 85025 COMPLETE CBC W/AUTO DIFF WBC: CPT | Performed by: INTERNAL MEDICINE

## 2019-05-11 RX ADMIN — PANCRELIPASE 48000 UNITS: 24000; 76000; 120000 CAPSULE, DELAYED RELEASE PELLETS ORAL at 09:25

## 2019-05-11 RX ADMIN — AMPICILLIN SODIUM 2000 MG: 2 INJECTION, POWDER, FOR SOLUTION INTRAMUSCULAR; INTRAVENOUS at 06:24

## 2019-05-11 RX ADMIN — ALBUMIN (HUMAN) 25 G: 5 SOLUTION INTRAVENOUS at 12:00

## 2019-05-11 RX ADMIN — PANCRELIPASE 48000 UNITS: 24000; 76000; 120000 CAPSULE, DELAYED RELEASE PELLETS ORAL at 14:29

## 2019-05-11 RX ADMIN — SPIRONOLACTONE 12.5 MG: 25 TABLET ORAL at 09:25

## 2019-05-11 RX ADMIN — ALBUMIN (HUMAN) 25 G: 5 SOLUTION INTRAVENOUS at 23:39

## 2019-05-11 RX ADMIN — MELATONIN TAB 3 MG 9 MG: 3 TAB at 21:26

## 2019-05-11 RX ADMIN — PANCRELIPASE 48000 UNITS: 24000; 76000; 120000 CAPSULE, DELAYED RELEASE PELLETS ORAL at 19:26

## 2019-05-11 RX ADMIN — INSULIN GLARGINE 18 UNITS: 100 INJECTION, SOLUTION SUBCUTANEOUS at 21:26

## 2019-05-11 RX ADMIN — INSULIN LISPRO 10 UNITS: 100 INJECTION, SOLUTION INTRAVENOUS; SUBCUTANEOUS at 14:30

## 2019-05-11 RX ADMIN — AMPICILLIN SODIUM 2000 MG: 2 INJECTION, POWDER, FOR SOLUTION INTRAMUSCULAR; INTRAVENOUS at 21:27

## 2019-05-11 RX ADMIN — PANTOPRAZOLE SODIUM 40 MG: 40 TABLET, DELAYED RELEASE ORAL at 06:24

## 2019-05-11 RX ADMIN — INSULIN LISPRO 10 UNITS: 100 INJECTION, SOLUTION INTRAVENOUS; SUBCUTANEOUS at 19:26

## 2019-05-11 RX ADMIN — INSULIN LISPRO 3 UNITS: 100 INJECTION, SOLUTION INTRAVENOUS; SUBCUTANEOUS at 14:29

## 2019-05-11 RX ADMIN — INSULIN LISPRO 5 UNITS: 100 INJECTION, SOLUTION INTRAVENOUS; SUBCUTANEOUS at 19:26

## 2019-05-11 RX ADMIN — ALBUMIN (HUMAN) 25 G: 5 SOLUTION INTRAVENOUS at 05:36

## 2019-05-11 RX ADMIN — ALBUMIN (HUMAN) 25 G: 5 SOLUTION INTRAVENOUS at 17:58

## 2019-05-11 RX ADMIN — AMPICILLIN SODIUM 2000 MG: 2 INJECTION, POWDER, FOR SOLUTION INTRAMUSCULAR; INTRAVENOUS at 14:15

## 2019-05-11 RX ADMIN — OXYCODONE HYDROCHLORIDE 5 MG: 5 TABLET ORAL at 21:25

## 2019-05-12 LAB
BACTERIA BLD CULT: NORMAL
BACTERIA BLD CULT: NORMAL
BACTERIA SPEC BFLD CULT: NO GROWTH
ERYTHROCYTE [DISTWIDTH] IN BLOOD BY AUTOMATED COUNT: 21.8 % (ref 11.6–15.1)
GLUCOSE SERPL-MCNC: 182 MG/DL (ref 65–140)
GLUCOSE SERPL-MCNC: 188 MG/DL (ref 65–140)
GLUCOSE SERPL-MCNC: 327 MG/DL (ref 65–140)
GLUCOSE SERPL-MCNC: 66 MG/DL (ref 65–140)
GLUCOSE SERPL-MCNC: 70 MG/DL (ref 65–140)
GLUCOSE SERPL-MCNC: 84 MG/DL (ref 65–140)
GRAM STN SPEC: ABNORMAL
GRAM STN SPEC: ABNORMAL
HCT VFR BLD AUTO: 24.8 % (ref 34.8–46.1)
HGB BLD-MCNC: 7.7 G/DL (ref 11.5–15.4)
MCH RBC QN AUTO: 30.4 PG (ref 26.8–34.3)
MCHC RBC AUTO-ENTMCNC: 31 G/DL (ref 31.4–37.4)
MCV RBC AUTO: 98 FL (ref 82–98)
PLATELET # BLD AUTO: 251 THOUSANDS/UL (ref 149–390)
PMV BLD AUTO: 11.9 FL (ref 8.9–12.7)
RBC # BLD AUTO: 2.53 MILLION/UL (ref 3.81–5.12)
WBC # BLD AUTO: 13.72 THOUSAND/UL (ref 4.31–10.16)

## 2019-05-12 PROCEDURE — 85027 COMPLETE CBC AUTOMATED: CPT | Performed by: INTERNAL MEDICINE

## 2019-05-12 PROCEDURE — 99232 SBSQ HOSP IP/OBS MODERATE 35: CPT | Performed by: INTERNAL MEDICINE

## 2019-05-12 PROCEDURE — 82948 REAGENT STRIP/BLOOD GLUCOSE: CPT

## 2019-05-12 RX ORDER — AMOXICILLIN 500 MG/1
500 CAPSULE ORAL EVERY 8 HOURS SCHEDULED
Status: DISCONTINUED | OUTPATIENT
Start: 2019-05-12 | End: 2019-05-15

## 2019-05-12 RX ORDER — SPIRONOLACTONE 25 MG/1
25 TABLET ORAL DAILY
Status: DISCONTINUED | OUTPATIENT
Start: 2019-05-13 | End: 2019-05-14

## 2019-05-12 RX ADMIN — AMPICILLIN SODIUM 2000 MG: 2 INJECTION, POWDER, FOR SOLUTION INTRAMUSCULAR; INTRAVENOUS at 06:34

## 2019-05-12 RX ADMIN — INSULIN LISPRO 1 UNITS: 100 INJECTION, SOLUTION INTRAVENOUS; SUBCUTANEOUS at 18:00

## 2019-05-12 RX ADMIN — INSULIN GLARGINE 18 UNITS: 100 INJECTION, SOLUTION SUBCUTANEOUS at 22:14

## 2019-05-12 RX ADMIN — SPIRONOLACTONE 12.5 MG: 25 TABLET ORAL at 08:24

## 2019-05-12 RX ADMIN — ALBUMIN (HUMAN) 25 G: 5 SOLUTION INTRAVENOUS at 12:19

## 2019-05-12 RX ADMIN — PANCRELIPASE 48000 UNITS: 24000; 76000; 120000 CAPSULE, DELAYED RELEASE PELLETS ORAL at 17:59

## 2019-05-12 RX ADMIN — PANTOPRAZOLE SODIUM 40 MG: 40 TABLET, DELAYED RELEASE ORAL at 06:34

## 2019-05-12 RX ADMIN — INSULIN LISPRO 5 UNITS: 100 INJECTION, SOLUTION INTRAVENOUS; SUBCUTANEOUS at 12:19

## 2019-05-12 RX ADMIN — INSULIN LISPRO 10 UNITS: 100 INJECTION, SOLUTION INTRAVENOUS; SUBCUTANEOUS at 18:00

## 2019-05-12 RX ADMIN — MELATONIN TAB 3 MG 9 MG: 3 TAB at 22:14

## 2019-05-12 RX ADMIN — AMOXICILLIN 500 MG: 500 CAPSULE ORAL at 18:00

## 2019-05-12 RX ADMIN — AMOXICILLIN 500 MG: 500 CAPSULE ORAL at 22:14

## 2019-05-12 RX ADMIN — OXYCODONE HYDROCHLORIDE 5 MG: 5 TABLET ORAL at 22:15

## 2019-05-12 RX ADMIN — DIPHENHYDRAMINE HCL 25 MG: 25 TABLET, FILM COATED ORAL at 22:14

## 2019-05-12 RX ADMIN — INSULIN LISPRO 10 UNITS: 100 INJECTION, SOLUTION INTRAVENOUS; SUBCUTANEOUS at 09:56

## 2019-05-12 RX ADMIN — ALBUMIN (HUMAN) 25 G: 5 SOLUTION INTRAVENOUS at 05:50

## 2019-05-12 RX ADMIN — PANCRELIPASE 48000 UNITS: 24000; 76000; 120000 CAPSULE, DELAYED RELEASE PELLETS ORAL at 09:55

## 2019-05-13 PROBLEM — N17.0 ACUTE RENAL FAILURE WITH ACUTE TUBULAR NECROSIS SUPERIMPOSED ON STAGE 3 CHRONIC KIDNEY DISEASE (HCC): Status: RESOLVED | Noted: 2019-04-15 | Resolved: 2019-05-13

## 2019-05-13 PROBLEM — N18.30 ACUTE RENAL FAILURE WITH ACUTE TUBULAR NECROSIS SUPERIMPOSED ON STAGE 3 CHRONIC KIDNEY DISEASE (HCC): Status: RESOLVED | Noted: 2019-04-15 | Resolved: 2019-05-13

## 2019-05-13 PROBLEM — K65.9 PERITONITIS (HCC): Status: ACTIVE | Noted: 2019-05-13

## 2019-05-13 PROBLEM — K83.1 OBSTRUCTIVE JAUNDICE: Status: ACTIVE | Noted: 2019-05-13

## 2019-05-13 PROBLEM — R59.1 LYMPHADENOPATHY: Status: ACTIVE | Noted: 2019-05-13

## 2019-05-13 LAB
ALBUMIN SERPL BCP-MCNC: 3.7 G/DL (ref 3.5–5)
ALP SERPL-CCNC: 153 U/L (ref 46–116)
ALT SERPL W P-5'-P-CCNC: 44 U/L (ref 12–78)
ANION GAP SERPL CALCULATED.3IONS-SCNC: 13 MMOL/L (ref 4–13)
AST SERPL W P-5'-P-CCNC: 45 U/L (ref 5–45)
BASOPHILS # BLD AUTO: 0.05 THOUSANDS/ΜL (ref 0–0.1)
BASOPHILS NFR BLD AUTO: 0 % (ref 0–1)
BILIRUB SERPL-MCNC: 7.52 MG/DL (ref 0.2–1)
BUN SERPL-MCNC: 56 MG/DL (ref 5–25)
CALCIUM SERPL-MCNC: 9.4 MG/DL (ref 8.3–10.1)
CHLORIDE SERPL-SCNC: 101 MMOL/L (ref 100–108)
CO2 SERPL-SCNC: 19 MMOL/L (ref 21–32)
CREAT SERPL-MCNC: 1.32 MG/DL (ref 0.6–1.3)
EOSINOPHIL # BLD AUTO: 0.03 THOUSAND/ΜL (ref 0–0.61)
EOSINOPHIL NFR BLD AUTO: 0 % (ref 0–6)
ERYTHROCYTE [DISTWIDTH] IN BLOOD BY AUTOMATED COUNT: 21.5 % (ref 11.6–15.1)
GFR SERPL CREATININE-BSD FRML MDRD: 39 ML/MIN/1.73SQ M
GLUCOSE SERPL-MCNC: 171 MG/DL (ref 65–140)
GLUCOSE SERPL-MCNC: 180 MG/DL (ref 65–140)
GLUCOSE SERPL-MCNC: 196 MG/DL (ref 65–140)
GLUCOSE SERPL-MCNC: 247 MG/DL (ref 65–140)
GLUCOSE SERPL-MCNC: 265 MG/DL (ref 65–140)
HCT VFR BLD AUTO: 24.7 % (ref 34.8–46.1)
HGB BLD-MCNC: 7.7 G/DL (ref 11.5–15.4)
IMM GRANULOCYTES # BLD AUTO: 0.4 THOUSAND/UL (ref 0–0.2)
IMM GRANULOCYTES NFR BLD AUTO: 1 % (ref 0–2)
LYMPHOCYTES # BLD AUTO: 0.89 THOUSANDS/ΜL (ref 0.6–4.47)
LYMPHOCYTES NFR BLD AUTO: 3 % (ref 14–44)
MCH RBC QN AUTO: 30.6 PG (ref 26.8–34.3)
MCHC RBC AUTO-ENTMCNC: 31.2 G/DL (ref 31.4–37.4)
MCV RBC AUTO: 98 FL (ref 82–98)
MONOCYTES # BLD AUTO: 1.82 THOUSAND/ΜL (ref 0.17–1.22)
MONOCYTES NFR BLD AUTO: 6 % (ref 4–12)
NEUTROPHILS # BLD AUTO: 25.1 THOUSANDS/ΜL (ref 1.85–7.62)
NEUTS SEG NFR BLD AUTO: 90 % (ref 43–75)
NRBC BLD AUTO-RTO: 0 /100 WBCS
PLATELET # BLD AUTO: 230 THOUSANDS/UL (ref 149–390)
PMV BLD AUTO: 11.5 FL (ref 8.9–12.7)
POTASSIUM SERPL-SCNC: 4.4 MMOL/L (ref 3.5–5.3)
PROT SERPL-MCNC: 6 G/DL (ref 6.4–8.2)
RBC # BLD AUTO: 2.52 MILLION/UL (ref 3.81–5.12)
SODIUM SERPL-SCNC: 133 MMOL/L (ref 136–145)
WBC # BLD AUTO: 28.29 THOUSAND/UL (ref 4.31–10.16)

## 2019-05-13 PROCEDURE — 99233 SBSQ HOSP IP/OBS HIGH 50: CPT | Performed by: INTERNAL MEDICINE

## 2019-05-13 PROCEDURE — 85025 COMPLETE CBC W/AUTO DIFF WBC: CPT | Performed by: INTERNAL MEDICINE

## 2019-05-13 PROCEDURE — 82948 REAGENT STRIP/BLOOD GLUCOSE: CPT

## 2019-05-13 PROCEDURE — 80053 COMPREHEN METABOLIC PANEL: CPT | Performed by: INTERNAL MEDICINE

## 2019-05-13 PROCEDURE — 99232 SBSQ HOSP IP/OBS MODERATE 35: CPT | Performed by: INTERNAL MEDICINE

## 2019-05-13 RX ORDER — OXYCODONE HYDROCHLORIDE 5 MG/1
2.5 TABLET ORAL ONCE
Status: COMPLETED | OUTPATIENT
Start: 2019-05-13 | End: 2019-05-13

## 2019-05-13 RX ORDER — OXYCODONE HYDROCHLORIDE 5 MG/1
7.5 TABLET ORAL EVERY 4 HOURS PRN
Status: DISCONTINUED | OUTPATIENT
Start: 2019-05-13 | End: 2019-06-03 | Stop reason: HOSPADM

## 2019-05-13 RX ADMIN — INSULIN LISPRO 10 UNITS: 100 INJECTION, SOLUTION INTRAVENOUS; SUBCUTANEOUS at 17:03

## 2019-05-13 RX ADMIN — AMOXICILLIN 500 MG: 500 CAPSULE ORAL at 21:52

## 2019-05-13 RX ADMIN — DIPHENHYDRAMINE HCL 25 MG: 25 TABLET, FILM COATED ORAL at 21:54

## 2019-05-13 RX ADMIN — INSULIN LISPRO 1 UNITS: 100 INJECTION, SOLUTION INTRAVENOUS; SUBCUTANEOUS at 08:35

## 2019-05-13 RX ADMIN — INSULIN LISPRO 3 UNITS: 100 INJECTION, SOLUTION INTRAVENOUS; SUBCUTANEOUS at 17:02

## 2019-05-13 RX ADMIN — PANCRELIPASE 48000 UNITS: 24000; 76000; 120000 CAPSULE, DELAYED RELEASE PELLETS ORAL at 17:03

## 2019-05-13 RX ADMIN — OXYCODONE HYDROCHLORIDE 2.5 MG: 5 TABLET ORAL at 21:52

## 2019-05-13 RX ADMIN — PANTOPRAZOLE SODIUM 40 MG: 40 TABLET, DELAYED RELEASE ORAL at 05:58

## 2019-05-13 RX ADMIN — INSULIN GLARGINE 18 UNITS: 100 INJECTION, SOLUTION SUBCUTANEOUS at 21:58

## 2019-05-13 RX ADMIN — OXYCODONE HYDROCHLORIDE 5 MG: 5 TABLET ORAL at 18:53

## 2019-05-13 RX ADMIN — AMOXICILLIN 500 MG: 500 CAPSULE ORAL at 13:34

## 2019-05-13 RX ADMIN — INSULIN LISPRO 3 UNITS: 100 INJECTION, SOLUTION INTRAVENOUS; SUBCUTANEOUS at 13:35

## 2019-05-13 RX ADMIN — PANCRELIPASE 48000 UNITS: 24000; 76000; 120000 CAPSULE, DELAYED RELEASE PELLETS ORAL at 08:34

## 2019-05-13 RX ADMIN — INSULIN LISPRO 10 UNITS: 100 INJECTION, SOLUTION INTRAVENOUS; SUBCUTANEOUS at 13:35

## 2019-05-13 RX ADMIN — ACETAMINOPHEN 650 MG: 325 TABLET, FILM COATED ORAL at 23:40

## 2019-05-13 RX ADMIN — PANCRELIPASE 48000 UNITS: 24000; 76000; 120000 CAPSULE, DELAYED RELEASE PELLETS ORAL at 11:09

## 2019-05-13 RX ADMIN — DIPHENHYDRAMINE HCL 25 MG: 25 TABLET, FILM COATED ORAL at 13:34

## 2019-05-13 RX ADMIN — SPIRONOLACTONE 25 MG: 25 TABLET ORAL at 11:09

## 2019-05-13 RX ADMIN — AMOXICILLIN 500 MG: 500 CAPSULE ORAL at 05:58

## 2019-05-13 RX ADMIN — INSULIN LISPRO 10 UNITS: 100 INJECTION, SOLUTION INTRAVENOUS; SUBCUTANEOUS at 08:35

## 2019-05-13 RX ADMIN — DIPHENHYDRAMINE HCL 25 MG: 25 TABLET, FILM COATED ORAL at 06:30

## 2019-05-13 RX ADMIN — ERGOCALCIFEROL 50000 UNITS: 1.25 CAPSULE ORAL at 08:34

## 2019-05-14 LAB
ALBUMIN SERPL BCP-MCNC: 3.1 G/DL (ref 3.5–5)
ALP SERPL-CCNC: 142 U/L (ref 46–116)
ALT SERPL W P-5'-P-CCNC: 40 U/L (ref 12–78)
ANION GAP SERPL CALCULATED.3IONS-SCNC: 11 MMOL/L (ref 4–13)
AST SERPL W P-5'-P-CCNC: 36 U/L (ref 5–45)
BILIRUB DIRECT SERPL-MCNC: 5.83 MG/DL (ref 0–0.2)
BILIRUB SERPL-MCNC: 7.27 MG/DL (ref 0.2–1)
BUN SERPL-MCNC: 69 MG/DL (ref 5–25)
CALCIUM SERPL-MCNC: 9.2 MG/DL (ref 8.3–10.1)
CHLORIDE SERPL-SCNC: 102 MMOL/L (ref 100–108)
CO2 SERPL-SCNC: 20 MMOL/L (ref 21–32)
CREAT SERPL-MCNC: 1.4 MG/DL (ref 0.6–1.3)
ERYTHROCYTE [DISTWIDTH] IN BLOOD BY AUTOMATED COUNT: 21.2 % (ref 11.6–15.1)
GFR SERPL CREATININE-BSD FRML MDRD: 37 ML/MIN/1.73SQ M
GLUCOSE SERPL-MCNC: 136 MG/DL (ref 65–140)
GLUCOSE SERPL-MCNC: 160 MG/DL (ref 65–140)
GLUCOSE SERPL-MCNC: 179 MG/DL (ref 65–140)
GLUCOSE SERPL-MCNC: 196 MG/DL (ref 65–140)
GLUCOSE SERPL-MCNC: 246 MG/DL (ref 65–140)
HCT VFR BLD AUTO: 24.5 % (ref 34.8–46.1)
HGB BLD-MCNC: 7.7 G/DL (ref 11.5–15.4)
MCH RBC QN AUTO: 30.7 PG (ref 26.8–34.3)
MCHC RBC AUTO-ENTMCNC: 31.4 G/DL (ref 31.4–37.4)
MCV RBC AUTO: 98 FL (ref 82–98)
PLATELET # BLD AUTO: 255 THOUSANDS/UL (ref 149–390)
PMV BLD AUTO: 11.5 FL (ref 8.9–12.7)
POTASSIUM SERPL-SCNC: 4.8 MMOL/L (ref 3.5–5.3)
PROT SERPL-MCNC: 5.6 G/DL (ref 6.4–8.2)
RBC # BLD AUTO: 2.51 MILLION/UL (ref 3.81–5.12)
SODIUM SERPL-SCNC: 133 MMOL/L (ref 136–145)
WBC # BLD AUTO: 19.78 THOUSAND/UL (ref 4.31–10.16)

## 2019-05-14 PROCEDURE — 80076 HEPATIC FUNCTION PANEL: CPT | Performed by: INTERNAL MEDICINE

## 2019-05-14 PROCEDURE — 80048 BASIC METABOLIC PNL TOTAL CA: CPT | Performed by: INTERNAL MEDICINE

## 2019-05-14 PROCEDURE — 82948 REAGENT STRIP/BLOOD GLUCOSE: CPT

## 2019-05-14 PROCEDURE — 99233 SBSQ HOSP IP/OBS HIGH 50: CPT | Performed by: INTERNAL MEDICINE

## 2019-05-14 PROCEDURE — 97168 OT RE-EVAL EST PLAN CARE: CPT

## 2019-05-14 PROCEDURE — G8988 SELF CARE GOAL STATUS: HCPCS

## 2019-05-14 PROCEDURE — 85027 COMPLETE CBC AUTOMATED: CPT | Performed by: INTERNAL MEDICINE

## 2019-05-14 PROCEDURE — 97535 SELF CARE MNGMENT TRAINING: CPT

## 2019-05-14 PROCEDURE — G8987 SELF CARE CURRENT STATUS: HCPCS

## 2019-05-14 PROCEDURE — 99232 SBSQ HOSP IP/OBS MODERATE 35: CPT | Performed by: INTERNAL MEDICINE

## 2019-05-14 RX ADMIN — INSULIN GLARGINE 18 UNITS: 100 INJECTION, SOLUTION SUBCUTANEOUS at 22:14

## 2019-05-14 RX ADMIN — AMOXICILLIN 500 MG: 500 CAPSULE ORAL at 06:17

## 2019-05-14 RX ADMIN — PANCRELIPASE 48000 UNITS: 24000; 76000; 120000 CAPSULE, DELAYED RELEASE PELLETS ORAL at 08:55

## 2019-05-14 RX ADMIN — PANCRELIPASE 48000 UNITS: 24000; 76000; 120000 CAPSULE, DELAYED RELEASE PELLETS ORAL at 13:47

## 2019-05-14 RX ADMIN — AMOXICILLIN 500 MG: 500 CAPSULE ORAL at 13:50

## 2019-05-14 RX ADMIN — DIPHENHYDRAMINE HCL 25 MG: 25 TABLET, FILM COATED ORAL at 16:28

## 2019-05-14 RX ADMIN — INSULIN LISPRO 10 UNITS: 100 INJECTION, SOLUTION INTRAVENOUS; SUBCUTANEOUS at 18:35

## 2019-05-14 RX ADMIN — SPIRONOLACTONE 25 MG: 25 TABLET ORAL at 08:56

## 2019-05-14 RX ADMIN — PANCRELIPASE 48000 UNITS: 24000; 76000; 120000 CAPSULE, DELAYED RELEASE PELLETS ORAL at 18:36

## 2019-05-14 RX ADMIN — INSULIN LISPRO 10 UNITS: 100 INJECTION, SOLUTION INTRAVENOUS; SUBCUTANEOUS at 08:55

## 2019-05-14 RX ADMIN — ENOXAPARIN SODIUM 40 MG: 40 INJECTION SUBCUTANEOUS at 13:48

## 2019-05-14 RX ADMIN — INSULIN LISPRO 3 UNITS: 100 INJECTION, SOLUTION INTRAVENOUS; SUBCUTANEOUS at 13:48

## 2019-05-14 RX ADMIN — INSULIN LISPRO 10 UNITS: 100 INJECTION, SOLUTION INTRAVENOUS; SUBCUTANEOUS at 13:47

## 2019-05-14 RX ADMIN — PANTOPRAZOLE SODIUM 40 MG: 40 TABLET, DELAYED RELEASE ORAL at 06:17

## 2019-05-14 RX ADMIN — INSULIN LISPRO 2 UNITS: 100 INJECTION, SOLUTION INTRAVENOUS; SUBCUTANEOUS at 18:36

## 2019-05-14 RX ADMIN — INSULIN LISPRO 1 UNITS: 100 INJECTION, SOLUTION INTRAVENOUS; SUBCUTANEOUS at 08:55

## 2019-05-14 RX ADMIN — DIPHENHYDRAMINE HCL 25 MG: 25 TABLET, FILM COATED ORAL at 06:17

## 2019-05-15 ENCOUNTER — APPOINTMENT (INPATIENT)
Dept: RADIOLOGY | Facility: HOSPITAL | Age: 74
DRG: 469 | End: 2019-05-15
Attending: INTERNAL MEDICINE
Payer: COMMERCIAL

## 2019-05-15 ENCOUNTER — APPOINTMENT (INPATIENT)
Dept: RADIOLOGY | Facility: HOSPITAL | Age: 74
DRG: 469 | End: 2019-05-15
Payer: COMMERCIAL

## 2019-05-15 LAB
ALBUMIN SERPL BCP-MCNC: 3.1 G/DL (ref 3.5–5)
ALP SERPL-CCNC: 178 U/L (ref 46–116)
ALT SERPL W P-5'-P-CCNC: 38 U/L (ref 12–78)
ANION GAP SERPL CALCULATED.3IONS-SCNC: 11 MMOL/L (ref 4–13)
AST SERPL W P-5'-P-CCNC: 39 U/L (ref 5–45)
BILIRUB DIRECT SERPL-MCNC: 6.58 MG/DL (ref 0–0.2)
BILIRUB SERPL-MCNC: 8.28 MG/DL (ref 0.2–1)
BUN SERPL-MCNC: 73 MG/DL (ref 5–25)
CALCIUM SERPL-MCNC: 9.4 MG/DL (ref 8.3–10.1)
CHLORIDE SERPL-SCNC: 100 MMOL/L (ref 100–108)
CO2 SERPL-SCNC: 19 MMOL/L (ref 21–32)
CREAT SERPL-MCNC: 1.29 MG/DL (ref 0.6–1.3)
ERYTHROCYTE [DISTWIDTH] IN BLOOD BY AUTOMATED COUNT: 20.7 % (ref 11.6–15.1)
GFR SERPL CREATININE-BSD FRML MDRD: 41 ML/MIN/1.73SQ M
GLUCOSE SERPL-MCNC: 108 MG/DL (ref 65–140)
GLUCOSE SERPL-MCNC: 111 MG/DL (ref 65–140)
GLUCOSE SERPL-MCNC: 113 MG/DL (ref 65–140)
GLUCOSE SERPL-MCNC: 131 MG/DL (ref 65–140)
GLUCOSE SERPL-MCNC: 176 MG/DL (ref 65–140)
HCT VFR BLD AUTO: 25.6 % (ref 34.8–46.1)
HGB BLD-MCNC: 8.3 G/DL (ref 11.5–15.4)
MCH RBC QN AUTO: 31.2 PG (ref 26.8–34.3)
MCHC RBC AUTO-ENTMCNC: 32.4 G/DL (ref 31.4–37.4)
MCV RBC AUTO: 96 FL (ref 82–98)
PLATELET # BLD AUTO: 298 THOUSANDS/UL (ref 149–390)
PMV BLD AUTO: 11.4 FL (ref 8.9–12.7)
POTASSIUM SERPL-SCNC: 5.1 MMOL/L (ref 3.5–5.3)
PROT SERPL-MCNC: 5.9 G/DL (ref 6.4–8.2)
RBC # BLD AUTO: 2.66 MILLION/UL (ref 3.81–5.12)
SODIUM SERPL-SCNC: 130 MMOL/L (ref 136–145)
WBC # BLD AUTO: 20.65 THOUSAND/UL (ref 4.31–10.16)

## 2019-05-15 PROCEDURE — 47536 EXCHANGE BILIARY DRG CATH: CPT

## 2019-05-15 PROCEDURE — C1729 CATH, DRAINAGE: HCPCS

## 2019-05-15 PROCEDURE — C1769 GUIDE WIRE: HCPCS

## 2019-05-15 PROCEDURE — 85027 COMPLETE CBC AUTOMATED: CPT | Performed by: INTERNAL MEDICINE

## 2019-05-15 PROCEDURE — 49424 ASSESS CYST CONTRAST INJECT: CPT

## 2019-05-15 PROCEDURE — 80076 HEPATIC FUNCTION PANEL: CPT | Performed by: INTERNAL MEDICINE

## 2019-05-15 PROCEDURE — 97530 THERAPEUTIC ACTIVITIES: CPT

## 2019-05-15 PROCEDURE — 99232 SBSQ HOSP IP/OBS MODERATE 35: CPT | Performed by: INTERNAL MEDICINE

## 2019-05-15 PROCEDURE — 47536 EXCHANGE BILIARY DRG CATH: CPT | Performed by: RADIOLOGY

## 2019-05-15 PROCEDURE — 82948 REAGENT STRIP/BLOOD GLUCOSE: CPT

## 2019-05-15 PROCEDURE — 80048 BASIC METABOLIC PNL TOTAL CA: CPT | Performed by: INTERNAL MEDICINE

## 2019-05-15 RX ORDER — FENTANYL CITRATE 50 UG/ML
INJECTION, SOLUTION INTRAMUSCULAR; INTRAVENOUS CODE/TRAUMA/SEDATION MEDICATION
Status: COMPLETED | OUTPATIENT
Start: 2019-05-15 | End: 2019-05-15

## 2019-05-15 RX ORDER — INSULIN GLARGINE 100 [IU]/ML
9 INJECTION, SOLUTION SUBCUTANEOUS ONCE
Status: COMPLETED | OUTPATIENT
Start: 2019-05-15 | End: 2019-05-15

## 2019-05-15 RX ORDER — AMOXICILLIN 500 MG/1
500 CAPSULE ORAL EVERY 8 HOURS
Status: DISCONTINUED | OUTPATIENT
Start: 2019-05-15 | End: 2019-05-16

## 2019-05-15 RX ADMIN — FENTANYL CITRATE 25 MCG: 50 INJECTION, SOLUTION INTRAMUSCULAR; INTRAVENOUS at 08:54

## 2019-05-15 RX ADMIN — ENOXAPARIN SODIUM 40 MG: 40 INJECTION SUBCUTANEOUS at 11:23

## 2019-05-15 RX ADMIN — PANTOPRAZOLE SODIUM 40 MG: 40 TABLET, DELAYED RELEASE ORAL at 05:30

## 2019-05-15 RX ADMIN — INSULIN LISPRO 1 UNITS: 100 INJECTION, SOLUTION INTRAVENOUS; SUBCUTANEOUS at 15:28

## 2019-05-15 RX ADMIN — INSULIN LISPRO 10 UNITS: 100 INJECTION, SOLUTION INTRAVENOUS; SUBCUTANEOUS at 15:28

## 2019-05-15 RX ADMIN — PANCRELIPASE 48000 UNITS: 24000; 76000; 120000 CAPSULE, DELAYED RELEASE PELLETS ORAL at 15:28

## 2019-05-15 RX ADMIN — AMOXICILLIN 500 MG: 500 CAPSULE ORAL at 10:42

## 2019-05-15 RX ADMIN — FENTANYL CITRATE 25 MCG: 50 INJECTION, SOLUTION INTRAMUSCULAR; INTRAVENOUS at 08:50

## 2019-05-15 RX ADMIN — DIPHENHYDRAMINE HCL 25 MG: 25 TABLET, FILM COATED ORAL at 18:49

## 2019-05-15 RX ADMIN — FENTANYL CITRATE 50 MCG: 50 INJECTION, SOLUTION INTRAMUSCULAR; INTRAVENOUS at 13:35

## 2019-05-15 RX ADMIN — INSULIN LISPRO 10 UNITS: 100 INJECTION, SOLUTION INTRAVENOUS; SUBCUTANEOUS at 18:47

## 2019-05-15 RX ADMIN — INSULIN LISPRO 10 UNITS: 100 INJECTION, SOLUTION INTRAVENOUS; SUBCUTANEOUS at 10:43

## 2019-05-15 RX ADMIN — OXYCODONE HYDROCHLORIDE 2.5 MG: 5 TABLET ORAL at 17:36

## 2019-05-15 RX ADMIN — DIPHENHYDRAMINE HCL 25 MG: 25 TABLET, FILM COATED ORAL at 10:46

## 2019-05-15 RX ADMIN — PANCRELIPASE 48000 UNITS: 24000; 76000; 120000 CAPSULE, DELAYED RELEASE PELLETS ORAL at 10:42

## 2019-05-15 RX ADMIN — IOHEXOL 10 ML: 300 INJECTION, SOLUTION INTRAVENOUS at 14:05

## 2019-05-15 RX ADMIN — AMOXICILLIN 500 MG: 500 CAPSULE ORAL at 18:46

## 2019-05-15 RX ADMIN — IOHEXOL 20 ML: 240 INJECTION, SOLUTION INTRATHECAL; INTRAVASCULAR; INTRAVENOUS; ORAL at 09:58

## 2019-05-15 RX ADMIN — ERGOCALCIFEROL 50000 UNITS: 1.25 CAPSULE ORAL at 10:42

## 2019-05-15 RX ADMIN — INSULIN GLARGINE 9 UNITS: 100 INJECTION, SOLUTION SUBCUTANEOUS at 23:08

## 2019-05-15 RX ADMIN — PANCRELIPASE 48000 UNITS: 24000; 76000; 120000 CAPSULE, DELAYED RELEASE PELLETS ORAL at 18:46

## 2019-05-16 LAB
ALBUMIN SERPL BCP-MCNC: 2.9 G/DL (ref 3.5–5)
ALP SERPL-CCNC: 195 U/L (ref 46–116)
ALT SERPL W P-5'-P-CCNC: 44 U/L (ref 12–78)
ANION GAP SERPL CALCULATED.3IONS-SCNC: 15 MMOL/L (ref 4–13)
AST SERPL W P-5'-P-CCNC: 42 U/L (ref 5–45)
BILIRUB DIRECT SERPL-MCNC: 6.69 MG/DL (ref 0–0.2)
BILIRUB SERPL-MCNC: 8.35 MG/DL (ref 0.2–1)
BUN SERPL-MCNC: 75 MG/DL (ref 5–25)
CALCIUM SERPL-MCNC: 9.3 MG/DL (ref 8.3–10.1)
CHLORIDE SERPL-SCNC: 102 MMOL/L (ref 100–108)
CO2 SERPL-SCNC: 16 MMOL/L (ref 21–32)
CREAT SERPL-MCNC: 1.21 MG/DL (ref 0.6–1.3)
ERYTHROCYTE [DISTWIDTH] IN BLOOD BY AUTOMATED COUNT: 20.8 % (ref 11.6–15.1)
GFR SERPL CREATININE-BSD FRML MDRD: 44 ML/MIN/1.73SQ M
GLUCOSE SERPL-MCNC: 106 MG/DL (ref 65–140)
GLUCOSE SERPL-MCNC: 196 MG/DL (ref 65–140)
GLUCOSE SERPL-MCNC: 216 MG/DL (ref 65–140)
GLUCOSE SERPL-MCNC: 229 MG/DL (ref 65–140)
GLUCOSE SERPL-MCNC: 60 MG/DL (ref 65–140)
GLUCOSE SERPL-MCNC: 73 MG/DL (ref 65–140)
HCT VFR BLD AUTO: 26.6 % (ref 34.8–46.1)
HGB BLD-MCNC: 8.5 G/DL (ref 11.5–15.4)
MCH RBC QN AUTO: 30.5 PG (ref 26.8–34.3)
MCHC RBC AUTO-ENTMCNC: 32 G/DL (ref 31.4–37.4)
MCV RBC AUTO: 95 FL (ref 82–98)
PLATELET # BLD AUTO: 338 THOUSANDS/UL (ref 149–390)
PMV BLD AUTO: 11.3 FL (ref 8.9–12.7)
POTASSIUM SERPL-SCNC: 5 MMOL/L (ref 3.5–5.3)
PROT SERPL-MCNC: 5.8 G/DL (ref 6.4–8.2)
RBC # BLD AUTO: 2.79 MILLION/UL (ref 3.81–5.12)
SODIUM SERPL-SCNC: 133 MMOL/L (ref 136–145)
WBC # BLD AUTO: 19.51 THOUSAND/UL (ref 4.31–10.16)

## 2019-05-16 PROCEDURE — 97535 SELF CARE MNGMENT TRAINING: CPT

## 2019-05-16 PROCEDURE — 82948 REAGENT STRIP/BLOOD GLUCOSE: CPT

## 2019-05-16 PROCEDURE — 99232 SBSQ HOSP IP/OBS MODERATE 35: CPT | Performed by: INTERNAL MEDICINE

## 2019-05-16 PROCEDURE — 80076 HEPATIC FUNCTION PANEL: CPT | Performed by: INTERNAL MEDICINE

## 2019-05-16 PROCEDURE — 85027 COMPLETE CBC AUTOMATED: CPT | Performed by: INTERNAL MEDICINE

## 2019-05-16 PROCEDURE — 99222 1ST HOSP IP/OBS MODERATE 55: CPT | Performed by: SURGERY

## 2019-05-16 PROCEDURE — 80048 BASIC METABOLIC PNL TOTAL CA: CPT | Performed by: INTERNAL MEDICINE

## 2019-05-16 RX ORDER — LACTOBACILLUS ACIDOPHILUS / LACTOBACILLUS BULGARICUS 100 MILLION CFU STRENGTH
1 GRANULES ORAL
Status: DISCONTINUED | OUTPATIENT
Start: 2019-05-16 | End: 2019-06-03 | Stop reason: HOSPADM

## 2019-05-16 RX ADMIN — AMOXICILLIN 500 MG: 500 CAPSULE ORAL at 10:48

## 2019-05-16 RX ADMIN — PANCRELIPASE 48000 UNITS: 24000; 76000; 120000 CAPSULE, DELAYED RELEASE PELLETS ORAL at 13:31

## 2019-05-16 RX ADMIN — DIPHENHYDRAMINE HCL 25 MG: 25 TABLET, FILM COATED ORAL at 01:33

## 2019-05-16 RX ADMIN — PANCRELIPASE 48000 UNITS: 24000; 76000; 120000 CAPSULE, DELAYED RELEASE PELLETS ORAL at 10:48

## 2019-05-16 RX ADMIN — INSULIN LISPRO 10 UNITS: 100 INJECTION, SOLUTION INTRAVENOUS; SUBCUTANEOUS at 18:48

## 2019-05-16 RX ADMIN — INSULIN LISPRO 2 UNITS: 100 INJECTION, SOLUTION INTRAVENOUS; SUBCUTANEOUS at 13:20

## 2019-05-16 RX ADMIN — LIDOCAINE 1 PATCH: 50 PATCH CUTANEOUS at 22:59

## 2019-05-16 RX ADMIN — AMOXICILLIN 500 MG: 500 CAPSULE ORAL at 01:33

## 2019-05-16 RX ADMIN — Medication 1 PACKET: at 19:20

## 2019-05-16 RX ADMIN — INSULIN LISPRO 10 UNITS: 100 INJECTION, SOLUTION INTRAVENOUS; SUBCUTANEOUS at 13:20

## 2019-05-16 RX ADMIN — PANTOPRAZOLE SODIUM 40 MG: 40 TABLET, DELAYED RELEASE ORAL at 06:44

## 2019-05-16 RX ADMIN — INSULIN LISPRO 2 UNITS: 100 INJECTION, SOLUTION INTRAVENOUS; SUBCUTANEOUS at 18:48

## 2019-05-16 RX ADMIN — PANCRELIPASE 48000 UNITS: 24000; 76000; 120000 CAPSULE, DELAYED RELEASE PELLETS ORAL at 18:49

## 2019-05-16 RX ADMIN — DIPHENHYDRAMINE HCL 25 MG: 25 TABLET, FILM COATED ORAL at 10:48

## 2019-05-16 RX ADMIN — ENOXAPARIN SODIUM 40 MG: 40 INJECTION SUBCUTANEOUS at 13:19

## 2019-05-16 RX ADMIN — INSULIN GLARGINE 18 UNITS: 100 INJECTION, SOLUTION SUBCUTANEOUS at 22:59

## 2019-05-17 LAB
ALBUMIN SERPL BCP-MCNC: 2.8 G/DL (ref 3.5–5)
ALP SERPL-CCNC: 248 U/L (ref 46–116)
ALT SERPL W P-5'-P-CCNC: 45 U/L (ref 12–78)
AST SERPL W P-5'-P-CCNC: 39 U/L (ref 5–45)
BILIRUB DIRECT SERPL-MCNC: 6.69 MG/DL (ref 0–0.2)
BILIRUB SERPL-MCNC: 8.21 MG/DL (ref 0.2–1)
ERYTHROCYTE [DISTWIDTH] IN BLOOD BY AUTOMATED COUNT: 20.8 % (ref 11.6–15.1)
GLUCOSE SERPL-MCNC: 117 MG/DL (ref 65–140)
GLUCOSE SERPL-MCNC: 127 MG/DL (ref 65–140)
GLUCOSE SERPL-MCNC: 129 MG/DL (ref 65–140)
GLUCOSE SERPL-MCNC: 143 MG/DL (ref 65–140)
HCT VFR BLD AUTO: 27.4 % (ref 34.8–46.1)
HGB BLD-MCNC: 8.8 G/DL (ref 11.5–15.4)
MCH RBC QN AUTO: 30.7 PG (ref 26.8–34.3)
MCHC RBC AUTO-ENTMCNC: 32.1 G/DL (ref 31.4–37.4)
MCV RBC AUTO: 96 FL (ref 82–98)
PLATELET # BLD AUTO: 361 THOUSANDS/UL (ref 149–390)
PMV BLD AUTO: 10.9 FL (ref 8.9–12.7)
PROT SERPL-MCNC: 5.8 G/DL (ref 6.4–8.2)
RBC # BLD AUTO: 2.87 MILLION/UL (ref 3.81–5.12)
WBC # BLD AUTO: 16.24 THOUSAND/UL (ref 4.31–10.16)

## 2019-05-17 PROCEDURE — 80076 HEPATIC FUNCTION PANEL: CPT | Performed by: INTERNAL MEDICINE

## 2019-05-17 PROCEDURE — 99232 SBSQ HOSP IP/OBS MODERATE 35: CPT | Performed by: INTERNAL MEDICINE

## 2019-05-17 PROCEDURE — 82948 REAGENT STRIP/BLOOD GLUCOSE: CPT

## 2019-05-17 PROCEDURE — 85027 COMPLETE CBC AUTOMATED: CPT | Performed by: INTERNAL MEDICINE

## 2019-05-17 RX ADMIN — ENOXAPARIN SODIUM 40 MG: 40 INJECTION SUBCUTANEOUS at 13:17

## 2019-05-17 RX ADMIN — PANCRELIPASE 48000 UNITS: 24000; 76000; 120000 CAPSULE, DELAYED RELEASE PELLETS ORAL at 17:32

## 2019-05-17 RX ADMIN — LIDOCAINE 1 PATCH: 50 PATCH CUTANEOUS at 21:32

## 2019-05-17 RX ADMIN — ERGOCALCIFEROL 50000 UNITS: 1.25 CAPSULE ORAL at 08:30

## 2019-05-17 RX ADMIN — INSULIN LISPRO 10 UNITS: 100 INJECTION, SOLUTION INTRAVENOUS; SUBCUTANEOUS at 08:31

## 2019-05-17 RX ADMIN — INSULIN LISPRO 10 UNITS: 100 INJECTION, SOLUTION INTRAVENOUS; SUBCUTANEOUS at 17:33

## 2019-05-17 RX ADMIN — Medication 1 PACKET: at 08:31

## 2019-05-17 RX ADMIN — PANTOPRAZOLE SODIUM 40 MG: 40 TABLET, DELAYED RELEASE ORAL at 06:00

## 2019-05-17 RX ADMIN — Medication 1 PACKET: at 13:17

## 2019-05-17 RX ADMIN — PANCRELIPASE 48000 UNITS: 24000; 76000; 120000 CAPSULE, DELAYED RELEASE PELLETS ORAL at 13:17

## 2019-05-17 RX ADMIN — INSULIN LISPRO 10 UNITS: 100 INJECTION, SOLUTION INTRAVENOUS; SUBCUTANEOUS at 13:17

## 2019-05-17 RX ADMIN — VANCOMYCIN HYDROCHLORIDE 1500 MG: 5 INJECTION, POWDER, LYOPHILIZED, FOR SOLUTION INTRAVENOUS at 13:16

## 2019-05-17 RX ADMIN — PANCRELIPASE 48000 UNITS: 24000; 76000; 120000 CAPSULE, DELAYED RELEASE PELLETS ORAL at 08:30

## 2019-05-17 RX ADMIN — INSULIN GLARGINE 18 UNITS: 100 INJECTION, SOLUTION SUBCUTANEOUS at 21:31

## 2019-05-17 RX ADMIN — Medication 1 PACKET: at 17:32

## 2019-05-18 LAB
ALBUMIN SERPL BCP-MCNC: 2.6 G/DL (ref 3.5–5)
ALP SERPL-CCNC: 232 U/L (ref 46–116)
ALT SERPL W P-5'-P-CCNC: 44 U/L (ref 12–78)
ANION GAP SERPL CALCULATED.3IONS-SCNC: 11 MMOL/L (ref 4–13)
AST SERPL W P-5'-P-CCNC: 43 U/L (ref 5–45)
BASOPHILS # BLD AUTO: 0.08 THOUSANDS/ΜL (ref 0–0.1)
BASOPHILS NFR BLD AUTO: 1 % (ref 0–1)
BILIRUB SERPL-MCNC: 7.59 MG/DL (ref 0.2–1)
BUN SERPL-MCNC: 69 MG/DL (ref 5–25)
CALCIUM SERPL-MCNC: 8.9 MG/DL (ref 8.3–10.1)
CHLORIDE SERPL-SCNC: 100 MMOL/L (ref 100–108)
CO2 SERPL-SCNC: 18 MMOL/L (ref 21–32)
CREAT SERPL-MCNC: 1.1 MG/DL (ref 0.6–1.3)
EOSINOPHIL # BLD AUTO: 0.28 THOUSAND/ΜL (ref 0–0.61)
EOSINOPHIL NFR BLD AUTO: 2 % (ref 0–6)
ERYTHROCYTE [DISTWIDTH] IN BLOOD BY AUTOMATED COUNT: 20.6 % (ref 11.6–15.1)
GFR SERPL CREATININE-BSD FRML MDRD: 49 ML/MIN/1.73SQ M
GLUCOSE SERPL-MCNC: 133 MG/DL (ref 65–140)
GLUCOSE SERPL-MCNC: 167 MG/DL (ref 65–140)
GLUCOSE SERPL-MCNC: 207 MG/DL (ref 65–140)
GLUCOSE SERPL-MCNC: 60 MG/DL (ref 65–140)
GLUCOSE SERPL-MCNC: 62 MG/DL (ref 65–140)
GLUCOSE SERPL-MCNC: 92 MG/DL (ref 65–140)
HCT VFR BLD AUTO: 28.3 % (ref 34.8–46.1)
HGB BLD-MCNC: 9 G/DL (ref 11.5–15.4)
IMM GRANULOCYTES # BLD AUTO: 0.36 THOUSAND/UL (ref 0–0.2)
IMM GRANULOCYTES NFR BLD AUTO: 2 % (ref 0–2)
LYMPHOCYTES # BLD AUTO: 1.24 THOUSANDS/ΜL (ref 0.6–4.47)
LYMPHOCYTES NFR BLD AUTO: 7 % (ref 14–44)
MCH RBC QN AUTO: 30.5 PG (ref 26.8–34.3)
MCHC RBC AUTO-ENTMCNC: 31.8 G/DL (ref 31.4–37.4)
MCV RBC AUTO: 96 FL (ref 82–98)
MONOCYTES # BLD AUTO: 1.2 THOUSAND/ΜL (ref 0.17–1.22)
MONOCYTES NFR BLD AUTO: 7 % (ref 4–12)
NEUTROPHILS # BLD AUTO: 13.96 THOUSANDS/ΜL (ref 1.85–7.62)
NEUTS SEG NFR BLD AUTO: 81 % (ref 43–75)
NRBC BLD AUTO-RTO: 0 /100 WBCS
PLATELET # BLD AUTO: 265 THOUSANDS/UL (ref 149–390)
PMV BLD AUTO: 11.6 FL (ref 8.9–12.7)
POTASSIUM SERPL-SCNC: 4.4 MMOL/L (ref 3.5–5.3)
PROT SERPL-MCNC: 5.5 G/DL (ref 6.4–8.2)
RBC # BLD AUTO: 2.95 MILLION/UL (ref 3.81–5.12)
SODIUM SERPL-SCNC: 129 MMOL/L (ref 136–145)
WBC # BLD AUTO: 17.12 THOUSAND/UL (ref 4.31–10.16)

## 2019-05-18 PROCEDURE — 82948 REAGENT STRIP/BLOOD GLUCOSE: CPT

## 2019-05-18 PROCEDURE — 99232 SBSQ HOSP IP/OBS MODERATE 35: CPT | Performed by: SURGERY

## 2019-05-18 PROCEDURE — 85025 COMPLETE CBC W/AUTO DIFF WBC: CPT | Performed by: INTERNAL MEDICINE

## 2019-05-18 PROCEDURE — 80053 COMPREHEN METABOLIC PANEL: CPT | Performed by: INTERNAL MEDICINE

## 2019-05-18 PROCEDURE — 99232 SBSQ HOSP IP/OBS MODERATE 35: CPT | Performed by: INTERNAL MEDICINE

## 2019-05-18 RX ORDER — INSULIN GLARGINE 100 [IU]/ML
12 INJECTION, SOLUTION SUBCUTANEOUS
Status: DISCONTINUED | OUTPATIENT
Start: 2019-05-18 | End: 2019-05-19

## 2019-05-18 RX ADMIN — PANCRELIPASE 48000 UNITS: 24000; 76000; 120000 CAPSULE, DELAYED RELEASE PELLETS ORAL at 13:28

## 2019-05-18 RX ADMIN — Medication 1 PACKET: at 13:28

## 2019-05-18 RX ADMIN — VANCOMYCIN HYDROCHLORIDE 1500 MG: 5 INJECTION, POWDER, LYOPHILIZED, FOR SOLUTION INTRAVENOUS at 13:58

## 2019-05-18 RX ADMIN — PANCRELIPASE 48000 UNITS: 24000; 76000; 120000 CAPSULE, DELAYED RELEASE PELLETS ORAL at 08:00

## 2019-05-18 RX ADMIN — INSULIN LISPRO 10 UNITS: 100 INJECTION, SOLUTION INTRAVENOUS; SUBCUTANEOUS at 13:29

## 2019-05-18 RX ADMIN — Medication 1 PACKET: at 08:00

## 2019-05-18 RX ADMIN — OXYCODONE HYDROCHLORIDE 2.5 MG: 5 TABLET ORAL at 01:23

## 2019-05-18 RX ADMIN — Medication 1 PACKET: at 17:54

## 2019-05-18 RX ADMIN — INSULIN LISPRO 1 UNITS: 100 INJECTION, SOLUTION INTRAVENOUS; SUBCUTANEOUS at 17:54

## 2019-05-18 RX ADMIN — INSULIN LISPRO 10 UNITS: 100 INJECTION, SOLUTION INTRAVENOUS; SUBCUTANEOUS at 17:54

## 2019-05-18 RX ADMIN — PANTOPRAZOLE SODIUM 40 MG: 40 TABLET, DELAYED RELEASE ORAL at 05:25

## 2019-05-18 RX ADMIN — OXYCODONE HYDROCHLORIDE 2.5 MG: 5 TABLET ORAL at 22:12

## 2019-05-18 RX ADMIN — ENOXAPARIN SODIUM 40 MG: 40 INJECTION SUBCUTANEOUS at 13:28

## 2019-05-18 RX ADMIN — PANCRELIPASE 48000 UNITS: 24000; 76000; 120000 CAPSULE, DELAYED RELEASE PELLETS ORAL at 17:54

## 2019-05-18 RX ADMIN — INSULIN LISPRO 10 UNITS: 100 INJECTION, SOLUTION INTRAVENOUS; SUBCUTANEOUS at 08:03

## 2019-05-18 RX ADMIN — LIDOCAINE 1 PATCH: 50 PATCH CUTANEOUS at 22:18

## 2019-05-18 RX ADMIN — INSULIN LISPRO 2 UNITS: 100 INJECTION, SOLUTION INTRAVENOUS; SUBCUTANEOUS at 13:29

## 2019-05-18 RX ADMIN — INSULIN GLARGINE 12 UNITS: 100 INJECTION, SOLUTION SUBCUTANEOUS at 22:17

## 2019-05-19 ENCOUNTER — APPOINTMENT (INPATIENT)
Dept: MRI IMAGING | Facility: HOSPITAL | Age: 74
DRG: 469 | End: 2019-05-19
Payer: COMMERCIAL

## 2019-05-19 LAB
GLUCOSE SERPL-MCNC: 111 MG/DL (ref 65–140)
GLUCOSE SERPL-MCNC: 176 MG/DL (ref 65–140)
GLUCOSE SERPL-MCNC: 227 MG/DL (ref 65–140)
GLUCOSE SERPL-MCNC: 268 MG/DL (ref 65–140)
GLUCOSE SERPL-MCNC: 52 MG/DL (ref 65–140)

## 2019-05-19 PROCEDURE — 82948 REAGENT STRIP/BLOOD GLUCOSE: CPT

## 2019-05-19 PROCEDURE — 99232 SBSQ HOSP IP/OBS MODERATE 35: CPT | Performed by: INTERNAL MEDICINE

## 2019-05-19 PROCEDURE — 74181 MRI ABDOMEN W/O CONTRAST: CPT

## 2019-05-19 RX ORDER — INSULIN GLARGINE 100 [IU]/ML
10 INJECTION, SOLUTION SUBCUTANEOUS
Status: DISCONTINUED | OUTPATIENT
Start: 2019-05-19 | End: 2019-06-03 | Stop reason: HOSPADM

## 2019-05-19 RX ADMIN — INSULIN LISPRO 3 UNITS: 100 INJECTION, SOLUTION INTRAVENOUS; SUBCUTANEOUS at 18:08

## 2019-05-19 RX ADMIN — Medication 1 PACKET: at 18:08

## 2019-05-19 RX ADMIN — PANCRELIPASE 48000 UNITS: 24000; 76000; 120000 CAPSULE, DELAYED RELEASE PELLETS ORAL at 13:42

## 2019-05-19 RX ADMIN — OXYCODONE HYDROCHLORIDE 2.5 MG: 5 TABLET ORAL at 22:02

## 2019-05-19 RX ADMIN — PANCRELIPASE 48000 UNITS: 24000; 76000; 120000 CAPSULE, DELAYED RELEASE PELLETS ORAL at 08:07

## 2019-05-19 RX ADMIN — INSULIN GLARGINE 10 UNITS: 100 INJECTION, SOLUTION SUBCUTANEOUS at 22:02

## 2019-05-19 RX ADMIN — ENOXAPARIN SODIUM 40 MG: 40 INJECTION SUBCUTANEOUS at 13:45

## 2019-05-19 RX ADMIN — VANCOMYCIN HYDROCHLORIDE 1500 MG: 5 INJECTION, POWDER, LYOPHILIZED, FOR SOLUTION INTRAVENOUS at 13:48

## 2019-05-19 RX ADMIN — PANTOPRAZOLE SODIUM 40 MG: 40 TABLET, DELAYED RELEASE ORAL at 06:38

## 2019-05-19 RX ADMIN — Medication 1 PACKET: at 13:42

## 2019-05-19 RX ADMIN — Medication 1 PACKET: at 08:07

## 2019-05-19 RX ADMIN — INSULIN LISPRO 2 UNITS: 100 INJECTION, SOLUTION INTRAVENOUS; SUBCUTANEOUS at 13:28

## 2019-05-19 RX ADMIN — INSULIN LISPRO 10 UNITS: 100 INJECTION, SOLUTION INTRAVENOUS; SUBCUTANEOUS at 18:10

## 2019-05-19 RX ADMIN — PANCRELIPASE 48000 UNITS: 24000; 76000; 120000 CAPSULE, DELAYED RELEASE PELLETS ORAL at 18:08

## 2019-05-19 RX ADMIN — INSULIN LISPRO 10 UNITS: 100 INJECTION, SOLUTION INTRAVENOUS; SUBCUTANEOUS at 13:43

## 2019-05-19 RX ADMIN — LIDOCAINE 1 PATCH: 50 PATCH CUTANEOUS at 22:02

## 2019-05-20 LAB
ALBUMIN SERPL BCP-MCNC: 2.6 G/DL (ref 3.5–5)
ALP SERPL-CCNC: 242 U/L (ref 46–116)
ALT SERPL W P-5'-P-CCNC: 53 U/L (ref 12–78)
ANION GAP SERPL CALCULATED.3IONS-SCNC: 11 MMOL/L (ref 4–13)
AST SERPL W P-5'-P-CCNC: 49 U/L (ref 5–45)
BASOPHILS # BLD MANUAL: 0 THOUSAND/UL (ref 0–0.1)
BASOPHILS NFR MAR MANUAL: 0 % (ref 0–1)
BILIRUB SERPL-MCNC: 6.94 MG/DL (ref 0.2–1)
BUN SERPL-MCNC: 60 MG/DL (ref 5–25)
CALCIUM SERPL-MCNC: 9 MG/DL (ref 8.3–10.1)
CHLORIDE SERPL-SCNC: 102 MMOL/L (ref 100–108)
CO2 SERPL-SCNC: 16 MMOL/L (ref 21–32)
CREAT SERPL-MCNC: 1.19 MG/DL (ref 0.6–1.3)
EOSINOPHIL # BLD MANUAL: 0.2 THOUSAND/UL (ref 0–0.4)
EOSINOPHIL NFR BLD MANUAL: 1 % (ref 0–6)
ERYTHROCYTE [DISTWIDTH] IN BLOOD BY AUTOMATED COUNT: 20.3 % (ref 11.6–15.1)
GFR SERPL CREATININE-BSD FRML MDRD: 45 ML/MIN/1.73SQ M
GLUCOSE SERPL-MCNC: 184 MG/DL (ref 65–140)
GLUCOSE SERPL-MCNC: 215 MG/DL (ref 65–140)
GLUCOSE SERPL-MCNC: 219 MG/DL (ref 65–140)
GLUCOSE SERPL-MCNC: 90 MG/DL (ref 65–140)
GLUCOSE SERPL-MCNC: 92 MG/DL (ref 65–140)
HCT VFR BLD AUTO: 30.4 % (ref 34.8–46.1)
HGB BLD-MCNC: 9.7 G/DL (ref 11.5–15.4)
LYMPHOCYTES # BLD AUTO: 1.62 THOUSAND/UL (ref 0.6–4.47)
LYMPHOCYTES # BLD AUTO: 8 % (ref 14–44)
MCH RBC QN AUTO: 30.4 PG (ref 26.8–34.3)
MCHC RBC AUTO-ENTMCNC: 31.9 G/DL (ref 31.4–37.4)
MCV RBC AUTO: 95 FL (ref 82–98)
METAMYELOCYTES NFR BLD MANUAL: 1 % (ref 0–1)
MONOCYTES # BLD AUTO: 1.02 THOUSAND/UL (ref 0–1.22)
MONOCYTES NFR BLD: 5 % (ref 4–12)
NEUTROPHILS # BLD MANUAL: 17.26 THOUSAND/UL (ref 1.85–7.62)
NEUTS BAND NFR BLD MANUAL: 4 % (ref 0–8)
NEUTS SEG NFR BLD AUTO: 81 % (ref 43–75)
NRBC BLD AUTO-RTO: 0 /100 WBCS
PLATELET # BLD AUTO: 383 THOUSANDS/UL (ref 149–390)
PLATELET BLD QL SMEAR: ADEQUATE
PMV BLD AUTO: 11.1 FL (ref 8.9–12.7)
POTASSIUM SERPL-SCNC: 4.8 MMOL/L (ref 3.5–5.3)
PROT SERPL-MCNC: 5.7 G/DL (ref 6.4–8.2)
RBC # BLD AUTO: 3.19 MILLION/UL (ref 3.81–5.12)
RBC MORPH BLD: NORMAL
SODIUM SERPL-SCNC: 129 MMOL/L (ref 136–145)
TOTAL CELLS COUNTED SPEC: 100
WBC # BLD AUTO: 20.31 THOUSAND/UL (ref 4.31–10.16)

## 2019-05-20 PROCEDURE — 85007 BL SMEAR W/DIFF WBC COUNT: CPT | Performed by: INTERNAL MEDICINE

## 2019-05-20 PROCEDURE — 80053 COMPREHEN METABOLIC PANEL: CPT | Performed by: INTERNAL MEDICINE

## 2019-05-20 PROCEDURE — 99232 SBSQ HOSP IP/OBS MODERATE 35: CPT | Performed by: INTERNAL MEDICINE

## 2019-05-20 PROCEDURE — 85027 COMPLETE CBC AUTOMATED: CPT | Performed by: INTERNAL MEDICINE

## 2019-05-20 PROCEDURE — 82948 REAGENT STRIP/BLOOD GLUCOSE: CPT

## 2019-05-20 RX ADMIN — Medication 1 PACKET: at 08:16

## 2019-05-20 RX ADMIN — INSULIN LISPRO 1 UNITS: 100 INJECTION, SOLUTION INTRAVENOUS; SUBCUTANEOUS at 18:33

## 2019-05-20 RX ADMIN — INSULIN LISPRO 2 UNITS: 100 INJECTION, SOLUTION INTRAVENOUS; SUBCUTANEOUS at 13:34

## 2019-05-20 RX ADMIN — INSULIN GLARGINE 10 UNITS: 100 INJECTION, SOLUTION SUBCUTANEOUS at 22:12

## 2019-05-20 RX ADMIN — ENOXAPARIN SODIUM 40 MG: 40 INJECTION SUBCUTANEOUS at 12:09

## 2019-05-20 RX ADMIN — ERGOCALCIFEROL 50000 UNITS: 1.25 CAPSULE ORAL at 08:18

## 2019-05-20 RX ADMIN — Medication 1 PACKET: at 18:30

## 2019-05-20 RX ADMIN — PANCRELIPASE 48000 UNITS: 24000; 76000; 120000 CAPSULE, DELAYED RELEASE PELLETS ORAL at 18:29

## 2019-05-20 RX ADMIN — INSULIN LISPRO 10 UNITS: 100 INJECTION, SOLUTION INTRAVENOUS; SUBCUTANEOUS at 13:35

## 2019-05-20 RX ADMIN — PANCRELIPASE 48000 UNITS: 24000; 76000; 120000 CAPSULE, DELAYED RELEASE PELLETS ORAL at 13:35

## 2019-05-20 RX ADMIN — PANCRELIPASE 48000 UNITS: 24000; 76000; 120000 CAPSULE, DELAYED RELEASE PELLETS ORAL at 08:16

## 2019-05-20 RX ADMIN — OXYCODONE HYDROCHLORIDE 2.5 MG: 5 TABLET ORAL at 22:17

## 2019-05-20 RX ADMIN — INSULIN LISPRO 10 UNITS: 100 INJECTION, SOLUTION INTRAVENOUS; SUBCUTANEOUS at 08:18

## 2019-05-20 RX ADMIN — PANTOPRAZOLE SODIUM 40 MG: 40 TABLET, DELAYED RELEASE ORAL at 06:03

## 2019-05-20 RX ADMIN — Medication 1 PACKET: at 13:35

## 2019-05-20 RX ADMIN — INSULIN LISPRO 10 UNITS: 100 INJECTION, SOLUTION INTRAVENOUS; SUBCUTANEOUS at 18:34

## 2019-05-21 LAB
ALBUMIN SERPL BCP-MCNC: 2.3 G/DL (ref 3.5–5)
ALBUMIN SERPL BCP-MCNC: 2.4 G/DL (ref 3.5–5)
ALP SERPL-CCNC: 244 U/L (ref 46–116)
ALP SERPL-CCNC: 244 U/L (ref 46–116)
ALT SERPL W P-5'-P-CCNC: 51 U/L (ref 12–78)
ALT SERPL W P-5'-P-CCNC: 54 U/L (ref 12–78)
ANION GAP SERPL CALCULATED.3IONS-SCNC: 12 MMOL/L (ref 4–13)
ANION GAP SERPL CALCULATED.3IONS-SCNC: 13 MMOL/L (ref 4–13)
AST SERPL W P-5'-P-CCNC: 44 U/L (ref 5–45)
AST SERPL W P-5'-P-CCNC: 50 U/L (ref 5–45)
BASOPHILS # BLD AUTO: 0.08 THOUSANDS/ΜL (ref 0–0.1)
BASOPHILS NFR BLD AUTO: 1 % (ref 0–1)
BILIRUB SERPL-MCNC: 5.87 MG/DL (ref 0.2–1)
BILIRUB SERPL-MCNC: 6.15 MG/DL (ref 0.2–1)
BUN SERPL-MCNC: 54 MG/DL (ref 5–25)
BUN SERPL-MCNC: 58 MG/DL (ref 5–25)
CALCIUM SERPL-MCNC: 8.3 MG/DL (ref 8.3–10.1)
CALCIUM SERPL-MCNC: 8.5 MG/DL (ref 8.3–10.1)
CHLORIDE SERPL-SCNC: 102 MMOL/L (ref 100–108)
CHLORIDE SERPL-SCNC: 103 MMOL/L (ref 100–108)
CO2 SERPL-SCNC: 15 MMOL/L (ref 21–32)
CO2 SERPL-SCNC: 15 MMOL/L (ref 21–32)
CREAT SERPL-MCNC: 1.01 MG/DL (ref 0.6–1.3)
CREAT SERPL-MCNC: 1.05 MG/DL (ref 0.6–1.3)
EOSINOPHIL # BLD AUTO: 0.26 THOUSAND/ΜL (ref 0–0.61)
EOSINOPHIL NFR BLD AUTO: 2 % (ref 0–6)
ERYTHROCYTE [DISTWIDTH] IN BLOOD BY AUTOMATED COUNT: 20.3 % (ref 11.6–15.1)
GFR SERPL CREATININE-BSD FRML MDRD: 52 ML/MIN/1.73SQ M
GFR SERPL CREATININE-BSD FRML MDRD: 55 ML/MIN/1.73SQ M
GLUCOSE SERPL-MCNC: 111 MG/DL (ref 65–140)
GLUCOSE SERPL-MCNC: 122 MG/DL (ref 65–140)
GLUCOSE SERPL-MCNC: 123 MG/DL (ref 65–140)
GLUCOSE SERPL-MCNC: 227 MG/DL (ref 65–140)
GLUCOSE SERPL-MCNC: 260 MG/DL (ref 65–140)
GLUCOSE SERPL-MCNC: 274 MG/DL (ref 65–140)
HCT VFR BLD AUTO: 28.1 % (ref 34.8–46.1)
HGB BLD-MCNC: 8.8 G/DL (ref 11.5–15.4)
IMM GRANULOCYTES # BLD AUTO: 0.41 THOUSAND/UL (ref 0–0.2)
IMM GRANULOCYTES NFR BLD AUTO: 2 % (ref 0–2)
INR PPP: 1.61 (ref 0.86–1.17)
LYMPHOCYTES # BLD AUTO: 1.43 THOUSANDS/ΜL (ref 0.6–4.47)
LYMPHOCYTES NFR BLD AUTO: 8 % (ref 14–44)
MCH RBC QN AUTO: 29.8 PG (ref 26.8–34.3)
MCHC RBC AUTO-ENTMCNC: 31.3 G/DL (ref 31.4–37.4)
MCV RBC AUTO: 95 FL (ref 82–98)
MONOCYTES # BLD AUTO: 1.26 THOUSAND/ΜL (ref 0.17–1.22)
MONOCYTES NFR BLD AUTO: 7 % (ref 4–12)
NEUTROPHILS # BLD AUTO: 14.03 THOUSANDS/ΜL (ref 1.85–7.62)
NEUTS SEG NFR BLD AUTO: 80 % (ref 43–75)
NRBC BLD AUTO-RTO: 0 /100 WBCS
PLATELET # BLD AUTO: 340 THOUSANDS/UL (ref 149–390)
PMV BLD AUTO: 11.1 FL (ref 8.9–12.7)
POTASSIUM SERPL-SCNC: 4.2 MMOL/L (ref 3.5–5.3)
POTASSIUM SERPL-SCNC: 4.4 MMOL/L (ref 3.5–5.3)
PROT SERPL-MCNC: 5.2 G/DL (ref 6.4–8.2)
PROT SERPL-MCNC: 5.3 G/DL (ref 6.4–8.2)
PROTHROMBIN TIME: 19.2 SECONDS (ref 11.8–14.2)
RBC # BLD AUTO: 2.95 MILLION/UL (ref 3.81–5.12)
SODIUM SERPL-SCNC: 129 MMOL/L (ref 136–145)
SODIUM SERPL-SCNC: 131 MMOL/L (ref 136–145)
WBC # BLD AUTO: 17.47 THOUSAND/UL (ref 4.31–10.16)

## 2019-05-21 PROCEDURE — 85025 COMPLETE CBC W/AUTO DIFF WBC: CPT | Performed by: INTERNAL MEDICINE

## 2019-05-21 PROCEDURE — 85610 PROTHROMBIN TIME: CPT | Performed by: INTERNAL MEDICINE

## 2019-05-21 PROCEDURE — 99232 SBSQ HOSP IP/OBS MODERATE 35: CPT | Performed by: INTERNAL MEDICINE

## 2019-05-21 PROCEDURE — 80053 COMPREHEN METABOLIC PANEL: CPT | Performed by: INTERNAL MEDICINE

## 2019-05-21 PROCEDURE — NC001 PR NO CHARGE: Performed by: RADIOLOGY

## 2019-05-21 PROCEDURE — 82948 REAGENT STRIP/BLOOD GLUCOSE: CPT

## 2019-05-21 RX ADMIN — OXYCODONE HYDROCHLORIDE 2.5 MG: 5 TABLET ORAL at 21:13

## 2019-05-21 RX ADMIN — PANCRELIPASE 48000 UNITS: 24000; 76000; 120000 CAPSULE, DELAYED RELEASE PELLETS ORAL at 17:28

## 2019-05-21 RX ADMIN — Medication 1 PACKET: at 14:39

## 2019-05-21 RX ADMIN — LIDOCAINE 1 PATCH: 50 PATCH CUTANEOUS at 21:03

## 2019-05-21 RX ADMIN — INSULIN LISPRO 3 UNITS: 100 INJECTION, SOLUTION INTRAVENOUS; SUBCUTANEOUS at 17:28

## 2019-05-21 RX ADMIN — INSULIN LISPRO 10 UNITS: 100 INJECTION, SOLUTION INTRAVENOUS; SUBCUTANEOUS at 17:28

## 2019-05-21 RX ADMIN — PANCRELIPASE 48000 UNITS: 24000; 76000; 120000 CAPSULE, DELAYED RELEASE PELLETS ORAL at 14:39

## 2019-05-21 RX ADMIN — INSULIN GLARGINE 10 UNITS: 100 INJECTION, SOLUTION SUBCUTANEOUS at 21:03

## 2019-05-21 RX ADMIN — Medication 1 PACKET: at 17:28

## 2019-05-21 RX ADMIN — PANTOPRAZOLE SODIUM 40 MG: 40 TABLET, DELAYED RELEASE ORAL at 06:00

## 2019-05-22 ENCOUNTER — TELEPHONE (OUTPATIENT)
Dept: GASTROENTEROLOGY | Facility: CLINIC | Age: 74
End: 2019-05-22

## 2019-05-22 LAB
BASOPHILS # BLD AUTO: 0.09 THOUSANDS/ΜL (ref 0–0.1)
BASOPHILS NFR BLD AUTO: 1 % (ref 0–1)
EOSINOPHIL # BLD AUTO: 0.19 THOUSAND/ΜL (ref 0–0.61)
EOSINOPHIL NFR BLD AUTO: 1 % (ref 0–6)
ERYTHROCYTE [DISTWIDTH] IN BLOOD BY AUTOMATED COUNT: 20.4 % (ref 11.6–15.1)
GLUCOSE SERPL-MCNC: 145 MG/DL (ref 65–140)
GLUCOSE SERPL-MCNC: 182 MG/DL (ref 65–140)
GLUCOSE SERPL-MCNC: 219 MG/DL (ref 65–140)
GLUCOSE SERPL-MCNC: 260 MG/DL (ref 65–140)
HCT VFR BLD AUTO: 30.3 % (ref 34.8–46.1)
HGB BLD-MCNC: 9.7 G/DL (ref 11.5–15.4)
IMM GRANULOCYTES # BLD AUTO: 0.39 THOUSAND/UL (ref 0–0.2)
IMM GRANULOCYTES NFR BLD AUTO: 2 % (ref 0–2)
LYMPHOCYTES # BLD AUTO: 1.51 THOUSANDS/ΜL (ref 0.6–4.47)
LYMPHOCYTES NFR BLD AUTO: 8 % (ref 14–44)
MCH RBC QN AUTO: 30.6 PG (ref 26.8–34.3)
MCHC RBC AUTO-ENTMCNC: 32 G/DL (ref 31.4–37.4)
MCV RBC AUTO: 96 FL (ref 82–98)
MONOCYTES # BLD AUTO: 1.25 THOUSAND/ΜL (ref 0.17–1.22)
MONOCYTES NFR BLD AUTO: 7 % (ref 4–12)
NEUTROPHILS # BLD AUTO: 15.77 THOUSANDS/ΜL (ref 1.85–7.62)
NEUTS SEG NFR BLD AUTO: 81 % (ref 43–75)
NRBC BLD AUTO-RTO: 0 /100 WBCS
PLATELET # BLD AUTO: 357 THOUSANDS/UL (ref 149–390)
PMV BLD AUTO: 11.1 FL (ref 8.9–12.7)
RBC # BLD AUTO: 3.17 MILLION/UL (ref 3.81–5.12)
WBC # BLD AUTO: 19.2 THOUSAND/UL (ref 4.31–10.16)

## 2019-05-22 PROCEDURE — NC001 PR NO CHARGE: Performed by: INTERNAL MEDICINE

## 2019-05-22 PROCEDURE — 82948 REAGENT STRIP/BLOOD GLUCOSE: CPT

## 2019-05-22 PROCEDURE — 85025 COMPLETE CBC W/AUTO DIFF WBC: CPT | Performed by: INTERNAL MEDICINE

## 2019-05-22 PROCEDURE — 99233 SBSQ HOSP IP/OBS HIGH 50: CPT | Performed by: INTERNAL MEDICINE

## 2019-05-22 RX ADMIN — PANCRELIPASE 48000 UNITS: 24000; 76000; 120000 CAPSULE, DELAYED RELEASE PELLETS ORAL at 14:52

## 2019-05-22 RX ADMIN — INSULIN GLARGINE 10 UNITS: 100 INJECTION, SOLUTION SUBCUTANEOUS at 22:44

## 2019-05-22 RX ADMIN — ERGOCALCIFEROL 50000 UNITS: 1.25 CAPSULE ORAL at 08:37

## 2019-05-22 RX ADMIN — Medication 1 PACKET: at 14:52

## 2019-05-22 RX ADMIN — INSULIN LISPRO 10 UNITS: 100 INJECTION, SOLUTION INTRAVENOUS; SUBCUTANEOUS at 08:37

## 2019-05-22 RX ADMIN — Medication 1 PACKET: at 18:07

## 2019-05-22 RX ADMIN — INSULIN LISPRO 1 UNITS: 100 INJECTION, SOLUTION INTRAVENOUS; SUBCUTANEOUS at 14:52

## 2019-05-22 RX ADMIN — Medication 1 PACKET: at 08:37

## 2019-05-22 RX ADMIN — INSULIN LISPRO 3 UNITS: 100 INJECTION, SOLUTION INTRAVENOUS; SUBCUTANEOUS at 18:07

## 2019-05-22 RX ADMIN — INSULIN LISPRO 10 UNITS: 100 INJECTION, SOLUTION INTRAVENOUS; SUBCUTANEOUS at 18:07

## 2019-05-22 RX ADMIN — PANCRELIPASE 48000 UNITS: 24000; 76000; 120000 CAPSULE, DELAYED RELEASE PELLETS ORAL at 18:07

## 2019-05-22 RX ADMIN — PANTOPRAZOLE SODIUM 40 MG: 40 TABLET, DELAYED RELEASE ORAL at 06:40

## 2019-05-22 RX ADMIN — LIDOCAINE 1 PATCH: 50 PATCH CUTANEOUS at 22:44

## 2019-05-22 RX ADMIN — INSULIN LISPRO 10 UNITS: 100 INJECTION, SOLUTION INTRAVENOUS; SUBCUTANEOUS at 14:52

## 2019-05-22 RX ADMIN — OXYCODONE HYDROCHLORIDE 2.5 MG: 5 TABLET ORAL at 22:43

## 2019-05-22 RX ADMIN — PANCRELIPASE 48000 UNITS: 24000; 76000; 120000 CAPSULE, DELAYED RELEASE PELLETS ORAL at 08:37

## 2019-05-23 ENCOUNTER — ANESTHESIA EVENT (OUTPATIENT)
Dept: GASTROENTEROLOGY | Facility: HOSPITAL | Age: 74
End: 2019-05-23

## 2019-05-23 ENCOUNTER — APPOINTMENT (OUTPATIENT)
Dept: GASTROENTEROLOGY | Facility: HOSPITAL | Age: 74
End: 2019-05-23
Payer: COMMERCIAL

## 2019-05-23 ENCOUNTER — ANESTHESIA (OUTPATIENT)
Dept: GASTROENTEROLOGY | Facility: HOSPITAL | Age: 74
End: 2019-05-23

## 2019-05-23 LAB
ALBUMIN SERPL BCP-MCNC: 2.3 G/DL (ref 3.5–5)
ALP SERPL-CCNC: 266 U/L (ref 46–116)
ALT SERPL W P-5'-P-CCNC: 49 U/L (ref 12–78)
ANION GAP SERPL CALCULATED.3IONS-SCNC: 9 MMOL/L (ref 4–13)
AST SERPL W P-5'-P-CCNC: 41 U/L (ref 5–45)
BASOPHILS # BLD AUTO: 0.07 THOUSANDS/ΜL (ref 0–0.1)
BASOPHILS NFR BLD AUTO: 0 % (ref 0–1)
BILIRUB SERPL-MCNC: 5.94 MG/DL (ref 0.2–1)
BUN SERPL-MCNC: 53 MG/DL (ref 5–25)
CALCIUM SERPL-MCNC: 8.6 MG/DL (ref 8.3–10.1)
CHLORIDE SERPL-SCNC: 103 MMOL/L (ref 100–108)
CO2 SERPL-SCNC: 16 MMOL/L (ref 21–32)
CREAT SERPL-MCNC: 1.03 MG/DL (ref 0.6–1.3)
EOSINOPHIL # BLD AUTO: 0.2 THOUSAND/ΜL (ref 0–0.61)
EOSINOPHIL NFR BLD AUTO: 1 % (ref 0–6)
ERYTHROCYTE [DISTWIDTH] IN BLOOD BY AUTOMATED COUNT: 20.1 % (ref 11.6–15.1)
GFR SERPL CREATININE-BSD FRML MDRD: 53 ML/MIN/1.73SQ M
GLUCOSE SERPL-MCNC: 129 MG/DL (ref 65–140)
GLUCOSE SERPL-MCNC: 151 MG/DL (ref 65–140)
GLUCOSE SERPL-MCNC: 153 MG/DL (ref 65–140)
GLUCOSE SERPL-MCNC: 172 MG/DL (ref 65–140)
GLUCOSE SERPL-MCNC: 270 MG/DL (ref 65–140)
HCT VFR BLD AUTO: 27.5 % (ref 34.8–46.1)
HGB BLD-MCNC: 8.7 G/DL (ref 11.5–15.4)
IMM GRANULOCYTES # BLD AUTO: 0.39 THOUSAND/UL (ref 0–0.2)
IMM GRANULOCYTES NFR BLD AUTO: 2 % (ref 0–2)
LYMPHOCYTES # BLD AUTO: 1.57 THOUSANDS/ΜL (ref 0.6–4.47)
LYMPHOCYTES NFR BLD AUTO: 9 % (ref 14–44)
MCH RBC QN AUTO: 30.3 PG (ref 26.8–34.3)
MCHC RBC AUTO-ENTMCNC: 31.6 G/DL (ref 31.4–37.4)
MCV RBC AUTO: 96 FL (ref 82–98)
MONOCYTES # BLD AUTO: 1.45 THOUSAND/ΜL (ref 0.17–1.22)
MONOCYTES NFR BLD AUTO: 8 % (ref 4–12)
NEUTROPHILS # BLD AUTO: 14.81 THOUSANDS/ΜL (ref 1.85–7.62)
NEUTS SEG NFR BLD AUTO: 80 % (ref 43–75)
NRBC BLD AUTO-RTO: 0 /100 WBCS
PLATELET # BLD AUTO: 274 THOUSANDS/UL (ref 149–390)
PMV BLD AUTO: 10.8 FL (ref 8.9–12.7)
POTASSIUM SERPL-SCNC: 4 MMOL/L (ref 3.5–5.3)
PROT SERPL-MCNC: 5.2 G/DL (ref 6.4–8.2)
RBC # BLD AUTO: 2.87 MILLION/UL (ref 3.81–5.12)
SODIUM SERPL-SCNC: 128 MMOL/L (ref 136–145)
WBC # BLD AUTO: 18.49 THOUSAND/UL (ref 4.31–10.16)

## 2019-05-23 PROCEDURE — 97530 THERAPEUTIC ACTIVITIES: CPT

## 2019-05-23 PROCEDURE — 85025 COMPLETE CBC W/AUTO DIFF WBC: CPT | Performed by: INTERNAL MEDICINE

## 2019-05-23 PROCEDURE — 82948 REAGENT STRIP/BLOOD GLUCOSE: CPT

## 2019-05-23 PROCEDURE — 43238 EGD US FINE NEEDLE BX/ASPIR: CPT | Performed by: INTERNAL MEDICINE

## 2019-05-23 PROCEDURE — 80053 COMPREHEN METABOLIC PANEL: CPT | Performed by: INTERNAL MEDICINE

## 2019-05-23 PROCEDURE — 99233 SBSQ HOSP IP/OBS HIGH 50: CPT | Performed by: INTERNAL MEDICINE

## 2019-05-23 PROCEDURE — 97116 GAIT TRAINING THERAPY: CPT

## 2019-05-23 PROCEDURE — 99232 SBSQ HOSP IP/OBS MODERATE 35: CPT | Performed by: INTERNAL MEDICINE

## 2019-05-23 RX ORDER — TORSEMIDE 20 MG/1
30 TABLET ORAL DAILY
Status: DISCONTINUED | OUTPATIENT
Start: 2019-05-23 | End: 2019-06-03 | Stop reason: HOSPADM

## 2019-05-23 RX ORDER — PROPOFOL 10 MG/ML
INJECTION, EMULSION INTRAVENOUS CONTINUOUS PRN
Status: DISCONTINUED | OUTPATIENT
Start: 2019-05-23 | End: 2019-05-23 | Stop reason: SURG

## 2019-05-23 RX ORDER — PROPOFOL 10 MG/ML
INJECTION, EMULSION INTRAVENOUS AS NEEDED
Status: DISCONTINUED | OUTPATIENT
Start: 2019-05-23 | End: 2019-05-23 | Stop reason: SURG

## 2019-05-23 RX ORDER — GLYCOPYRROLATE 0.2 MG/ML
INJECTION INTRAMUSCULAR; INTRAVENOUS AS NEEDED
Status: DISCONTINUED | OUTPATIENT
Start: 2019-05-23 | End: 2019-05-23 | Stop reason: SURG

## 2019-05-23 RX ORDER — SODIUM CHLORIDE 9 MG/ML
100 INJECTION, SOLUTION INTRAVENOUS CONTINUOUS
Status: DISCONTINUED | OUTPATIENT
Start: 2019-05-23 | End: 2019-06-02

## 2019-05-23 RX ADMIN — PANCRELIPASE 48000 UNITS: 24000; 76000; 120000 CAPSULE, DELAYED RELEASE PELLETS ORAL at 17:56

## 2019-05-23 RX ADMIN — LIDOCAINE 1 PATCH: 50 PATCH CUTANEOUS at 22:01

## 2019-05-23 RX ADMIN — INSULIN GLARGINE 10 UNITS: 100 INJECTION, SOLUTION SUBCUTANEOUS at 22:01

## 2019-05-23 RX ADMIN — LIDOCAINE HYDROCHLORIDE 50 MG: 20 INJECTION, SOLUTION INTRAVENOUS at 13:15

## 2019-05-23 RX ADMIN — Medication 1 PACKET: at 17:56

## 2019-05-23 RX ADMIN — LIDOCAINE HYDROCHLORIDE 50 MG: 20 INJECTION, SOLUTION INTRAVENOUS at 13:10

## 2019-05-23 RX ADMIN — PROPOFOL 100 MCG/KG/MIN: 10 INJECTION, EMULSION INTRAVENOUS at 13:15

## 2019-05-23 RX ADMIN — OXYCODONE HYDROCHLORIDE 2.5 MG: 5 TABLET ORAL at 22:08

## 2019-05-23 RX ADMIN — SODIUM CHLORIDE: 0.9 INJECTION, SOLUTION INTRAVENOUS at 13:05

## 2019-05-23 RX ADMIN — PROPOFOL 40 MG: 10 INJECTION, EMULSION INTRAVENOUS at 13:15

## 2019-05-23 RX ADMIN — GLYCOPYRROLATE 0.1 MG: 0.2 INJECTION, SOLUTION INTRAMUSCULAR; INTRAVENOUS at 13:15

## 2019-05-23 RX ADMIN — INSULIN LISPRO 10 UNITS: 100 INJECTION, SOLUTION INTRAVENOUS; SUBCUTANEOUS at 17:56

## 2019-05-24 LAB
ALBUMIN SERPL BCP-MCNC: 2 G/DL (ref 3.5–5)
ALP SERPL-CCNC: 239 U/L (ref 46–116)
ALT SERPL W P-5'-P-CCNC: 46 U/L (ref 12–78)
ANION GAP SERPL CALCULATED.3IONS-SCNC: 12 MMOL/L (ref 4–13)
ANISOCYTOSIS BLD QL SMEAR: PRESENT
AST SERPL W P-5'-P-CCNC: 42 U/L (ref 5–45)
BASOPHILS # BLD MANUAL: 0 THOUSAND/UL (ref 0–0.1)
BASOPHILS NFR MAR MANUAL: 0 % (ref 0–1)
BILIRUB SERPL-MCNC: 5.34 MG/DL (ref 0.2–1)
BUN SERPL-MCNC: 44 MG/DL (ref 5–25)
CALCIUM SERPL-MCNC: 7.5 MG/DL (ref 8.3–10.1)
CHLORIDE SERPL-SCNC: 109 MMOL/L (ref 100–108)
CO2 SERPL-SCNC: 13 MMOL/L (ref 21–32)
CREAT SERPL-MCNC: 0.83 MG/DL (ref 0.6–1.3)
EOSINOPHIL # BLD MANUAL: 0.24 THOUSAND/UL (ref 0–0.4)
EOSINOPHIL NFR BLD MANUAL: 1 % (ref 0–6)
ERYTHROCYTE [DISTWIDTH] IN BLOOD BY AUTOMATED COUNT: 20.1 % (ref 11.6–15.1)
GFR SERPL CREATININE-BSD FRML MDRD: 69 ML/MIN/1.73SQ M
GLUCOSE SERPL-MCNC: 143 MG/DL (ref 65–140)
GLUCOSE SERPL-MCNC: 150 MG/DL (ref 65–140)
GLUCOSE SERPL-MCNC: 156 MG/DL (ref 65–140)
GLUCOSE SERPL-MCNC: 253 MG/DL (ref 65–140)
GLUCOSE SERPL-MCNC: 258 MG/DL (ref 65–140)
HCT VFR BLD AUTO: 29.7 % (ref 34.8–46.1)
HGB BLD-MCNC: 9.4 G/DL (ref 11.5–15.4)
LYMPHOCYTES # BLD AUTO: 1.45 THOUSAND/UL (ref 0.6–4.47)
LYMPHOCYTES # BLD AUTO: 6 % (ref 14–44)
MCH RBC QN AUTO: 30.3 PG (ref 26.8–34.3)
MCHC RBC AUTO-ENTMCNC: 31.6 G/DL (ref 31.4–37.4)
MCV RBC AUTO: 96 FL (ref 82–98)
MONOCYTES # BLD AUTO: 1.45 THOUSAND/UL (ref 0–1.22)
MONOCYTES NFR BLD: 6 % (ref 4–12)
NEUTROPHILS # BLD MANUAL: 20.97 THOUSAND/UL (ref 1.85–7.62)
NEUTS SEG NFR BLD AUTO: 87 % (ref 43–75)
NRBC BLD AUTO-RTO: 0 /100 WBCS
PLATELET # BLD AUTO: 305 THOUSANDS/UL (ref 149–390)
PLATELET BLD QL SMEAR: ADEQUATE
PMV BLD AUTO: 11.3 FL (ref 8.9–12.7)
POTASSIUM SERPL-SCNC: 3.7 MMOL/L (ref 3.5–5.3)
PROT SERPL-MCNC: 4.6 G/DL (ref 6.4–8.2)
RBC # BLD AUTO: 3.1 MILLION/UL (ref 3.81–5.12)
SODIUM SERPL-SCNC: 134 MMOL/L (ref 136–145)
TOTAL CELLS COUNTED SPEC: 100
WBC # BLD AUTO: 24.1 THOUSAND/UL (ref 4.31–10.16)

## 2019-05-24 PROCEDURE — 88173 CYTOPATH EVAL FNA REPORT: CPT | Performed by: PATHOLOGY

## 2019-05-24 PROCEDURE — 88341 IMHCHEM/IMCYTCHM EA ADD ANTB: CPT | Performed by: PATHOLOGY

## 2019-05-24 PROCEDURE — 88342 IMHCHEM/IMCYTCHM 1ST ANTB: CPT | Performed by: PATHOLOGY

## 2019-05-24 PROCEDURE — 99232 SBSQ HOSP IP/OBS MODERATE 35: CPT | Performed by: INTERNAL MEDICINE

## 2019-05-24 PROCEDURE — 99232 SBSQ HOSP IP/OBS MODERATE 35: CPT | Performed by: SURGERY

## 2019-05-24 PROCEDURE — 88305 TISSUE EXAM BY PATHOLOGIST: CPT | Performed by: PATHOLOGY

## 2019-05-24 PROCEDURE — 97535 SELF CARE MNGMENT TRAINING: CPT

## 2019-05-24 PROCEDURE — 85027 COMPLETE CBC AUTOMATED: CPT | Performed by: INTERNAL MEDICINE

## 2019-05-24 PROCEDURE — 97530 THERAPEUTIC ACTIVITIES: CPT

## 2019-05-24 PROCEDURE — 88172 CYTP DX EVAL FNA 1ST EA SITE: CPT | Performed by: PATHOLOGY

## 2019-05-24 PROCEDURE — 85007 BL SMEAR W/DIFF WBC COUNT: CPT | Performed by: INTERNAL MEDICINE

## 2019-05-24 PROCEDURE — 80053 COMPREHEN METABOLIC PANEL: CPT | Performed by: INTERNAL MEDICINE

## 2019-05-24 PROCEDURE — 82948 REAGENT STRIP/BLOOD GLUCOSE: CPT

## 2019-05-24 RX ADMIN — INSULIN GLARGINE 10 UNITS: 100 INJECTION, SOLUTION SUBCUTANEOUS at 21:56

## 2019-05-24 RX ADMIN — PANCRELIPASE 48000 UNITS: 24000; 76000; 120000 CAPSULE, DELAYED RELEASE PELLETS ORAL at 13:24

## 2019-05-24 RX ADMIN — INSULIN LISPRO 10 UNITS: 100 INJECTION, SOLUTION INTRAVENOUS; SUBCUTANEOUS at 13:23

## 2019-05-24 RX ADMIN — ENOXAPARIN SODIUM 40 MG: 40 INJECTION SUBCUTANEOUS at 13:24

## 2019-05-24 RX ADMIN — Medication 1 PACKET: at 18:00

## 2019-05-24 RX ADMIN — INSULIN LISPRO 10 UNITS: 100 INJECTION, SOLUTION INTRAVENOUS; SUBCUTANEOUS at 10:58

## 2019-05-24 RX ADMIN — TORSEMIDE 30 MG: 20 TABLET ORAL at 10:57

## 2019-05-24 RX ADMIN — OXYCODONE HYDROCHLORIDE 2.5 MG: 5 TABLET ORAL at 21:56

## 2019-05-24 RX ADMIN — INSULIN LISPRO 3 UNITS: 100 INJECTION, SOLUTION INTRAVENOUS; SUBCUTANEOUS at 13:24

## 2019-05-24 RX ADMIN — INSULIN LISPRO 1 UNITS: 100 INJECTION, SOLUTION INTRAVENOUS; SUBCUTANEOUS at 10:57

## 2019-05-24 RX ADMIN — PANCRELIPASE 48000 UNITS: 24000; 76000; 120000 CAPSULE, DELAYED RELEASE PELLETS ORAL at 18:01

## 2019-05-24 RX ADMIN — ERGOCALCIFEROL 50000 UNITS: 1.25 CAPSULE ORAL at 10:59

## 2019-05-24 RX ADMIN — LIDOCAINE 1 PATCH: 50 PATCH CUTANEOUS at 21:55

## 2019-05-24 RX ADMIN — PANTOPRAZOLE SODIUM 40 MG: 40 TABLET, DELAYED RELEASE ORAL at 06:41

## 2019-05-24 RX ADMIN — Medication 1 PACKET: at 13:24

## 2019-05-24 RX ADMIN — INSULIN LISPRO 10 UNITS: 100 INJECTION, SOLUTION INTRAVENOUS; SUBCUTANEOUS at 18:01

## 2019-05-24 RX ADMIN — PANCRELIPASE 48000 UNITS: 24000; 76000; 120000 CAPSULE, DELAYED RELEASE PELLETS ORAL at 10:57

## 2019-05-24 RX ADMIN — Medication 1 PACKET: at 11:00

## 2019-05-24 RX ADMIN — INSULIN LISPRO 3 UNITS: 100 INJECTION, SOLUTION INTRAVENOUS; SUBCUTANEOUS at 18:01

## 2019-05-25 LAB
ALBUMIN SERPL BCP-MCNC: 2.2 G/DL (ref 3.5–5)
ALP SERPL-CCNC: 234 U/L (ref 46–116)
ALT SERPL W P-5'-P-CCNC: 47 U/L (ref 12–78)
ANION GAP SERPL CALCULATED.3IONS-SCNC: 12 MMOL/L (ref 4–13)
AST SERPL W P-5'-P-CCNC: 37 U/L (ref 5–45)
BASOPHILS # BLD AUTO: 0.07 THOUSANDS/ΜL (ref 0–0.1)
BASOPHILS NFR BLD AUTO: 0 % (ref 0–1)
BILIRUB SERPL-MCNC: 5.42 MG/DL (ref 0.2–1)
BUN SERPL-MCNC: 53 MG/DL (ref 5–25)
CALCIUM SERPL-MCNC: 8.7 MG/DL (ref 8.3–10.1)
CHLORIDE SERPL-SCNC: 103 MMOL/L (ref 100–108)
CO2 SERPL-SCNC: 17 MMOL/L (ref 21–32)
CREAT SERPL-MCNC: 0.96 MG/DL (ref 0.6–1.3)
EOSINOPHIL # BLD AUTO: 0.21 THOUSAND/ΜL (ref 0–0.61)
EOSINOPHIL NFR BLD AUTO: 1 % (ref 0–6)
ERYTHROCYTE [DISTWIDTH] IN BLOOD BY AUTOMATED COUNT: 20.1 % (ref 11.6–15.1)
GFR SERPL CREATININE-BSD FRML MDRD: 58 ML/MIN/1.73SQ M
GLUCOSE SERPL-MCNC: 139 MG/DL (ref 65–140)
GLUCOSE SERPL-MCNC: 201 MG/DL (ref 65–140)
GLUCOSE SERPL-MCNC: 234 MG/DL (ref 65–140)
GLUCOSE SERPL-MCNC: 285 MG/DL (ref 65–140)
GLUCOSE SERPL-MCNC: 54 MG/DL (ref 65–140)
GLUCOSE SERPL-MCNC: 55 MG/DL (ref 65–140)
HCT VFR BLD AUTO: 27.3 % (ref 34.8–46.1)
HGB BLD-MCNC: 8.7 G/DL (ref 11.5–15.4)
IMM GRANULOCYTES # BLD AUTO: >0.5 THOUSAND/UL (ref 0–0.2)
IMM GRANULOCYTES NFR BLD AUTO: 3 % (ref 0–2)
LYMPHOCYTES # BLD AUTO: 1.96 THOUSANDS/ΜL (ref 0.6–4.47)
LYMPHOCYTES NFR BLD AUTO: 9 % (ref 14–44)
MCH RBC QN AUTO: 30.6 PG (ref 26.8–34.3)
MCHC RBC AUTO-ENTMCNC: 31.9 G/DL (ref 31.4–37.4)
MCV RBC AUTO: 96 FL (ref 82–98)
MONOCYTES # BLD AUTO: 1.91 THOUSAND/ΜL (ref 0.17–1.22)
MONOCYTES NFR BLD AUTO: 9 % (ref 4–12)
NEUTROPHILS # BLD AUTO: 16.22 THOUSANDS/ΜL (ref 1.85–7.62)
NEUTS SEG NFR BLD AUTO: 78 % (ref 43–75)
NRBC BLD AUTO-RTO: 0 /100 WBCS
PLATELET # BLD AUTO: 274 THOUSANDS/UL (ref 149–390)
PMV BLD AUTO: 11 FL (ref 8.9–12.7)
POTASSIUM SERPL-SCNC: 3.6 MMOL/L (ref 3.5–5.3)
PROT SERPL-MCNC: 5.1 G/DL (ref 6.4–8.2)
RBC # BLD AUTO: 2.84 MILLION/UL (ref 3.81–5.12)
SODIUM SERPL-SCNC: 132 MMOL/L (ref 136–145)
WBC # BLD AUTO: 20.94 THOUSAND/UL (ref 4.31–10.16)

## 2019-05-25 PROCEDURE — 80053 COMPREHEN METABOLIC PANEL: CPT | Performed by: INTERNAL MEDICINE

## 2019-05-25 PROCEDURE — 99232 SBSQ HOSP IP/OBS MODERATE 35: CPT | Performed by: INTERNAL MEDICINE

## 2019-05-25 PROCEDURE — 82948 REAGENT STRIP/BLOOD GLUCOSE: CPT

## 2019-05-25 PROCEDURE — 86316 IMMUNOASSAY TUMOR OTHER: CPT | Performed by: SURGERY

## 2019-05-25 PROCEDURE — 85025 COMPLETE CBC W/AUTO DIFF WBC: CPT | Performed by: INTERNAL MEDICINE

## 2019-05-25 RX ADMIN — Medication 1 PACKET: at 14:19

## 2019-05-25 RX ADMIN — INSULIN LISPRO 10 UNITS: 100 INJECTION, SOLUTION INTRAVENOUS; SUBCUTANEOUS at 14:21

## 2019-05-25 RX ADMIN — PANCRELIPASE 48000 UNITS: 24000; 76000; 120000 CAPSULE, DELAYED RELEASE PELLETS ORAL at 14:19

## 2019-05-25 RX ADMIN — INSULIN GLARGINE 10 UNITS: 100 INJECTION, SOLUTION SUBCUTANEOUS at 22:59

## 2019-05-25 RX ADMIN — Medication 1 PACKET: at 07:48

## 2019-05-25 RX ADMIN — ENOXAPARIN SODIUM 40 MG: 40 INJECTION SUBCUTANEOUS at 10:51

## 2019-05-25 RX ADMIN — INSULIN LISPRO 2 UNITS: 100 INJECTION, SOLUTION INTRAVENOUS; SUBCUTANEOUS at 14:21

## 2019-05-25 RX ADMIN — Medication 1 PACKET: at 18:27

## 2019-05-25 RX ADMIN — PANTOPRAZOLE SODIUM 40 MG: 40 TABLET, DELAYED RELEASE ORAL at 06:18

## 2019-05-25 RX ADMIN — LIDOCAINE 1 PATCH: 50 PATCH CUTANEOUS at 22:59

## 2019-05-25 RX ADMIN — PANCRELIPASE 48000 UNITS: 24000; 76000; 120000 CAPSULE, DELAYED RELEASE PELLETS ORAL at 07:48

## 2019-05-25 RX ADMIN — PANCRELIPASE 48000 UNITS: 24000; 76000; 120000 CAPSULE, DELAYED RELEASE PELLETS ORAL at 18:27

## 2019-05-25 RX ADMIN — TORSEMIDE 30 MG: 20 TABLET ORAL at 10:51

## 2019-05-26 LAB
ALBUMIN SERPL BCP-MCNC: 2.2 G/DL (ref 3.5–5)
ALP SERPL-CCNC: 235 U/L (ref 46–116)
ALT SERPL W P-5'-P-CCNC: 46 U/L (ref 12–78)
ANION GAP SERPL CALCULATED.3IONS-SCNC: 13 MMOL/L (ref 4–13)
AST SERPL W P-5'-P-CCNC: 39 U/L (ref 5–45)
BASOPHILS # BLD AUTO: 0.08 THOUSANDS/ΜL (ref 0–0.1)
BASOPHILS NFR BLD AUTO: 0 % (ref 0–1)
BILIRUB SERPL-MCNC: 5.56 MG/DL (ref 0.2–1)
BUN SERPL-MCNC: 53 MG/DL (ref 5–25)
CALCIUM SERPL-MCNC: 8.7 MG/DL (ref 8.3–10.1)
CHLORIDE SERPL-SCNC: 101 MMOL/L (ref 100–108)
CO2 SERPL-SCNC: 16 MMOL/L (ref 21–32)
CREAT SERPL-MCNC: 1.05 MG/DL (ref 0.6–1.3)
EOSINOPHIL # BLD AUTO: 0.2 THOUSAND/ΜL (ref 0–0.61)
EOSINOPHIL NFR BLD AUTO: 1 % (ref 0–6)
ERYTHROCYTE [DISTWIDTH] IN BLOOD BY AUTOMATED COUNT: 19.9 % (ref 11.6–15.1)
GFR SERPL CREATININE-BSD FRML MDRD: 52 ML/MIN/1.73SQ M
GLUCOSE SERPL-MCNC: 123 MG/DL (ref 65–140)
GLUCOSE SERPL-MCNC: 129 MG/DL (ref 65–140)
GLUCOSE SERPL-MCNC: 281 MG/DL (ref 65–140)
GLUCOSE SERPL-MCNC: 285 MG/DL (ref 65–140)
GLUCOSE SERPL-MCNC: 389 MG/DL (ref 65–140)
HCT VFR BLD AUTO: 26.3 % (ref 34.8–46.1)
HGB BLD-MCNC: 8.5 G/DL (ref 11.5–15.4)
IMM GRANULOCYTES # BLD AUTO: >0.5 THOUSAND/UL (ref 0–0.2)
IMM GRANULOCYTES NFR BLD AUTO: 3 % (ref 0–2)
LYMPHOCYTES # BLD AUTO: 2.18 THOUSANDS/ΜL (ref 0.6–4.47)
LYMPHOCYTES NFR BLD AUTO: 11 % (ref 14–44)
MCH RBC QN AUTO: 30.8 PG (ref 26.8–34.3)
MCHC RBC AUTO-ENTMCNC: 32.3 G/DL (ref 31.4–37.4)
MCV RBC AUTO: 95 FL (ref 82–98)
MONOCYTES # BLD AUTO: 2.09 THOUSAND/ΜL (ref 0.17–1.22)
MONOCYTES NFR BLD AUTO: 11 % (ref 4–12)
NEUTROPHILS # BLD AUTO: 14.82 THOUSANDS/ΜL (ref 1.85–7.62)
NEUTS SEG NFR BLD AUTO: 74 % (ref 43–75)
NRBC BLD AUTO-RTO: 0 /100 WBCS
PLATELET # BLD AUTO: 278 THOUSANDS/UL (ref 149–390)
PMV BLD AUTO: 10.9 FL (ref 8.9–12.7)
POTASSIUM SERPL-SCNC: 3.4 MMOL/L (ref 3.5–5.3)
PROT SERPL-MCNC: 5.2 G/DL (ref 6.4–8.2)
RBC # BLD AUTO: 2.76 MILLION/UL (ref 3.81–5.12)
SODIUM SERPL-SCNC: 130 MMOL/L (ref 136–145)
WBC # BLD AUTO: 19.88 THOUSAND/UL (ref 4.31–10.16)

## 2019-05-26 PROCEDURE — 80053 COMPREHEN METABOLIC PANEL: CPT | Performed by: INTERNAL MEDICINE

## 2019-05-26 PROCEDURE — 99232 SBSQ HOSP IP/OBS MODERATE 35: CPT | Performed by: INTERNAL MEDICINE

## 2019-05-26 PROCEDURE — 85025 COMPLETE CBC W/AUTO DIFF WBC: CPT | Performed by: INTERNAL MEDICINE

## 2019-05-26 PROCEDURE — 97110 THERAPEUTIC EXERCISES: CPT

## 2019-05-26 PROCEDURE — 97116 GAIT TRAINING THERAPY: CPT

## 2019-05-26 PROCEDURE — 97530 THERAPEUTIC ACTIVITIES: CPT

## 2019-05-26 PROCEDURE — 82948 REAGENT STRIP/BLOOD GLUCOSE: CPT

## 2019-05-26 RX ORDER — DIPHENHYDRAMINE HYDROCHLORIDE, ZINC ACETATE 2; .1 G/100G; G/100G
CREAM TOPICAL 3 TIMES DAILY PRN
Status: DISCONTINUED | OUTPATIENT
Start: 2019-05-26 | End: 2019-06-03 | Stop reason: HOSPADM

## 2019-05-26 RX ADMIN — Medication 1 PACKET: at 13:21

## 2019-05-26 RX ADMIN — PANTOPRAZOLE SODIUM 40 MG: 40 TABLET, DELAYED RELEASE ORAL at 06:21

## 2019-05-26 RX ADMIN — INSULIN LISPRO 10 UNITS: 100 INJECTION, SOLUTION INTRAVENOUS; SUBCUTANEOUS at 18:25

## 2019-05-26 RX ADMIN — TORSEMIDE 30 MG: 20 TABLET ORAL at 08:12

## 2019-05-26 RX ADMIN — ENOXAPARIN SODIUM 40 MG: 40 INJECTION SUBCUTANEOUS at 08:15

## 2019-05-26 RX ADMIN — PANCRELIPASE 48000 UNITS: 24000; 76000; 120000 CAPSULE, DELAYED RELEASE PELLETS ORAL at 13:21

## 2019-05-26 RX ADMIN — PANCRELIPASE 48000 UNITS: 24000; 76000; 120000 CAPSULE, DELAYED RELEASE PELLETS ORAL at 08:12

## 2019-05-26 RX ADMIN — LIDOCAINE 1 PATCH: 50 PATCH CUTANEOUS at 23:00

## 2019-05-26 RX ADMIN — DIPHENHYDRAMINE HYDROCHLORIDE, ZINC ACETATE 1 APPLICATION: 2; .1 CREAM TOPICAL at 13:25

## 2019-05-26 RX ADMIN — INSULIN LISPRO 6 UNITS: 100 INJECTION, SOLUTION INTRAVENOUS; SUBCUTANEOUS at 13:28

## 2019-05-26 RX ADMIN — Medication 1 PACKET: at 18:21

## 2019-05-26 RX ADMIN — INSULIN LISPRO 4 UNITS: 100 INJECTION, SOLUTION INTRAVENOUS; SUBCUTANEOUS at 18:25

## 2019-05-26 RX ADMIN — DIPHENHYDRAMINE HYDROCHLORIDE, ZINC ACETATE: 2; .1 CREAM TOPICAL at 23:06

## 2019-05-26 RX ADMIN — INSULIN GLARGINE 10 UNITS: 100 INJECTION, SOLUTION SUBCUTANEOUS at 23:00

## 2019-05-26 RX ADMIN — PANCRELIPASE 48000 UNITS: 24000; 76000; 120000 CAPSULE, DELAYED RELEASE PELLETS ORAL at 18:21

## 2019-05-26 RX ADMIN — Medication 1 PACKET: at 08:12

## 2019-05-26 RX ADMIN — INSULIN LISPRO 10 UNITS: 100 INJECTION, SOLUTION INTRAVENOUS; SUBCUTANEOUS at 13:28

## 2019-05-26 RX ADMIN — OXYCODONE HYDROCHLORIDE 2.5 MG: 5 TABLET ORAL at 23:23

## 2019-05-27 LAB
ALBUMIN SERPL BCP-MCNC: 2.4 G/DL (ref 3.5–5)
ALP SERPL-CCNC: 275 U/L (ref 46–116)
ALT SERPL W P-5'-P-CCNC: 48 U/L (ref 12–78)
ANION GAP SERPL CALCULATED.3IONS-SCNC: 13 MMOL/L (ref 4–13)
ANISOCYTOSIS BLD QL SMEAR: PRESENT
AST SERPL W P-5'-P-CCNC: 39 U/L (ref 5–45)
BASOPHILS # BLD MANUAL: 0 THOUSAND/UL (ref 0–0.1)
BASOPHILS NFR MAR MANUAL: 0 % (ref 0–1)
BILIRUB SERPL-MCNC: 6.1 MG/DL (ref 0.2–1)
BUN SERPL-MCNC: 54 MG/DL (ref 5–25)
CALCIUM SERPL-MCNC: 8.8 MG/DL (ref 8.3–10.1)
CHLORIDE SERPL-SCNC: 100 MMOL/L (ref 100–108)
CO2 SERPL-SCNC: 17 MMOL/L (ref 21–32)
CREAT SERPL-MCNC: 1.19 MG/DL (ref 0.6–1.3)
EOSINOPHIL # BLD MANUAL: 0 THOUSAND/UL (ref 0–0.4)
EOSINOPHIL NFR BLD MANUAL: 0 % (ref 0–6)
ERYTHROCYTE [DISTWIDTH] IN BLOOD BY AUTOMATED COUNT: 19.6 % (ref 11.6–15.1)
GFR SERPL CREATININE-BSD FRML MDRD: 45 ML/MIN/1.73SQ M
GLUCOSE SERPL-MCNC: 146 MG/DL (ref 65–140)
GLUCOSE SERPL-MCNC: 172 MG/DL (ref 65–140)
GLUCOSE SERPL-MCNC: 261 MG/DL (ref 65–140)
GLUCOSE SERPL-MCNC: 287 MG/DL (ref 65–140)
GLUCOSE SERPL-MCNC: 339 MG/DL (ref 65–140)
HCT VFR BLD AUTO: 29.4 % (ref 34.8–46.1)
HGB BLD-MCNC: 9.4 G/DL (ref 11.5–15.4)
HYPERCHROMIA BLD QL SMEAR: PRESENT
LYMPHOCYTES # BLD AUTO: 15 % (ref 14–44)
LYMPHOCYTES # BLD AUTO: 3.33 THOUSAND/UL (ref 0.6–4.47)
MCH RBC QN AUTO: 30.3 PG (ref 26.8–34.3)
MCHC RBC AUTO-ENTMCNC: 32 G/DL (ref 31.4–37.4)
MCV RBC AUTO: 95 FL (ref 82–98)
MONOCYTES # BLD AUTO: 1.77 THOUSAND/UL (ref 0–1.22)
MONOCYTES NFR BLD: 8 % (ref 4–12)
NEUTROPHILS # BLD MANUAL: 17.07 THOUSAND/UL (ref 1.85–7.62)
NEUTS BAND NFR BLD MANUAL: 4 % (ref 0–8)
NEUTS SEG NFR BLD AUTO: 73 % (ref 43–75)
NRBC BLD AUTO-RTO: 0 /100 WBCS
PLATELET # BLD AUTO: 302 THOUSANDS/UL (ref 149–390)
PLATELET BLD QL SMEAR: ADEQUATE
PMV BLD AUTO: 11.3 FL (ref 8.9–12.7)
POTASSIUM SERPL-SCNC: 3.6 MMOL/L (ref 3.5–5.3)
PROT SERPL-MCNC: 5.8 G/DL (ref 6.4–8.2)
RBC # BLD AUTO: 3.1 MILLION/UL (ref 3.81–5.12)
SODIUM SERPL-SCNC: 130 MMOL/L (ref 136–145)
TOTAL CELLS COUNTED SPEC: 100
WBC # BLD AUTO: 22.17 THOUSAND/UL (ref 4.31–10.16)

## 2019-05-27 PROCEDURE — 85007 BL SMEAR W/DIFF WBC COUNT: CPT | Performed by: INTERNAL MEDICINE

## 2019-05-27 PROCEDURE — 82948 REAGENT STRIP/BLOOD GLUCOSE: CPT

## 2019-05-27 PROCEDURE — 99232 SBSQ HOSP IP/OBS MODERATE 35: CPT | Performed by: INTERNAL MEDICINE

## 2019-05-27 PROCEDURE — 80053 COMPREHEN METABOLIC PANEL: CPT | Performed by: INTERNAL MEDICINE

## 2019-05-27 PROCEDURE — 85027 COMPLETE CBC AUTOMATED: CPT | Performed by: INTERNAL MEDICINE

## 2019-05-27 RX ADMIN — INSULIN LISPRO 10 UNITS: 100 INJECTION, SOLUTION INTRAVENOUS; SUBCUTANEOUS at 18:12

## 2019-05-27 RX ADMIN — Medication 1 PACKET: at 14:26

## 2019-05-27 RX ADMIN — INSULIN LISPRO 10 UNITS: 100 INJECTION, SOLUTION INTRAVENOUS; SUBCUTANEOUS at 08:16

## 2019-05-27 RX ADMIN — ENOXAPARIN SODIUM 40 MG: 40 INJECTION SUBCUTANEOUS at 08:16

## 2019-05-27 RX ADMIN — Medication 1 PACKET: at 08:17

## 2019-05-27 RX ADMIN — TORSEMIDE 30 MG: 20 TABLET ORAL at 08:15

## 2019-05-27 RX ADMIN — INSULIN LISPRO 4 UNITS: 100 INJECTION, SOLUTION INTRAVENOUS; SUBCUTANEOUS at 18:12

## 2019-05-27 RX ADMIN — PANTOPRAZOLE SODIUM 40 MG: 40 TABLET, DELAYED RELEASE ORAL at 05:04

## 2019-05-27 RX ADMIN — DIPHENHYDRAMINE HYDROCHLORIDE, ZINC ACETATE: 2; .1 CREAM TOPICAL at 23:22

## 2019-05-27 RX ADMIN — PANCRELIPASE 48000 UNITS: 24000; 76000; 120000 CAPSULE, DELAYED RELEASE PELLETS ORAL at 18:13

## 2019-05-27 RX ADMIN — ERGOCALCIFEROL 50000 UNITS: 1.25 CAPSULE ORAL at 08:16

## 2019-05-27 RX ADMIN — INSULIN LISPRO 5 UNITS: 100 INJECTION, SOLUTION INTRAVENOUS; SUBCUTANEOUS at 14:27

## 2019-05-27 RX ADMIN — LIDOCAINE 1 PATCH: 50 PATCH CUTANEOUS at 21:18

## 2019-05-27 RX ADMIN — Medication 1 PACKET: at 18:13

## 2019-05-27 RX ADMIN — PANCRELIPASE 48000 UNITS: 24000; 76000; 120000 CAPSULE, DELAYED RELEASE PELLETS ORAL at 08:16

## 2019-05-27 RX ADMIN — PANCRELIPASE 48000 UNITS: 24000; 76000; 120000 CAPSULE, DELAYED RELEASE PELLETS ORAL at 14:26

## 2019-05-27 RX ADMIN — INSULIN GLARGINE 10 UNITS: 100 INJECTION, SOLUTION SUBCUTANEOUS at 21:18

## 2019-05-27 RX ADMIN — INSULIN LISPRO 10 UNITS: 100 INJECTION, SOLUTION INTRAVENOUS; SUBCUTANEOUS at 14:27

## 2019-05-27 RX ADMIN — OXYCODONE HYDROCHLORIDE 2.5 MG: 5 TABLET ORAL at 23:27

## 2019-05-28 LAB
CGA SERPL-SCNC: 3 NMOL/L (ref 0–5)
GLUCOSE SERPL-MCNC: 192 MG/DL (ref 65–140)
GLUCOSE SERPL-MCNC: 245 MG/DL (ref 65–140)
GLUCOSE SERPL-MCNC: 307 MG/DL (ref 65–140)
GLUCOSE SERPL-MCNC: 65 MG/DL (ref 65–140)
GLUCOSE SERPL-MCNC: 80 MG/DL (ref 65–140)

## 2019-05-28 PROCEDURE — 82948 REAGENT STRIP/BLOOD GLUCOSE: CPT

## 2019-05-28 PROCEDURE — 99232 SBSQ HOSP IP/OBS MODERATE 35: CPT | Performed by: INTERNAL MEDICINE

## 2019-05-28 PROCEDURE — 97530 THERAPEUTIC ACTIVITIES: CPT

## 2019-05-28 PROCEDURE — 99233 SBSQ HOSP IP/OBS HIGH 50: CPT | Performed by: INTERNAL MEDICINE

## 2019-05-28 PROCEDURE — 97535 SELF CARE MNGMENT TRAINING: CPT

## 2019-05-28 RX ORDER — TRIAMCINOLONE ACETONIDE 1 MG/G
CREAM TOPICAL 2 TIMES DAILY
Status: DISCONTINUED | OUTPATIENT
Start: 2019-05-28 | End: 2019-06-03 | Stop reason: HOSPADM

## 2019-05-28 RX ADMIN — PANCRELIPASE 48000 UNITS: 24000; 76000; 120000 CAPSULE, DELAYED RELEASE PELLETS ORAL at 18:02

## 2019-05-28 RX ADMIN — PANTOPRAZOLE SODIUM 40 MG: 40 TABLET, DELAYED RELEASE ORAL at 05:23

## 2019-05-28 RX ADMIN — INSULIN LISPRO 10 UNITS: 100 INJECTION, SOLUTION INTRAVENOUS; SUBCUTANEOUS at 18:03

## 2019-05-28 RX ADMIN — INSULIN LISPRO 2 UNITS: 100 INJECTION, SOLUTION INTRAVENOUS; SUBCUTANEOUS at 14:02

## 2019-05-28 RX ADMIN — INSULIN GLARGINE 10 UNITS: 100 INJECTION, SOLUTION SUBCUTANEOUS at 21:38

## 2019-05-28 RX ADMIN — OXYCODONE HYDROCHLORIDE 2.5 MG: 5 TABLET ORAL at 21:42

## 2019-05-28 RX ADMIN — TORSEMIDE 30 MG: 20 TABLET ORAL at 08:48

## 2019-05-28 RX ADMIN — INSULIN LISPRO 4 UNITS: 100 INJECTION, SOLUTION INTRAVENOUS; SUBCUTANEOUS at 18:03

## 2019-05-28 RX ADMIN — PANCRELIPASE 48000 UNITS: 24000; 76000; 120000 CAPSULE, DELAYED RELEASE PELLETS ORAL at 08:48

## 2019-05-28 RX ADMIN — Medication 1 PACKET: at 18:04

## 2019-05-28 RX ADMIN — TRIAMCINOLONE ACETONIDE: 1 CREAM TOPICAL at 21:37

## 2019-05-28 RX ADMIN — DIPHENHYDRAMINE HYDROCHLORIDE, ZINC ACETATE: 2; .1 CREAM TOPICAL at 03:51

## 2019-05-28 RX ADMIN — PANCRELIPASE 48000 UNITS: 24000; 76000; 120000 CAPSULE, DELAYED RELEASE PELLETS ORAL at 14:03

## 2019-05-28 RX ADMIN — LIDOCAINE 1 PATCH: 50 PATCH CUTANEOUS at 21:38

## 2019-05-28 RX ADMIN — ENOXAPARIN SODIUM 40 MG: 40 INJECTION SUBCUTANEOUS at 08:48

## 2019-05-28 RX ADMIN — Medication 1 PACKET: at 14:03

## 2019-05-28 RX ADMIN — INSULIN LISPRO 10 UNITS: 100 INJECTION, SOLUTION INTRAVENOUS; SUBCUTANEOUS at 14:02

## 2019-05-28 RX ADMIN — DIPHENHYDRAMINE HYDROCHLORIDE, ZINC ACETATE: 2; .1 CREAM TOPICAL at 21:38

## 2019-05-28 RX ADMIN — Medication 1 PACKET: at 08:47

## 2019-05-29 ENCOUNTER — APPOINTMENT (INPATIENT)
Dept: RADIOLOGY | Facility: HOSPITAL | Age: 74
DRG: 469 | End: 2019-05-29
Payer: COMMERCIAL

## 2019-05-29 LAB
ALBUMIN SERPL BCP-MCNC: 1.9 G/DL (ref 3.5–5)
ALP SERPL-CCNC: 197 U/L (ref 46–116)
ALT SERPL W P-5'-P-CCNC: 36 U/L (ref 12–78)
AST SERPL W P-5'-P-CCNC: 32 U/L (ref 5–45)
BASOPHILS # BLD AUTO: 0.11 THOUSANDS/ΜL (ref 0–0.1)
BASOPHILS NFR BLD AUTO: 0 % (ref 0–1)
BILIRUB DIRECT SERPL-MCNC: 4.46 MG/DL (ref 0–0.2)
BILIRUB SERPL-MCNC: 5.41 MG/DL (ref 0.2–1)
EOSINOPHIL # BLD AUTO: 0.46 THOUSAND/ΜL (ref 0–0.61)
EOSINOPHIL NFR BLD AUTO: 2 % (ref 0–6)
ERYTHROCYTE [DISTWIDTH] IN BLOOD BY AUTOMATED COUNT: 19.2 % (ref 11.6–15.1)
GLUCOSE SERPL-MCNC: 148 MG/DL (ref 65–140)
GLUCOSE SERPL-MCNC: 180 MG/DL (ref 65–140)
GLUCOSE SERPL-MCNC: 214 MG/DL (ref 65–140)
GLUCOSE SERPL-MCNC: 88 MG/DL (ref 65–140)
HCT VFR BLD AUTO: 26.9 % (ref 34.8–46.1)
HGB BLD-MCNC: 8.6 G/DL (ref 11.5–15.4)
IMM GRANULOCYTES # BLD AUTO: 0.45 THOUSAND/UL (ref 0–0.2)
IMM GRANULOCYTES NFR BLD AUTO: 2 % (ref 0–2)
LYMPHOCYTES # BLD AUTO: 1.91 THOUSANDS/ΜL (ref 0.6–4.47)
LYMPHOCYTES NFR BLD AUTO: 8 % (ref 14–44)
MCH RBC QN AUTO: 30.2 PG (ref 26.8–34.3)
MCHC RBC AUTO-ENTMCNC: 32 G/DL (ref 31.4–37.4)
MCV RBC AUTO: 94 FL (ref 82–98)
MONOCYTES # BLD AUTO: 1.93 THOUSAND/ΜL (ref 0.17–1.22)
MONOCYTES NFR BLD AUTO: 8 % (ref 4–12)
NEUTROPHILS # BLD AUTO: 20.09 THOUSANDS/ΜL (ref 1.85–7.62)
NEUTS SEG NFR BLD AUTO: 80 % (ref 43–75)
NRBC BLD AUTO-RTO: 0 /100 WBCS
PLATELET # BLD AUTO: 285 THOUSANDS/UL (ref 149–390)
PMV BLD AUTO: 11.1 FL (ref 8.9–12.7)
PROT SERPL-MCNC: 5 G/DL (ref 6.4–8.2)
RBC # BLD AUTO: 2.85 MILLION/UL (ref 3.81–5.12)
WBC # BLD AUTO: 24.95 THOUSAND/UL (ref 4.31–10.16)

## 2019-05-29 PROCEDURE — 85025 COMPLETE CBC W/AUTO DIFF WBC: CPT | Performed by: INTERNAL MEDICINE

## 2019-05-29 PROCEDURE — 82948 REAGENT STRIP/BLOOD GLUCOSE: CPT

## 2019-05-29 PROCEDURE — 99232 SBSQ HOSP IP/OBS MODERATE 35: CPT | Performed by: INTERNAL MEDICINE

## 2019-05-29 PROCEDURE — 47536 EXCHANGE BILIARY DRG CATH: CPT | Performed by: RADIOLOGY

## 2019-05-29 PROCEDURE — C1769 GUIDE WIRE: HCPCS

## 2019-05-29 PROCEDURE — 49423 EXCHANGE DRAINAGE CATHETER: CPT

## 2019-05-29 PROCEDURE — 80076 HEPATIC FUNCTION PANEL: CPT | Performed by: INTERNAL MEDICINE

## 2019-05-29 PROCEDURE — C1729 CATH, DRAINAGE: HCPCS

## 2019-05-29 RX ADMIN — PANCRELIPASE 48000 UNITS: 24000; 76000; 120000 CAPSULE, DELAYED RELEASE PELLETS ORAL at 16:50

## 2019-05-29 RX ADMIN — IOHEXOL 25 ML: 300 INJECTION, SOLUTION INTRAVENOUS at 10:27

## 2019-05-29 RX ADMIN — INSULIN LISPRO 2 UNITS: 100 INJECTION, SOLUTION INTRAVENOUS; SUBCUTANEOUS at 16:50

## 2019-05-29 RX ADMIN — INSULIN LISPRO 10 UNITS: 100 INJECTION, SOLUTION INTRAVENOUS; SUBCUTANEOUS at 07:47

## 2019-05-29 RX ADMIN — ENOXAPARIN SODIUM 40 MG: 40 INJECTION SUBCUTANEOUS at 07:49

## 2019-05-29 RX ADMIN — PANCRELIPASE 48000 UNITS: 24000; 76000; 120000 CAPSULE, DELAYED RELEASE PELLETS ORAL at 13:10

## 2019-05-29 RX ADMIN — TRIAMCINOLONE ACETONIDE: 1 CREAM TOPICAL at 07:52

## 2019-05-29 RX ADMIN — INSULIN LISPRO 10 UNITS: 100 INJECTION, SOLUTION INTRAVENOUS; SUBCUTANEOUS at 13:14

## 2019-05-29 RX ADMIN — TORSEMIDE 30 MG: 20 TABLET ORAL at 07:50

## 2019-05-29 RX ADMIN — Medication 1 PACKET: at 13:10

## 2019-05-29 RX ADMIN — PANCRELIPASE 48000 UNITS: 24000; 76000; 120000 CAPSULE, DELAYED RELEASE PELLETS ORAL at 07:48

## 2019-05-29 RX ADMIN — ERGOCALCIFEROL 50000 UNITS: 1.25 CAPSULE ORAL at 07:48

## 2019-05-29 RX ADMIN — LIDOCAINE 1 PATCH: 50 PATCH CUTANEOUS at 21:51

## 2019-05-29 RX ADMIN — Medication 1 PACKET: at 07:48

## 2019-05-29 RX ADMIN — INSULIN GLARGINE 10 UNITS: 100 INJECTION, SOLUTION SUBCUTANEOUS at 21:51

## 2019-05-29 RX ADMIN — TRIAMCINOLONE ACETONIDE: 1 CREAM TOPICAL at 16:56

## 2019-05-29 RX ADMIN — INSULIN LISPRO 1 UNITS: 100 INJECTION, SOLUTION INTRAVENOUS; SUBCUTANEOUS at 13:14

## 2019-05-29 RX ADMIN — OXYCODONE HYDROCHLORIDE 2.5 MG: 5 TABLET ORAL at 21:58

## 2019-05-29 RX ADMIN — INSULIN LISPRO 10 UNITS: 100 INJECTION, SOLUTION INTRAVENOUS; SUBCUTANEOUS at 16:50

## 2019-05-29 RX ADMIN — Medication 1 PACKET: at 16:50

## 2019-05-30 ENCOUNTER — DOCUMENTATION (OUTPATIENT)
Dept: HEMATOLOGY ONCOLOGY | Facility: CLINIC | Age: 74
End: 2019-05-30

## 2019-05-30 LAB
GLUCOSE SERPL-MCNC: 256 MG/DL (ref 65–140)
GLUCOSE SERPL-MCNC: 261 MG/DL (ref 65–140)
GLUCOSE SERPL-MCNC: 272 MG/DL (ref 65–140)
GLUCOSE SERPL-MCNC: 76 MG/DL (ref 65–140)

## 2019-05-30 PROCEDURE — 97530 THERAPEUTIC ACTIVITIES: CPT

## 2019-05-30 PROCEDURE — 97110 THERAPEUTIC EXERCISES: CPT

## 2019-05-30 PROCEDURE — 82948 REAGENT STRIP/BLOOD GLUCOSE: CPT

## 2019-05-30 PROCEDURE — 97116 GAIT TRAINING THERAPY: CPT

## 2019-05-30 PROCEDURE — G8978 MOBILITY CURRENT STATUS: HCPCS

## 2019-05-30 PROCEDURE — G8979 MOBILITY GOAL STATUS: HCPCS

## 2019-05-30 PROCEDURE — 99232 SBSQ HOSP IP/OBS MODERATE 35: CPT | Performed by: INTERNAL MEDICINE

## 2019-05-30 PROCEDURE — 97535 SELF CARE MNGMENT TRAINING: CPT

## 2019-05-30 RX ADMIN — INSULIN LISPRO 10 UNITS: 100 INJECTION, SOLUTION INTRAVENOUS; SUBCUTANEOUS at 13:51

## 2019-05-30 RX ADMIN — INSULIN LISPRO 4 UNITS: 100 INJECTION, SOLUTION INTRAVENOUS; SUBCUTANEOUS at 13:50

## 2019-05-30 RX ADMIN — TORSEMIDE 30 MG: 20 TABLET ORAL at 08:00

## 2019-05-30 RX ADMIN — Medication 1 PACKET: at 13:50

## 2019-05-30 RX ADMIN — LIDOCAINE 1 PATCH: 50 PATCH CUTANEOUS at 22:43

## 2019-05-30 RX ADMIN — INSULIN LISPRO 10 UNITS: 100 INJECTION, SOLUTION INTRAVENOUS; SUBCUTANEOUS at 17:47

## 2019-05-30 RX ADMIN — PANCRELIPASE 48000 UNITS: 24000; 76000; 120000 CAPSULE, DELAYED RELEASE PELLETS ORAL at 13:50

## 2019-05-30 RX ADMIN — ENOXAPARIN SODIUM 40 MG: 40 INJECTION SUBCUTANEOUS at 08:00

## 2019-05-30 RX ADMIN — INSULIN LISPRO 3 UNITS: 100 INJECTION, SOLUTION INTRAVENOUS; SUBCUTANEOUS at 17:46

## 2019-05-30 RX ADMIN — TRIAMCINOLONE ACETONIDE: 1 CREAM TOPICAL at 08:00

## 2019-05-30 RX ADMIN — PANCRELIPASE 48000 UNITS: 24000; 76000; 120000 CAPSULE, DELAYED RELEASE PELLETS ORAL at 07:49

## 2019-05-30 RX ADMIN — Medication 1 PACKET: at 07:49

## 2019-05-30 RX ADMIN — PANCRELIPASE 48000 UNITS: 24000; 76000; 120000 CAPSULE, DELAYED RELEASE PELLETS ORAL at 17:47

## 2019-05-30 RX ADMIN — INSULIN GLARGINE 10 UNITS: 100 INJECTION, SOLUTION SUBCUTANEOUS at 22:43

## 2019-05-30 RX ADMIN — Medication 1 PACKET: at 17:47

## 2019-05-30 RX ADMIN — TRIAMCINOLONE ACETONIDE: 1 CREAM TOPICAL at 17:47

## 2019-05-31 LAB
GLUCOSE SERPL-MCNC: 139 MG/DL (ref 65–140)
GLUCOSE SERPL-MCNC: 215 MG/DL (ref 65–140)
GLUCOSE SERPL-MCNC: 283 MG/DL (ref 65–140)
GLUCOSE SERPL-MCNC: 309 MG/DL (ref 65–140)

## 2019-05-31 PROCEDURE — 99232 SBSQ HOSP IP/OBS MODERATE 35: CPT | Performed by: SURGERY

## 2019-05-31 PROCEDURE — 82948 REAGENT STRIP/BLOOD GLUCOSE: CPT

## 2019-05-31 PROCEDURE — 99232 SBSQ HOSP IP/OBS MODERATE 35: CPT | Performed by: INTERNAL MEDICINE

## 2019-05-31 RX ADMIN — ENOXAPARIN SODIUM 40 MG: 40 INJECTION SUBCUTANEOUS at 09:33

## 2019-05-31 RX ADMIN — Medication 1 PACKET: at 16:52

## 2019-05-31 RX ADMIN — OXYCODONE HYDROCHLORIDE 2.5 MG: 5 TABLET ORAL at 03:31

## 2019-05-31 RX ADMIN — TORSEMIDE 30 MG: 20 TABLET ORAL at 09:32

## 2019-05-31 RX ADMIN — INSULIN LISPRO 4 UNITS: 100 INJECTION, SOLUTION INTRAVENOUS; SUBCUTANEOUS at 12:28

## 2019-05-31 RX ADMIN — INSULIN LISPRO 10 UNITS: 100 INJECTION, SOLUTION INTRAVENOUS; SUBCUTANEOUS at 08:34

## 2019-05-31 RX ADMIN — ERGOCALCIFEROL 50000 UNITS: 1.25 CAPSULE ORAL at 09:32

## 2019-05-31 RX ADMIN — PANCRELIPASE 48000 UNITS: 24000; 76000; 120000 CAPSULE, DELAYED RELEASE PELLETS ORAL at 16:48

## 2019-05-31 RX ADMIN — PANCRELIPASE 48000 UNITS: 24000; 76000; 120000 CAPSULE, DELAYED RELEASE PELLETS ORAL at 08:35

## 2019-05-31 RX ADMIN — TRIAMCINOLONE ACETONIDE: 1 CREAM TOPICAL at 09:34

## 2019-05-31 RX ADMIN — PANCRELIPASE 48000 UNITS: 24000; 76000; 120000 CAPSULE, DELAYED RELEASE PELLETS ORAL at 12:31

## 2019-05-31 RX ADMIN — INSULIN GLARGINE 10 UNITS: 100 INJECTION, SOLUTION SUBCUTANEOUS at 22:13

## 2019-05-31 RX ADMIN — INSULIN LISPRO 10 UNITS: 100 INJECTION, SOLUTION INTRAVENOUS; SUBCUTANEOUS at 12:29

## 2019-05-31 RX ADMIN — INSULIN LISPRO 10 UNITS: 100 INJECTION, SOLUTION INTRAVENOUS; SUBCUTANEOUS at 16:46

## 2019-05-31 RX ADMIN — TRIAMCINOLONE ACETONIDE: 1 CREAM TOPICAL at 22:30

## 2019-05-31 RX ADMIN — LIDOCAINE 1 PATCH: 50 PATCH CUTANEOUS at 22:13

## 2019-05-31 RX ADMIN — DIPHENHYDRAMINE HYDROCHLORIDE, ZINC ACETATE: 2; .1 CREAM TOPICAL at 03:33

## 2019-05-31 RX ADMIN — INSULIN LISPRO 4 UNITS: 100 INJECTION, SOLUTION INTRAVENOUS; SUBCUTANEOUS at 16:47

## 2019-05-31 RX ADMIN — Medication 1 PACKET: at 12:31

## 2019-05-31 RX ADMIN — Medication 1 PACKET: at 08:36

## 2019-05-31 RX ADMIN — OXYCODONE HYDROCHLORIDE 2.5 MG: 5 TABLET ORAL at 22:12

## 2019-06-01 LAB
GLUCOSE SERPL-MCNC: 102 MG/DL (ref 65–140)
GLUCOSE SERPL-MCNC: 241 MG/DL (ref 65–140)
GLUCOSE SERPL-MCNC: 282 MG/DL (ref 65–140)
GLUCOSE SERPL-MCNC: 282 MG/DL (ref 65–140)

## 2019-06-01 PROCEDURE — 99232 SBSQ HOSP IP/OBS MODERATE 35: CPT | Performed by: INTERNAL MEDICINE

## 2019-06-01 PROCEDURE — 82948 REAGENT STRIP/BLOOD GLUCOSE: CPT

## 2019-06-01 RX ADMIN — PANCRELIPASE 48000 UNITS: 24000; 76000; 120000 CAPSULE, DELAYED RELEASE PELLETS ORAL at 17:42

## 2019-06-01 RX ADMIN — INSULIN GLARGINE 10 UNITS: 100 INJECTION, SOLUTION SUBCUTANEOUS at 21:15

## 2019-06-01 RX ADMIN — OXYCODONE HYDROCHLORIDE 2.5 MG: 5 TABLET ORAL at 21:33

## 2019-06-01 RX ADMIN — Medication 1 PACKET: at 08:56

## 2019-06-01 RX ADMIN — PANCRELIPASE 48000 UNITS: 24000; 76000; 120000 CAPSULE, DELAYED RELEASE PELLETS ORAL at 13:14

## 2019-06-01 RX ADMIN — PANCRELIPASE 48000 UNITS: 24000; 76000; 120000 CAPSULE, DELAYED RELEASE PELLETS ORAL at 08:55

## 2019-06-01 RX ADMIN — TRIAMCINOLONE ACETONIDE 1 APPLICATION: 1 CREAM TOPICAL at 17:43

## 2019-06-01 RX ADMIN — INSULIN LISPRO 4 UNITS: 100 INJECTION, SOLUTION INTRAVENOUS; SUBCUTANEOUS at 17:43

## 2019-06-01 RX ADMIN — Medication 1 PACKET: at 13:14

## 2019-06-01 RX ADMIN — INSULIN LISPRO 3 UNITS: 100 INJECTION, SOLUTION INTRAVENOUS; SUBCUTANEOUS at 13:15

## 2019-06-01 RX ADMIN — INSULIN LISPRO 10 UNITS: 100 INJECTION, SOLUTION INTRAVENOUS; SUBCUTANEOUS at 13:15

## 2019-06-01 RX ADMIN — INSULIN LISPRO 10 UNITS: 100 INJECTION, SOLUTION INTRAVENOUS; SUBCUTANEOUS at 17:43

## 2019-06-01 RX ADMIN — Medication 1 PACKET: at 17:43

## 2019-06-01 RX ADMIN — LIDOCAINE 1 PATCH: 50 PATCH CUTANEOUS at 21:14

## 2019-06-01 RX ADMIN — ENOXAPARIN SODIUM 40 MG: 40 INJECTION SUBCUTANEOUS at 08:56

## 2019-06-01 RX ADMIN — TORSEMIDE 30 MG: 20 TABLET ORAL at 08:55

## 2019-06-01 RX ADMIN — TRIAMCINOLONE ACETONIDE 1 APPLICATION: 1 CREAM TOPICAL at 09:01

## 2019-06-02 LAB
GLUCOSE SERPL-MCNC: 154 MG/DL (ref 65–140)
GLUCOSE SERPL-MCNC: 282 MG/DL (ref 65–140)
GLUCOSE SERPL-MCNC: 305 MG/DL (ref 65–140)
GLUCOSE SERPL-MCNC: 324 MG/DL (ref 65–140)

## 2019-06-02 PROCEDURE — 82948 REAGENT STRIP/BLOOD GLUCOSE: CPT

## 2019-06-02 PROCEDURE — 99232 SBSQ HOSP IP/OBS MODERATE 35: CPT | Performed by: INTERNAL MEDICINE

## 2019-06-02 RX ORDER — BACITRACIN, NEOMYCIN, POLYMYXIN B 400; 3.5; 5 [USP'U]/G; MG/G; [USP'U]/G
1 OINTMENT TOPICAL EVERY 8 HOURS PRN
Status: DISCONTINUED | OUTPATIENT
Start: 2019-06-02 | End: 2019-06-03 | Stop reason: HOSPADM

## 2019-06-02 RX ADMIN — ENOXAPARIN SODIUM 40 MG: 40 INJECTION SUBCUTANEOUS at 08:44

## 2019-06-02 RX ADMIN — PANCRELIPASE 48000 UNITS: 24000; 76000; 120000 CAPSULE, DELAYED RELEASE PELLETS ORAL at 12:59

## 2019-06-02 RX ADMIN — INSULIN LISPRO 10 UNITS: 100 INJECTION, SOLUTION INTRAVENOUS; SUBCUTANEOUS at 16:50

## 2019-06-02 RX ADMIN — INSULIN GLARGINE 10 UNITS: 100 INJECTION, SOLUTION SUBCUTANEOUS at 22:25

## 2019-06-02 RX ADMIN — INSULIN LISPRO 5 UNITS: 100 INJECTION, SOLUTION INTRAVENOUS; SUBCUTANEOUS at 16:50

## 2019-06-02 RX ADMIN — TRIAMCINOLONE ACETONIDE 1 APPLICATION: 1 CREAM TOPICAL at 08:44

## 2019-06-02 RX ADMIN — Medication 1 PACKET: at 16:50

## 2019-06-02 RX ADMIN — TORSEMIDE 30 MG: 20 TABLET ORAL at 08:42

## 2019-06-02 RX ADMIN — INSULIN LISPRO 4 UNITS: 100 INJECTION, SOLUTION INTRAVENOUS; SUBCUTANEOUS at 13:01

## 2019-06-02 RX ADMIN — PANCRELIPASE 48000 UNITS: 24000; 76000; 120000 CAPSULE, DELAYED RELEASE PELLETS ORAL at 08:42

## 2019-06-02 RX ADMIN — Medication 1 PACKET: at 08:42

## 2019-06-02 RX ADMIN — INSULIN LISPRO 10 UNITS: 100 INJECTION, SOLUTION INTRAVENOUS; SUBCUTANEOUS at 13:02

## 2019-06-02 RX ADMIN — LIDOCAINE 1 PATCH: 50 PATCH CUTANEOUS at 22:25

## 2019-06-02 RX ADMIN — TRIAMCINOLONE ACETONIDE 1 APPLICATION: 1 CREAM TOPICAL at 18:52

## 2019-06-02 RX ADMIN — PANCRELIPASE 48000 UNITS: 24000; 76000; 120000 CAPSULE, DELAYED RELEASE PELLETS ORAL at 16:50

## 2019-06-02 RX ADMIN — Medication 1 PACKET: at 12:59

## 2019-06-03 VITALS
RESPIRATION RATE: 18 BRPM | WEIGHT: 172.84 LBS | HEIGHT: 65 IN | DIASTOLIC BLOOD PRESSURE: 60 MMHG | TEMPERATURE: 97.9 F | SYSTOLIC BLOOD PRESSURE: 119 MMHG | BODY MASS INDEX: 28.8 KG/M2 | OXYGEN SATURATION: 99 % | HEART RATE: 101 BPM

## 2019-06-03 LAB
ALBUMIN SERPL BCP-MCNC: 2 G/DL (ref 3.5–5)
ALP SERPL-CCNC: 209 U/L (ref 46–116)
ALT SERPL W P-5'-P-CCNC: 35 U/L (ref 12–78)
ANION GAP SERPL CALCULATED.3IONS-SCNC: 12 MMOL/L (ref 4–13)
AST SERPL W P-5'-P-CCNC: 30 U/L (ref 5–45)
BILIRUB SERPL-MCNC: 4.15 MG/DL (ref 0.2–1)
BUN SERPL-MCNC: 58 MG/DL (ref 5–25)
CALCIUM SERPL-MCNC: 8.7 MG/DL (ref 8.3–10.1)
CHLORIDE SERPL-SCNC: 98 MMOL/L (ref 100–108)
CO2 SERPL-SCNC: 20 MMOL/L (ref 21–32)
CREAT SERPL-MCNC: 1.11 MG/DL (ref 0.6–1.3)
GFR SERPL CREATININE-BSD FRML MDRD: 49 ML/MIN/1.73SQ M
GLUCOSE SERPL-MCNC: 140 MG/DL (ref 65–140)
GLUCOSE SERPL-MCNC: 146 MG/DL (ref 65–140)
GLUCOSE SERPL-MCNC: 308 MG/DL (ref 65–140)
POTASSIUM SERPL-SCNC: 3.9 MMOL/L (ref 3.5–5.3)
PROT SERPL-MCNC: 5 G/DL (ref 6.4–8.2)
SODIUM SERPL-SCNC: 130 MMOL/L (ref 136–145)

## 2019-06-03 PROCEDURE — 80053 COMPREHEN METABOLIC PANEL: CPT | Performed by: INTERNAL MEDICINE

## 2019-06-03 PROCEDURE — 97110 THERAPEUTIC EXERCISES: CPT

## 2019-06-03 PROCEDURE — 99239 HOSP IP/OBS DSCHRG MGMT >30: CPT | Performed by: INTERNAL MEDICINE

## 2019-06-03 PROCEDURE — 97530 THERAPEUTIC ACTIVITIES: CPT

## 2019-06-03 PROCEDURE — 82948 REAGENT STRIP/BLOOD GLUCOSE: CPT

## 2019-06-03 PROCEDURE — 97116 GAIT TRAINING THERAPY: CPT

## 2019-06-03 RX ORDER — LIDOCAINE 50 MG/G
1 PATCH TOPICAL DAILY
Qty: 30 PATCH | Refills: 0 | Status: SHIPPED | OUTPATIENT
Start: 2019-06-03

## 2019-06-03 RX ORDER — ERGOCALCIFEROL 1.25 MG/1
50000 CAPSULE ORAL 3 TIMES WEEKLY
Refills: 0 | Status: SHIPPED | OUTPATIENT
Start: 2019-06-05

## 2019-06-03 RX ORDER — LANOLIN ALCOHOL/MO/W.PET/CERES
9 CREAM (GRAM) TOPICAL
Refills: 0 | Status: SHIPPED | OUTPATIENT
Start: 2019-06-03

## 2019-06-03 RX ORDER — SENNOSIDES 8.6 MG
2 TABLET ORAL
Qty: 120 EACH | Refills: 0 | Status: SHIPPED | OUTPATIENT
Start: 2019-06-03

## 2019-06-03 RX ORDER — OXYCODONE HYDROCHLORIDE 15 MG/1
7.5 TABLET ORAL EVERY 4 HOURS PRN
Qty: 30 TABLET | Refills: 0 | Status: SHIPPED | OUTPATIENT
Start: 2019-06-03 | End: 2019-06-13

## 2019-06-03 RX ORDER — TRIAMCINOLONE ACETONIDE 1 MG/G
CREAM TOPICAL 2 TIMES DAILY
Qty: 30 G | Refills: 0 | Status: SHIPPED | OUTPATIENT
Start: 2019-06-03

## 2019-06-03 RX ORDER — DIPHENHYDRAMINE HYDROCHLORIDE, ZINC ACETATE 2; .1 G/100G; G/100G
CREAM TOPICAL 3 TIMES DAILY PRN
Qty: 28.4 G | Refills: 0 | Status: SHIPPED | OUTPATIENT
Start: 2019-06-03

## 2019-06-03 RX ADMIN — TORSEMIDE 30 MG: 20 TABLET ORAL at 09:55

## 2019-06-03 RX ADMIN — PANCRELIPASE 48000 UNITS: 24000; 76000; 120000 CAPSULE, DELAYED RELEASE PELLETS ORAL at 13:16

## 2019-06-03 RX ADMIN — ERGOCALCIFEROL 50000 UNITS: 1.25 CAPSULE ORAL at 10:55

## 2019-06-03 RX ADMIN — INSULIN LISPRO 4 UNITS: 100 INJECTION, SOLUTION INTRAVENOUS; SUBCUTANEOUS at 13:17

## 2019-06-03 RX ADMIN — PANCRELIPASE 48000 UNITS: 24000; 76000; 120000 CAPSULE, DELAYED RELEASE PELLETS ORAL at 07:57

## 2019-06-03 RX ADMIN — Medication 1 PACKET: at 07:57

## 2019-06-03 RX ADMIN — ENOXAPARIN SODIUM 40 MG: 40 INJECTION SUBCUTANEOUS at 09:55

## 2019-06-03 RX ADMIN — INSULIN LISPRO 10 UNITS: 100 INJECTION, SOLUTION INTRAVENOUS; SUBCUTANEOUS at 08:01

## 2019-06-03 RX ADMIN — INSULIN LISPRO 10 UNITS: 100 INJECTION, SOLUTION INTRAVENOUS; SUBCUTANEOUS at 13:18

## 2019-06-03 RX ADMIN — TRIAMCINOLONE ACETONIDE: 1 CREAM TOPICAL at 10:00

## 2019-06-03 RX ADMIN — Medication 1 PACKET: at 13:16

## 2019-06-07 ENCOUNTER — TELEPHONE (OUTPATIENT)
Dept: FAMILY MEDICINE CLINIC | Facility: CLINIC | Age: 74
End: 2019-06-07

## 2019-06-08 LAB — HBA1C MFR BLD HPLC: 6 %

## 2019-06-11 ENCOUNTER — TELEPHONE (OUTPATIENT)
Dept: SURGICAL ONCOLOGY | Facility: CLINIC | Age: 74
End: 2019-06-11

## 2019-06-13 ENCOUNTER — TELEPHONE (OUTPATIENT)
Dept: SURGICAL ONCOLOGY | Facility: CLINIC | Age: 74
End: 2019-06-13

## 2019-06-19 ENCOUNTER — TELEPHONE (OUTPATIENT)
Dept: FAMILY MEDICINE CLINIC | Facility: CLINIC | Age: 74
End: 2019-06-19

## 2019-06-20 ENCOUNTER — OFFICE VISIT (OUTPATIENT)
Dept: NEPHROLOGY | Facility: CLINIC | Age: 74
End: 2019-06-20
Payer: COMMERCIAL

## 2019-06-20 ENCOUNTER — TELEPHONE (OUTPATIENT)
Dept: FAMILY MEDICINE CLINIC | Facility: CLINIC | Age: 74
End: 2019-06-20

## 2019-06-20 VITALS — BODY MASS INDEX: 28.76 KG/M2 | HEIGHT: 65 IN

## 2019-06-20 DIAGNOSIS — R60.0 BILATERAL LOWER EXTREMITY EDEMA: ICD-10-CM

## 2019-06-20 DIAGNOSIS — S72.001D CLOSED FRACTURE OF RIGHT HIP WITH ROUTINE HEALING, SUBSEQUENT ENCOUNTER: ICD-10-CM

## 2019-06-20 DIAGNOSIS — I50.32 CHRONIC DIASTOLIC CHF (CONGESTIVE HEART FAILURE) (HCC): ICD-10-CM

## 2019-06-20 DIAGNOSIS — N18.30 CKD (CHRONIC KIDNEY DISEASE), STAGE III (HCC): ICD-10-CM

## 2019-06-20 DIAGNOSIS — Z90.410 DIABETES MELLITUS SECONDARY TO PANCREATECTOMY (HCC): Chronic | ICD-10-CM

## 2019-06-20 DIAGNOSIS — R18.0 MALIGNANT ASCITES: Primary | ICD-10-CM

## 2019-06-20 DIAGNOSIS — E89.1 DIABETES MELLITUS SECONDARY TO PANCREATECTOMY (HCC): Chronic | ICD-10-CM

## 2019-06-20 DIAGNOSIS — E13.9 DIABETES MELLITUS SECONDARY TO PANCREATECTOMY (HCC): Chronic | ICD-10-CM

## 2019-06-20 DIAGNOSIS — C56.9 MALIGNANT NEOPLASM OF OVARY, UNSPECIFIED LATERALITY (HCC): ICD-10-CM

## 2019-06-20 DIAGNOSIS — E83.51 HYPOCALCEMIA: ICD-10-CM

## 2019-06-20 DIAGNOSIS — I10 BENIGN ESSENTIAL HYPERTENSION: ICD-10-CM

## 2019-06-20 DIAGNOSIS — N17.9 AKI (ACUTE KIDNEY INJURY) (HCC): ICD-10-CM

## 2019-06-20 PROCEDURE — 99215 OFFICE O/P EST HI 40 MIN: CPT | Performed by: NURSE PRACTITIONER

## 2019-06-20 PROCEDURE — 3066F NEPHROPATHY DOC TX: CPT | Performed by: NURSE PRACTITIONER

## 2019-06-20 RX ORDER — ACETAMINOPHEN 325 MG/1
650 TABLET ORAL EVERY 6 HOURS PRN
COMMUNITY

## 2019-06-21 ENCOUNTER — TELEPHONE (OUTPATIENT)
Dept: FAMILY MEDICINE CLINIC | Facility: CLINIC | Age: 74
End: 2019-06-21

## 2019-06-22 ENCOUNTER — APPOINTMENT (EMERGENCY)
Dept: CT IMAGING | Facility: HOSPITAL | Age: 74
DRG: 054 | End: 2019-06-22
Payer: COMMERCIAL

## 2019-06-22 ENCOUNTER — HOSPITAL ENCOUNTER (INPATIENT)
Facility: HOSPITAL | Age: 74
LOS: 13 days | Discharge: HOME HEALTH | DRG: 054 | End: 2019-07-05
Attending: EMERGENCY MEDICINE | Admitting: INTERNAL MEDICINE
Payer: COMMERCIAL

## 2019-06-22 DIAGNOSIS — Z90.49 S/P PARTIAL COLECTOMY: ICD-10-CM

## 2019-06-22 DIAGNOSIS — N18.30 CKD (CHRONIC KIDNEY DISEASE), STAGE III (HCC): ICD-10-CM

## 2019-06-22 DIAGNOSIS — Z85.07 PERSONAL HISTORY OF PANCREATIC CANCER: ICD-10-CM

## 2019-06-22 DIAGNOSIS — N17.9 AKI (ACUTE KIDNEY INJURY) (HCC): ICD-10-CM

## 2019-06-22 DIAGNOSIS — C79.9 MULTIPLE LESIONS OF METASTATIC MALIGNANCY (HCC): ICD-10-CM

## 2019-06-22 DIAGNOSIS — K75.0 BACTERIAL LIVER ABSCESS: Chronic | ICD-10-CM

## 2019-06-22 DIAGNOSIS — Z85.3 PERSONAL HISTORY OF BREAST CANCER: ICD-10-CM

## 2019-06-22 DIAGNOSIS — A41.9 SEPSIS, DUE TO UNSPECIFIED ORGANISM: ICD-10-CM

## 2019-06-22 DIAGNOSIS — R41.82 ALTERED MENTAL STATUS: Primary | ICD-10-CM

## 2019-06-22 DIAGNOSIS — C56.9 MALIGNANT NEOPLASM OF OVARY, UNSPECIFIED LATERALITY (HCC): ICD-10-CM

## 2019-06-22 DIAGNOSIS — R60.0 LOCALIZED EDEMA: ICD-10-CM

## 2019-06-22 DIAGNOSIS — I50.32 CHRONIC DIASTOLIC CHF (CONGESTIVE HEART FAILURE) (HCC): ICD-10-CM

## 2019-06-22 DIAGNOSIS — C79.31 METASTASIS TO BRAIN (HCC): ICD-10-CM

## 2019-06-22 DIAGNOSIS — D18.02 CAVERNOUS HEMANGIOMA OF INTRACRANIAL STRUCTURE (HCC): ICD-10-CM

## 2019-06-22 LAB
ALBUMIN SERPL BCP-MCNC: 1.6 G/DL (ref 3.5–5)
ALP SERPL-CCNC: 168 U/L (ref 46–116)
ALT SERPL W P-5'-P-CCNC: 35 U/L (ref 12–78)
AMMONIA PLAS-SCNC: 27 UMOL/L (ref 11–35)
ANION GAP SERPL CALCULATED.3IONS-SCNC: 11 MMOL/L (ref 4–13)
APTT PPP: 34 SECONDS (ref 26–38)
AST SERPL W P-5'-P-CCNC: 38 U/L (ref 5–45)
ATRIAL RATE: 98 BPM
BASOPHILS # BLD AUTO: 0.05 THOUSANDS/ΜL (ref 0–0.1)
BASOPHILS NFR BLD AUTO: 0 % (ref 0–1)
BILIRUB SERPL-MCNC: 3.45 MG/DL (ref 0.2–1)
BUN SERPL-MCNC: 63 MG/DL (ref 5–25)
CALCIUM SERPL-MCNC: 8.6 MG/DL (ref 8.3–10.1)
CHLORIDE SERPL-SCNC: 97 MMOL/L (ref 100–108)
CO2 SERPL-SCNC: 21 MMOL/L (ref 21–32)
CREAT SERPL-MCNC: 1.57 MG/DL (ref 0.6–1.3)
EOSINOPHIL # BLD AUTO: 0.09 THOUSAND/ΜL (ref 0–0.61)
EOSINOPHIL NFR BLD AUTO: 0 % (ref 0–6)
ERYTHROCYTE [DISTWIDTH] IN BLOOD BY AUTOMATED COUNT: 17.2 % (ref 11.6–15.1)
GFR SERPL CREATININE-BSD FRML MDRD: 32 ML/MIN/1.73SQ M
GLUCOSE SERPL-MCNC: 112 MG/DL (ref 65–140)
GLUCOSE SERPL-MCNC: 62 MG/DL (ref 65–140)
GLUCOSE SERPL-MCNC: 63 MG/DL (ref 65–140)
GLUCOSE SERPL-MCNC: 63 MG/DL (ref 65–140)
HCT VFR BLD AUTO: 29.6 % (ref 34.8–46.1)
HGB BLD-MCNC: 9.3 G/DL (ref 11.5–15.4)
IMM GRANULOCYTES # BLD AUTO: 0.29 THOUSAND/UL (ref 0–0.2)
IMM GRANULOCYTES NFR BLD AUTO: 1 % (ref 0–2)
INR PPP: 1.91 (ref 0.86–1.17)
LACTATE SERPL-SCNC: 1.4 MMOL/L (ref 0.5–2)
LIPASE SERPL-CCNC: 24 U/L (ref 73–393)
LYMPHOCYTES # BLD AUTO: 2.09 THOUSANDS/ΜL (ref 0.6–4.47)
LYMPHOCYTES NFR BLD AUTO: 9 % (ref 14–44)
MCH RBC QN AUTO: 30.5 PG (ref 26.8–34.3)
MCHC RBC AUTO-ENTMCNC: 31.4 G/DL (ref 31.4–37.4)
MCV RBC AUTO: 97 FL (ref 82–98)
MONOCYTES # BLD AUTO: 1.8 THOUSAND/ΜL (ref 0.17–1.22)
MONOCYTES NFR BLD AUTO: 8 % (ref 4–12)
NEUTROPHILS # BLD AUTO: 19.72 THOUSANDS/ΜL (ref 1.85–7.62)
NEUTS SEG NFR BLD AUTO: 82 % (ref 43–75)
NRBC BLD AUTO-RTO: 0 /100 WBCS
NT-PROBNP SERPL-MCNC: 1576 PG/ML
P AXIS: 82 DEGREES
PLATELET # BLD AUTO: 276 THOUSANDS/UL (ref 149–390)
PMV BLD AUTO: 11 FL (ref 8.9–12.7)
POTASSIUM SERPL-SCNC: 4.5 MMOL/L (ref 3.5–5.3)
PR INTERVAL: 140 MS
PROT SERPL-MCNC: 5.2 G/DL (ref 6.4–8.2)
PROTHROMBIN TIME: 22 SECONDS (ref 11.8–14.2)
QRS AXIS: 47 DEGREES
QRSD INTERVAL: 78 MS
QT INTERVAL: 324 MS
QTC INTERVAL: 409 MS
RBC # BLD AUTO: 3.05 MILLION/UL (ref 3.81–5.12)
SODIUM SERPL-SCNC: 129 MMOL/L (ref 136–145)
T WAVE AXIS: 49 DEGREES
TROPONIN I SERPL-MCNC: 0.02 NG/ML
VENTRICULAR RATE: 96 BPM
WBC # BLD AUTO: 24.04 THOUSAND/UL (ref 4.31–10.16)

## 2019-06-22 PROCEDURE — 87040 BLOOD CULTURE FOR BACTERIA: CPT | Performed by: NURSE PRACTITIONER

## 2019-06-22 PROCEDURE — 85025 COMPLETE CBC W/AUTO DIFF WBC: CPT | Performed by: EMERGENCY MEDICINE

## 2019-06-22 PROCEDURE — 70450 CT HEAD/BRAIN W/O DYE: CPT

## 2019-06-22 PROCEDURE — 82140 ASSAY OF AMMONIA: CPT | Performed by: EMERGENCY MEDICINE

## 2019-06-22 PROCEDURE — 82948 REAGENT STRIP/BLOOD GLUCOSE: CPT

## 2019-06-22 PROCEDURE — 99291 CRITICAL CARE FIRST HOUR: CPT | Performed by: EMERGENCY MEDICINE

## 2019-06-22 PROCEDURE — 80053 COMPREHEN METABOLIC PANEL: CPT | Performed by: EMERGENCY MEDICINE

## 2019-06-22 PROCEDURE — 99285 EMERGENCY DEPT VISIT HI MDM: CPT

## 2019-06-22 PROCEDURE — 83605 ASSAY OF LACTIC ACID: CPT | Performed by: EMERGENCY MEDICINE

## 2019-06-22 PROCEDURE — 93010 ELECTROCARDIOGRAM REPORT: CPT | Performed by: INTERNAL MEDICINE

## 2019-06-22 PROCEDURE — 83690 ASSAY OF LIPASE: CPT | Performed by: EMERGENCY MEDICINE

## 2019-06-22 PROCEDURE — 93005 ELECTROCARDIOGRAM TRACING: CPT

## 2019-06-22 PROCEDURE — 36415 COLL VENOUS BLD VENIPUNCTURE: CPT | Performed by: EMERGENCY MEDICINE

## 2019-06-22 PROCEDURE — 85610 PROTHROMBIN TIME: CPT | Performed by: EMERGENCY MEDICINE

## 2019-06-22 PROCEDURE — 83880 ASSAY OF NATRIURETIC PEPTIDE: CPT | Performed by: EMERGENCY MEDICINE

## 2019-06-22 PROCEDURE — 85730 THROMBOPLASTIN TIME PARTIAL: CPT | Performed by: EMERGENCY MEDICINE

## 2019-06-22 PROCEDURE — 0W9G3ZZ DRAINAGE OF PERITONEAL CAVITY, PERCUTANEOUS APPROACH: ICD-10-PCS | Performed by: INTERNAL MEDICINE

## 2019-06-22 PROCEDURE — 87147 CULTURE TYPE IMMUNOLOGIC: CPT | Performed by: NURSE PRACTITIONER

## 2019-06-22 PROCEDURE — 99223 1ST HOSP IP/OBS HIGH 75: CPT | Performed by: NURSE PRACTITIONER

## 2019-06-22 PROCEDURE — 84484 ASSAY OF TROPONIN QUANT: CPT | Performed by: EMERGENCY MEDICINE

## 2019-06-22 RX ORDER — SODIUM CHLORIDE 9 MG/ML
50 INJECTION, SOLUTION INTRAVENOUS CONTINUOUS
Status: DISCONTINUED | OUTPATIENT
Start: 2019-06-22 | End: 2019-07-01

## 2019-06-22 RX ORDER — CHLORHEXIDINE GLUCONATE 0.12 MG/ML
15 RINSE ORAL EVERY 12 HOURS SCHEDULED
Status: DISCONTINUED | OUTPATIENT
Start: 2019-06-22 | End: 2019-06-24

## 2019-06-22 RX ORDER — DIPHENHYDRAMINE HYDROCHLORIDE, ZINC ACETATE 2; .1 G/100G; G/100G
CREAM TOPICAL 3 TIMES DAILY PRN
Status: DISCONTINUED | OUTPATIENT
Start: 2019-06-22 | End: 2019-07-05 | Stop reason: HOSPADM

## 2019-06-22 RX ORDER — LIDOCAINE 50 MG/G
1 PATCH TOPICAL DAILY
Status: DISCONTINUED | OUTPATIENT
Start: 2019-06-23 | End: 2019-07-05 | Stop reason: HOSPADM

## 2019-06-22 RX ADMIN — SODIUM CHLORIDE 50 ML/HR: 0.9 INJECTION, SOLUTION INTRAVENOUS at 20:20

## 2019-06-22 RX ADMIN — CHLORHEXIDINE GLUCONATE 0.12% ORAL RINSE 15 ML: 1.2 LIQUID ORAL at 21:39

## 2019-06-22 NOTE — ED PROVIDER NOTES
History  Chief Complaint   Patient presents with    Altered Mental Status     End stage pancreatic cancer pt, with altered mental status, family reports decreased appetite, increased lethargy, over past few days  21-year-old female with a history advanced pancreatic cancer presents for evaluation increased lethargy over the past couple of days with decreased activity level  Patient has generalized fatigue and inability to care for self  The patient was recently discharged from the hospital and skilled nursing for the plan of remaining home with home health aides however the home health aide today noted that the family was having difficulty caring for the patient in the environment that she was in  The paramedics state that the patient was found laying on a Bare mattress with a pillow and she was naked  There was concern for the family's ability to provide proper care for the patient at home  Prior to Admission Medications   Prescriptions Last Dose Informant Patient Reported?  Taking?   acetaminophen (TYLENOL) 325 mg tablet   Yes No   Sig: Take 650 mg by mouth every 6 (six) hours as needed for mild pain   diphenhydrAMINE (BENADRYL) 25 mg capsule   Yes No   Sig: Take 25 mg by mouth every 6 (six) hours as needed for itching   diphenhydrAMINE-zinc acetate (BENADRYL) cream   No No   Sig: Apply topically 3 (three) times a day as needed for itching   enoxaparin (LOVENOX) 40 mg/0 4 mL   No No   Sig: Inject 0 4 mL (40 mg total) under the skin daily for 28 days   ergocalciferol (VITAMIN D2) 50,000 units Not Taking at Unknown time  No No   Sig: Take 1 capsule (50,000 Units total) by mouth 3 (three) times a week   Patient not taking: Reported on 6/20/2019   insulin aspart (NOVOLOG FLEXPEN) 100 Units/mL injection pen Not Taking at Unknown time  No No   Sig: Inject 10 Units under the skin 3 (three) times a day with meals   Patient not taking: Reported on 6/20/2019   insulin glargine (BASAGLAR KWIKPEN) 100 units/mL injection pen   Yes No   Sig: Inject under the skin daily   insulin lispro (HumaLOG) 100 units/mL injection   Yes No   Sig: Inject under the skin 3 (three) times a day before meals   lidocaine (LIDODERM) 5 %   No No   Sig: Apply 1 patch topically daily Remove & Discard patch within 12 hours or as directed by MD   melatonin 3 mg   No No   Sig: Take 3 tablets (9 mg total) by mouth daily at bedtime as needed (Sleep)   oxyCODONE HCl 15 MG TABA   Yes No   Sig: Take by mouth as needed   pancrelipase, Lip-Prot-Amyl, (CREON) 24,000 units   No No   Sig: Take 2 capsules (48,000 Units total) by mouth 3 (three) times a day with meals   pramoxine-phenylephrine-glycerin-petrolatum (PREPARATION H MAX) 1-0 25-14 4-15 % rectal cream Not Taking at Unknown time  No No   Sig: Insert into the rectum 2 (two) times a day as needed (Hemorrhoids)   Patient not taking: Reported on 2019   senna (SENOKOT) 8 6 mg   No No   Sig: Take 2 tablets (17 2 mg total) by mouth daily at bedtime as needed for constipation   torsemide (DEMADEX) 20 mg tablet   No No   Si 1/2 tabs qd   triamcinolone (KENALOG) 0 1 % cream   No No   Sig: Apply topically 2 (two) times a day      Facility-Administered Medications: None       Past Medical History:   Diagnosis Date    Bacteremia 2019    Cancer [facility])     Breast; Last Assessed: 2016    Carpal tunnel syndrome     unspecified laterality;  Onset: 2012    Cellulitis     Last Assessesd: 2012    Diabetes mellitus out of control ([facility] [address] )     Last Assessed: 11/3/2014    Fatigue     Last Assessed:     Fracture of toe     Last Assessed: 2014    Pancreatic insufficiency 2019    Rectal cancer [facility])        Past Surgical History:   Procedure Laterality Date    COLON SURGERY      IR IMAGE GUIDED ASPIRATION / DRAINAGE  2019    IR IMAGE GUIDED DRAINAGE W TUBE PLACEMENT  2019    IR PARACENTESIS  2019    IR PICC LINE  2019    IR TUBE PLACEMENT 5/3/2019    MASTECTOMY      bilateral    PANCREATECTOMY  2010    Proximal Subtotal (whipple procedure)    MD PARTIAL HIP REPLACEMENT Right 4/11/2019    Procedure: HEMIARTHROPLASTY HIP (BIPOLAR); Surgeon: Favian Barney MD;  Location: [facility];  Service: Orthopedics       Family History   Problem Relation Age of Onset    Melanoma Mother     Stroke Father      I have reviewed and agree with the history as documented  Social History     Tobacco Use    Smoking status: Never Smoker    Smokeless tobacco: Never Used   Substance Use Topics    Alcohol use: Not Currently    Drug use: Not Currently        Review of Systems   Unable to perform ROS: Mental status change   Constitutional: Positive for fatigue  Neurological: Positive for speech difficulty and weakness  All other systems reviewed and are negative  Physical Exam  Physical Exam   Constitutional: She is oriented to person, place, and time  She appears well-developed and well-nourished  She appears lethargic  She has a sickly appearance  She appears ill  No distress  HENT:   Head: Normocephalic and atraumatic  Right Ear: External ear normal    Left Ear: External ear normal    Mouth/Throat: Mucous membranes are dry  Eyes: Pupils are equal, round, and reactive to light  Conjunctivae and EOM are normal  No scleral icterus  Neck: Normal range of motion  Cardiovascular: Normal rate, regular rhythm and normal heart sounds  Pulmonary/Chest: Effort normal and breath sounds normal  No respiratory distress  Abdominal: Soft  Bowel sounds are normal  There is no tenderness  There is no rebound and no guarding  Musculoskeletal: Normal range of motion  She exhibits edema ( Generalized anasarca)  Neurological: She is oriented to person, place, and time  She appears lethargic  Skin: Skin is warm and dry  No rash noted  There is pallor  Psychiatric: She has a normal mood and affect  Nursing note and vitals reviewed        Vital Signs  ED Triage Vitals   Temperature Pulse Respirations Blood Pressure SpO2   06/22/19 1453 06/22/19 1453 06/22/19 1453 06/22/19 1453 06/22/19 1453   99 7 °F (37 6 °C) 97 12 127/66 94 %      Temp Source Heart Rate Source Patient Position - Orthostatic VS BP Location FiO2 (%)   06/22/19 1453 06/22/19 1453 06/22/19 1608 06/22/19 1608 --   Rectal Monitor Lying Left arm       Pain Score       06/22/19 1453       7           Vitals:    06/23/19 0400 06/23/19 0615 06/23/19 0740 06/23/19 0840   BP: 91/57 110/59 97/59 109/56   Pulse: 78 78 74 80   Patient Position - Orthostatic VS:             Visual Acuity  Visual Acuity      Most Recent Value   L Pupil Size (mm)  4   R Pupil Size (mm)  4          ED Medications  Medications   chlorhexidine (PERIDEX) 0 12 % oral rinse 15 mL (15 mL Swish & Spit Given 6/23/19 0825)   diphenhydrAMINE-zinc acetate (BENADRYL) 2-0 1 % cream (has no administration in time range)   lidocaine (LIDODERM) 5 % patch 1 patch (1 patch Topical Medication Applied 6/23/19 0825)   sodium chloride 0 9 % infusion (50 mL/hr Intravenous New Bag 6/22/19 2020)   insulin lispro (HumaLOG) 100 units/mL subcutaneous injection 1-5 Units (1 Units Subcutaneous Not Given 6/23/19 3236)       Diagnostic Studies  Results Reviewed     Procedure Component Value Units Date/Time    Blood culture [[id number]] Collected:  06/22/19 1455    Lab Status:   In process Specimen:  Blood from Arm, Right Updated:  06/22/19 2026    Fingerstick Glucose (POCT) [[id number]]  (Abnormal) Collected:  06/22/19 1815    Lab Status:  Final result Updated:  06/22/19 1816     POC Glucose 63 mg/dl     Lipase [[id number]]  (Abnormal) Collected:  06/22/19 1455    Lab Status:  Final result Specimen:  Blood from Arm, Right Updated:  06/22/19 1540     Lipase 24 u/L     B-type natriuretic peptide [[id number]]  (Abnormal) Collected:  06/22/19 1455    Lab Status:  Final result Specimen:  Blood from Arm, Right Updated:  06/22/19 1540     NT-proBNP 1,576 pg/mL Comprehensive metabolic panel [[id number]]  (Abnormal) Collected:  06/22/19 1455    Lab Status:  Final result Specimen:  Blood from Arm, Right Updated:  06/22/19 1539     Sodium 129 mmol/L      Potassium 4 5 mmol/L      Chloride 97 mmol/L      CO2 21 mmol/L      ANION GAP 11 mmol/L      BUN 63 mg/dL      Creatinine 1 57 mg/dL      Glucose 62 mg/dL      Calcium 8 6 mg/dL      AST 38 U/L      ALT 35 U/L      Alkaline Phosphatase 168 U/L      Total Protein 5 2 g/dL      Albumin 1 6 g/dL      Total Bilirubin 3 45 mg/dL      eGFR 32 ml/min/1 73sq m     Narrative:       [place] guidelines for Chronic Kidney Disease (CKD):     Stage 1 with normal or high GFR (GFR > 90 mL/min/1 73 square meters)    Stage 2 Mild CKD (GFR = 60-89 mL/min/1 73 square meters)    Stage 3A Moderate CKD (GFR = 45-59 mL/min/1 73 square meters)    Stage 3B Moderate CKD (GFR = 30-44 mL/min/1 73 square meters)    Stage 4 Severe CKD (GFR = 15-29 mL/min/1 73 square meters)    Stage 5 End Stage CKD (GFR <15 mL/min/1 73 square meters)  Note: GFR calculation is accurate only with a steady state creatinine    Troponin I [[id number]]  (Normal) Collected:  06/22/19 1455    Lab Status:  Final result Specimen:  Blood from Arm, Right Updated:  06/22/19 1529     Troponin I 0 02 ng/mL     Lactic acid, plasma [[id number]]  (Normal) Collected:  06/22/19 1455    Lab Status:  Final result Specimen:  Blood from Arm, Right Updated:  06/22/19 1529     LACTIC ACID 1 4 mmol/L     Narrative:       Result may be elevated if tourniquet was used during collection      Protime-INR [[id number]]  (Abnormal) Collected:  06/22/19 1455    Lab Status:  Final result Specimen:  Blood from Arm, Right Updated:  06/22/19 1528     Protime 22 0 seconds      INR 1 91    APTT [[id number]]  (Normal) Collected:  06/22/19 1455    Lab Status:  Final result Specimen:  Blood from Arm, Right Updated:  06/22/19 1528     PTT 34 seconds     Ammonia [[id number]]  (Normal) Collected:  06/22/19 1455    Lab Status:  Final result Specimen:  Blood from Arm, Right Updated:  06/22/19 1523     Ammonia 27 umol/L     CBC and differential [[id number]]  (Abnormal) Collected:  06/22/19 1455    Lab Status:  Final result Specimen:  Blood from Arm, Right Updated:  06/22/19 1514     WBC 24 04 Thousand/uL      RBC 3 05 Million/uL      Hemoglobin 9 3 g/dL      Hematocrit 29 6 %      MCV 97 fL      MCH 30 5 pg      MCHC 31 4 g/dL      RDW 17 2 %      MPV 11 0 fL      Platelets 519 Thousands/uL      nRBC 0 /100 WBCs      Neutrophils Relative 82 %      Immat GRANS % 1 %      Lymphocytes Relative 9 %      Monocytes Relative 8 %      Eosinophils Relative 0 %      Basophils Relative 0 %      Neutrophils Absolute 19 72 Thousands/µL      Immature Grans Absolute 0 29 Thousand/uL      Lymphocytes Absolute 2 09 Thousands/µL      Monocytes Absolute 1 80 Thousand/µL      Eosinophils Absolute 0 09 Thousand/µL      Basophils Absolute 0 05 Thousands/µL     POCT urinalysis dipstick [[id number]]     Lab Status:  No result Specimen:  Urine                  CT head without contrast   Final Result by Ellen Mcgregor MD (06/22 1536)      A few scattered pontine and left supratentorial lesions suspicious for hemorrhagic metastases  An enhanced MRI of the brain is recommended for further evaluation  No mass effect or midline shift  Mild chronic small vessel ischemic changes and volume loss  Workstation performed: [id number]                    Procedures  CriticalCare Time  Performed by: Carmen Mooney DO  Authorized by:  Carmen Mooney DO     Critical care provider statement:     Critical care time (minutes):  35    Critical care time was exclusive of:  Separately billable procedures and treating other patients and teaching time    Critical care was necessary to treat or prevent imminent or life-threatening deterioration of the following conditions:  CNS failure or compromise    Critical care was time spent personally by me on the following activities:  Blood draw for specimens, obtaining history from patient or surrogate, development of treatment plan with patient or surrogate, discussions with consultants, evaluation of patient's response to treatment, examination of patient, ordering and performing treatments and interventions, ordering and review of laboratory studies, ordering and review of radiographic studies, re-evaluation of patient's condition, review of old charts and interpretation of cardiac output measurements    I assumed direction of critical care for this patient from another provider in my specialty: no             ED Course  ED Course as of Jun 23 0937   Sat Jun 22, 2019   1639 Discussed [facility] results with family  ICU paged      [phone] Discussed the case with Dr Bhakti Billingsley from [phone]  He advised discussion with Neurosurgery  If Neurosurgery does not want to intervene were then will accept to level 1 step-down      1705 Discussed case with Dr Boo Newell from neuro surgery  No emergent surgical intervention needed agrees with plan for the patient to stay on the medical service at [facility] - CLOSED  Can consider nonemergent stereotactic intervention      1705 RRNP paged                                  MDM  Number of Diagnoses or Management Options  Altered mental status: new and requires workup  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  Diagnosis management comments: The plan is to obtain laboratories to assess for infection, anemia, renal failure, electrolyte disturbance, encephalopathy, stroke, myocardial infarction    Patient will be admitted to the medical ICU for further evaluation of metastatic lesions to the brain with possible hemorrhagic conversion         Amount and/or Complexity of Data Reviewed  Clinical lab tests: ordered and reviewed  Tests in the radiology section of CPT®: ordered and reviewed  Review and summarize past medical records: yes  Discuss the patient with other providers: yes  Independent visualization of images, tracings, or specimens: yes        Disposition  Final diagnoses: Altered mental status   Metastasis to [facility])     Time reflects when diagnosis was documented in both MDM as applicable and the Disposition within this note     Time User Action Codes Description Comment    6/22/2019  5:27 PM Jason Jim Add [R41 [id number]] Altered mental status     6/22/2019  5:27 PM Jason Jim Add [C79 [id number]] Metastasis to [facility])       ED Disposition     ED Disposition Condition Date/Time Comment    Admit Stable Sat Jun 22, 2019  5:27 PM Case was discussed with Dr Celestino Marquez and the patient's admission status was agreed to be Admission Status: inpatient status to the service of Dr Celestino Marquez   Follow-up Information    None         Current Discharge Medication List      CONTINUE these medications which have NOT CHANGED    Details   acetaminophen (TYLENOL) 325 mg tablet Take 650 mg by mouth every 6 (six) hours as needed for mild pain      diphenhydrAMINE (BENADRYL) 25 mg capsule Take 25 mg by mouth every 6 (six) hours as needed for itching      diphenhydrAMINE-zinc acetate (BENADRYL) cream Apply topically 3 (three) times a day as needed for itching  Qty: 28 4 g, Refills: 0    Associated Diagnoses: Leukocytosis, unspecified type; Elevated LFTs; Personal history of pancreatic cancer; Spontaneous [facility]itis ([facility] ); Rash; Benign essential hypertension; 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 ([facility] ); Personal history of breast cancer; Chronic diastolic CHF (congestive heart failure) ([facility] ); Acute blood loss anemia; Peritonitis ([facility] ); Ovarian cancer ([facility] ); S/P partial colectomy      insulin aspart (NOVOLOG FLEXPEN) 100 Units/mL injection pen Inject 10 Units under the skin 3 (three) times a day with meals  Qty: 3 pen, Refills: 0    Associated Diagnoses: 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 (HCC)      insulin glargine (BASAGLAR KWIKPEN) 100 units/mL injection pen Inject under the skin daily      insulin lispro (HumaLOG) 100 units/mL injection Inject under the skin 3 (three) times a day before meals      lidocaine (LIDODERM) 5 % Apply 1 patch topically daily Remove & Discard patch within 12 hours or as directed by MD  Qty: 30 patch, Refills: 0    Associated Diagnoses: Leukocytosis, unspecified type; Elevated LFTs; Personal history of pancreatic cancer; Spontaneous bacterial peritonitis ([facility] ); Rash; Benign essential hypertension; 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 ([facility] ); Personal history of breast cancer; Chronic diastolic CHF (congestive heart failure) ([facility] ); Acute blood loss anemia; Peritonitis ([facility] ); Ovarian cancer ([facility] ); S/P partial colectomy      melatonin 3 mg Take 3 tablets (9 mg total) by mouth daily at bedtime as needed (Sleep)  Refills: 0    Associated Diagnoses: Leukocytosis, unspecified type; Elevated LFTs; Personal history of pancreatic cancer; Spontaneous bacterial peritonitis ([facility] ); Rash; Benign essential hypertension; 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 ([facility] ); Personal history of breast cancer; Chronic diastolic CHF (congestive heart failure) ([facility] );  Acute blood loss anemia; Peritonitis ([facility] ); Ovarian cancer ([place] [address] ); S/P partial colectomy      oxyCODONE HCl 15 MG TABA Take by mouth as needed      pancrelipase, Lip-Prot-Amyl, (CREON) 24,000 units Take 2 capsules (48,000 Units total) by mouth 3 (three) times a day with meals  Refills: 0    Associated Diagnoses: Leukocytosis, unspecified type; Elevated LFTs; Personal history of pancreatic cancer; Spontaneous bacterial peritonitis ([place] [address] ); Rash; Benign essential hypertension; 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 ([place] [address] ); Personal history of breast cancer; Chronic diastolic CHF (congestive heart failure) ([place] [address] ); Acute blood loss anemia; Peritonitis ([place] [address] ); Ovarian cancer ([place] [address] ); S/P partial colectomy      pramoxine-phenylephrine-glycerin-petrolatum (PREPARATION H MAX) 1-0 25-14 4-15 % rectal cream Insert into the rectum 2 (two) times a day as needed (Hemorrhoids)  Refills: 0    Associated Diagnoses: Leukocytosis, unspecified type; Elevated LFTs; Personal history of pancreatic cancer; Spontaneous bacterial peritonitis ([place] [address] ); Rash; Benign essential hypertension; 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 ([place] [address] ); Personal history of breast cancer; Chronic diastolic CHF (congestive heart failure) ([place] [address] ); Acute blood loss anemia; Peritonitis ([place] [address] ); Ovarian cancer ([place] [address] ); S/P partial colectomy      senna (SENOKOT) 8 6 mg Take 2 tablets (17 2 mg total) by mouth daily at bedtime as needed for constipation  Qty: 120 each, Refills: 0    Associated Diagnoses: Leukocytosis, unspecified type; Elevated LFTs; Personal history of pancreatic cancer; Spontaneous bacterial peritonitis ([place] [address] ); Rash; Benign essential hypertension; 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 ([place] [address] ); Personal history of breast cancer; Chronic diastolic CHF (congestive heart failure) ([place] [address] ); Acute blood loss anemia; Peritonitis ([place] [address] );  Ovarian cancer ([facility] ); S/P partial colectomy      torsemide (DEMADEX) 20 mg tablet 1 1/2 tabs qd  Qty: 45 tablet, Refills: 2    Associated Diagnoses: Anasarca      triamcinolone (KENALOG) 0 1 % cream Apply topically 2 (two) times a day  Qty: 30 g, Refills: 0    Associated Diagnoses: Leukocytosis, unspecified type; Elevated LFTs; Personal history of pancreatic cancer; Spontaneous bacterial peritonitis ([facility] [facility] ); Rash; Benign essential hypertension; 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 ([facility] ); Personal history of breast cancer; Chronic diastolic CHF (congestive heart failure) ([facility] ); Acute blood loss anemia; Peritonitis ([facility] ); Ovarian cancer ([facility] ); S/P partial colectomy           No discharge procedures on file      ED Provider  Electronically Signed by           Len Marx DO  06/23/19 9216

## 2019-06-22 NOTE — H&P
History & Physical Exam - Critical Care   Joselin Fu 75 y o  female MRN: [id number]  Unit/Bed#: ED 32 Encounter: [id number]      Assessment/Plan:  1  End-stage pancreatic cancer  · Patient follows with [facility]  · Consider Heme-Onc consult versus hospice  · In addition to pancreatic cancer the patient also has known breast cancer, ovarian cancer, metastatic brain hemorrhages and colon cancer  2  Chronic kidney disease stage 3  · Continue to monitor kidney indices  · Avoid nephrotoxic drugs  · Gentle hydration secondary to congestive heart failure  3  Multiple Metastatic brain hemorrhages  · Neurosurgery consult, not a surgical candidate at this time  4  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  · Sliding scale insulin  · Q 6 hours blood sugars  · Maintain NPO for now  5  Hyponatremia:  Start normal saline at 50 an hour  Monitor serum sodium q 6 hours  6  Chronic diastolic heart failure: Add IV Lasix, monitor potassium  7  No central venous access, consider PICC placement  8  Swallow evaluation, speech consult  9  Recent fall with hip fracture consider physical therapy consult  10  Obstructive jaundice biliary drains in place  11  History of peritonitis, protuberant abdomen with absent bowel sounds  Consider CT scan versus KUB ascites drain in place  Critical Care Time:   Documented critical care time excludes any procedures documented elsewhere  It also excludes any family updates    _____________________________________________________________________      HPI:    Joselin Fu is a 75 y o  female who has a known history of the above cancers was recently taken home from a skilled nursing facility  Family intended to care for the patient home with home health aides  Today the family noted increasing lethargy over the past few days and decreased activity the patient has been unable to care for self  The patient's home health aide agreed with these observations    EMS was activated  Upon their arrival patient was observed negated laying on a mattress with no sheets  Patient was therefore transported to [facility] Emergency Department for further evaluation treatment  Patient will be admitted to the level 1 step-down portion of the intensive care unit  She will require greater than 2 midnight stay  Upon my arrival to the emergency department the patient is responsive but extremely somnolent  She drifts off in the middle of a sentence  She denies complaints of pain or shortness of breath  She denies abdominal pain specifically as well as chest pain  She states that she has no difficulty moving her bowels or urinating  She denies visual changes or headache  Review of Systems:    Full 12 point review of systems was performed  Aside from what was mentioned in the HPI, it is otherwise negative  Historical Information   Past Medical History:   Diagnosis Date    Bacteremia 2/18/2019    Cancer [facility])     Breast; Last Assessed: 8/29/2016    Carpal tunnel syndrome     unspecified laterality; Onset: 4/23/2012    Cellulitis     Last Assessesd: 8/30/2012    Diabetes mellitus out of control ([facility] )     Last Assessed: 11/3/2014    Fatigue     Last Assessed: 2/11/014    Fracture of toe     Last Assessed: 11/17/2014    Pancreatic insufficiency 2/21/2019    Rectal cancer [facility])      Past Surgical History:   Procedure Laterality Date    COLON SURGERY      IR IMAGE GUIDED ASPIRATION / DRAINAGE  2/9/2019    IR IMAGE GUIDED DRAINAGE W TUBE PLACEMENT  4/20/2019    IR PARACENTESIS  5/2/2019    IR PICC LINE  4/20/2019    IR TUBE PLACEMENT  5/3/2019    MASTECTOMY      bilateral    PANCREATECTOMY  2010    Proximal Subtotal (whipple procedure)    KS PARTIAL HIP REPLACEMENT Right 4/11/2019    Procedure: HEMIARTHROPLASTY HIP (BIPOLAR);   Surgeon: Jean-Paul Mcelroy MD;  Location: [facility];  Service: Orthopedics     Social History   Social History     Substance and Sexual Activity   Alcohol Use Not Currently     Social History     Substance and Sexual Activity   Drug Use Not Currently     Social History     Tobacco Use   Smoking Status Never Smoker   Smokeless Tobacco Never Used       Family History:   Family History   Problem Relation Age of Onset    Melanoma Mother     Stroke Father        Medications:  Pertinent medications were reviewed    Current Facility-Administered Medications:  chlorhexidine 15 mL Swish & Spit Q12H [facility] Charlane Gut, CRNP   diphenhydrAMINE-zinc acetate  Topical TID PRN Charlane Gut, CRNP   insulin lispro 2-12 Units Subcutaneous TID AC Charlane Gut, CRNP   [START ON 6/23/2019] lidocaine 1 patch Topical Daily Charlane Gut, CRNP         Allergies   Allergen Reactions    Gadolinium Derivatives Anaphylaxis     MRI dye- hot flashes/ flushing    Iodine Anaphylaxis     ? ??SHORTNESS OF BREATH    Acetaminophen     Acetaminophen-Codeine Edema    Aspirin Hives     RASH    Cephalexin      Annotation - [id number]: rash  Patient reported tolerance in the past during her May 2019 admission     Gadolinium     Iohexol     Nickel Hives    Penicillin G      Tolerated Ampicillin on May 2019 admission     Penicillins Hives     Tolerated Ampicillin on May 2019 admission  Tolerated Amoxicillin in past      Sulfa Antibiotics Hives    Sulfamethoxazole-Trimethoprim Diarrhea         Vitals:   /58   Pulse 96   Temp 99 1 °F (37 3 °C) (Temporal)   Resp (!) 10   Wt 93 kg (205 lb 0 4 oz)   SpO2 96%   BMI 34 12 kg/m²   Body mass index is 34 12 kg/m²    SpO2: 96 %,   SpO2 Activity: At Rest,   O2 Device: None (Room air)      Intake/Output Summary (Last 24 hours) at 6/22/2019 1803  Last data filed at 6/22/2019 1453  Gross per 24 hour   Intake 400 ml   Output    Net 400 ml     Invasive Devices     Peripheral Intravenous Line            Peripheral IV 06/22/19 Left Antecubital less than 1 day    Peripheral IV 06/22/19 Right Antecubital less than 1 day Drain            Open Drain Right;Lateral Abdomen 38 days    Biliary Tube  10 2 Fr  LUQ 24 days    Biliary Tube  10 2 Fr  RUQ 24 days                Physical Exam:  Gen:  Somnolent, toxic appearing, cooperative  HEENT:  Atraumatic normocephalic pupils equal round reactive to light extraocular muscles intact sclerae anicteric oral mucosa is pink but dry  Neck:  Supple trachea midline  Chest:  Clear to auscultation bilaterally respirations slow and shallow  SpO2 in the mid 90s on room air  Cor:  Regular rate and rhythm no murmurs rubs or gallops  Abd:  Protuberant, bilateral biliary drains in place and draining, ascites drain in place and draining  No bowel sounds appreciated  Ext:  3+ pitting edema of the bilateral lower extremities  Neuro:  Somnolent, slow to respond moves all extremities  Skin:  Warm dry and intact no rash      Diagnostic Data:  Lab: I have personally reviewed pertinent lab results  ,   CBC:  Results from last 7 days   Lab Units 06/22/19  1455   WBC Thousand/uL 24 04*   HEMOGLOBIN g/dL 9 3*   HEMATOCRIT % 29 6*   PLATELETS Thousands/uL 276      CMP: Lab Results   Component Value Date    SODIUM 129 (L) 06/22/2019    K 4 5 06/22/2019    CL 97 (L) 06/22/2019    CO2 21 06/22/2019    BUN 63 (H) 06/22/2019    CREATININE 1 57 (H) 06/22/2019    CALCIUM 8 6 06/22/2019    AST 38 06/22/2019    ALT 35 06/22/2019    ALKPHOS 168 (H) 06/22/2019    EGFR 32 06/22/2019   ,   PT/INR:   Lab Results   Component Value Date    INR 1 91 (H) 06/22/2019   ,   Magnesium: No components found for: MAG,  Phosphorous: No results found for: PHOS    ABG: No results found for: PHART, ZRQ2DTT, PO2ART, ZUD0YNH, T8WTOTKR, BEART, SOURCE,     Microbiology:  Blood cultures pending    Imaging: I have personally reviewed the pertinent imaging studies on the PACS system  No recent x-rays  CT of the head revealed multiple pontine metastatic hemorrhages      Cardiac/EKG/telemetry/Echo:   Results from last 7 days   Lab Units 06/22/19  1455   TROPONIN I ng/mL 0 02   NT-PRO BNP pg/mL 1,576*     Sinus rhythm      VTE Prophylaxis:  Sequential compression device, no anticoagulation secondary to metastatic brain hemorrhages    Code Status: Level 1 - Full Code    ESE Hodges    Portions of the record may have been created with voice recognition software  Occasional wrong word or "sound a like" substitutions may have occurred due to the inherent limitations of voice recognition software  Read the chart carefully and recognize, using context, where substitutions have occurred

## 2019-06-23 LAB
ANION GAP SERPL CALCULATED.3IONS-SCNC: 13 MMOL/L (ref 4–13)
BUN SERPL-MCNC: 79 MG/DL (ref 5–25)
CALCIUM SERPL-MCNC: 8.6 MG/DL (ref 8.3–10.1)
CHLORIDE SERPL-SCNC: 97 MMOL/L (ref 100–108)
CO2 SERPL-SCNC: 17 MMOL/L (ref 21–32)
CREAT SERPL-MCNC: 1.67 MG/DL (ref 0.6–1.3)
GFR SERPL CREATININE-BSD FRML MDRD: 30 ML/MIN/1.73SQ M
GLUCOSE SERPL-MCNC: 107 MG/DL (ref 65–140)
GLUCOSE SERPL-MCNC: 113 MG/DL (ref 65–140)
GLUCOSE SERPL-MCNC: 144 MG/DL (ref 65–140)
GLUCOSE SERPL-MCNC: 158 MG/DL (ref 65–140)
POTASSIUM SERPL-SCNC: 4.9 MMOL/L (ref 3.5–5.3)
SODIUM SERPL-SCNC: 124 MMOL/L (ref 136–145)
SODIUM SERPL-SCNC: 126 MMOL/L (ref 136–145)
SODIUM SERPL-SCNC: 127 MMOL/L (ref 136–145)
SODIUM SERPL-SCNC: 128 MMOL/L (ref 136–145)

## 2019-06-23 PROCEDURE — 80048 BASIC METABOLIC PNL TOTAL CA: CPT | Performed by: NURSE PRACTITIONER

## 2019-06-23 PROCEDURE — 84295 ASSAY OF SERUM SODIUM: CPT | Performed by: NURSE PRACTITIONER

## 2019-06-23 PROCEDURE — 82948 REAGENT STRIP/BLOOD GLUCOSE: CPT

## 2019-06-23 PROCEDURE — 99233 SBSQ HOSP IP/OBS HIGH 50: CPT | Performed by: INTERNAL MEDICINE

## 2019-06-23 PROCEDURE — 92610 EVALUATE SWALLOWING FUNCTION: CPT

## 2019-06-23 RX ADMIN — MORPHINE SULFATE 1 MG: 2 INJECTION, SOLUTION INTRAMUSCULAR; INTRAVENOUS at 12:08

## 2019-06-23 RX ADMIN — SODIUM CHLORIDE 50 ML/HR: 0.9 INJECTION, SOLUTION INTRAVENOUS at 15:26

## 2019-06-23 RX ADMIN — CHLORHEXIDINE GLUCONATE 0.12% ORAL RINSE 15 ML: 1.2 LIQUID ORAL at 08:25

## 2019-06-23 RX ADMIN — CHLORHEXIDINE GLUCONATE 0.12% ORAL RINSE 15 ML: 1.2 LIQUID ORAL at 22:34

## 2019-06-23 RX ADMIN — MORPHINE SULFATE 1 MG: 2 INJECTION, SOLUTION INTRAMUSCULAR; INTRAVENOUS at 19:41

## 2019-06-23 RX ADMIN — LIDOCAINE 1 PATCH: 50 PATCH TOPICAL at 08:25

## 2019-06-23 NOTE — UTILIZATION REVIEW
Initial Clinical Review    Admission: Date/Time/Statement: 6/22/19 @ 1729     Orders Placed This Encounter   Procedures    Inpatient Admission (expected length of stay for this patient Order details is greater than two midnights)     Standing Status:   Standing     Number of Occurrences:   1     Order Specific Question:   Admitting Physician     Answer:   Robin Krishna     Order Specific Question:   Level of Care     Answer:   Level 1 Stepdown [13]     Order Specific Question:   Estimated length of stay     Answer:   More than 2 Midnights     Order Specific Question:   Certification     Answer:   I certify that inpatient services are medically necessary for this patient for a duration of greater than two midnights  See H&P and MD Progress Notes for additional information about the patient's course of treatment  ED Arrival Information     Expected Arrival Acuity Means of Arrival Escorted By Service Admission Type    - 6/22/2019 14:53 Emergent [address] [facility] Critical Care/ICU Emergency    Arrival Complaint    -        Chief Complaint   Patient presents with    Altered Mental Status     End stage pancreatic cancer pt, with altered mental status, family reports decreased appetite, increased lethargy, over past few days  Assessment/Plan:  76 yo female with hx of advanced pancreatic cancer presents to ED via EMS with increased lethargy, fatigue past few days  Recently taken home from [facility] to be cared for by family  Pt is responsive but extremely somnolent  Admitted to IP  with  End Stage Pancreatic Cancer ( also has known breast cancer, ovarian cancer, metastatic brain hemorrhages and colon cancer ) aCKF stage 3 and Hyponatremia - IVF,  Monitor se na q6h, monitor kidney indicies  NPO for now - q6h BS's with SSI  IV Lasix for CHF, monitor  K  Speech / Swallow eval      6/23: Somnolent but wakes up to voice  Very fatigued appearing   she has jaundice,  temporal wasting  Mucous membranes are dry  Chest is clear  Abdomen is distended, protuberant  Multiple drains  There are subcutaneous metastases  Will hold off on MRI or additional advanced imaging pending Neurology evaluation  Cr worse, little urine out put  Continue gentle IVF hydration  Plan to contact August Tirado in am to review additional Oncology options and discuss further goals of care       ED Triage Vitals   Temperature Pulse Respirations Blood Pressure SpO2   06/22/19 1453 06/22/19 1453 06/22/19 1453 06/22/19 1453 06/22/19 1453   99 7 °F (37 6 °C) 97 12 127/66 94 %      Temp Source Heart Rate Source Patient Position - Orthostatic VS BP Location FiO2 (%)   06/22/19 1453 06/22/19 1453 06/22/19 1608 06/22/19 1608 --   Rectal Monitor Lying Left arm       Pain Score       06/22/19 1453       7        Wt Readings from Last 1 Encounters:   06/23/19 86 3 kg (190 lb 4 1 oz)     Additional Vital Signs:   06/23/19 1040    78  8  100/52  66  96 %  RA     06/23/19 0749  97 2 °F (36 2 °C)Abnormal                  06/23/19 0740    74  10Abnormal   97/59  68  96 %   (Room air)     06/23/19 0400    78  8Abnormal   91/57  71  96 %  RA     06/23/19 0200    86  8Abnormal   106/64  76  96 %       06/23/19 0100    88  11Abnormal   82/58  72  96 %  RA     06/22/19 2100    92  9Abnormal   107/55  75  95 %         06/22/19 1745  99 1 °F   96  10Abnormal   111/58    96 %           Pertinent Labs/Diagnostic Test Results:   Results from last 7 days   Lab Units 06/22/19  1455   WBC Thousand/uL 24 04*   HEMOGLOBIN g/dL 9 3*   HEMATOCRIT % 29 6*   PLATELETS Thousands/uL 276   NEUTROS ABS Thousands/µL 19 72*     Results from last 7 days   Lab Units 06/23/19  0445 06/23/19  0038 06/22/19  1455   SODIUM mmol/L 127* 126* 129*   POTASSIUM mmol/L 4 9  --  4 5   CHLORIDE mmol/L 97*  --  97*   CO2 mmol/L 17*  --  21   ANION GAP mmol/L 13  --  11   BUN mg/dL 79*  --  63*   CREATININE mg/dL 1 67*  --  1 57*   EGFR ml/min/1 73sq m 30  --  32   CALCIUM mg/dL 8 6  --  8 6     Results from last 7 days   Lab Units 06/22/19  1455   AST U/L 38   ALT U/L 35   ALK PHOS U/L 168*   TOTAL PROTEIN g/dL 5 2*   ALBUMIN g/dL 1 6*   TOTAL BILIRUBIN mg/dL 3 45*     Results from last 7 days   Lab Units 06/23/19  0616 06/22/19  2355 06/22/19  1847 06/22/19  1815   POC GLUCOSE mg/dl 113 112 63* 63*     Results from last 7 days   Lab Units 06/23/19  0445 06/22/19  1455   GLUCOSE RANDOM mg/dL 107 62*     Results from last 7 days   Lab Units 06/22/19  1455   TROPONIN I ng/mL 0 02     Results from last 7 days   Lab Units 06/22/19  1455   PROTIME seconds 22 0*   INR  1 91*   PTT seconds 34     Results from last 7 days   Lab Units 06/22/19  1455   LACTIC ACID mmol/L 1 4     Results from last 7 days   Lab Units 06/22/19  1455   NT-PRO BNP pg/mL 1,576*     Results from last 7 days   Lab Units 06/22/19  1455   LIPASE u/L 24*     6/22: CT Head: A few scattered pontine and left supratentorial lesions suspicious for hemorrhagic metastases   An enhanced MRI of the brain is recommended for further evaluation  No mass effect or midline shift  Mild chronic small vessel ischemic changes and volume loss  EKG 6/22: Sinus rhythm with sinus arrhythmia    ED Treatment:   Medication Administration from 06/22/2019 1450 to 06/22/2019 1834     None        Past Medical History:   Diagnosis Date    Bacteremia 2/18/2019    Cancer ([facility] )     Breast; Last Assessed: 8/29/2016    Carpal tunnel syndrome     unspecified laterality;  Onset: 4/23/2012    Cellulitis     Last Assessesd: 8/30/2012    Diabetes mellitus out of control ([facility] )     Last Assessed: 11/3/2014    Fatigue     Last Assessed: 2/11/014    Fracture of toe     Last Assessed: 11/17/2014    Pancreatic insufficiency 2/21/2019    Rectal cancer ([facility] )      Present on Admission:   Personal history of pancreatic cancer   Anemia of acute infection      Admitting Diagnosis: Altered mental status [R41 82]  Metastasis to brain [facility]) [C79 31]  Age/Sex: 75 y o  female     Admission Orders   : Level 1 Stepdown  Current Facility-Administered Medications:  chlorhexidine 15 mL Swish & Spit Q12H [facility]     diphenhydrAMINE-zinc acetate  Topical TID PRN     insulin lispro 1-5 Units Subcutaneous Q6H [facility]     lidocaine 1 patch Topical Daily     morphine injection 1 mg Intravenous Q4H PRN IV X 1 6/23    sodium chloride 50 mL/hr Intravenous Continuous       Se NA q6h  BMP in am  Neuro checks q2h  NPO  SCD's  IP CONSULT TO CASE MANAGEMENT  IP CONSULT TO Darya Galvan Utilization Review Department  Phone: [phone]; Fax [phone]  [email or web address]  ATTENTION: Please call with any questions or concerns to [phone]  and carefully listen to the prompts so that you are directed to the right person  Send all requests for admission clinical reviews, approved or denied determinations and any other requests to fax [phone]   All voicemails are confidential

## 2019-06-23 NOTE — PROGRESS NOTES
Progress Note - Critical Care   Kenya Camacho 75 y o  female MRN: [id number]  Unit/Bed#: ICU 06 Encounter: [id number]    Assessment/Plan:  1  End-stage Pancreatic Ca  · Follows at [facility]  Pt also with hx of Breast, Ovarian and Colon cancer  Has been declining at home  · Monitor abdominal drain output  · Pt is likely hospice appropriate  2  Multiple metastatic brain hemorrhages  · Neuro consult is pending  Not a candidate for surgical intervention per Neurosurgery  · Monitor mental status closely  · Hold DVT prophylaxis for now  3  ANNE on CKD with baseline creatinine of approximately 0 8-1 0  · Creatinine is worse today  Little to no urine output overnight  Consider nephrology consult  · Continue gentle IV hydration  4  CHF with preserved ejection fraction  · Echo from 5/7/19 shows EF of 60% with grade 1 diastolic dysfunction  · Restart home torsemide once tolerating po  Can use IV lasix prn for now  5  Chronic hyponatremia  · Continue gentle IV hydration with NS  Trend sodium  Avoid over rapid correction  6  DM   · Continue accucheks with SSI coverage  Avoid hypoglycemia  · Once tolerating PO can return to home lantus/SSI regimen    _____________________________________________________________________    HPI/24hr events:   Afebrile  No acute events overnight  Medications:    Current Facility-Administered Medications:  chlorhexidine 15 mL Swish & Spit Q12H [facility] Julia Tidwell CRNP    diphenhydrAMINE-zinc acetate  Topical TID PRN Julia Diann, CRNP    insulin lispro 1-5 Units Subcutaneous Q6H [facility] Noreen Lennox Spatzer, CRNP    lidocaine 1 patch Topical Daily Julia Diann, CRNP    sodium chloride 50 mL/hr Intravenous Continuous Julia Diann, CRNP Last Rate: 50 mL/hr (06/22/19 2020)         sodium chloride 50 mL/hr Last Rate: 50 mL/hr (06/22/19 2020)         Physical exam:  Vitals: Body mass index is 31 66 kg/m²    Blood pressure 91/57, pulse 78, temperature (!) 97 1 °F (36 2 °C), temperature source Temporal, resp  rate (!) 8, height 5' 5" (1 651 m), weight 86 3 kg (190 lb 4 1 oz), SpO2 96 %  ,  Temp  Min: 97 1 °F (36 2 °C)  Max: 99 7 °F (37 6 °C)  IBW: 57 kg    SpO2: 96 %  SpO2 Activity: At Rest  O2 Device: None (Room air)      Intake/Output Summary (Last 24 hours) at 6/23/2019 0544  Last data filed at 6/23/2019 0450  Gross per 24 hour   Intake 783 33 ml   Output 400 ml   Net 383 33 ml       Invasive/non-invasive ventilation settings:   Respiratory    Lab Data (Last 4 hours)    None         O2/Vent Data (Last 4 hours)    None              Invasive Devices     Peripheral Intravenous Line            Peripheral IV 06/22/19 Left Antecubital less than 1 day    Peripheral IV 06/22/19 Right Antecubital less than 1 day          Drain            Open Drain Right;Lateral Abdomen 38 days    Biliary Tube  10 2 Fr  LUQ 24 days    Biliary Tube  10 2 Fr  RUQ 24 days                  Physical Exam:  Gen: Somolent but arousable, weak and chronically ill appearing, no acute distress  HEENT: atraumatic, normocephalic, EOMI, pupils 3mm equal and reactive, o/p dry  Neck: Supple, trachea midline, no JVD no lymphadenopathy  Chest: diminished with fine bibasilar crackles  Cor: Single S1/S2, no m/r/g, RRR  Abd: firm, distended, nontender, BS normoactive, biliary and ascites drains in place  Ext: 3+ BLE edema, 2+ b/L UE edema, no clubbing or cyanosis  Neuro: Mental status waxes and wanes, oriented x3 with intermittent confusion, moves all extremities, grossly nonfocal  Skin: pale, warm, dry      Diagnostic Data:  Lab: I have personally reviewed pertinent lab results     CBC:   Results from last 7 days   Lab Units 06/22/19  1455   WBC Thousand/uL 24 04*   HEMOGLOBIN g/dL 9 3*   HEMATOCRIT % 29 6*   PLATELETS Thousands/uL 276       CMP:   Results from last 7 days   Lab Units 06/23/19  0038 06/22/19  1455   SODIUM mmol/L 126* 129*   POTASSIUM mmol/L  --  4 5   CHLORIDE mmol/L  --  97*   CO2 mmol/L  --  21   BUN mg/dL  -- 63*   CREATININE mg/dL  --  1 57*   CALCIUM mg/dL  --  8 6   ALK PHOS U/L  --  168*   ALT U/L  --  35   AST U/L  --  38     PT/INR:   Lab Results   Component Value Date    INR 1 91 (H) 06/22/2019   ,   Magnesium:     Phosphorous:       Microbiology:        Imaging:  No new imaging    Cardiac lab/EKG/telemetry/ECHO:   NSR    VTE Prophylaxis: SCDs    Code Status: Level 1 - Full Code    Graciela Perks Spatzer, CRNP    Portions of the record may have been created with voice recognition software  Occasional wrong word or "sound a like" substitutions may have occurred due to the inherent limitations of voice recognition software  Read the chart carefully and recognize, using context, where substitutions have occurred

## 2019-06-23 NOTE — PLAN OF CARE
Problem: Potential for Falls  Goal: Patient will remain free of falls  Description  INTERVENTIONS:  - Assess patient frequently for physical needs  -  Identify cognitive and physical deficits and behaviors that affect risk of falls  -  [place] fall precautions as indicated by assessment   - Educate patient/family on patient safety including physical limitations  - Instruct patient to call for assistance with activity based on assessment  - Modify environment to reduce risk of injury  - Consider OT/PT consult to assist with strengthening/mobility  Outcome: Progressing     Problem: Prexisting or High Potential for Compromised Skin Integrity  Goal: Skin integrity is maintained or improved  Description  INTERVENTIONS:  - Identify patients at risk for skin breakdown  - Assess and monitor skin integrity  - Assess and monitor nutrition and hydration status  - Monitor labs (i e  albumin)  - Assess for incontinence   - Turn and reposition patient  - Assist with mobility/ambulation  - Relieve pressure over bony prominences  - Avoid friction and shearing  - Provide appropriate hygiene as needed including keeping skin clean and dry  - Evaluate need for skin moisturizer/barrier cream  - Collaborate with interdisciplinary team (i e  Nutrition, Rehabilitation, etc )   - Patient/family teaching  Outcome: Progressing     Problem: NEUROSENSORY - ADULT  Goal: Achieves stable or improved neurological status  Description  INTERVENTIONS  - Monitor and report changes in neurological status  - Initiate measures to prevent increased intracranial pressure  - Maintain blood pressure and fluid volume within ordered parameters to optimize cerebral perfusion  - Monitor temperature, glucose, and sodium or any other associated labs   Initiate appropriate interventions as ordered  - Monitor for seizure activity   - Administer anti-seizure medications as ordered  Outcome: Progressing     Problem: RESPIRATORY - ADULT  Goal: Achieves optimal ventilation and oxygenation  Description  INTERVENTIONS:  - Assess for changes in respiratory status  - Assess for changes in mentation and behavior  - Position to facilitate oxygenation and minimize respiratory effort  - Oxygen administration by appropriate delivery method based on oxygen saturation (per order) or ABGs  - Initiate smoking cessation education as indicated  - Encourage broncho-pulmonary hygiene including cough, deep breathe, Incentive Spirometry  - Assess the need for suctioning and aspirate as needed  - Assess and instruct to report SOB or any respiratory difficulty  - Respiratory Therapy support as indicated  Outcome: Progressing     Problem: GASTROINTESTINAL - ADULT  Goal: Maintains adequate nutritional intake  Description  INTERVENTIONS:  - Monitor percentage of each meal consumed  - Identify factors contributing to decreased intake, treat as appropriate  - Assist with meals as needed  - Monitor I&O, WT and lab values  - Obtain nutrition services referral as needed  Outcome: Progressing     Problem: GENITOURINARY - ADULT  Goal: Maintains or returns to baseline urinary function  Description  INTERVENTIONS:  - Assess urinary function  - Encourage oral fluids to ensure adequate hydration  - Administer IV fluids as ordered to ensure adequate hydration  - Administer ordered medications as needed  - Offer frequent toileting  - Follow urinary retention protocol if ordered  Outcome: Progressing     Problem: METABOLIC, FLUID AND ELECTROLYTES - ADULT  Goal: Electrolytes maintained within normal limits  Description  INTERVENTIONS:  - Monitor labs and assess patient for signs and symptoms of electrolyte imbalances  - Administer electrolyte replacement as ordered  - Monitor response to electrolyte replacements, including repeat lab results as appropriate  - Instruct patient on fluid and nutrition as appropriate  Outcome: Progressing  Goal: Fluid balance maintained  Description  INTERVENTIONS:  - Monitor labs and assess for signs and symptoms of volume excess or deficit  - Monitor I/O and WT  - Instruct patient on fluid and nutrition as appropriate  Outcome: Progressing  Goal: Glucose maintained within target range  Description  INTERVENTIONS:  - Monitor Blood Glucose as ordered  - Assess for signs and symptoms of hyperglycemia and hypoglycemia  - Administer ordered medications to maintain glucose within target range  - Assess nutritional intake and initiate nutrition service referral as needed  Outcome: Progressing     Problem: SKIN/TISSUE INTEGRITY - ADULT  Goal: Skin integrity remains intact  Description  INTERVENTIONS  - Identify patients at risk for skin breakdown  - Assess and monitor skin integrity  - Assess and monitor nutrition and hydration status  - Monitor labs (i e  albumin)  - Assess for incontinence   - Turn and reposition patient  - Assist with mobility/ambulation  - Relieve pressure over bony prominences  - Avoid friction and shearing  - Provide appropriate hygiene as needed including keeping skin clean and dry  - Evaluate need for skin moisturizer/barrier cream  - Collaborate with interdisciplinary team (i e  Nutrition, Rehabilitation, etc )   - Patient/family teaching  Outcome: Progressing  Goal: Incision(s), wounds(s) or drain site(s) healing without S/S of infection  Description  INTERVENTIONS  - Assess and document risk factors for skin impairment   - Assess and document dressing, incision, wound bed, drain sites and surrounding tissue  - Initiate Nutrition services consult and/or wound management as needed  Outcome: Progressing     Problem: COPING  Goal: Pt/Family able to verbalize concerns and demonstrate effective coping strategies  Description  INTERVENTIONS:  - Assist patient/family to identify coping skills, available support systems and cultural and spiritual values  - Provide emotional support, including active listening and acknowledgement of concerns of patient and caregivers  - Reduce environmental stimuli, as able  - Provide patient education  - Assess for spiritual pain/suffering and initiate spiritual care, including notification of Pastoral Care or helen based community as needed  - Assess effectiveness of coping strategies  Outcome: Progressing     Problem: DEATH & DYING  Goal: Pt/Family communicate acceptance of impending death and expresses psychological comfort and peace  Description  INTERVENTIONS:  - Assess patient/family anxiety and grief process related to end of life issues  - Provide emotional, spiritual and psychosocial support  - Provide information about the patient's health status with consideration of family and cultural values  - Communicate willingness to discuss death and facilitate grief process  with patient/family as appropriate  - Emphasize sustaining relationships within family system and community, or helen/spiritual traditions  - Initiate Spiritual Care, Pastoral care or other ancillary consults as needed  - Refer to community support groups as appropriate   Outcome: Progressing     Problem: DECISION MAKING  Goal: Pt/Family able to effectively weigh alternatives and participate in decision making related to treatment and care  Description  INTERVENTIONS:  - Identify decision maker  - Determine when there are differences among patient's view, family's view, and healthcare provider's view of patient condition and care goals  - Facilitate patient/family articulation of goals for care  - Help patient/family identify pros/cons of alternative solutions  - Provide information as requested by patient/family  - Respect patient/family rights related to privacy and sharing information   - Serve as a liaison between patient, family and health care team  - Initiate consults as appropriate (Ethics Team, Palliative Care, Family Care Conference, etc )  Outcome: Progressing     Problem: SPIRITUAL CARE  Goal: Pt/Family able to move forward in process of forgiving self, others and/or higher power  Description  INTERVENTIONS:  - Assist patient with any spiritual needs/requests such as communion, confession, anointing, etc  - Explore guilt and help patient/family identify possible spiritual/cultural beliefs and values  - Explore possibilities of making amends & reconciliation with self, others, and/or a greater power  - Guide patient/family in identifying painful feelings  - Help patient explore and identify spiritual beliefs, cultural understandings or values that may help or hinder letting go of issue  - Help patient explore feelings of anger, bitterness, resentment, anxiety   Help patient/family identify and examine the situation in which these feelings are experienced  - Help patient/family identify destructive displacement of feelings onto other individuals  - Refer patient to formal counseling and/or to helen community for further support as needed or per request  Outcome: Progressing  Goal: Patient feels balance and connection with others and/or higher power that empowers the self during times of loss, guilt and fear  Description  INTERVENTIONS:  - Create safety for patient through empathic presence and non-judgmental listening  - Encourage patient to explore his/her values, beliefs and/or spiritual images and practices  - Encourage use of breath work, imagery, meditation, relaxation, reiki to ease distress and provide healing  - Encourage use of cultural and spiritual celebrations and rituals  - Facilitate discussion that helps patient sort out spiritual concerns  - Help patient identify where meaning/hope/comfort & strength are in his/her life  - Refer patient to helen community for assistance, as appropriate  - Respond to patient/family need for prayer/ritual/sacrament/ceremony  Outcome: Progressing

## 2019-06-23 NOTE — SPEECH THERAPY NOTE
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 ([facility] )    Personal history of breast cancer    Personal history of pancreatic cancer    Personal history of colon cancer, stage II    Localized edema    Sepsis ([facility] )    ANNE (acute kidney injury) ([place] [address] )    Anemia of acute infection    Leukocytosis    Ascites    Bacterial liver abscess    Bacteremia    Chronic diastolic CHF (congestive heart failure) ([place] [address] )    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    Monoallelic mutation of CHEK2 gene    Cavernous hemangioma of intracranial structure ([place] [address] )    S/P partial colectomy    Lymphadenopathy, retroperitoneal    Hip pain    Closed fracture of right hip (HCC)    Renal insufficiency    Edema    Elevated LFTs    Bilateral lower extremity edema    Acute blood loss anemia    Hypokalemia    Peritonitis (HCC)    Obstructive jaundice    Lymphadenopathy    Ovarian cancer ([place] [address] )    CKD (chronic kidney disease), stage III (HCC)    Hypocalcemia       Past Medical History  Past Medical History:   Diagnosis Date    Bacteremia 2/18/2019    Cancer [facility])     Breast; Last Assessed: 8/29/2016    Carpal tunnel syndrome     unspecified laterality;  Onset: 4/23/2012    Cellulitis     Last Assessesd: 8/30/2012    Diabetes mellitus out of control ([place] [address] )     Last Assessed: 11/3/2014    Fatigue     Last Assessed: 2/11/014    Fracture of toe     Last Assessed: 11/17/2014    Pancreatic insufficiency 2/21/2019    Rectal cancer [facility])        Past Surgical History  Past Surgical History:   Procedure Laterality Date    COLON SURGERY      IR IMAGE GUIDED ASPIRATION / DRAINAGE  2/9/2019    IR IMAGE GUIDED DRAINAGE W TUBE PLACEMENT  4/20/2019    IR PARACENTESIS  5/2/2019    IR PICC LINE  4/20/2019    IR TUBE PLACEMENT  5/3/2019    MASTECTOMY      bilateral    PANCREATECTOMY  2010    Proximal Subtotal (whipple procedure)    RI PARTIAL HIP REPLACEMENT Right 4/11/2019    Procedure: HEMIARTHROPLASTY HIP (BIPOLAR); Surgeon: Dulce Maria Juarez MD;  Location: [facility];  Service: Orthopedics       Summary   Limited assessment secondary to patient lethargy  Patient appears to be tolerating small amounts of puree and thins without s/s of aspiration  Depending on plan of care for patient, doctors to upgrade diet as appropriate  Risk for Aspiration: higher when not as alert     Recommendations: puree/level 1 diet and thin liquids     Recommended Form of Meds: crushed with puree     Aspiration precautions and compensatory swallowing strategies: upright posture and only feed when fully alert          Current Medical Status  Pt is a 75 y o  female who presented to [address] [id number] with increasing lethargy  Found to metastatic lesions in brain  Patient with waxing and waning mental status    Past medical history:   Please see H&P for details      Special Studies:  HCT: A few scattered pontine and left supratentorial lesions suspicious for hemorrhagic metastases    Social/Education/Vocational Hx:  Pt lives in assisted living facility      Swallow Information   Current Risks for Dysphagia & Aspiration: metastatic CA     Current Symptoms/Concerns: waxing and waning mental status    Current Diet: NPO      Baseline Diet: regular diet and thin liquids      Baseline Assessment   Behavior/Cognition: waxing and waning arousal level    Speech/Language Status: limited verbal output    Patient Positioning: upright in bed    Pain Status/Interventions/Response to Interventions:   N nonverbal indications of pain  Swallow Mechanism Exam   Difficult to formally assess given waxing and waning mental status  Patient is xerostomic  Weak attempts at speaking    Voice weak, cough weak    Consistencies Assessed and Performance   Consistencies Administered: ice chips, thin liquids and puree      Oral Stage: Moderately weak labial seal but patient is able to use a straw for thins  Moderately prolonged bolus formation and AP transfer  No oral residual       Pharyngeal Stage: Weak swallow, with 2 swallows needed to clear 1/2 teaspoon of puree  Coordination for rapid swallows of thins appears adequate  No coughing or choking  No vocal or respiratory wetness      Esophageal Concerns: none reported    Strategies and Efficacy: only feed when awake and alert      Summary and Recommendations (see above)      Results Reviewed with: RN     Consider referral to: n/a    Treatment Recommended: will sign off

## 2019-06-24 ENCOUNTER — TRANSITIONAL CARE MANAGEMENT (OUTPATIENT)
Dept: FAMILY MEDICINE CLINIC | Facility: CLINIC | Age: 74
End: 2019-06-24

## 2019-06-24 LAB
ANION GAP SERPL CALCULATED.3IONS-SCNC: 16 MMOL/L (ref 4–13)
BUN SERPL-MCNC: 86 MG/DL (ref 5–25)
CALCIUM SERPL-MCNC: 8.3 MG/DL (ref 8.3–10.1)
CHLORIDE SERPL-SCNC: 97 MMOL/L (ref 100–108)
CO2 SERPL-SCNC: 14 MMOL/L (ref 21–32)
CREAT SERPL-MCNC: 1.92 MG/DL (ref 0.6–1.3)
ERYTHROCYTE [DISTWIDTH] IN BLOOD BY AUTOMATED COUNT: 17.1 % (ref 11.6–15.1)
GFR SERPL CREATININE-BSD FRML MDRD: 25 ML/MIN/1.73SQ M
GLUCOSE SERPL-MCNC: 143 MG/DL (ref 65–140)
GLUCOSE SERPL-MCNC: 146 MG/DL (ref 65–140)
GLUCOSE SERPL-MCNC: 157 MG/DL (ref 65–140)
GLUCOSE SERPL-MCNC: 158 MG/DL (ref 65–140)
GLUCOSE SERPL-MCNC: 161 MG/DL (ref 65–140)
HCT VFR BLD AUTO: 28.3 % (ref 34.8–46.1)
HGB BLD-MCNC: 9 G/DL (ref 11.5–15.4)
MAGNESIUM SERPL-MCNC: 1.9 MG/DL (ref 1.6–2.6)
MCH RBC QN AUTO: 31 PG (ref 26.8–34.3)
MCHC RBC AUTO-ENTMCNC: 31.8 G/DL (ref 31.4–37.4)
MCV RBC AUTO: 98 FL (ref 82–98)
PLATELET # BLD AUTO: 324 THOUSANDS/UL (ref 149–390)
PMV BLD AUTO: 11 FL (ref 8.9–12.7)
POTASSIUM SERPL-SCNC: 4.8 MMOL/L (ref 3.5–5.3)
RBC # BLD AUTO: 2.9 MILLION/UL (ref 3.81–5.12)
SODIUM SERPL-SCNC: 127 MMOL/L (ref 136–145)
WBC # BLD AUTO: 28.15 THOUSAND/UL (ref 4.31–10.16)

## 2019-06-24 PROCEDURE — 82948 REAGENT STRIP/BLOOD GLUCOSE: CPT

## 2019-06-24 PROCEDURE — 99233 SBSQ HOSP IP/OBS HIGH 50: CPT | Performed by: INTERNAL MEDICINE

## 2019-06-24 PROCEDURE — 85027 COMPLETE CBC AUTOMATED: CPT | Performed by: NURSE PRACTITIONER

## 2019-06-24 PROCEDURE — 80048 BASIC METABOLIC PNL TOTAL CA: CPT | Performed by: NURSE PRACTITIONER

## 2019-06-24 PROCEDURE — 83735 ASSAY OF MAGNESIUM: CPT | Performed by: NURSE PRACTITIONER

## 2019-06-24 RX ORDER — FUROSEMIDE 10 MG/ML
40 INJECTION INTRAMUSCULAR; INTRAVENOUS ONCE
Status: COMPLETED | OUTPATIENT
Start: 2019-06-24 | End: 2019-06-24

## 2019-06-24 RX ADMIN — MORPHINE SULFATE 1 MG: 2 INJECTION, SOLUTION INTRAMUSCULAR; INTRAVENOUS at 20:45

## 2019-06-24 RX ADMIN — LIDOCAINE 1 PATCH: 50 PATCH TOPICAL at 08:14

## 2019-06-24 RX ADMIN — SODIUM CHLORIDE 50 ML/HR: 0.9 INJECTION, SOLUTION INTRAVENOUS at 12:43

## 2019-06-24 RX ADMIN — CHLORHEXIDINE GLUCONATE 0.12% ORAL RINSE 15 ML: 1.2 LIQUID ORAL at 08:14

## 2019-06-24 RX ADMIN — MORPHINE SULFATE 1 MG: 2 INJECTION, SOLUTION INTRAMUSCULAR; INTRAVENOUS at 13:48

## 2019-06-24 RX ADMIN — FUROSEMIDE 40 MG: 10 INJECTION, SOLUTION INTRAMUSCULAR; INTRAVENOUS at 09:32

## 2019-06-24 RX ADMIN — INSULIN LISPRO 1 UNITS: 100 INJECTION, SOLUTION INTRAVENOUS; SUBCUTANEOUS at 05:51

## 2019-06-24 NOTE — PLAN OF CARE
Problem: Potential for Falls  Goal: Patient will remain free of falls  Description  INTERVENTIONS:  - Assess patient frequently for physical needs  -  Identify cognitive and physical deficits and behaviors that affect risk of falls  -  Anthony fall precautions as indicated by assessment   - Educate patient/family on patient safety including physical limitations  - Instruct patient to call for assistance with activity based on assessment  - Modify environment to reduce risk of injury  - Consider OT/PT consult to assist with strengthening/mobility  Outcome: Progressing     Problem: Prexisting or High Potential for Compromised Skin Integrity  Goal: Skin integrity is maintained or improved  Description  INTERVENTIONS:  - Identify patients at risk for skin breakdown  - Assess and monitor skin integrity  - Assess and monitor nutrition and hydration status  - Monitor labs (i e  albumin)  - Assess for incontinence   - Turn and reposition patient  - Assist with mobility/ambulation  - Relieve pressure over bony prominences  - Avoid friction and shearing  - Provide appropriate hygiene as needed including keeping skin clean and dry  - Evaluate need for skin moisturizer/barrier cream  - Collaborate with interdisciplinary team (i e  Nutrition, Rehabilitation, etc )   - Patient/family teaching  Outcome: Progressing     Problem: NEUROSENSORY - ADULT  Goal: Achieves stable or improved neurological status  Description  INTERVENTIONS  - Monitor and report changes in neurological status  - Initiate measures to prevent increased intracranial pressure  - Maintain blood pressure and fluid volume within ordered parameters to optimize cerebral perfusion  - Monitor temperature, glucose, and sodium or any other associated labs   Initiate appropriate interventions as ordered  - Monitor for seizure activity   - Administer anti-seizure medications as ordered  Outcome: Progressing     Problem: RESPIRATORY - ADULT  Goal: Achieves optimal ventilation and oxygenation  Description  INTERVENTIONS:  - Assess for changes in respiratory status  - Assess for changes in mentation and behavior  - Position to facilitate oxygenation and minimize respiratory effort  - Oxygen administration by appropriate delivery method based on oxygen saturation (per order) or ABGs  - Initiate smoking cessation education as indicated  - Encourage broncho-pulmonary hygiene including cough, deep breathe, Incentive Spirometry  - Assess the need for suctioning and aspirate as needed  - Assess and instruct to report SOB or any respiratory difficulty  - Respiratory Therapy support as indicated  Outcome: Progressing     Problem: GASTROINTESTINAL - ADULT  Goal: Maintains adequate nutritional intake  Description  INTERVENTIONS:  - Monitor percentage of each meal consumed  - Identify factors contributing to decreased intake, treat as appropriate  - Assist with meals as needed  - Monitor I&O, WT and lab values  - Obtain nutrition services referral as needed  Outcome: Progressing     Problem: GENITOURINARY - ADULT  Goal: Maintains or returns to baseline urinary function  Description  INTERVENTIONS:  - Assess urinary function  - Encourage oral fluids to ensure adequate hydration  - Administer IV fluids as ordered to ensure adequate hydration  - Administer ordered medications as needed  - Offer frequent toileting  - Follow urinary retention protocol if ordered  Outcome: Progressing     Problem: METABOLIC, FLUID AND ELECTROLYTES - ADULT  Goal: Electrolytes maintained within normal limits  Description  INTERVENTIONS:  - Monitor labs and assess patient for signs and symptoms of electrolyte imbalances  - Administer electrolyte replacement as ordered  - Monitor response to electrolyte replacements, including repeat lab results as appropriate  - Instruct patient on fluid and nutrition as appropriate  Outcome: Progressing  Goal: Fluid balance maintained  Description  INTERVENTIONS:  - Monitor labs and assess for signs and symptoms of volume excess or deficit  - Monitor I/O and WT  - Instruct patient on fluid and nutrition as appropriate  Outcome: Progressing  Goal: Glucose maintained within target range  Description  INTERVENTIONS:  - Monitor Blood Glucose as ordered  - Assess for signs and symptoms of hyperglycemia and hypoglycemia  - Administer ordered medications to maintain glucose within target range  - Assess nutritional intake and initiate nutrition service referral as needed  Outcome: Progressing     Problem: SKIN/TISSUE INTEGRITY - ADULT  Goal: Skin integrity remains intact  Description  INTERVENTIONS  - Identify patients at risk for skin breakdown  - Assess and monitor skin integrity  - Assess and monitor nutrition and hydration status  - Monitor labs (i e  albumin)  - Assess for incontinence   - Turn and reposition patient  - Assist with mobility/ambulation  - Relieve pressure over bony prominences  - Avoid friction and shearing  - Provide appropriate hygiene as needed including keeping skin clean and dry  - Evaluate need for skin moisturizer/barrier cream  - Collaborate with interdisciplinary team (i e  Nutrition, Rehabilitation, etc )   - Patient/family teaching  Outcome: Progressing  Goal: Incision(s), wounds(s) or drain site(s) healing without S/S of infection  Description  INTERVENTIONS  - Assess and document risk factors for skin impairment   - Assess and document dressing, incision, wound bed, drain sites and surrounding tissue  - Initiate Nutrition services consult and/or wound management as needed  Outcome: Progressing     Problem: COPING  Goal: Pt/Family able to verbalize concerns and demonstrate effective coping strategies  Description  INTERVENTIONS:  - Assist patient/family to identify coping skills, available support systems and cultural and spiritual values  - Provide emotional support, including active listening and acknowledgement of concerns of patient and caregivers  - Reduce environmental stimuli, as able  - Provide patient education  - Assess for spiritual pain/suffering and initiate spiritual care, including notification of Pastoral Care or helen based community as needed  - Assess effectiveness of coping strategies  Outcome: Progressing     Problem: DEATH & DYING  Goal: Pt/Family communicate acceptance of impending death and expresses psychological comfort and peace  Description  INTERVENTIONS:  - Assess patient/family anxiety and grief process related to end of life issues  - Provide emotional, spiritual and psychosocial support  - Provide information about the patient's health status with consideration of family and cultural values  - Communicate willingness to discuss death and facilitate grief process  with patient/family as appropriate  - Emphasize sustaining relationships within family system and community, or helen/spiritual traditions  - Initiate Spiritual Care, Pastoral care or other ancillary consults as needed  - Refer to community support groups as appropriate   Outcome: Progressing     Problem: DECISION MAKING  Goal: Pt/Family able to effectively weigh alternatives and participate in decision making related to treatment and care  Description  INTERVENTIONS:  - Identify decision maker  - Determine when there are differences among patient's view, family's view, and healthcare provider's view of patient condition and care goals  - Facilitate patient/family articulation of goals for care  - Help patient/family identify pros/cons of alternative solutions  - Provide information as requested by patient/family  - Respect patient/family rights related to privacy and sharing information   - Serve as a liaison between patient, family and health care team  - Initiate consults as appropriate (Ethics Team, Palliative Care, Family Care Conference, etc )  Outcome: Progressing     Problem: SPIRITUAL CARE  Goal: Pt/Family able to move forward in process of forgiving self, others and/or higher power  Description  INTERVENTIONS:  - Assist patient with any spiritual needs/requests such as communion, confession, anointing, etc  - Explore guilt and help patient/family identify possible spiritual/cultural beliefs and values  - Explore possibilities of making amends & reconciliation with self, others, and/or a greater power  - Guide patient/family in identifying painful feelings  - Help patient explore and identify spiritual beliefs, cultural understandings or values that may help or hinder letting go of issue  - Help patient explore feelings of anger, bitterness, resentment, anxiety   Help patient/family identify and examine the situation in which these feelings are experienced  - Help patient/family identify destructive displacement of feelings onto other individuals  - Refer patient to formal counseling and/or to helen community for further support as needed or per request  Outcome: Progressing  Goal: Patient feels balance and connection with others and/or higher power that empowers the self during times of loss, guilt and fear  Description  INTERVENTIONS:  - Create safety for patient through empathic presence and non-judgmental listening  - Encourage patient to explore his/her values, beliefs and/or spiritual images and practices  - Encourage use of breath work, imagery, meditation, relaxation, reiki to ease distress and provide healing  - Encourage use of cultural and spiritual celebrations and rituals  - Facilitate discussion that helps patient sort out spiritual concerns  - Help patient identify where meaning/hope/comfort & strength are in his/her life  - Refer patient to helen community for assistance, as appropriate  - Respond to patient/family need for prayer/ritual/sacrament/ceremony  Outcome: Progressing     Problem: Nutrition/Hydration-ADULT  Goal: Nutrient/Hydration intake appropriate for improving, restoring or maintaining nutritional needs  Description  Monitor and assess patient's nutrition/hydration status for malnutrition (ex- brittle hair, bruises, dry skin, pale skin and conjunctiva, muscle wasting, smooth red tongue, and disorientation)  Collaborate with interdisciplinary team and initiate plan and interventions as ordered  Monitor patient's weight and dietary intake as ordered or per policy  Utilize nutrition screening tool and intervene per policy  Determine patient's food preferences and provide high-protein, high-caloric foods as appropriate       INTERVENTIONS:  - Monitor oral intake, urinary output, labs, and treatment plans  - Assess nutrition and hydration status and recommend course of action  - Evaluate amount of meals eaten  - Assist patient with eating if necessary   - Allow adequate time for meals  - Recommend/ encourage appropriate diets, oral nutritional supplements, and vitamin/mineral supplements  - Order, calculate, and assess calorie counts as needed  - Recommend, monitor, and adjust tube feedings and TPN/PPN based on assessed needs  - Assess need for intravenous fluids  - Provide specific nutrition/hydration education as appropriate  - Include patient/family/caregiver in decisions related to nutrition  Outcome: Progressing

## 2019-06-24 NOTE — SOCIAL WORK
Advance Care Planning     Call from Dr Chio Moreland that he had met with family to discuss goals of care and provide option of hospice cares to the patient and family  They are still thinking about what direction they wish to move in regards to care, but would like to know further what types of care can be provide through hospice (home, inpt, snf   amount of help available, etc )     Initially CM was going to place hospice referral for liaison to discuss different cares, however, the patients children are limited in time in regards to how long they can both stay in the hospital today for these discussions  Met with family and discussed hospice at all venues, what is covered through the hospice benefit and what is not  The patient lives with her son, he is still working, the patients daughter lives in [place], and has two children dependent on her care and would not be able to stay in [place] for months at a time to assist with care at home  They do not have the funds to pay for private duty aids in the home  Discussed Hospice in SNF and non-coverage of room and board rates at SNF  Discussed that a hospice physician would need to determine the appropriateness of IPU use  At this time, the family does not seem in agreement with one another regarding next steps in care  The son is hopeful, he would like to continue oral chemotherapy treatment provided through [facility], but also would like for her to be on hospice - he reports that he would be picking up her clinical trial medication today  CM notified him that there would be no one managing the clinical trial for his mother if she were on hospice care as she would be technically forgoing any treatment for this advanced cancer       The daughter seems to be accepting of a poor prognosis and is agreeable to hospice care, but wonders "how much longer" her mother may have - she is struggling with length of time left for her mother as she lives several [facility] away and would like to be here to help with her mothers care, but her time is limited and she can not be in [place] for "3 or 4 months at a time "     They would like to have further conversation around prognosis and progression of cancer as they feel this would help determine if they will pursue hospice, and if so, what would be the most appropriate venue  We discussed that it may be more beneficial to discuss goals with the palliative care physician first and then if they still wished to pursue hospice to call the hospice liaison to help them determine an appropriate venue for hospice care  Critical Care Team ordered a Palliative Consult        Brandee Sales  06/24/19 1:40 PM

## 2019-06-25 ENCOUNTER — APPOINTMENT (INPATIENT)
Dept: ULTRASOUND IMAGING | Facility: HOSPITAL | Age: 74
DRG: 054 | End: 2019-06-25
Payer: COMMERCIAL

## 2019-06-25 ENCOUNTER — APPOINTMENT (INPATIENT)
Dept: CT IMAGING | Facility: HOSPITAL | Age: 74
DRG: 054 | End: 2019-06-25
Payer: COMMERCIAL

## 2019-06-25 PROBLEM — C79.9 MULTIPLE LESIONS OF METASTATIC MALIGNANCY (HCC): Status: ACTIVE | Noted: 2019-06-25

## 2019-06-25 PROBLEM — E87.1 HYPONATREMIA: Status: ACTIVE | Noted: 2019-06-25

## 2019-06-25 PROBLEM — E87.2 INCREASED ANION GAP METABOLIC ACIDOSIS: Status: ACTIVE | Noted: 2019-06-25

## 2019-06-25 LAB
ANION GAP SERPL CALCULATED.3IONS-SCNC: 14 MMOL/L (ref 4–13)
BACTERIA BLD CULT: ABNORMAL
BUN SERPL-MCNC: 91 MG/DL (ref 5–25)
CALCIUM SERPL-MCNC: 8.1 MG/DL (ref 8.3–10.1)
CHLORIDE SERPL-SCNC: 100 MMOL/L (ref 100–108)
CO2 SERPL-SCNC: 16 MMOL/L (ref 21–32)
CREAT SERPL-MCNC: 1.66 MG/DL (ref 0.6–1.3)
GFR SERPL CREATININE-BSD FRML MDRD: 30 ML/MIN/1.73SQ M
GLUCOSE SERPL-MCNC: 156 MG/DL (ref 65–140)
GLUCOSE SERPL-MCNC: 161 MG/DL (ref 65–140)
GLUCOSE SERPL-MCNC: 165 MG/DL (ref 65–140)
GLUCOSE SERPL-MCNC: 166 MG/DL (ref 65–140)
GLUCOSE SERPL-MCNC: 199 MG/DL (ref 65–140)
GRAM STN SPEC: ABNORMAL
POTASSIUM SERPL-SCNC: 4.3 MMOL/L (ref 3.5–5.3)
SODIUM SERPL-SCNC: 130 MMOL/L (ref 136–145)

## 2019-06-25 PROCEDURE — 99223 1ST HOSP IP/OBS HIGH 75: CPT | Performed by: FAMILY MEDICINE

## 2019-06-25 PROCEDURE — 99233 SBSQ HOSP IP/OBS HIGH 50: CPT | Performed by: INTERNAL MEDICINE

## 2019-06-25 PROCEDURE — 70450 CT HEAD/BRAIN W/O DYE: CPT

## 2019-06-25 PROCEDURE — 76705 ECHO EXAM OF ABDOMEN: CPT

## 2019-06-25 PROCEDURE — 80048 BASIC METABOLIC PNL TOTAL CA: CPT | Performed by: NURSE PRACTITIONER

## 2019-06-25 PROCEDURE — NC001 PR NO CHARGE: Performed by: FAMILY MEDICINE

## 2019-06-25 PROCEDURE — 82948 REAGENT STRIP/BLOOD GLUCOSE: CPT

## 2019-06-25 RX ORDER — OXYCODONE HCL 5 MG/5 ML
2.5 SOLUTION, ORAL ORAL
Status: DISCONTINUED | OUTPATIENT
Start: 2019-06-25 | End: 2019-06-26

## 2019-06-25 RX ORDER — HYDROMORPHONE HCL/PF 1 MG/ML
0.5 SYRINGE (ML) INJECTION ONCE
Status: COMPLETED | OUTPATIENT
Start: 2019-06-25 | End: 2019-06-25

## 2019-06-25 RX ORDER — HYDROMORPHONE HCL/PF 1 MG/ML
0.2 SYRINGE (ML) INJECTION
Status: DISCONTINUED | OUTPATIENT
Start: 2019-06-25 | End: 2019-06-26

## 2019-06-25 RX ADMIN — LIDOCAINE 1 PATCH: 50 PATCH TOPICAL at 10:04

## 2019-06-25 RX ADMIN — INSULIN LISPRO 1 UNITS: 100 INJECTION, SOLUTION INTRAVENOUS; SUBCUTANEOUS at 17:12

## 2019-06-25 RX ADMIN — INSULIN LISPRO 1 UNITS: 100 INJECTION, SOLUTION INTRAVENOUS; SUBCUTANEOUS at 22:26

## 2019-06-25 RX ADMIN — OXYCODONE HYDROCHLORIDE 2.5 MG: 5 SOLUTION ORAL at 19:43

## 2019-06-25 RX ADMIN — HYDROMORPHONE HYDROCHLORIDE 0.2 MG: 1 INJECTION, SOLUTION INTRAMUSCULAR; INTRAVENOUS; SUBCUTANEOUS at 17:43

## 2019-06-25 RX ADMIN — INSULIN LISPRO 1 UNITS: 100 INJECTION, SOLUTION INTRAVENOUS; SUBCUTANEOUS at 05:38

## 2019-06-25 RX ADMIN — HYDROMORPHONE HYDROCHLORIDE 0.5 MG: 1 INJECTION, SOLUTION INTRAMUSCULAR; INTRAVENOUS; SUBCUTANEOUS at 20:12

## 2019-06-25 RX ADMIN — MORPHINE SULFATE 1 MG: 2 INJECTION, SOLUTION INTRAMUSCULAR; INTRAVENOUS at 05:42

## 2019-06-25 RX ADMIN — MORPHINE SULFATE 2 MG: 2 INJECTION, SOLUTION INTRAMUSCULAR; INTRAVENOUS at 10:35

## 2019-06-25 RX ADMIN — INSULIN LISPRO 1 UNITS: 100 INJECTION, SOLUTION INTRAVENOUS; SUBCUTANEOUS at 01:10

## 2019-06-25 RX ADMIN — SODIUM CHLORIDE 50 ML/HR: 0.9 INJECTION, SOLUTION INTRAVENOUS at 09:30

## 2019-06-25 NOTE — SPEECH THERAPY NOTE
Speech Language/Pathology  Pt seen by another SLP on 6/23  Krystin harrington/ thin recommended  Spoke w/ her nurse yesterday , Stated pt is only taking a few bites of food here and there that family bring in  Noted a regular diet was ordered today  Awaiting palliative consult  Will check status peripherally  F/u if indicated

## 2019-06-25 NOTE — PROGRESS NOTES
Progress Note - Critical Care   Susan Najera 75 y o  female MRN: [id number]  Unit/Bed#: ICU 06 Encounter: [id number]    Assessment/Plan:  1  End-stage pancreatic cancer  · Awaiting a palliative care consult at the patient and family have yet to decide on goals of care moving forward with discussions yesterday regarding hospice and or the possible continuation of her oral chemotherapeutic agent  · Will continue to provide emotional support to the patient and the family as they make these decisions regarding goals of care moving forward  2  Multiple metastatic brain lesions with hemorrhage conversion  · If we are going to continue with aggressive care we would need to get a CT scan today to evaluate the stability of the hemorrhage  3  Acute kidney injury on chronic kidney disease stage 3  · We will continue to monitor her renal indices and urine output closely  4  Chronic congestive heart failure with preserved EF  · We will continue to monitor her input and output closely  · We may be able to restart her outpatient toresemide  5  Chronic hyponatremia, likely multifactorial related to 1  2  And 3    · We will continue to monitor her sodium level closely  6  Diabetes mellitus type 2  · We will continue her on the sliding scale insulin coverage for now with a goal to maintain her blood glucose between 140 and 180  7  One of 2 blood cultures is positive for coag-negative staph, likely a contaminant    Critical Care Time:   Documented critical care time excludes any procedures documented elsewhere  It also excludes any family updates    _____________________________________________________________________    HPI/24hr events:    The patient appears more uncomfortable this morning complaining of generalized abdominal pain with grimace    Medications:    Current Facility-Administered Medications:  diphenhydrAMINE-zinc acetate  Topical TID PRN ESE Girard    insulin lispro 1-5 Units Subcutaneous Q6H [address] Robb Chase Spatzer, CRNP    lidocaine 1 patch Topical Daily Lelan Croissant, CRNP    morphine injection 1 mg Intravenous Q4H PRN Lelan Croissant, CRNP    sodium chloride 50 mL/hr Intravenous Continuous Lelan Croissant, CRNP Last Rate: 50 mL/hr (06/24/19 1243)         sodium chloride 50 mL/hr Last Rate: 50 mL/hr (06/24/19 1243)         Physical exam:  Vitals: Body mass index is 31 55 kg/m²  Blood pressure 101/60, pulse 92, temperature 98 2 °F (36 8 °C), temperature source Tympanic, resp  rate 12, height 5' 5" (1 651 m), weight 86 kg (189 lb 9 5 oz), SpO2 99 %  ,  Temp  Min: 97 1 °F (36 2 °C)  Max: 99 7 °F (37 6 °C)  IBW: 57 kg    SpO2: 99 %  SpO2 Activity: At Rest  O2 Device: None (Room air)      Intake/Output Summary (Last 24 hours) at 6/25/2019 0606  Last data filed at 6/25/2019 0433  Gross per 24 hour   Intake 1200 ml   Output 726 ml   Net 474 ml       Invasive/non-invasive ventilation settings:   Respiratory    Lab Data (Last 4 hours)    None         O2/Vent Data (Last 4 hours)    None              Invasive Devices     Peripheral Intravenous Line            Peripheral IV 06/22/19 Right Antecubital 2 days    Peripheral IV 06/23/19 Left;Proximal;Ventral (anterior) Forearm 1 day          Drain            Open Drain Right;Lateral Abdomen 40 days    Biliary Tube  10 2 Fr  LUQ 26 days    Biliary Tube  10 2 Fr  RUQ 26 days                  Physical Exam:  Gen:  Awake and alert  HEENT:  Pupils are equal round reactive to light  The oropharynx is dry but without erythema  Neck:  Supple negative for lymphadenopathy  Chest:  Diminished in the bases bilaterally  Cor:  Regular rate and rhythm  Abd:  Obese with generalized abdominal wall edema  There is tenderness throughout the abdomen  Ext:  There is edema in the bilateral lower extremity  Neuro:  Awake and alert, able to move her extremities but weak  Skin:  Pale, warm and dry      Diagnostic Data:  Lab: I have personally reviewed pertinent lab results       Microbiology:  Results from last 7 days   Lab Units 06/22/19 2036 06/22/19  1455   BLOOD CULTURE  No Growth at 24 hrs  Staphylococcus coagulase negative*   GRAM STAIN RESULT   --  Gram positive cocci in clusters*       Imaging:      Cardiac lab/EKG/telemetry/ECHO:       VTE Prophylaxis:  SCDs    Code Status: Level 3 - DNAR and DNI    Lavetta Soulier, CRNP    Portions of the record may have been created with voice recognition software  Occasional wrong word or "sound a like" substitutions may have occurred due to the inherent limitations of voice recognition software  Read the chart carefully and recognize, using context, where substitutions have occurred

## 2019-06-25 NOTE — ASSESSMENT & PLAN NOTE
Currently 127, likely due to hemorrhagic conversion of metastatic brain lesion  No current complaints of blurred vision, lethargy, or confusion  Will consider serial labs and gently replace sodium, if patient / family would like to continue treatment

## 2019-06-25 NOTE — ASSESSMENT & PLAN NOTE
Pending palliative care consult  If patient / family wishes to continue treatment will monitor hemoglobin and consider transfusion for hemoglobin < 7 mg/dL

## 2019-06-25 NOTE — ASSESSMENT & PLAN NOTE
January 2010, attempted whipple converted to cholecystojejunostomy and biopsy of the celiac and caval lymph nodes at [facility]   5/15 Ascites drain placed by IR   5/29 Biliary drains x 2 placed by IR follows at [facility] and spoke with oncologist there

## 2019-06-25 NOTE — ASSESSMENT & PLAN NOTE
Continue Q6H blood glucose monitoring and sliding scale insulin coverage  Followed by endocrinology as an outpatient

## 2019-06-25 NOTE — CONSULTS
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 ([place] [address] )    Personal history of breast cancer    Personal history of pancreatic cancer    Personal history of colon cancer, stage II    Localized edema    Sepsis ([place] [address] )    ANNE (acute kidney injury) ([place] [address] )    Anemia of acute infection    Leukocytosis    Ascites    Bacterial liver abscess    Bacteremia    Chronic diastolic CHF (congestive heart failure) ([place] [address] )    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    Monoallelic mutation of CHEK2 gene    Cavernous hemangioma of intracranial structure ([place] [address] )    S/P partial colectomy    Lymphadenopathy, retroperitoneal    Hip pain    Closed fracture of right hip (HCC)    Renal insufficiency    Edema    Elevated LFTs    Bilateral lower extremity edema    Acute blood loss anemia    Hypokalemia    Peritonitis (HCC)    Obstructive jaundice    Lymphadenopathy    Ovarian cancer (HCC)    CKD (chronic kidney disease), stage III (HCC)    Hypocalcemia    Hyponatremia    Increased anion gap metabolic acidosis    Multiple lesions of metastatic malignancy ([place] [address] )     Plan:  1  Symptom management - withholding all meds at this time, pending family mtg    2  Goals - full medical cares, no formal limits set by family at this time  - Pt appears to have a very poor funcitonal status for chemo nor any type of antineoplastic therapy at this time    - Regardless, family have not yet agreed to comfort cares; they still wish for enteral nutrition, and preserved ability to take PO and talk  - The presence of Stage IV pancreatic cancer with meds to almost all major organs save heart certainly meets our criteria for hospice    Family are informed of this, but have not felt comfortable with this option    - Pt is incompetent; diamond Kinsey has primary decisional capacity     Code Status: DNR/DNI - Level 3   Decisional apparatus:  Patient is not competent on my exam today  If competence is lost, patient's substitute decision maker would default to diamond Kinsey by scanned PoA on [place] by [place] Act 169  Advance Directive / Living Will / POLST:  Reviewed and inform our plant     I have reviewed the patient's controlled substance dispensing history in the Prescription Drug Monitoring Program in compliance with the [facility] regulations before prescribing any controlled substances  We appreciate the invitation to be involved in this patient's care  We will continue to follow  Please do not hesitate to reach our on call provider through our clinic answering service at  should you have acute symptom control concerns  Yenni Giron MD  Palliative and Supportive Care  Pager: [phone]       IDENTIFICATION:  Inpatient consult to Palliative Care  Consult performed by: Yenni Giron MD  Consult ordered by: Mani Montero[address]        Physician Requesting Consult: Felipe Casanova MD  Reason for Consult / Principal Problem: goals  Hx and PE limited by: pt's obtundation    HISTORY OF PRESENT ILLNESS:       Amy Alcazar is a 75 y o  female who presents with change in mental status an ddecline in function  She has a known history of multiple cancers, including pancreas, colon, and breast   Most notably, the pancreas cancer has recurred, and she has been told that all standard therapies have been exhausted  Her [facility] teams have offered her trial therapy with Lynparza (olaprarib), a DNA-repair inhibitor  She is brought to our care with a change in mental status, and appeared septic on initial w/up  An elevated WBC was detected, and previously known brain mets were shown on CT to have some signs of hemorrhagic conversion  ANNE was noted as well    Of note, family describe in conference today that she has a history of multiple hepatic abscesses, and they wish to r/o this as a possible contributor to her current illness  In conference tdoay with family, pt's son (who hold PoA) wishes to follow what her wishes seem to have been during her life: to get every treatment available for every problem she is noted to have  This is indeed consistent with her previous behaviors: she has undergone Whipple and colectomy for her abdominal cancers, and aggressive cares for her breast disease as well  Again, [facility] had informed her of lack of usual treatments, and she had opted from experimental therapy  Review of Systems   Unable to perform ROS: mental status change       Past Medical History:   Diagnosis Date    Bacteremia 2/18/2019    Cancer [facility])     Breast; Last Assessed: 8/29/2016    Carpal tunnel syndrome     unspecified laterality; Onset: 4/23/2012    Cellulitis     Last Assessesd: 8/30/2012    Diabetes mellitus out of control ([facility] [address] )     Last Assessed: 11/3/2014    Fatigue     Last Assessed: 2/11/014    Fracture of toe     Last Assessed: 11/17/2014    Pancreatic insufficiency 2/21/2019    Rectal cancer [facility])      Past Surgical History:   Procedure Laterality Date    COLON SURGERY      IR IMAGE GUIDED ASPIRATION / DRAINAGE  2/9/2019    IR IMAGE GUIDED DRAINAGE W TUBE PLACEMENT  4/20/2019    IR PARACENTESIS  5/2/2019    IR PICC LINE  4/20/2019    IR TUBE PLACEMENT  5/3/2019    MASTECTOMY      bilateral    PANCREATECTOMY  2010    Proximal Subtotal (whipple procedure)    CO PARTIAL HIP REPLACEMENT Right 4/11/2019    Procedure: HEMIARTHROPLASTY HIP (BIPOLAR); Surgeon: Harley Soliz MD;  Location: [facility];  Service: Orthopedics     Social History     Socioeconomic History    Marital status:       Spouse name: Not on file    Number of children: Not on file    Years of education: Not on file    Highest education level: Not on file   Occupational History    Not on file   Social Needs    Financial resource strain: Not on file    Food insecurity:     Worry: Not on file     Inability: Not on file    Transportation needs:     Medical: Not on file     Non-medical: Not on file   Tobacco Use    Smoking status: Never Smoker    Smokeless tobacco: Never Used   Substance and Sexual Activity    Alcohol use: Not Currently    Drug use: Not Currently    Sexual activity: Not on file   Lifestyle    Physical activity:     Days per week: Not on file     Minutes per session: Not on file    Stress: Not on file   Relationships    Social connections:     Talks on phone: Not on file     Gets together: Not on file     Attends [employer] service: Not on file     Active member of club or organization: Not on file     Attends meetings of clubs or organizations: Not on file     Relationship status: Not on file    Intimate partner violence:     Fear of current or ex partner: Not on file     Emotionally abused: Not on file     Physically abused: Not on file     Forced sexual activity: Not on file   Other Topics Concern    Not on file   Social History Narrative    Always uses seat belt    Feels safe at home     Family History   Problem Relation Age of Onset    Melanoma Mother     Stroke Father        MEDICATIONS / ALLERGIES:    current meds:   Current Facility-Administered Medications   Medication Dose Route Frequency    diphenhydrAMINE-zinc acetate (BENADRYL) 2-0 1 % cream   Topical TID PRN    HYDROmorphone (DILAUDID) injection 0 2 mg  0 2 mg Intravenous Q3H PRN    insulin lispro (HumaLOG) 100 units/mL subcutaneous injection 1-5 Units  1-5 Units Subcutaneous TID AC    insulin lispro (HumaLOG) 100 units/mL subcutaneous injection 1-5 Units  1-5 Units Subcutaneous HS    lidocaine (LIDODERM) 5 % patch 1 patch  1 patch Topical Daily    oxyCODONE (ROXICODONE) oral solution 2 5 mg  2 5 mg Oral Q3H PRN    sodium chloride 0 9 % infusion  50 mL/hr Intravenous Continuous       Allergies   Allergen Reactions    Gadolinium Derivatives Anaphylaxis     MRI dye- hot flashes/ flushing    Iodine Anaphylaxis     ? ??SHORTNESS OF BREATH    Acetaminophen     Acetaminophen-Codeine Edema    Aspirin Hives     RASH    Cephalexin      Annotation - [id number]: rash  Patient reported tolerance in the past during her May 2019 admission     Gadolinium     Iohexol     Nickel Hives    Penicillin G      Tolerated Ampicillin on May 2019 admission     Penicillins Hives     Tolerated Ampicillin on May 2019 admission  Tolerated Amoxicillin in past      Sulfa Antibiotics Hives    Sulfamethoxazole-Trimethoprim Diarrhea       OBJECTIVE:    Physical Exam  Physical Exam   Constitutional: No distress  Frail, cachectic   HENT:   Head: Normocephalic and atraumatic  Right Ear: External ear normal    Left Ear: External ear normal    Mouth/Throat: Oropharyngeal exudate (mucous membranes tacky) present  Eyes: Pupils are equal, round, and reactive to light  Conjunctivae and EOM are normal  Right eye exhibits no discharge  Left eye exhibits no discharge  Neck: No tracheal deviation present  Cardiovascular: Regular rhythm  tachycardic   Pulmonary/Chest: Effort normal  No stridor  No respiratory distress  Abdominal: Soft  She exhibits no distension  Scaphoid   Musculoskeletal: She exhibits no edema  Neurological:   Somnolent, and does not participate in exam   Skin: Skin is warm and dry  No rash noted  She is not diaphoretic  No erythema  There is pallor  Psychiatric:   Does not participate with me in exam today       Lab Results: I have personally reviewed pertinent labs  Sepsis markers reviewed, as well as elevated liver testing  Imaging Studies: personally reviwed CT findings of brain, liver  EKG, Pathology, and Other Studies: none pertinent    Counseling / Coordination of Care  Total floor / unit time spent today 80+ minutes  Greater than 50% of total time was spent with the patient and / or family counseling and / or coordination of care  A description of the counseling / coordination of care: extensive chart review, and protracated family mtg on the floor, as documented; attempt at symptom review; review and assessment of decisional apparatus and capacity, applicable legal codes and extant documents;

## 2019-06-25 NOTE — ASSESSMENT & PLAN NOTE
Likely due to acute kidney injury  Will consider serial labs and gently replace sodium, if patient / family would like to continue treatment

## 2019-06-25 NOTE — ASSESSMENT & PLAN NOTE
Her creatinine is currently 1 92, baseline appears to be 0 9 to 1 1  If patient / family decides against hospice would trend renal indices, monitor intake and output, and monitor and replace electrolytes as needed

## 2019-06-26 LAB
ANION GAP SERPL CALCULATED.3IONS-SCNC: 15 MMOL/L (ref 4–13)
BASOPHILS # BLD AUTO: 0.07 THOUSANDS/ΜL (ref 0–0.1)
BASOPHILS NFR BLD AUTO: 0 % (ref 0–1)
BUN SERPL-MCNC: 89 MG/DL (ref 5–25)
CALCIUM SERPL-MCNC: 8 MG/DL (ref 8.3–10.1)
CHLORIDE SERPL-SCNC: 101 MMOL/L (ref 100–108)
CO2 SERPL-SCNC: 15 MMOL/L (ref 21–32)
CREAT SERPL-MCNC: 1.71 MG/DL (ref 0.6–1.3)
EOSINOPHIL # BLD AUTO: 0.08 THOUSAND/ΜL (ref 0–0.61)
EOSINOPHIL NFR BLD AUTO: 0 % (ref 0–6)
ERYTHROCYTE [DISTWIDTH] IN BLOOD BY AUTOMATED COUNT: 16.9 % (ref 11.6–15.1)
GFR SERPL CREATININE-BSD FRML MDRD: 29 ML/MIN/1.73SQ M
GLUCOSE SERPL-MCNC: 154 MG/DL (ref 65–140)
GLUCOSE SERPL-MCNC: 158 MG/DL (ref 65–140)
GLUCOSE SERPL-MCNC: 199 MG/DL (ref 65–140)
GLUCOSE SERPL-MCNC: 285 MG/DL (ref 65–140)
GLUCOSE SERPL-MCNC: 285 MG/DL (ref 65–140)
HCT VFR BLD AUTO: 30.1 % (ref 34.8–46.1)
HGB BLD-MCNC: 9.8 G/DL (ref 11.5–15.4)
IMM GRANULOCYTES # BLD AUTO: 0.42 THOUSAND/UL (ref 0–0.2)
IMM GRANULOCYTES NFR BLD AUTO: 1 % (ref 0–2)
LYMPHOCYTES # BLD AUTO: 1.77 THOUSANDS/ΜL (ref 0.6–4.47)
LYMPHOCYTES NFR BLD AUTO: 6 % (ref 14–44)
MAGNESIUM SERPL-MCNC: 2 MG/DL (ref 1.6–2.6)
MCH RBC QN AUTO: 30.3 PG (ref 26.8–34.3)
MCHC RBC AUTO-ENTMCNC: 32.6 G/DL (ref 31.4–37.4)
MCV RBC AUTO: 93 FL (ref 82–98)
MONOCYTES # BLD AUTO: 1.73 THOUSAND/ΜL (ref 0.17–1.22)
MONOCYTES NFR BLD AUTO: 6 % (ref 4–12)
NEUTROPHILS # BLD AUTO: 26.41 THOUSANDS/ΜL (ref 1.85–7.62)
NEUTS SEG NFR BLD AUTO: 87 % (ref 43–75)
NRBC BLD AUTO-RTO: 0 /100 WBCS
PLATELET # BLD AUTO: 326 THOUSANDS/UL (ref 149–390)
PMV BLD AUTO: 11 FL (ref 8.9–12.7)
POTASSIUM SERPL-SCNC: 4.4 MMOL/L (ref 3.5–5.3)
RBC # BLD AUTO: 3.23 MILLION/UL (ref 3.81–5.12)
SODIUM SERPL-SCNC: 131 MMOL/L (ref 136–145)
WBC # BLD AUTO: 30.48 THOUSAND/UL (ref 4.31–10.16)

## 2019-06-26 PROCEDURE — 85025 COMPLETE CBC W/AUTO DIFF WBC: CPT | Performed by: NURSE PRACTITIONER

## 2019-06-26 PROCEDURE — 99233 SBSQ HOSP IP/OBS HIGH 50: CPT | Performed by: INTERNAL MEDICINE

## 2019-06-26 PROCEDURE — 83735 ASSAY OF MAGNESIUM: CPT | Performed by: NURSE PRACTITIONER

## 2019-06-26 PROCEDURE — 80048 BASIC METABOLIC PNL TOTAL CA: CPT | Performed by: NURSE PRACTITIONER

## 2019-06-26 PROCEDURE — 82948 REAGENT STRIP/BLOOD GLUCOSE: CPT

## 2019-06-26 PROCEDURE — 87040 BLOOD CULTURE FOR BACTERIA: CPT | Performed by: NURSE PRACTITIONER

## 2019-06-26 PROCEDURE — NC001 PR NO CHARGE: Performed by: FAMILY MEDICINE

## 2019-06-26 PROCEDURE — 99232 SBSQ HOSP IP/OBS MODERATE 35: CPT | Performed by: FAMILY MEDICINE

## 2019-06-26 RX ORDER — HYDROMORPHONE HCL/PF 1 MG/ML
0.5 SYRINGE (ML) INJECTION
Status: DISCONTINUED | OUTPATIENT
Start: 2019-06-26 | End: 2019-06-30

## 2019-06-26 RX ORDER — OXYCODONE HCL 5 MG/5 ML
5 SOLUTION, ORAL ORAL
Status: DISCONTINUED | OUTPATIENT
Start: 2019-06-26 | End: 2019-06-30

## 2019-06-26 RX ADMIN — LIDOCAINE 1 PATCH: 50 PATCH TOPICAL at 12:19

## 2019-06-26 RX ADMIN — INSULIN LISPRO 3 UNITS: 100 INJECTION, SOLUTION INTRAVENOUS; SUBCUTANEOUS at 21:30

## 2019-06-26 RX ADMIN — INSULIN LISPRO 1 UNITS: 100 INJECTION, SOLUTION INTRAVENOUS; SUBCUTANEOUS at 22:01

## 2019-06-26 RX ADMIN — SODIUM CHLORIDE 50 ML/HR: 0.9 INJECTION, SOLUTION INTRAVENOUS at 05:21

## 2019-06-26 RX ADMIN — INSULIN LISPRO 3 UNITS: 100 INJECTION, SOLUTION INTRAVENOUS; SUBCUTANEOUS at 16:17

## 2019-06-26 RX ADMIN — HYDROMORPHONE HYDROCHLORIDE 0.5 MG: 1 INJECTION, SOLUTION INTRAMUSCULAR; INTRAVENOUS; SUBCUTANEOUS at 20:29

## 2019-06-26 NOTE — PROGRESS NOTES
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 ([facility] )    Personal history of breast cancer    Personal history of pancreatic cancer    Personal history of colon cancer, stage II    Localized edema    Sepsis ([facility] )    ANNE (acute kidney injury) ([facility] )    Anemia of acute infection    Leukocytosis    Ascites    Bacterial liver abscess    Bacteremia    Chronic diastolic CHF (congestive heart failure) ([place] [address] )    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    Monoallelic mutation of CHEK2 gene    Cavernous hemangioma of intracranial structure ([place] [address] )    S/P partial colectomy    Lymphadenopathy, retroperitoneal    Hip pain    Closed fracture of right hip (HCC)    Renal insufficiency    Edema    Elevated LFTs    Bilateral lower extremity edema    Acute blood loss anemia    Hypokalemia    Peritonitis (HCC)    Obstructive jaundice    Lymphadenopathy    Ovarian cancer (HCC)    CKD (chronic kidney disease), stage III (HCC)    Hypocalcemia    Hyponatremia    Increased anion gap metabolic acidosis    Multiple lesions of metastatic malignancy ([place] [address] )      Plan:  1  Symptom management - none identified at this time  - rotated opioids from renally cleared substances    2  Goals - full cares, with limits   - Pt not entirely competent  Son leads family    - Pt will not be coded, as this is not ethical   Family have been informed  Code Status: DNR/DNI - Level 3   Decisional apparatus:  Patient is not competent on my exam today  If competence is lost, patient's substitute decision maker would default to diamond Kinsey by formal Holden Roque  Advance Directive / Living Will / POLST:  Reviewed and inform our plan      Isadora Troncoso MD  Palliative and Supportive Care  Pager: [phone]         Interval history:   Met with several family, including pt's sister and granddaughter, in conference  Expressed our concerns for lack of benefit of chemo, given recurrent disease and new brain mets  Also shared that todays' testing has not revealed a reversible source of pt's overall clinical decline  It is much more likely today, in context with these objective findings, that all her illness is due to evolution of her cancer   Negative BCx in context of escalating WBCs seems a leukemoid rxn, asso with very poor px  Shared our concerns as noted above with pt's son Bradford Martinez over the phone, separate from family conference on the floor  Reviewed case with ICU team, including interp of liver and brain imaging  Family will meet tomorrow at 10:00  Code status remains DNR/DNI, and we will not gather labs tonight nor tomorrow, as these are harmful to pt, and serve no clinical benefit  She may Xfer to floors for Med/Surg care if a bed is needed; the family are aware of this possibility  MEDICATIONS / ALLERGIES:     all current active meds have been reviewed    Allergies   Allergen Reactions    Gadolinium Derivatives Anaphylaxis     MRI dye- hot flashes/ flushing    Iodine Anaphylaxis     ? ??SHORTNESS OF BREATH    Morphine Delerium     Contraindicated based on poor renal function    Acetaminophen     Acetaminophen-Codeine Edema    Aspirin Hives     RASH    Cephalexin      Annotation - [id number]: rash  Patient reported tolerance in the past during her May 2019 admission     Gadolinium     Iohexol     Nickel Hives    Penicillin G      Tolerated Ampicillin on May 2019 admission     Penicillins Hives     Tolerated Ampicillin on May 2019 admission  Tolerated Amoxicillin in past      Sulfa Antibiotics Hives    Sulfamethoxazole-Trimethoprim Diarrhea       OBJECTIVE:    Physical Exam  Physical Exam   Constitutional: No distress  HENT:   Head: Normocephalic and atraumatic     Right Ear: External ear normal    Left Ear: External ear normal    Mouth/Throat: No oropharyngeal exudate (membranes tacky)  Eyes: Right eye exhibits no discharge  Left eye exhibits no discharge  Does not open eyes during encounter  Neck: No tracheal deviation present  Pulmonary/Chest: No stridor  No respiratory distress  Abdominal: Soft  She exhibits no distension  Disetended, arachnoid belly   Musculoskeletal: She exhibits no edema  Neurological:   Not alert, not interactive  Facies generally symmetric  Skin: Skin is warm and dry  No rash noted  She is not diaphoretic  No erythema  There is pallor  jaundiced   Psychiatric:   Cannot participate in exam today  Lab Results: I have personally reviewed pertinent labs  Elevating WBCs indicate leukemoid rxn, as documented  Imaging Studies: liver U/S unrevealing; no evolution of disease on CT scans  EKG, Pathology, and Other Studies: none new    Counseling / Coordination of Care  Total floor / unit time spent today 25+ minutes  Greater than 50% of total time was spent with the patient and / or family counseling and / or coordination of care   A description of the counseling / coordination of care: chart review; phone call to family from floor; consideration of hospice

## 2019-06-26 NOTE — MALNUTRITION/BMI
This medical record reflects one or more clinical indicators suggestive of malnutrition and/or morbid obesity  Malnutrition Findings:   Malnutrition type: Chronic illness  Degree of Malnutrition: Other severe protein calorie malnutrition(related to metastatic pancreatic cancer as evidenced by severe fat loss around orbitals- dark circles, hollow, buccal fat pads, severe muscle loss around temples, < 50% po intake > 5 days (3 days prior to admission + 4 days inpatients))  Malnutrition Characteristics: Muscle loss, Fat loss, Inadequate energy(Noted in chart, ongoing discussion with family regarding goals of care in pt with poor prognosis  Will provide nutrition support recommendations if all cares wanted)        See Nutrition note dated 6/25/19 for additional details  Completed nutrition assessment is viewable in flowsheets

## 2019-06-26 NOTE — PROGRESS NOTES
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  · CT scan of the head was performed yesterday and reveals stable presence of multiple hemorrhagic lesions  · Will continue to monitor her mental status closely and provide supportive care for now  3  Acute kidney injury on chronic kidney disease stage 3-slowly improving  · We will continue to monitor her renal indices and urine output closely  4  Diabetes mellitus type 2  · Will continue her on sliding scale insulin for now with a goal to maintain her blood glucose between 140 and 180  5  Leukocytosis, etiology not entirely known, possibly related to 1  · The patient remains afebrile so would favor continuing to monitor her off antibiotics for now  · She underwent an ultrasound of the right upper quadrant to evaluate her known history of liver abscesses to determine whether these are being drained appropriately  6  Disposition:  The patient's overall prognosis is extremely poor given the presence of her metastatic pancreatic cancer with limited to no additional treatment options available  Does not unreasonable to pursue hospice care at this time  The patient yesterday was refusing lab draws and was reluctant to undergo the ultrasound as she was very uncomfortable during the procedure  Will continue ongoing discussions with the patient and family regarding comfort measures with quality of life goals in mind    Critical Care Time:   Documented critical care time excludes any procedures documented elsewhere   It also excludes any family updates    _____________________________________________________________________    HPI/24hr events:   No events overnight  The patient has some complaints of pain but it is better with pain medicines    Medications:    Current Facility-Administered Medications:  diphenhydrAMINE-zinc acetate  Topical TID PRN Garrel PiBILLY reyesNP    HYDROmorphone 0 2 mg Intravenous Q3H PRN Nicolasa Marino MD    insulin lispro 1-5 Units Subcutaneous TID [facility] Sreedhar Graham, ESE    insulin lispro 1-5 Units Subcutaneous HS ESE Patel    lidocaine 1 patch Topical Daily Garrel Piamy, CRNP    oxyCODONE 2 5 mg Oral Q3H PRN Nicolasa Marino MD    sodium chloride 50 mL/hr Intravenous Continuous Garrel BILLY MckenzieNP Last Rate: 50 mL/hr (06/26/19 0521)         sodium chloride 50 mL/hr Last Rate: 50 mL/hr (06/26/19 0521)         Physical exam:  Vitals: Body mass index is 31 55 kg/m²  Blood pressure 102/57, pulse 94, temperature 97 5 °F (36 4 °C), temperature source Temporal, resp  rate (!) 10, height 5' 5" (1 651 m), weight 86 kg (189 lb 9 5 oz), SpO2 97 %  ,  Temp  Min: 97 1 °F (36 2 °C)  Max: 99 7 °F (37 6 °C)  IBW: 57 kg    SpO2: 97 %  SpO2 Activity: At Rest  O2 Device: None (Room air)      Intake/Output Summary (Last 24 hours) at 6/26/2019 9055  Last data filed at 6/26/2019 0518  Gross per 24 hour   Intake 599 17 ml   Output 333 ml   Net 266 17 ml       Invasive/non-invasive ventilation settings:   Respiratory    Lab Data (Last 4 hours)    None         O2/Vent Data (Last 4 hours)    None              Invasive Devices     Peripheral Intravenous Line            Peripheral IV 06/23/19 Left;Proximal;Ventral (anterior) Forearm 2 days    Peripheral IV 06/25/19 Right;Ventral (anterior) Wrist less than 1 day          Drain            Open Drain Right;Lateral Abdomen 41 days    Biliary Tube  10 2 Fr  LUQ 27 days    Biliary Tube  10 2 Fr  RUQ 27 days                  Physical Exam:  Gen:  Sleepy but easily arousable  HEENT:  Pupils are equal round reactive to light  The oropharynx is dry but without erythema  Neck:  Supple negative for lymphadenopathy  Chest:  Essentially clear to auscultation bilaterally  Cor:  Regular rate and rhythm  Abd:  Distended, soft generalized tenderness there are drains in the right upper quadrant which are draining bilious material  Ext:  There is no significant edema clubbing or cyanosis  Neuro:  Sleepy but arousable, able to move her extremities but weak  Skin:  Warm and dry  Diagnostic Data:  Lab: I have personally reviewed pertinent lab results  CBC:   Results from last 7 days   Lab Units 06/26/19  0510 06/24/19  0549 06/22/19  1455   WBC Thousand/uL 30 48* 28 15* 24 04*   HEMOGLOBIN g/dL 9 8* 9 0* 9 3*   HEMATOCRIT % 30 1* 28 3* 29 6*   PLATELETS Thousands/uL 326 324 276       CMP:   Results from last 7 days   Lab Units 06/26/19  0510 06/25/19  1734 06/24/19  0549  06/22/19  1455   SODIUM mmol/L 131* 130* 127*   < > 129*   POTASSIUM mmol/L 4 4 4 3 4 8   < > 4 5   CHLORIDE mmol/L 101 100 97*   < > 97*   CO2 mmol/L 15* 16* 14*   < > 21   BUN mg/dL 89* 91* 86*   < > 63*   CREATININE mg/dL 1 71* 1 66* 1 92*   < > 1 57*   CALCIUM mg/dL 8 0* 8 1* 8 3   < > 8 6   ALK PHOS U/L  --   --   --   --  168*   ALT U/L  --   --   --   --  35   AST U/L  --   --   --   --  38    < > = values in this interval not displayed  PT/INR:   No results found for: PT, INR,   Magnesium:   Results from last 7 days   Lab Units 06/26/19  0510 06/24/19  0549   MAGNESIUM mg/dL 2 0 1 9     Phosphorous:       Microbiology:  Results from last 7 days   Lab Units 06/22/19  2036 06/22/19  1455   BLOOD CULTURE  No Growth at 48 hrs   Staphylococcus coagulase negative*   GRAM STAIN RESULT   --  Gram positive cocci in clusters*       Imaging:  CT of the head and ultrasound were performed and the official results are pending    Cardiac lab/EKG/telemetry/ECHO:       VTE Prophylaxis:  SCDs    Code Status: Level 3 - DNAR and DNI    Flaquita Sasser, CRNP    Portions of the record may have been created with voice recognition software  Occasional wrong word or "sound a like" substitutions may have occurred due to the inherent limitations of voice recognition software  Read the chart carefully and recognize, using context, where substitutions have occurred

## 2019-06-27 LAB
GLUCOSE SERPL-MCNC: 240 MG/DL (ref 65–140)
GLUCOSE SERPL-MCNC: 261 MG/DL (ref 65–140)
GLUCOSE SERPL-MCNC: 270 MG/DL (ref 65–140)
GLUCOSE SERPL-MCNC: 273 MG/DL (ref 65–140)
GLUCOSE SERPL-MCNC: 280 MG/DL (ref 65–140)

## 2019-06-27 PROCEDURE — 99232 SBSQ HOSP IP/OBS MODERATE 35: CPT | Performed by: FAMILY MEDICINE

## 2019-06-27 PROCEDURE — 99356 PR PROLONGED SVC I/P OR OBS SETTING 1ST HOUR: CPT | Performed by: FAMILY MEDICINE

## 2019-06-27 PROCEDURE — NC001 PR NO CHARGE: Performed by: FAMILY MEDICINE

## 2019-06-27 PROCEDURE — 99233 SBSQ HOSP IP/OBS HIGH 50: CPT | Performed by: INTERNAL MEDICINE

## 2019-06-27 PROCEDURE — 82948 REAGENT STRIP/BLOOD GLUCOSE: CPT

## 2019-06-27 RX ORDER — DEXAMETHASONE SODIUM PHOSPHATE 4 MG/ML
4 INJECTION, SOLUTION INTRA-ARTICULAR; INTRALESIONAL; INTRAMUSCULAR; INTRAVENOUS; SOFT TISSUE ONCE
Status: COMPLETED | OUTPATIENT
Start: 2019-06-27 | End: 2019-06-27

## 2019-06-27 RX ORDER — DEXAMETHASONE SODIUM PHOSPHATE 4 MG/ML
2 INJECTION, SOLUTION INTRA-ARTICULAR; INTRALESIONAL; INTRAMUSCULAR; INTRAVENOUS; SOFT TISSUE
Status: DISCONTINUED | OUTPATIENT
Start: 2019-06-28 | End: 2019-06-28

## 2019-06-27 RX ADMIN — SODIUM CHLORIDE 50 ML/HR: 0.9 INJECTION, SOLUTION INTRAVENOUS at 20:26

## 2019-06-27 RX ADMIN — DEXAMETHASONE SODIUM PHOSPHATE 4 MG: 4 INJECTION, SOLUTION INTRAMUSCULAR; INTRAVENOUS at 11:53

## 2019-06-27 RX ADMIN — OXYCODONE HYDROCHLORIDE 5 MG: 5 SOLUTION ORAL at 12:52

## 2019-06-27 RX ADMIN — INSULIN LISPRO 3 UNITS: 100 INJECTION, SOLUTION INTRAVENOUS; SUBCUTANEOUS at 08:30

## 2019-06-27 RX ADMIN — INSULIN LISPRO 4 UNITS: 100 INJECTION, SOLUTION INTRAVENOUS; SUBCUTANEOUS at 22:30

## 2019-06-27 RX ADMIN — LIDOCAINE 1 PATCH: 50 PATCH TOPICAL at 11:14

## 2019-06-27 RX ADMIN — INSULIN LISPRO 4 UNITS: 100 INJECTION, SOLUTION INTRAVENOUS; SUBCUTANEOUS at 15:46

## 2019-06-27 RX ADMIN — OXYCODONE HYDROCHLORIDE 5 MG: 5 SOLUTION ORAL at 20:31

## 2019-06-27 RX ADMIN — INSULIN LISPRO 3 UNITS: 100 INJECTION, SOLUTION INTRAVENOUS; SUBCUTANEOUS at 11:14

## 2019-06-27 RX ADMIN — SODIUM CHLORIDE 50 ML/HR: 0.9 INJECTION, SOLUTION INTRAVENOUS at 00:21

## 2019-06-27 NOTE — UTILIZATION REVIEW
Continued Stay Review    Date:     6/27/2019                          Current Patient Class:    IP  Current Level of Care:    ICU    HPI:74 y o  female initially admitted on    6/22/2019     Assessment/Plan:  Pt  Initially  Adm  With   End  Stage  Pancreatic  CA with mets,  Increased  Lethargy and fatigue  CONT  IP  ICU LOC  D/T  Palliative   Care  Involvement, family  Decision and  Pain control  Req  Close  Neurological  Monitoring D/T  Mult  Hemorrhagic met  Brain lesions  Cont  Gently  IVF  For  ANNE on  CKD  Pertinent Labs/Diagnostic Results:   Results from last 7 days   Lab Units 06/26/19  0510 06/24/19  0549 06/22/19  1455   WBC Thousand/uL 30 48* 28 15* 24 04*   HEMOGLOBIN g/dL 9 8* 9 0* 9 3*   HEMATOCRIT % 30 1* 28 3* 29 6*   PLATELETS Thousands/uL 326 324 276   NEUTROS ABS Thousands/µL 26 41*  --  19 72*         Results from last 7 days   Lab Units 06/26/19  0510 06/25/19  1734 06/24/19  0549 06/23/19  1812  06/23/19  0445   SODIUM mmol/L 131* 130* 127* 128*   < > 127*   POTASSIUM mmol/L 4 4 4 3 4 8  --   --  4 9   CHLORIDE mmol/L 101 100 97*  --   --  97*   CO2 mmol/L 15* 16* 14*  --   --  17*   ANION GAP mmol/L 15* 14* 16*  --   --  13   BUN mg/dL 89* 91* 86*  --   --  79*   CREATININE mg/dL 1 71* 1 66* 1 92*  --   --  1 67*   EGFR ml/min/1 73sq m 29 30 25  --   --  30   CALCIUM mg/dL 8 0* 8 1* 8 3  --   --  8 6   MAGNESIUM mg/dL 2 0  --  1 9  --   --   --     < > = values in this interval not displayed       Results from last 7 days   Lab Units 06/22/19  1455   AST U/L 38   ALT U/L 35   ALK PHOS U/L 168*   TOTAL PROTEIN g/dL 5 2*   ALBUMIN g/dL 1 6*   TOTAL BILIRUBIN mg/dL 3 45*     Results from last 7 days   Lab Units 06/27/19  1056 06/27/19  0739 06/26/19  2129 06/26/19  1554 06/26/19  1145 06/26/19  0806 06/25/19  2200 06/25/19  1710 06/25/19  0537 06/25/19  0109   POC GLUCOSE mg/dl 261* 240* 285* 285* 199* 158* 161* 165* 166* 199*     Results from last 7 days   Lab Units 06/26/19  0510 06/25/19  1734 06/24/19  0549 06/23/19  0445 06/22/19  1455   GLUCOSE RANDOM mg/dL 154* 156* 143* 107 62*         Results from last 7 days   Lab Units 06/22/19  1455   TROPONIN I ng/mL 0 02         Results from last 7 days   Lab Units 06/22/19  1455   PROTIME seconds 22 0*   INR  1 91*   PTT seconds 34         Results from last 7 days   Lab Units 06/22/19  1455   LACTIC ACID mmol/L 1 4         Results from last 7 days   Lab Units 06/22/19  1455   NT-PRO BNP pg/mL 1,576*             Results from last 7 days   Lab Units 06/22/19  1455   LIPASE u/L 24*         Results from last 7 days   Lab Units 06/22/19  2036 06/22/19  1455   BLOOD CULTURE  No Growth After 4 Days  Staphylococcus coagulase negative*   GRAM STAIN RESULT   --  Gram positive cocci in clusters*       Vital Signs:   06/27/19 1059  98 °F (36 7 °C)                 06/27/19 1015    82  10Abnormal   115/67  80  97 %       06/27/19 0815    78  10Abnormal   111/58  71  97 %    Lying   06/27/19 0736  98 °F (36 7 °C)                 06/27/19 0609    72  12  103/63  74  99 %       06/27/19 0409    76  12  106/58  76  97 %       06/27/19 0300  99 1 °F (37 3 °C)                 06/27/19 0209    92  18  120/62  79  99 %       06/27/19 0009    88  12  103/59  75  99 %             Medications:   Scheduled Meds:   Current Facility-Administered Medications:  [START ON 6/28/2019] dexamethasone 2 mg Intravenous BID (AM & Afternoon)    diphenhydrAMINE-zinc acetate  Topical TID PRN    HYDROmorphone 0 5 mg Intravenous Q3H PRN    insulin lispro 1-6 Units Subcutaneous TID AC    insulin lispro 1-6 Units Subcutaneous HS    lidocaine 1 patch Topical Daily    oxyCODONE 5 mg Oral Q3H PRN    sodium chloride 50 mL/hr Intravenous Continuous Last Rate: 50 mL/hr (06/27/19 0021)       Discharge Plan:    TBD/palliative   Care   involved    Network Utilization Review Department  Phone: [phone];  Fax [phone]  [email or web address]  ATTENTION: Please call with any questions or concerns to [phone]  and carefully listen to the prompts so that you are directed to the right person  Send all requests for admission clinical reviews, approved or denied determinations and any other requests to fax [phone]   All voicemails are confidential

## 2019-06-27 NOTE — PROGRESS NOTES
Progress Note - Critical Care   King Bacon 75 y o  female MRN: [id number]  Unit/Bed#: ICU 06 Encounter: [id number]    Assessment/Plan:  1  Stage IV Pancreatic cancer with metastasis  · Per oncology provider at [facility] no further chemo is likely to be beneficial or is warranted  Palliative care  is following for symptom management  · Plan for family meeting today for ongoing discussion regarding goals of care and possible transition to hospice  2  Multiple hemorrhagic metastatic brain lesions  · No extension seen on CT  Continue close neurological monitoring  3  Acute kidney injury on chronic kidney disease stage 3  · Trend renal indices and monitor intake and output  Urine output has been marginal  · Continue gentle IV fluid hydration  4  Diabetes mellitus type 2  · Blood sugar has been slightly more elevated  Will increase SSI coverage algorithm  Goal to maintain -180  5  Leukocytosis, likely leukemoid reaction in the setting of progressive pancreatic cancer    _____________________________________________________________________    HPI/24hr events:   Afebrile  No acute events overnight  Medications:    Current Facility-Administered Medications:  diphenhydrAMINE-zinc acetate  Topical TID PRN BILLY CarverNP    HYDROmorphone 0 5 mg Intravenous Q3H PRN Matias Medina MD    insulin lispro 1-6 Units Subcutaneous TID AC Renna Roses Spatzer, CRNP    insulin lispro 1-6 Units Subcutaneous HS Renna Roses Spatzer, CRNP    lidocaine 1 patch Topical Daily Edel Cuellar, BILLYNP    oxyCODONE 5 mg Oral Q3H PRN Matias Medina MD    sodium chloride 50 mL/hr Intravenous Continuous Hollingsworth Colace, CRNP Last Rate: 50 mL/hr (06/27/19 0021)         sodium chloride 50 mL/hr Last Rate: 50 mL/hr (06/27/19 0021)         Physical exam:  Vitals: Body mass index is 32 54 kg/m²  Blood pressure 106/58, pulse 76, temperature 99 1 °F (37 3 °C), temperature source Temporal, resp   rate 12, height 5' 5" (1 651 m), weight 88 7 kg (195 lb 8 8 oz), SpO2 97 %  ,  Temp  Min: 96 9 °F (36 1 °C)  Max: 99 7 °F (37 6 °C)  IBW: 57 kg    SpO2: 97 %  SpO2 Activity: At Rest  O2 Device: None (Room air)      Intake/Output Summary (Last 24 hours) at 6/27/2019 0557  Last data filed at 6/27/2019 0401  Gross per 24 hour   Intake 1700 83 ml   Output 600 ml   Net 1100 83 ml       Invasive/non-invasive ventilation settings:   Respiratory    Lab Data (Last 4 hours)    None         O2/Vent Data (Last 4 hours)    None              Invasive Devices     Peripheral Intravenous Line            Peripheral IV 06/23/19 Left;Proximal;Ventral (anterior) Forearm 3 days    Peripheral IV 06/25/19 Right;Ventral (anterior) Wrist 1 day          Drain            Open Drain Right;Lateral Abdomen 42 days    Biliary Tube  10 2 Fr  LUQ 28 days    Biliary Tube  10 2 Fr  RUQ 28 days    External Urinary Catheter 1 day                  Physical Exam:  Gen: Somnolent but arousable, frail and cachectic, no acute distress  HEENT:  Atraumatic, normocephalic, extraocular movements intact, pupils 3 mm equal and reactive, oropharynx dry  Neck:  Supple, trachea midline, no JVD, no lymphadenopathy  Chest:  Diminished otherwise clear to auscultation anteriorly, some fine bibasilar crackles posteriorly  Cor:  Single S1/S2, no murmurs, rubs, gallops, regular rate and rhythm  Abd:  Soft, distended, generalized tenderness to palpation, bowel sounds hypoactive  Ext:  No edema, no clubbing or cyanosis  Neuro: Moves all extremities, follows commands, grossly nonfocal  Skin:  Warm, dry      Diagnostic Data:  Lab: I have personally reviewed pertinent lab results     CBC:   Results from last 7 days   Lab Units 06/26/19  0510 06/24/19  0549 06/22/19  1455   WBC Thousand/uL 30 48* 28 15* 24 04*   HEMOGLOBIN g/dL 9 8* 9 0* 9 3*   HEMATOCRIT % 30 1* 28 3* 29 6*   PLATELETS Thousands/uL 326 324 276       CMP:   Results from last 7 days   Lab Units 06/26/19  0510 06/25/19  1734 06/24/19  0549 06/22/19  1455   SODIUM mmol/L 131* 130* 127*   < > 129*   POTASSIUM mmol/L 4 4 4 3 4 8   < > 4 5   CHLORIDE mmol/L 101 100 97*   < > 97*   CO2 mmol/L 15* 16* 14*   < > 21   BUN mg/dL 89* 91* 86*   < > 63*   CREATININE mg/dL 1 71* 1 66* 1 92*   < > 1 57*   CALCIUM mg/dL 8 0* 8 1* 8 3   < > 8 6   ALK PHOS U/L  --   --   --   --  168*   ALT U/L  --   --   --   --  35   AST U/L  --   --   --   --  38    < > = values in this interval not displayed  PT/INR:   No results found for: PT, INR,   Magnesium:   Results from last 7 days   Lab Units 06/26/19  0510 06/24/19  0549   MAGNESIUM mg/dL 2 0 1 9     Phosphorous:       Microbiology:  Results from last 7 days   Lab Units 06/22/19  2036 06/22/19  1455   BLOOD CULTURE  No Growth at 72 hrs  Staphylococcus coagulase negative*   GRAM STAIN RESULT   --  Gram positive cocci in clusters*       Imaging:  No new imaging    Cardiac lab/EKG/telemetry/ECHO:   Sinus rhythm    VTE Prophylaxis: SCDs    Code Status: Level 3 - DNAR and DNI    Renna Roses Spatzer, CRNP    Portions of the record may have been created with voice recognition software  Occasional wrong word or "sound a like" substitutions may have occurred due to the inherent limitations of voice recognition software  Read the chart carefully and recognize, using context, where substitutions have occurred

## 2019-06-27 NOTE — ASSESSMENT & PLAN NOTE
Resolving on serial BMP's   Baseline creatinine appears to be 0 9-1 1  Serial BMP's with stable creat   Likely a sequela of her decreased PO intake

## 2019-06-27 NOTE — HOSPICE NOTE
Hospice referral received  Spoke with CRNP and CM regarding patient's hospitalizations and discussion with [facility]  Spoke with patient's children, Chepe Meza and Harshal Driver, to explain hospice services and philosophy  They agreed to hospice services after the patient receives medication discussed with physicians  Will continue to follow patient   Thank you for the referral

## 2019-06-27 NOTE — PROGRESS NOTES
Progress Note - Minnie Otero 1945, 75 y o  female MRN: [id number]    Unit/Bed#: ICU 06 Encounter: [id number]    Primary Care Provider: Steph Pride DO   Date and time admitted to hospital: 6/22/2019  2:53 PM        * Personal history of pancreatic cancer  Assessment & Plan  Per [facility], she is not a candidate for oral chemotherapy agents until she is discharged and reevaluated  Leukocytosis noted, however no tachycardia, remains afebrile   US RUQ negative for abscess   Repeat CTH with stable foci of hyperattenuation within the L cerebral hemisphere and the brainstem  Dr Pat Fan with palliative medicine following with family at the bedside   Lab holiday observed today  Diet advanced per patient's preference, bedside swallow passed   [address] spoke with patient and family today and will f/u tomorrow -- she is not currently a candidate for [facility], but she will continue to reevaluate     Hyponatremia  Assessment & Plan  Improving  NS @50mL/hour   Mentation and neuro exam appropriate   Trend PRN    ANNE (acute kidney injury) ([facility] )  Assessment & Plan  Resolving on serial BMP's   Baseline creatinine appears to be 0 9-1 1  Serial BMP's with stable creat   Likely a sequela of her decreased PO intake    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 ([facility] )  Assessment & Plan  SSI Q6H to maintain glucose 140-180      Code Status: Level 3 - DNAR and DNI  POA: Yes  POLST:      Reason for ICU admission:   End-stage pancreatic cancer     Active problems:   Principal Problem:    Personal history of pancreatic cancer  Active Problems:    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 ([facility] )    Personal history of breast cancer    ANNE (acute kidney injury) ([facility] )    Anemia of acute infection    Hyponatremia    Increased anion gap metabolic acidosis    Multiple lesions of metastatic malignancy ([facility] )  Resolved Problems:    * No resolved hospital problems   *      Consultants:   Case Management, Hospice, Palliative Care     History of Present Illness:   76yo F with PMH of pancreatic CA, breast CA, multiple metastatic malignancy, DM 2/2 pancreatectomy presented to the ED on 6/22 after family noted she was more lethargic than her baseline  On her admission, she was noted to be somnolent and initial CTH noted few scattered pontine and L supratentorial lesions suspicious for hemorrhagic metastases  Summary of clinical course:   BC 1/2 were positive for coag-negative staph, though this was thought to be a contaminant and infectious process has since been ruled out  Repeat BC x2 have been negative  She was noted to have hyponatremia as low as 126 that was being slowly corrected with NS and diet  Palliative care was consulted and has been involved in family updates and care planning  Family raised the option of PO chemotherapy agents as suggested by Pawan Lopez, however after several conversations with Pawan Lopez, it has been determined that she is no longer a candidate for these agents until she is reevaluated after her hospitalization  Today, family requested a meeting with the hospice liaison, who states she is not a candidate for [facility] currently, but will continue to follow and reevaluate  She is appropriate for transfer to [facility]       Recent or scheduled procedures:   None     Outstanding/pending diagnostics:   None     Cultures:   6/22 BC 1/2 coag-negative staph  6/26 BC 2/2 no growth x24H       Mobilization Plan:   Per mobilization protocol     Nutrition Plan:   Diet advanced    VTE Pharmacologic Prophylaxis: Pharmacologic VTE Prophylaxis contraindicated due to hemorrhagic lesions on CTH  VTE Mechanical Prophylaxis: sequential compression device      Initial Physical Therapy Recommendations: Per protocol  Initial Occupational Therapy Recommendations: NA  Initial /Plan: Case Management is intimately involved with family in the planning    Home medications that are not reordered and reason why:   Torsemide -- presumably volume depleted given poor PO intake, soft pressures       Spoke with Ryan regarding transfer  Please call [phone] with any questions or concerns  Portions of the record may have been created with voice recognition software  Occasional wrong word or "sound a like" substitutions may have occurred due to the inherent limitations of voice recognition software  Read the chart carefully and recognize, using context, where substitutions have occurred

## 2019-06-27 NOTE — PROGRESS NOTES
06/27/19 1100   Clinical Encounter Type   Visited With Family   Crisis Visit Critical Care   Referral From Physician   Patient Spiritual Encounters   Spiritual Encounter Notes Attended family meeting with patient's son, daughter and son in-law  Dr Ester Dance from the Palliative care service was conducting the meeting and the patient's bedside nurse was also in [address] [place] [address] Dr Dr Ester Dance gave the family a thorough medical update and review of recent tests that had been conducted  He stated that it was the opinion of the medical team that it was the patient's CA that was causing the elevated white blood cell count and not an underlying infection  The family asked questions and generally indicated that they understood the significance of the findings  Dr Ester Dance presented that we were at a crossroads where the patient is at this point hospice appropriate but that [facility] treatment prior to this hospital admission and since we had the medication could provide that to the patient in the hospital  Dr Ester Dance expressed that he was pessimistic as to its possibility of having success and that even if it provided a clinical benefit it will not provide a cure for the patient's CA  The family indicated that they would like to continue with the Chemo, despite the information provided stating that "if the information provided from my mother's case will help someone else by finding a cure it's worth it " The family also discussed the hope that their mother's organs could be donated to medical research  Dr Ester Dance indicated that we would know if she would be healthy enough to take the oral medication within a few days and if she would be able to tolerate the treatment within 48 to 72 hours of starting the medication   Family indicated that they understood that if she was not able to tolerate this treatment that we had nothing else to try and that we would have to address end of life care at that time

## 2019-06-28 LAB
BACTERIA BLD CULT: NORMAL
GLUCOSE SERPL-MCNC: 275 MG/DL (ref 65–140)
GLUCOSE SERPL-MCNC: 345 MG/DL (ref 65–140)
GLUCOSE SERPL-MCNC: 361 MG/DL (ref 65–140)
GLUCOSE SERPL-MCNC: 369 MG/DL (ref 65–140)

## 2019-06-28 PROCEDURE — 82948 REAGENT STRIP/BLOOD GLUCOSE: CPT

## 2019-06-28 PROCEDURE — 99232 SBSQ HOSP IP/OBS MODERATE 35: CPT | Performed by: FAMILY MEDICINE

## 2019-06-28 PROCEDURE — 99233 SBSQ HOSP IP/OBS HIGH 50: CPT | Performed by: INTERNAL MEDICINE

## 2019-06-28 RX ORDER — DEXAMETHASONE 4 MG/1
4 TABLET ORAL EVERY 12 HOURS SCHEDULED
Status: DISCONTINUED | OUTPATIENT
Start: 2019-06-28 | End: 2019-06-28

## 2019-06-28 RX ORDER — DEXAMETHASONE 4 MG/1
4 TABLET ORAL
Status: DISCONTINUED | OUTPATIENT
Start: 2019-06-29 | End: 2019-07-05 | Stop reason: HOSPADM

## 2019-06-28 RX ORDER — LIDOCAINE 40 MG/G
CREAM TOPICAL 4 TIMES DAILY PRN
Status: DISCONTINUED | OUTPATIENT
Start: 2019-06-28 | End: 2019-07-05 | Stop reason: HOSPADM

## 2019-06-28 RX ADMIN — OXYCODONE HYDROCHLORIDE 5 MG: 5 SOLUTION ORAL at 19:29

## 2019-06-28 RX ADMIN — LIDOCAINE 1 PATCH: 50 PATCH TOPICAL at 08:51

## 2019-06-28 RX ADMIN — INSULIN LISPRO 6 UNITS: 100 INJECTION, SOLUTION INTRAVENOUS; SUBCUTANEOUS at 13:03

## 2019-06-28 RX ADMIN — DEXAMETHASONE SODIUM PHOSPHATE 2 MG: 4 INJECTION, SOLUTION INTRAMUSCULAR; INTRAVENOUS at 15:08

## 2019-06-28 RX ADMIN — INSULIN LISPRO 4 UNITS: 100 INJECTION, SOLUTION INTRAVENOUS; SUBCUTANEOUS at 08:51

## 2019-06-28 RX ADMIN — INSULIN LISPRO 6 UNITS: 100 INJECTION, SOLUTION INTRAVENOUS; SUBCUTANEOUS at 22:05

## 2019-06-28 RX ADMIN — DEXAMETHASONE SODIUM PHOSPHATE 2 MG: 4 INJECTION, SOLUTION INTRAMUSCULAR; INTRAVENOUS at 08:51

## 2019-06-28 RX ADMIN — INSULIN LISPRO 5 UNITS: 100 INJECTION, SOLUTION INTRAVENOUS; SUBCUTANEOUS at 18:00

## 2019-06-28 NOTE — PROGRESS NOTES
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 ([facility] )    Personal history of breast cancer    Personal history of pancreatic cancer    Personal history of colon cancer, stage II    Localized edema    Sepsis ([facility] )    ANNE (acute kidney injury) ([place] [address] )    Anemia of acute infection    Leukocytosis    Ascites    Bacterial liver abscess    Bacteremia    Chronic diastolic CHF (congestive heart failure) ([place] [address] )    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    Monoallelic mutation of CHEK2 gene    Cavernous hemangioma of intracranial structure ([place] [address] )    S/P partial colectomy    Lymphadenopathy, retroperitoneal    Hip pain    Closed fracture of right hip (HCC)    Renal insufficiency    Edema    Elevated LFTs    Bilateral lower extremity edema    Acute blood loss anemia    Hypokalemia    Peritonitis (HCC)    Obstructive jaundice    Lymphadenopathy    Ovarian cancer (HCC)    CKD (chronic kidney disease), stage III (HCC)    Hypocalcemia    Hyponatremia    Increased anion gap metabolic acidosis    Multiple lesions of metastatic malignancy ([place] [address] )      Plan:  1  Symptom management - none identified by patient at this time  - dexamethasone 4mg PO BID   - oxyIR 5mg liquid PRN mod pain   - hydromorphone 0 5mg available if unable to PO    2  Goals - full cares, with limits   - Pt not entirely competent  Son leads family    - Pt will not be coded, as this is not ethical   Family have been informed of this limit  - Family have requested third opinion, with systemic and intrathecal chemo  We feel this is not indicated and unethical   She is far too medically unstable to support this plan of care  Family may have to coordinate transfer of care to another institution    At this time, [facility] qualifies chemo for this lady as non-beneficial treatment, and it will not be offered  Importantly, her primary oncology team at [facility] have advised us NOT to administer oral systemic chemotherapy  Code Status: DNR/DNI - Level 3   Decisional apparatus:  Patient is not competent on my exam today  If competence is lost, patient's substitute decision maker would default to diamond Kinsey by formal Eugenie Duffy  Advance Directive / Living Will / POLST:  Reviewed and inform our plan  Rolando Alvarado MD  Palliative and Supportive Care  Pager: [phone]         Interval history:   Since last visit, steroids seem to have improved pt's energy level and alertness  She is successfully taking all consistencies now, with less dysphagia symptoms, and was cleared by [facility] for full diet  Sugars are much dramatically higher now, since starting dex  After moving to medical floor and considering options overnight, the family has found a recommendation from a non-hospital based 'consultant' company, who has promised them systemic and intrathecal chemotherapy if the family will find a neurosurgeon to place an [place] reservoir, and if a hospital will supervise her chemotherapy  The family have made formal requests for pt's labs and data to be sent to this company, so named [employer]'  In our experience, provision of chemo to a patient in renal failure, with an ECOG of 4, with a bili >3, at Stage IV disease with brain mets and an underlying genetic predilection towards cancer is completely unethical, entirely without medical indication, and likely immediately dangerous to the patient  Dr Ashley Barahona and I agree that the patient has a terminal illness, and this has been identified numerous times to the family by myself personally, with a prognosis quoted to them of weeks to months  Numerous staff from the ICU and my team have recommended hospice to the family instead of hospital cares and chemo     At this time, we would categorize placement of an [place] reservoir and/or provision of systemic IV chemotherapy as non-beneficial treatment  It will not improve this patient's health, and places her at significant risk of harm, pain, and even death  We support Dr Cindi Robbins and the primary team in refusing to comply with the family's request to use [facility] resources for such unethical cares, which appear to be orchestrated by an external and unlicensed company, and arranged exclusively for profit, rather than patient benefits  MEDICATIONS / ALLERGIES:     all current active meds have been reviewed    Allergies   Allergen Reactions    Gadolinium Derivatives Anaphylaxis     MRI dye- hot flashes/ flushing    Iodine Anaphylaxis     ? ??SHORTNESS OF BREATH    Morphine Delerium     Contraindicated based on poor renal function    Acetaminophen     Acetaminophen-Codeine Edema    Aspirin Hives     RASH    Cephalexin      Annotation - [id number]: rash  Patient reported tolerance in the past during her May 2019 admission     Gadolinium     Iohexol     Nickel Hives    Penicillin G      Tolerated Ampicillin on May 2019 admission     Penicillins Hives     Tolerated Ampicillin on May 2019 admission  Tolerated Amoxicillin in past      Sulfa Antibiotics Hives    Sulfamethoxazole-Trimethoprim Diarrhea       OBJECTIVE:    Physical Exam  Physical Exam   Constitutional: No distress  Frail, chronically ill, arachnoid   HENT:   Head: Normocephalic and atraumatic  Right Ear: External ear normal    Left Ear: External ear normal    Mouth/Throat: No oropharyngeal exudate (membranes tacky)  Eyes: Right eye exhibits no discharge  Left eye exhibits no discharge  Does not open eyes during encounter  Neck: No tracheal deviation present  Cardiovascular: Regular rhythm  Pulmonary/Chest: No stridor  No respiratory distress  Abdominal: Soft  She exhibits no distension  distended   Musculoskeletal: She exhibits no edema     Neurological: Not alert, not interactive  Facies generally symmetric  Skin: Skin is warm and dry  No rash noted  She is not diaphoretic  No erythema  There is pallor  Jaundiced, wan   Psychiatric:   Cannot participate in exam today  Lab Results: I have personally reviewed pertinent labs  Elevated sugars are noted  Imaging Studies: none new today  EKG, Pathology, and Other Studies: none new today    Counseling / Coordination of Care  Total floor / unit time spent today 25+ minutes  Greater than 50% of total time was spent with the patient and / or family counseling and / or coordination of care   A description of the counseling / coordination of care: consideration of hospice; review and assessment of decisional apparatus and capacity, applicable legal codes and extant documents; chart review

## 2019-06-28 NOTE — PROGRESS NOTES
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 ([facility] )    Personal history of breast cancer    Personal history of pancreatic cancer    Personal history of colon cancer, stage II    Localized edema    Sepsis ([facility] )    ANNE (acute kidney injury) ([place] [address] )    Anemia of acute infection    Leukocytosis    Ascites    Bacterial liver abscess    Bacteremia    Chronic diastolic CHF (congestive heart failure) ([place] [address] )    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    Monoallelic mutation of CHEK2 gene    Cavernous hemangioma of intracranial structure ([place] [address] )    S/P partial colectomy    Lymphadenopathy, retroperitoneal    Hip pain    Closed fracture of right hip (HCC)    Renal insufficiency    Edema    Elevated LFTs    Bilateral lower extremity edema    Acute blood loss anemia    Hypokalemia    Peritonitis (HCC)    Obstructive jaundice    Lymphadenopathy    Ovarian cancer (HCC)    CKD (chronic kidney disease), stage III (HCC)    Hypocalcemia    Hyponatremia    Increased anion gap metabolic acidosis    Multiple lesions of metastatic malignancy ([place] [address] )      Plan:  1  Symptom management - none identified at this time  - rotated opioids from renally cleared substances    2  Goals - full cares, with limits   - Pt not entirely competent  Son leads family    - Pt will not be coded, as this is not ethical   Family have been informed  - Family have requested treatment with chemo available on hand  Defer this decision to ICU team, who coordinates with [facility]  Code Status: DNR/DNI - Level 3   Decisional apparatus:  Patient is not competent on my exam today  If competence is lost, patient's substitute decision maker would default to son Bradford Martinez by formal Jacklyn Santiago     Advance Directive / Living Will / POLST: Reviewed and inform our plan  Naldo Monzon MD  Palliative and Supportive Care  Pager: [phone]         Interval history:   Met again with several family, including Major Samuels and sister Ly Damico  Reviewed that imaging, labs, and all available clinical data point to recurrent cancer from pancreas to brain  Reiterated that all traditional chemotherapies have been insufficient to control her disease, and that use of untested agent Myna Eastern is not evidence-based, and is not likely to help her  Further identified that this drug is not likely to cross blood-brain-barrier, even should her disease respond, and so is unlikely to improve her symptomatology  Hospice is offered, with the clear expectation that chemo will not be supported on hospice  Hospital stay is offered, with the caveat that oral chemo might be given so long as she is able to swallow pills  Offered steroids as a palliative measure, which family are willing to accept, now that infectious risks have been effectively ruled out  MEDICATIONS / ALLERGIES:     all current active meds have been reviewed    Allergies   Allergen Reactions    Gadolinium Derivatives Anaphylaxis     MRI dye- hot flashes/ flushing    Iodine Anaphylaxis     ? ??SHORTNESS OF BREATH    Morphine Delerium     Contraindicated based on poor renal function    Acetaminophen     Acetaminophen-Codeine Edema    Aspirin Hives     RASH    Cephalexin      Annotation - [id number]: rash  Patient reported tolerance in the past during her May 2019 admission     Gadolinium     Iohexol     Nickel Hives    Penicillin G      Tolerated Ampicillin on May 2019 admission     Penicillins Hives     Tolerated Ampicillin on May 2019 admission  Tolerated Amoxicillin in past      Sulfa Antibiotics Hives    Sulfamethoxazole-Trimethoprim Diarrhea       OBJECTIVE:    Physical Exam  Physical Exam   Constitutional: No distress     Arachnoid habitus   HENT:   Head: Normocephalic and atraumatic  Right Ear: External ear normal    Left Ear: External ear normal    Mouth/Throat: No oropharyngeal exudate (membranes tacky)  Eyes: Right eye exhibits no discharge  Left eye exhibits no discharge  Does not open eyes during encounter  Neck: No tracheal deviation present  Pulmonary/Chest: No stridor  No respiratory distress  Abdominal: Soft  She exhibits no distension  Musculoskeletal: She exhibits no edema  Neurological:   Not alert, not interactive  Facies generally symmetric  Skin: Skin is warm and dry  No rash noted  She is not diaphoretic  No erythema  There is pallor  jaundiced   Psychiatric:   Cannot participate in exam today  Lab Results: I have personally reviewed pertinent labs  None new today  Imaging Studies: none new today  EKG, Pathology, and Other Studies: none new today    Counseling / Coordination of Care  Total floor / unit time spent today 75+ minutes  Greater than 50% of total time was spent with the patient and / or family counseling and / or coordination of care   A description of the counseling / coordination of care: extensive chart reivew; multiple family mtgs on the floor, from [phone]; consideration of hospice

## 2019-06-28 NOTE — PLAN OF CARE
Problem: Potential for Falls  Goal: Patient will remain free of falls  Description  INTERVENTIONS:  - Assess patient frequently for physical needs  -  Identify cognitive and physical deficits and behaviors that affect risk of falls  -  [place] fall precautions as indicated by assessment   - Educate patient/family on patient safety including physical limitations  - Instruct patient to call for assistance with activity based on assessment  - Modify environment to reduce risk of injury  - Consider OT/PT consult to assist with strengthening/mobility  Outcome: Progressing     Problem: Prexisting or High Potential for Compromised Skin Integrity  Goal: Skin integrity is maintained or improved  Description  INTERVENTIONS:  - Identify patients at risk for skin breakdown  - Assess and monitor skin integrity  - Assess and monitor nutrition and hydration status  - Monitor labs (i e  albumin)  - Assess for incontinence   - Turn and reposition patient  - Assist with mobility/ambulation  - Relieve pressure over bony prominences  - Avoid friction and shearing  - Provide appropriate hygiene as needed including keeping skin clean and dry  - Evaluate need for skin moisturizer/barrier cream  - Collaborate with interdisciplinary team (i e  Nutrition, Rehabilitation, etc )   - Patient/family teaching  Outcome: Progressing     Problem: NEUROSENSORY - ADULT  Goal: Achieves stable or improved neurological status  Description  INTERVENTIONS  - Monitor and report changes in neurological status  - Initiate measures to prevent increased intracranial pressure  - Maintain blood pressure and fluid volume within ordered parameters to optimize cerebral perfusion  - Monitor temperature, glucose, and sodium or any other associated labs   Initiate appropriate interventions as ordered  - Monitor for seizure activity   - Administer anti-seizure medications as ordered  Outcome: Progressing     Problem: RESPIRATORY - ADULT  Goal: Achieves optimal ventilation and oxygenation  Description  INTERVENTIONS:  - Assess for changes in respiratory status  - Assess for changes in mentation and behavior  - Position to facilitate oxygenation and minimize respiratory effort  - Oxygen administration by appropriate delivery method based on oxygen saturation (per order) or ABGs  - Initiate smoking cessation education as indicated  - Encourage broncho-pulmonary hygiene including cough, deep breathe, Incentive Spirometry  - Assess the need for suctioning and aspirate as needed  - Assess and instruct to report SOB or any respiratory difficulty  - Respiratory Therapy support as indicated  Outcome: Progressing     Problem: GASTROINTESTINAL - ADULT  Goal: Maintains adequate nutritional intake  Description  INTERVENTIONS:  - Monitor percentage of each meal consumed  - Identify factors contributing to decreased intake, treat as appropriate  - Assist with meals as needed  - Monitor I&O, WT and lab values  - Obtain nutrition services referral as needed  Outcome: Progressing     Problem: GENITOURINARY - ADULT  Goal: Maintains or returns to baseline urinary function  Description  INTERVENTIONS:  - Assess urinary function  - Encourage oral fluids to ensure adequate hydration  - Administer IV fluids as ordered to ensure adequate hydration  - Administer ordered medications as needed  - Offer frequent toileting  - Follow urinary retention protocol if ordered  Outcome: Progressing     Problem: METABOLIC, FLUID AND ELECTROLYTES - ADULT  Goal: Electrolytes maintained within normal limits  Description  INTERVENTIONS:  - Monitor labs and assess patient for signs and symptoms of electrolyte imbalances  - Administer electrolyte replacement as ordered  - Monitor response to electrolyte replacements, including repeat lab results as appropriate  - Instruct patient on fluid and nutrition as appropriate  Outcome: Progressing  Goal: Fluid balance maintained  Description  INTERVENTIONS:  - Monitor labs and assess for signs and symptoms of volume excess or deficit  - Monitor I/O and WT  - Instruct patient on fluid and nutrition as appropriate  Outcome: Progressing  Goal: Glucose maintained within target range  Description  INTERVENTIONS:  - Monitor Blood Glucose as ordered  - Assess for signs and symptoms of hyperglycemia and hypoglycemia  - Administer ordered medications to maintain glucose within target range  - Assess nutritional intake and initiate nutrition service referral as needed  Outcome: Progressing     Problem: SKIN/TISSUE INTEGRITY - ADULT  Goal: Skin integrity remains intact  Description  INTERVENTIONS  - Identify patients at risk for skin breakdown  - Assess and monitor skin integrity  - Assess and monitor nutrition and hydration status  - Monitor labs (i e  albumin)  - Assess for incontinence   - Turn and reposition patient  - Assist with mobility/ambulation  - Relieve pressure over bony prominences  - Avoid friction and shearing  - Provide appropriate hygiene as needed including keeping skin clean and dry  - Evaluate need for skin moisturizer/barrier cream  - Collaborate with interdisciplinary team (i e  Nutrition, Rehabilitation, etc )   - Patient/family teaching  Outcome: Progressing  Goal: Incision(s), wounds(s) or drain site(s) healing without S/S of infection  Description  INTERVENTIONS  - Assess and document risk factors for skin impairment   - Assess and document dressing, incision, wound bed, drain sites and surrounding tissue  - Initiate Nutrition services consult and/or wound management as needed  Outcome: Progressing     Problem: COPING  Goal: Pt/Family able to verbalize concerns and demonstrate effective coping strategies  Description  INTERVENTIONS:  - Assist patient/family to identify coping skills, available support systems and cultural and spiritual values  - Provide emotional support, including active listening and acknowledgement of concerns of patient and caregivers  - Reduce environmental stimuli, as able  - Provide patient education  - Assess for spiritual pain/suffering and initiate spiritual care, including notification of Pastoral Care or helen based community as needed  - Assess effectiveness of coping strategies  Outcome: Progressing     Problem: DEATH & DYING  Goal: Pt/Family communicate acceptance of impending death and expresses psychological comfort and peace  Description  INTERVENTIONS:  - Assess patient/family anxiety and grief process related to end of life issues  - Provide emotional, spiritual and psychosocial support  - Provide information about the patient's health status with consideration of family and cultural values  - Communicate willingness to discuss death and facilitate grief process  with patient/family as appropriate  - Emphasize sustaining relationships within family system and community, or helen/spiritual traditions  - Initiate Spiritual Care, Pastoral care or other ancillary consults as needed  - Refer to community support groups as appropriate   Outcome: Progressing     Problem: DECISION MAKING  Goal: Pt/Family able to effectively weigh alternatives and participate in decision making related to treatment and care  Description  INTERVENTIONS:  - Identify decision maker  - Determine when there are differences among patient's view, family's view, and healthcare provider's view of patient condition and care goals  - Facilitate patient/family articulation of goals for care  - Help patient/family identify pros/cons of alternative solutions  - Provide information as requested by patient/family  - Respect patient/family rights related to privacy and sharing information   - Serve as a liaison between patient, family and health care team  - Initiate consults as appropriate (Ethics Team, Palliative Care, Family Care Conference, etc )  Outcome: Progressing     Problem: SPIRITUAL CARE  Goal: Pt/Family able to move forward in process of forgiving self, others and/or higher power  Description  INTERVENTIONS:  - Assist patient with any spiritual needs/requests such as communion, confession, anointing, etc  - Explore guilt and help patient/family identify possible spiritual/cultural beliefs and values  - Explore possibilities of making amends & reconciliation with self, others, and/or a greater power  - Guide patient/family in identifying painful feelings  - Help patient explore and identify spiritual beliefs, cultural understandings or values that may help or hinder letting go of issue  - Help patient explore feelings of anger, bitterness, resentment, anxiety   Help patient/family identify and examine the situation in which these feelings are experienced  - Help patient/family identify destructive displacement of feelings onto other individuals  - Refer patient to formal counseling and/or to helen community for further support as needed or per request  Outcome: Progressing  Goal: Patient feels balance and connection with others and/or higher power that empowers the self during times of loss, guilt and fear  Description  INTERVENTIONS:  - Create safety for patient through empathic presence and non-judgmental listening  - Encourage patient to explore his/her values, beliefs and/or spiritual images and practices  - Encourage use of breath work, imagery, meditation, relaxation, reiki to ease distress and provide healing  - Encourage use of cultural and spiritual celebrations and rituals  - Facilitate discussion that helps patient sort out spiritual concerns  - Help patient identify where meaning/hope/comfort & strength are in his/her life  - Refer patient to helen community for assistance, as appropriate  - Respond to patient/family need for prayer/ritual/sacrament/ceremony  Outcome: Progressing     Problem: Nutrition/Hydration-ADULT  Goal: Nutrient/Hydration intake appropriate for improving, restoring or maintaining nutritional needs  Description  Monitor and assess patient's nutrition/hydration status for malnutrition (ex- brittle hair, bruises, dry skin, pale skin and conjunctiva, muscle wasting, smooth red tongue, and disorientation)  Collaborate with interdisciplinary team and initiate plan and interventions as ordered  Monitor patient's weight and dietary intake as ordered or per policy  Utilize nutrition screening tool and intervene per policy  Determine patient's food preferences and provide high-protein, high-caloric foods as appropriate       INTERVENTIONS:  - Monitor oral intake, urinary output, labs, and treatment plans  - Assess nutrition and hydration status and recommend course of action  - Evaluate amount of meals eaten  - Assist patient with eating if necessary   - Allow adequate time for meals  - Recommend/ encourage appropriate diets, oral nutritional supplements, and vitamin/mineral supplements  - Order, calculate, and assess calorie counts as needed  - Recommend, monitor, and adjust tube feedings and TPN/PPN based on assessed needs  - Assess need for intravenous fluids  - Provide specific nutrition/hydration education as appropriate  - Include patient/family/caregiver in decisions related to nutrition  Outcome: Progressing

## 2019-06-28 NOTE — PROGRESS NOTES
Progress Note - Malcom Plaza 75 y o  female MRN: [id number]    Unit/Bed#: E5 -01 Encounter: [id number]      Assessment/Plan:  1-acute kidney injury superimposed on chronic kidney disease stage 3:  Patient's history of chronic kidney disease stage 3 with baseline creatinine 1 0-1 2    -status post IV fluid    2-stage IV pancreatic cancer:  Patient follows at [facility] and had previously been on chemotherapy  She had a prior Whipple procedure  Patient's malignancy has progressed  She has a probable new renal malignancy  On her recent admission 05/2009 diet she had EUS with positive biopsies    -Patient's care was collaborated by the ICU team with her [facility] oncologist who determined that there was not any future chemotherapy that would be beneficial or indicated  Palliative care is recommended, especially of light of her progressive disease    -patient and her family notes that they do not wish hospice or comfort measures  They wish aggressive therapy and are investigating a non hospital related company for a 2nd opinion regarding intrathecal and systemic chemotherapy  3-possible hemorrhagic metastatic brain lesions:  Possible, noted on CT scan  Would need MRI with IV contrast for confirmation  Test postponed due to acute kidney injury    4-diabetes type 2:  Secondary to pancreatectomy:  Continue Accu-Cheks and sliding scale insulin    5-benign hypertension:  Patient has a prior history of benign hypertension  She is currently normotensive on no medication  Monitor    6-chronic diastolic congestive heart failure:  Patient's most recent echocardiogram revealed a left ventricular ejection fraction of 60% and grade 1 diastolic dysfunction  Patient is on torsemide previously  Held for acute kidney injury    7-pancreatic insufficiency:  With chronic diarrhea:  Patient not tolerating p o    Restart pancrelipase    8-history of Enterococcus peritonitis:  Patient was previously admitted for enterococcal peritonitis and hepatic abscesses requiring drains  Drain still in place  Followed by IR  Completed antibiotic course    9-history of right hip fracture and fall:  Supportive measures    10-family:  Updated patient, and her son Macey Avery at the bedside and answered all questions  Patient also gave permission for information be given to her sister at the bedside  Patient and her son requests information being given to their [facility] with whom they are investigating a 2nd opinion for chemotherapy  VTE Pharmacologic Prophylaxis: Enoxaparin (Lovenox)  VTE Mechanical Prophylaxis: sequential compression device    Certification Statement: The patient will continue to require additional inpatient hospital stay due to need for further acute intervention for acute kidney injury    Status: i inpatient    Total time spent today including interview, exam, review of chart, discussion with patient and her family:  50 minutes    ==================================================================  Subjective:  Pt denies any pain at this time  Notes pain only with being turned  When she is being turned she notes pain at the drain insertion sites  Notes pain meds helping her pain  Denies any pain anywhere else  Denies any Ha, Cp, back pain or other abd pain  Denies any dizziness, lightheadedness  Tolerating po  Denies sob    Pt notes she does not wish hospice  She wishes full medical therapy and chemotherapy  Her son notes they are pursuing a second opinion through a [address] who recommend a neurosurgeon to place an intrathecal chemo port  Patient relates she does wish lab draws and IVs placed, however wishes the Lidoderm cream prior to any attempts    Physical Exam:   Temp:  [97 5 °F (36 4 °C)-99 °F (37 2 °C)] 97 5 °F (36 4 °C)  HR:  [57-89] 60  Resp:  [18] 18  BP: (112-126)/(52-63) 126/63    Gen:  Pleasant, non-tachypnic, non-dyspnic  Conversant    Son and sister at the bedside, with patient's permission  Heart: regular rate and rhythm, S1S2 present, no murmur, rub or gallop  Lungs: clear to ausculatation bilaterally  No wheezing, crackles, or rhonchi  No accessory muscle use or respiratory distress  Abd: soft, non-tender with palpation, +distended  NABS, no guarding, rebound or peritoneal signs  Extremities: no clubbing, cyanosis  2+ bilateral lower extremity edema     2+pedal pulses bilaterally  Neuro: awake, alert  Fluent speech  Skin: warm and dry: no petechiae, purpura and rash  LABS:   Results from last 7 days   Lab Units 06/26/19  0510 06/24/19  0549 06/22/19  1455   WBC Thousand/uL 30 48* 28 15* 24 04*   HEMOGLOBIN g/dL 9 8* 9 0* 9 3*   HEMATOCRIT % 30 1* 28 3* 29 6*   PLATELETS Thousands/uL 326 324 276     Results from last 7 days   Lab Units 06/26/19  0510 06/25/19  1734 06/24/19  0549   POTASSIUM mmol/L 4 4 4 3 4 8   CHLORIDE mmol/L 101 100 97*   CO2 mmol/L 15* 16* 14*   BUN mg/dL 89* 91* 86*   CREATININE mg/dL 1 71* 1 66* 1 92*   CALCIUM mg/dL 8 0* 8 1* 8 3       Hospital Data:  6/25:  Right upper quadrant ultrasound:  Markedly limited examination demonstrating appears to represent 2 6 cm periportal right hepatic lobe metastatic lesion, as well as mild central intrahepatic biliary dilatation  Moderate volume of complex ascites  Cross-sectional imaging would likely   provide for more comprehensive evaluation if clinically warranted  6/25:  CT brain:   Stable foci of hyperattenuation within the left cerebral hemisphere and the brainstem  While metastatic disease is a consideration, given the history of multiple malignancies, the lack of associated edema favors an alternative consideration such as   cavernous angiomas  Nonemergent MR evaluation could be utilized for confirmation  6/22:  CT brain without contrast    few scattered pontine and left supratentorial lesions suspicious for hemorrhagic metastases    An enhanced MRI of the brain is recommended for further evaluation    No mass effect or midline shift    Mild chronic small vessel ischemic changes and volume loss  6/26:  Blood culture:  Negative x2  6/22:  Blood culture 1/2 coag-negative staph  ---------------------------------------------------------------------------------------------------------------  This note has been constructed using a voice recognition system

## 2019-06-28 NOTE — PROGRESS NOTES
06/28/19 1400   Clinical Encounter Type   Visited With Patient and family together   Routine Visit Follow-up   Referral From Physician   Patient Spiritual Encounters   Spiritual Encounter Notes Visited with patient and her family now that she has been transferred to the Med/Surg unit  Patient's daughter spoke of her new plan to head home this afternoon so she can speak with her employer about a requested leave of absence and then return next week  Family also advised that their mother's  visited last night   oriented family to the availability of additional pastoral support from the Pastoral Care Dept  and offered continued comfort and support

## 2019-06-28 NOTE — SPEECH THERAPY NOTE
Speech Language/Pathology  Pt seen briefly at lunch time  , Taking small amts of PO  Son bought in food for her  Will sign off  Please reconsult if signif change in status or GOC change

## 2019-06-28 NOTE — PROGRESS NOTES
I have read the previous nurses's assessment and re-assessed the patient  I agree with the previous shifts report

## 2019-06-29 LAB
GLUCOSE SERPL-MCNC: 317 MG/DL (ref 65–140)
GLUCOSE SERPL-MCNC: 337 MG/DL (ref 65–140)
GLUCOSE SERPL-MCNC: 378 MG/DL (ref 65–140)
GLUCOSE SERPL-MCNC: 383 MG/DL (ref 65–140)

## 2019-06-29 PROCEDURE — 82948 REAGENT STRIP/BLOOD GLUCOSE: CPT

## 2019-06-29 PROCEDURE — 99232 SBSQ HOSP IP/OBS MODERATE 35: CPT | Performed by: INTERNAL MEDICINE

## 2019-06-29 RX ORDER — INSULIN GLARGINE 100 [IU]/ML
10 INJECTION, SOLUTION SUBCUTANEOUS
Status: DISCONTINUED | OUTPATIENT
Start: 2019-06-29 | End: 2019-06-30

## 2019-06-29 RX ADMIN — PANCRELIPASE 48000 UNITS: 24000; 76000; 120000 CAPSULE, DELAYED RELEASE PELLETS ORAL at 12:22

## 2019-06-29 RX ADMIN — INSULIN LISPRO 5 UNITS: 100 INJECTION, SOLUTION INTRAVENOUS; SUBCUTANEOUS at 09:45

## 2019-06-29 RX ADMIN — PANCRELIPASE 48000 UNITS: 24000; 76000; 120000 CAPSULE, DELAYED RELEASE PELLETS ORAL at 17:09

## 2019-06-29 RX ADMIN — ENOXAPARIN SODIUM 30 MG: 30 INJECTION SUBCUTANEOUS at 17:09

## 2019-06-29 RX ADMIN — INSULIN LISPRO 5 UNITS: 100 INJECTION, SOLUTION INTRAVENOUS; SUBCUTANEOUS at 17:09

## 2019-06-29 RX ADMIN — INSULIN LISPRO 6 UNITS: 100 INJECTION, SOLUTION INTRAVENOUS; SUBCUTANEOUS at 21:25

## 2019-06-29 RX ADMIN — PANCRELIPASE 48000 UNITS: 24000; 76000; 120000 CAPSULE, DELAYED RELEASE PELLETS ORAL at 09:44

## 2019-06-29 RX ADMIN — DEXAMETHASONE 4 MG: 4 TABLET ORAL at 09:44

## 2019-06-29 RX ADMIN — INSULIN GLARGINE 10 UNITS: 100 INJECTION, SOLUTION SUBCUTANEOUS at 21:25

## 2019-06-29 RX ADMIN — INSULIN LISPRO 5 UNITS: 100 INJECTION, SOLUTION INTRAVENOUS; SUBCUTANEOUS at 12:23

## 2019-06-29 RX ADMIN — DEXAMETHASONE 4 MG: 4 TABLET ORAL at 13:24

## 2019-06-29 RX ADMIN — INSULIN LISPRO 6 UNITS: 100 INJECTION, SOLUTION INTRAVENOUS; SUBCUTANEOUS at 17:08

## 2019-06-29 NOTE — PROGRESS NOTES
Progress Note - Layne Ibarra 75 y o  female MRN: [id number]    Unit/Bed#: E5 -01 Encounter: [id number]      Assessment/Plan:  1-acute kidney injury superimposed on chronic kidney disease stage 3:  Patient's history of chronic kidney disease stage 3 with baseline creatinine 1 0-1 2    -patient presented with acute kidney injury with a creatinine of 1 57 which peaked at 1 9               -patient was placed on IV fluids with improvement in her creatinine of 1 71 when most recently checked on 06/29:     -Labs pending from today      2-stage IV pancreatic cancer:  Patient follows at [facility] and had previously been on chemotherapy  She had a prior Whipple procedure  Patient's malignancy has progressed  She has a probable new renal malignancy, as well as a 2 5 cm right hepatic lobe mass  On her recent admission 05/2009 diet she had EUS with positive biopsies  Patient's CT scan of the brain was concerning for possible hemorrhagic metastatic lesions as well, although MRI confirmation was recommended               -Patient's care was collaborated by the ICU team with her Pawan Lopez oncologist who determined that there was no future chemotherapy that would be beneficial or indicated  Palliative care was recommended, especially of light of her progressive disease               -patient and her family note that they do not wish hospice or comfort measures  They wish aggressive therapy and are investigating a private non-health system affiliated company for a 2nd opinion regarding intrathecal and systemic chemotherapy  The have requested records be sent  -It was previously noted by Pawan Lopez if her bili remains elevated, chemo would be precluded      3-possible hemorrhagic metastatic brain lesions:  Possible, noted on CT scan  Would need MRI with IV contrast for confirmation    Contrasted test postponed due to acute kidney injury     4-diabetes type 2:  Secondary to pancreatectomy:  Continue Accu-Cheks and sliding scale insulin   -patient had previously been on Lantus  She was discharged on sliding scale insulin alone    -current blood sugars elevated in the 300 yesterday and today, likely secondary to the dexamethasone:     -Will start Lantus 10 units q h s     Will add pre meal NovoLog  Continue sliding scale     5-benign hypertension:  Patient has a prior history of benign hypertension  She is currently normotensive on no medication      -blood pressure today currently ranging a systolic of 597-045B     6-chronic diastolic congestive heart failure:  Patient's most recent echocardiogram revealed a left ventricular ejection fraction of 60% and grade 1 diastolic dysfunction  Patient is on torsemide previously  Held for acute kidney injury     7-pancreatic insufficiency:  With chronic diarrhea:  Patient now tolerating p o , and was restarted on pancrelipase  Patient has a history of chronic diarrhea, however not currently having diarrhea      8-history of Enterococcus peritonitis:  Patient was previously admitted for enterococcal peritonitis and hepatic abscesses requiring drains  Drain still in place  Followed by IR  Completed antibiotic course     9-history of right hip fracture and fall:  Supportive measures    10-recurrent ascites:  Requiring intermittent paracentesis:  Will consult IR for paracentesis      VTE Pharmacologic Prophylaxis: Enoxaparin (Lovenox)-will start, as patient does not wish hospice  VTE Mechanical Prophylaxis: sequential compression device    Certification Statement: The patient will continue to require additional inpatient hospital stay due to need for further acute intervention for ascites requiring paracentesis    Status: inpatient     Unable to get Lab/IVs:  Will need PICC line    ===================================================================    Subjective:  Patient complains of pain in her right upper quadrant at the site of her drains    She request pain medication, which she notes helps her pain  She also complains of sores on her tongue  She denies any pain anywhere else  She denies any shortness of breath or cough  She denies any nausea or vomiting  She denies any diarrhea and notes she feels constipated  She is not certain if her abdomen has become more distended  Physical Exam:   Temp:  [97 5 °F (36 4 °C)] 97 5 °F (36 4 °C)  HR:  [60-69] 63  Resp:  [18] 18  BP: (122-138)/(63-75) 122/75    Gen:  Pleasant, non-tachypnic, non-dyspnic  Conversant  Heart: regular rate and rhythm, S1S2 present, no murmur, rub or gallop  Lungs: clear to ausculatation bilaterally  No wheezing, crackles, or rhonchi  No accessory muscle use or respiratory distress  Abd: soft, non-tender with palpation, + distended  NABS, no guarding, rebound or peritoneal signs  Extremities: no clubbing, cyanosis  1+ bilateral lower extremity edema     2+pedal pulses bilaterally  Neuro: awake, alert    Fluent speech      LABS:   Results from last 7 days   Lab Units 06/26/19  0510 06/24/19  0549 06/22/19  1455   WBC Thousand/uL 30 48* 28 15* 24 04*   HEMOGLOBIN g/dL 9 8* 9 0* 9 3*   HEMATOCRIT % 30 1* 28 3* 29 6*   PLATELETS Thousands/uL 326 324 276     Results from last 7 days   Lab Units 06/26/19  0510 06/25/19  1734 06/24/19  0549   POTASSIUM mmol/L 4 4 4 3 4 8   CHLORIDE mmol/L 101 100 97*   CO2 mmol/L 15* 16* 14*   BUN mg/dL 89* 91* 86*   CREATININE mg/dL 1 71* 1 66* 1 92*   CALCIUM mg/dL 8 0* 8 1* 8 3       Hospital Data:  6/25:  Right upper quadrant ultrasound:  Markedly limited examination demonstrating appears to represent 2 6 cm periportal right hepatic lobe metastatic lesion, as well as mild central intrahepatic biliary dilatation   Moderate volume of complex ascites   Cross-sectional imaging would likely   provide for more comprehensive evaluation if clinically warranted      6/25:  CT brain:   Stable foci of hyperattenuation within the left cerebral hemisphere and the brainstem   While metastatic disease is a consideration, given the history of multiple malignancies, the lack of associated edema favors an alternative consideration such as   cavernous angiomas  Nonemergent MR evaluation could be utilized for confirmation      6/22:  CT brain without contrast    few scattered pontine and left supratentorial lesions suspicious for hemorrhagic metastases   An enhanced MRI of the brain is recommended for further evaluation    No mass effect or midline shift    Mild chronic small vessel ischemic changes and volume loss      6/26:  Blood culture:  Negative x2  6/22:  Blood culture 1/2 coag-negative staph          ---------------------------------------------------------------------------------------------------------------  This note has been constructed using a voice recognition system

## 2019-06-29 NOTE — PROGRESS NOTES
ICU nurse came to unit with ultrasound to attempt IV placement  Attempt was unsuccessful  Will pass along to morning shift to see what next steps could be

## 2019-06-30 LAB
GLUCOSE SERPL-MCNC: 309 MG/DL (ref 65–140)
GLUCOSE SERPL-MCNC: 335 MG/DL (ref 65–140)
GLUCOSE SERPL-MCNC: 337 MG/DL (ref 65–140)
GLUCOSE SERPL-MCNC: 357 MG/DL (ref 65–140)

## 2019-06-30 PROCEDURE — 82948 REAGENT STRIP/BLOOD GLUCOSE: CPT

## 2019-06-30 PROCEDURE — 99232 SBSQ HOSP IP/OBS MODERATE 35: CPT | Performed by: INTERNAL MEDICINE

## 2019-06-30 RX ORDER — OXYCODONE HYDROCHLORIDE 5 MG/1
5 TABLET ORAL EVERY 4 HOURS PRN
Status: DISCONTINUED | OUTPATIENT
Start: 2019-06-30 | End: 2019-07-05 | Stop reason: HOSPADM

## 2019-06-30 RX ORDER — INSULIN GLARGINE 100 [IU]/ML
14 INJECTION, SOLUTION SUBCUTANEOUS
Status: DISCONTINUED | OUTPATIENT
Start: 2019-06-30 | End: 2019-07-02

## 2019-06-30 RX ADMIN — DEXAMETHASONE 4 MG: 4 TABLET ORAL at 08:57

## 2019-06-30 RX ADMIN — INSULIN LISPRO 6 UNITS: 100 INJECTION, SOLUTION INTRAVENOUS; SUBCUTANEOUS at 07:53

## 2019-06-30 RX ADMIN — INSULIN LISPRO 5 UNITS: 100 INJECTION, SOLUTION INTRAVENOUS; SUBCUTANEOUS at 17:58

## 2019-06-30 RX ADMIN — PANCRELIPASE 48000 UNITS: 24000; 76000; 120000 CAPSULE, DELAYED RELEASE PELLETS ORAL at 07:55

## 2019-06-30 RX ADMIN — INSULIN GLARGINE 14 UNITS: 100 INJECTION, SOLUTION SUBCUTANEOUS at 21:12

## 2019-06-30 RX ADMIN — OXYCODONE HYDROCHLORIDE 5 MG: 5 TABLET ORAL at 20:03

## 2019-06-30 RX ADMIN — INSULIN LISPRO 5 UNITS: 100 INJECTION, SOLUTION INTRAVENOUS; SUBCUTANEOUS at 07:54

## 2019-06-30 RX ADMIN — ENOXAPARIN SODIUM 30 MG: 30 INJECTION SUBCUTANEOUS at 08:57

## 2019-06-30 RX ADMIN — INSULIN LISPRO 5 UNITS: 100 INJECTION, SOLUTION INTRAVENOUS; SUBCUTANEOUS at 11:53

## 2019-06-30 RX ADMIN — DEXAMETHASONE 4 MG: 4 TABLET ORAL at 17:59

## 2019-06-30 RX ADMIN — PANCRELIPASE 48000 UNITS: 24000; 76000; 120000 CAPSULE, DELAYED RELEASE PELLETS ORAL at 17:55

## 2019-06-30 RX ADMIN — PANCRELIPASE 48000 UNITS: 24000; 76000; 120000 CAPSULE, DELAYED RELEASE PELLETS ORAL at 11:51

## 2019-06-30 RX ADMIN — INSULIN LISPRO 4 UNITS: 100 INJECTION, SOLUTION INTRAVENOUS; SUBCUTANEOUS at 21:12

## 2019-06-30 RX ADMIN — LIDOCAINE HYDROCHLORIDE 10 ML: 20 SOLUTION ORAL; TOPICAL at 21:12

## 2019-06-30 NOTE — PROGRESS NOTES
Progress Note - Jen Peraza 75 y o  female MRN: [id number]    Unit/Bed#: E5 -01 Encounter: [id number]      Assessment/Plan:  1-acute kidney injury superimposed on chronic kidney disease stage 3:  Patient's history of chronic kidney disease stage 3 with baseline creatinine 1 0-1 2               -patient presented with acute kidney injury with a creatinine of 1 57 which peaked at 1 9               -ETIXJCF was placed on IV fluids with improvement in her creatinine of 1 71 when most recently checked on 06/29:                -have been unable to get labs despite multiple attempts  Labs were initially discontinued as patient had elected hospice measures  Patient and her son currently wish aggressive medical therapy, lab work, interventions, and chemotherapy  -PICC line request placed to obtain lab work, as multiple attempts by the floor and ICU nurses, with ultrasound guidance, have been unable to obtain any lab work     2-stage IV pancreatic cancer:  Patient follows at [facility] and had previously been on chemotherapy   She had a prior Whipple procedure   Patient's malignancy has progressed  Rosam Aria [place] has a probable new renal malignancy, as well as a 2 5 cm right hepatic lobe mass     On her recent admission 05/2019t she had EUS with positive biopsies  Patient's CT scan of the brain was concerning for possible hemorrhagic metastatic lesions as well, although MRI confirmation was recommended               -Patient's care was collaborated by the ICU team with her [facility] oncologist who determined that there was no future chemotherapy that would be beneficial or indicated   Palliative care was recommended, especially of light of her progressive disease               -CDHSYFF and her family note that they do not wish hospice or comfort measures  They wish aggressive therapy and are investigating a private non-health system affiliated company for a 2nd opinion regarding intrathecal and systemic chemotherapy    The have requested records be sent  -It was previously noted by Thierno Harper if her bili remains elevated, chemo would be precluded      3-possible hemorrhagic metastatic brain lesions:  Possible, noted on CT scan   Would need MRI with IV contrast for confirmation  Contrasted test postponed due to acute kidney injury     4-diabetes type 2:  Secondary to pancreatectomy:  Continue Accu-Cheks and sliding scale insulin              -patient had previously been on Lantus  She was discharged on sliding scale insulin alone               -current blood sugars remain elevated in the 300's, likely secondary to the dexamethasone:                -will increase Lantus to 14 units at bedtime, and increased pre meal NovoLog to 10 units t i d   Continue Accu-Cheks and sliding scale insulin      5-benign hypertension:  Patient has a prior history of benign hypertension  Kt Huertas is currently normotensive on no medication                 -blood pressure today currently ranging a systolic of 351-591 predominantly  Accelerated at times due to pain  Cont to monitor     6-chronic diastolic congestive heart failure:  Patient's most recent echocardiogram revealed a left ventricular ejection fraction of 60% and grade 1 diastolic dysfunction   Patient is on torsemide previously   Held for acute kidney injury     7-pancreatic insufficiency:  With chronic diarrhea:  Patient now tolerating p o , and was restarted on pancrelipase    Patient has a history of chronic diarrhea   -cont creon     8-history of Enterococcus peritonitis:  Patient was previously admitted for enterococcal peritonitis and hepatic abscesses requiring drains  [place] Shoulders still in place   Followed by IR   Completed antibiotic course     9-history of right hip fracture and fall:  Supportive measures     10-recurrent ascites:  Requiring intermittent paracentesis:     -Will consult IR for paracentesis    11-no iv access: consult for picc line    12-family:  Updated son Suze Sims at the bedside  Answered all questions  Dispo:  Await labs and PT eval   Anticipate pt will be dc to inpt str due to deconditioning  Pt and family wish to pursue second opinion for chemotherapy with [email or web address] company         VTE Pharmacologic Prophylaxis: Enoxaparin (Lovenox)-will start, as patient does not wish hospice  VTE Mechanical Prophylaxis: sequential compression device     Certification Statement: The patient will continue to require additional inpatient hospital stay due to need for further acute intervention for ascites requiring paracentesis, and short-term rehab place     Status: inpatient     ==================================================================    Subjective:  Patient notes that she does not have any current pain at her liver drain sites  She denies any pain anywhere at all  She denies any headache, chest pain, back pain, abdominal pain  She denies any shortness of breath or cough  She denies any dizziness or lightheadedness  She denies any nausea or vomiting  She notes she has poor appetite  She notes chronic diarrhea  She relates she does not wish anything to stop the diarrhea as her abdominal discomfort improves when she passes the stool  She is concerned that her abdominal pain will worsen if she does not pass diarrhea       Physical Exam:   Temp:  [96 8 °F (36 °C)-97 6 °F (36 4 °C)] 97 5 °F (36 4 °C)  HR:  [56-58] 58  Resp:  [18] 18  BP: (136-146)/(65-89) 136/89    Gen:  Pleasant, non-tachypnic, non-dyspnic  Conversant  Mild jaundice  Heart: regular rate and rhythm, S1S2 present, no murmur, rub or gallop  Lungs: clear to ausculatation bilaterally  No wheezing, crackles, or rhonchi  No accessory muscle use or respiratory distress  Abd: soft, non-tender with palpation, +distended  NABS, no guarding, rebound or peritoneal signs  Extremities: no clubbing, cyanosis  1+ bilateral lower extremity pedal edema     2+pedal pulses bilaterally  Neuro: awake, alert    Fluent speech  Skin:  Jaundiced    LABS:   Results from last 7 days   Lab Units 06/26/19  0510 06/24/19  0549   WBC Thousand/uL 30 48* 28 15*   HEMOGLOBIN g/dL 9 8* 9 0*   HEMATOCRIT % 30 1* 28 3*   PLATELETS Thousands/uL 326 324     Results from last 7 days   Lab Units 06/26/19  0510 06/25/19  1734 06/24/19  0549   POTASSIUM mmol/L 4 4 4 3 4 8   CHLORIDE mmol/L 101 100 97*   CO2 mmol/L 15* 16* 14*   BUN mg/dL 89* 91* 86*   CREATININE mg/dL 1 71* 1 66* 1 92*   CALCIUM mg/dL 8 0* 8 1* 8 3       Hospital Data:    6/25:  Right upper quadrant ultrasound:  Markedly limited examination demonstrating appears to represent 2 6 cm periportal right hepatic lobe metastatic lesion, as well as mild central intrahepatic biliary dilatation   Moderate volume of complex ascites   Cross-sectional imaging would likely   provide for more comprehensive evaluation if clinically warranted      6/25:  CT brain:   Stable foci of hyperattenuation within the left cerebral hemisphere and the brainstem   While metastatic disease is a consideration, given the history of multiple malignancies, the lack of associated edema favors an alternative consideration such as   cavernous angiomas  Nonemergent MR evaluation could be utilized for confirmation      6/22:  CT brain without contrast    few scattered pontine and left supratentorial lesions suspicious for hemorrhagic metastases   An enhanced MRI of the brain is recommended for further evaluation    No mass effect or midline shift    Mild chronic small vessel ischemic changes and volume loss      6/26:  Blood culture:  Negative x2  6/22:  Blood culture 1/2 coag-negative staph           ---------------------------------------------------------------------------------------------------------------  This note has been constructed using a voice recognition system

## 2019-07-01 ENCOUNTER — APPOINTMENT (INPATIENT)
Dept: RADIOLOGY | Facility: HOSPITAL | Age: 74
DRG: 054 | End: 2019-07-01
Attending: INTERNAL MEDICINE
Payer: COMMERCIAL

## 2019-07-01 LAB
ALBUMIN SERPL BCP-MCNC: 1.5 G/DL (ref 3.5–5)
ALP SERPL-CCNC: 180 U/L (ref 46–116)
ALT SERPL W P-5'-P-CCNC: 18 U/L (ref 12–78)
ANION GAP SERPL CALCULATED.3IONS-SCNC: 11 MMOL/L (ref 4–13)
AST SERPL W P-5'-P-CCNC: 16 U/L (ref 5–45)
BACTERIA BLD CULT: NORMAL
BACTERIA BLD CULT: NORMAL
BILIRUB SERPL-MCNC: 2.22 MG/DL (ref 0.2–1)
BUN SERPL-MCNC: 83 MG/DL (ref 5–25)
CALCIUM SERPL-MCNC: 8.5 MG/DL (ref 8.3–10.1)
CEA SERPL-MCNC: 19.8 NG/ML (ref 0–3)
CHLORIDE SERPL-SCNC: 98 MMOL/L (ref 100–108)
CO2 SERPL-SCNC: 18 MMOL/L (ref 21–32)
CREAT SERPL-MCNC: 1.13 MG/DL (ref 0.6–1.3)
ERYTHROCYTE [DISTWIDTH] IN BLOOD BY AUTOMATED COUNT: 17.2 % (ref 11.6–15.1)
GFR SERPL CREATININE-BSD FRML MDRD: 48 ML/MIN/1.73SQ M
GLUCOSE SERPL-MCNC: 294 MG/DL (ref 65–140)
GLUCOSE SERPL-MCNC: 335 MG/DL (ref 65–140)
GLUCOSE SERPL-MCNC: 352 MG/DL (ref 65–140)
GLUCOSE SERPL-MCNC: 366 MG/DL (ref 65–140)
GLUCOSE SERPL-MCNC: 367 MG/DL (ref 65–140)
HCT VFR BLD AUTO: 33.8 % (ref 34.8–46.1)
HGB BLD-MCNC: 10.6 G/DL (ref 11.5–15.4)
INR PPP: 2.03 (ref 0.84–1.19)
MAGNESIUM SERPL-MCNC: 2.1 MG/DL (ref 1.6–2.6)
MCH RBC QN AUTO: 30 PG (ref 26.8–34.3)
MCHC RBC AUTO-ENTMCNC: 31.4 G/DL (ref 31.4–37.4)
MCV RBC AUTO: 96 FL (ref 82–98)
PHOSPHATE SERPL-MCNC: 3.4 MG/DL (ref 2.3–4.1)
PLATELET # BLD AUTO: 230 THOUSANDS/UL (ref 149–390)
PMV BLD AUTO: 11.6 FL (ref 8.9–12.7)
POTASSIUM SERPL-SCNC: 5.1 MMOL/L (ref 3.5–5.3)
PREALB SERPL-MCNC: 8.4 MG/DL (ref 18–40)
PROT SERPL-MCNC: 4.7 G/DL (ref 6.4–8.2)
PROTHROMBIN TIME: 23.3 SECONDS (ref 11.6–14.5)
RBC # BLD AUTO: 3.53 MILLION/UL (ref 3.81–5.12)
SODIUM SERPL-SCNC: 127 MMOL/L (ref 136–145)
WBC # BLD AUTO: 32.21 THOUSAND/UL (ref 4.31–10.16)

## 2019-07-01 PROCEDURE — 82948 REAGENT STRIP/BLOOD GLUCOSE: CPT

## 2019-07-01 PROCEDURE — 84134 ASSAY OF PREALBUMIN: CPT | Performed by: FAMILY MEDICINE

## 2019-07-01 PROCEDURE — 83735 ASSAY OF MAGNESIUM: CPT | Performed by: FAMILY MEDICINE

## 2019-07-01 PROCEDURE — 82378 CARCINOEMBRYONIC ANTIGEN: CPT | Performed by: FAMILY MEDICINE

## 2019-07-01 PROCEDURE — 85027 COMPLETE CBC AUTOMATED: CPT | Performed by: FAMILY MEDICINE

## 2019-07-01 PROCEDURE — 49083 ABD PARACENTESIS W/IMAGING: CPT | Performed by: RADIOLOGY

## 2019-07-01 PROCEDURE — 86304 IMMUNOASSAY TUMOR CA 125: CPT | Performed by: FAMILY MEDICINE

## 2019-07-01 PROCEDURE — 85610 PROTHROMBIN TIME: CPT | Performed by: FAMILY MEDICINE

## 2019-07-01 PROCEDURE — 99233 SBSQ HOSP IP/OBS HIGH 50: CPT | Performed by: FAMILY MEDICINE

## 2019-07-01 PROCEDURE — 99232 SBSQ HOSP IP/OBS MODERATE 35: CPT | Performed by: INTERNAL MEDICINE

## 2019-07-01 PROCEDURE — C1751 CATH, INF, PER/CENT/MIDLINE: HCPCS

## 2019-07-01 PROCEDURE — 36569 INSJ PICC 5 YR+ W/O IMAGING: CPT

## 2019-07-01 PROCEDURE — 80053 COMPREHEN METABOLIC PANEL: CPT | Performed by: FAMILY MEDICINE

## 2019-07-01 PROCEDURE — 84100 ASSAY OF PHOSPHORUS: CPT | Performed by: FAMILY MEDICINE

## 2019-07-01 PROCEDURE — 86301 IMMUNOASSAY TUMOR CA 19-9: CPT | Performed by: FAMILY MEDICINE

## 2019-07-01 PROCEDURE — NC001 PR NO CHARGE: Performed by: FAMILY MEDICINE

## 2019-07-01 PROCEDURE — 49083 ABD PARACENTESIS W/IMAGING: CPT

## 2019-07-01 RX ORDER — SODIUM CHLORIDE 9 MG/ML
75 INJECTION, SOLUTION INTRAVENOUS CONTINUOUS
Status: DISCONTINUED | OUTPATIENT
Start: 2019-07-01 | End: 2019-07-03

## 2019-07-01 RX ADMIN — SODIUM CHLORIDE 75 ML/HR: 0.9 INJECTION, SOLUTION INTRAVENOUS at 17:57

## 2019-07-01 RX ADMIN — INSULIN LISPRO 6 UNITS: 100 INJECTION, SOLUTION INTRAVENOUS; SUBCUTANEOUS at 17:56

## 2019-07-01 RX ADMIN — PANCRELIPASE 48000 UNITS: 24000; 76000; 120000 CAPSULE, DELAYED RELEASE PELLETS ORAL at 17:57

## 2019-07-01 RX ADMIN — DEXAMETHASONE 4 MG: 4 TABLET ORAL at 16:05

## 2019-07-01 RX ADMIN — INSULIN LISPRO 5 UNITS: 100 INJECTION, SOLUTION INTRAVENOUS; SUBCUTANEOUS at 22:35

## 2019-07-01 RX ADMIN — DEXAMETHASONE 4 MG: 4 TABLET ORAL at 10:39

## 2019-07-01 RX ADMIN — INSULIN LISPRO 6 UNITS: 100 INJECTION, SOLUTION INTRAVENOUS; SUBCUTANEOUS at 16:04

## 2019-07-01 RX ADMIN — OXYCODONE HYDROCHLORIDE 5 MG: 5 TABLET ORAL at 20:50

## 2019-07-01 RX ADMIN — INSULIN GLARGINE 14 UNITS: 100 INJECTION, SOLUTION SUBCUTANEOUS at 22:34

## 2019-07-01 RX ADMIN — PANCRELIPASE 48000 UNITS: 24000; 76000; 120000 CAPSULE, DELAYED RELEASE PELLETS ORAL at 10:37

## 2019-07-01 RX ADMIN — LIDOCAINE HYDROCHLORIDE 10 ML: 20 SOLUTION ORAL; TOPICAL at 22:34

## 2019-07-01 RX ADMIN — ENOXAPARIN SODIUM 30 MG: 30 INJECTION SUBCUTANEOUS at 10:37

## 2019-07-01 RX ADMIN — PANCRELIPASE 48000 UNITS: 24000; 76000; 120000 CAPSULE, DELAYED RELEASE PELLETS ORAL at 16:05

## 2019-07-01 RX ADMIN — INSULIN LISPRO 4 UNITS: 100 INJECTION, SOLUTION INTRAVENOUS; SUBCUTANEOUS at 10:38

## 2019-07-01 NOTE — PLAN OF CARE
Problem: Potential for Falls  Goal: Patient will remain free of falls  Description  INTERVENTIONS:  - Assess patient frequently for physical needs  -  Identify cognitive and physical deficits and behaviors that affect risk of falls  -  [place] fall precautions as indicated by assessment   - Educate patient/family on patient safety including physical limitations  - Instruct patient to call for assistance with activity based on assessment  - Modify environment to reduce risk of injury  - Consider OT/PT consult to assist with strengthening/mobility  Outcome: Progressing     Problem: Prexisting or High Potential for Compromised Skin Integrity  Goal: Skin integrity is maintained or improved  Description  INTERVENTIONS:  - Identify patients at risk for skin breakdown  - Assess and monitor skin integrity  - Assess and monitor nutrition and hydration status  - Monitor labs (i e  albumin)  - Assess for incontinence   - Turn and reposition patient  - Assist with mobility/ambulation  - Relieve pressure over bony prominences  - Avoid friction and shearing  - Provide appropriate hygiene as needed including keeping skin clean and dry  - Evaluate need for skin moisturizer/barrier cream  - Collaborate with interdisciplinary team (i e  Nutrition, Rehabilitation, etc )   - Patient/family teaching  Outcome: Progressing     Problem: NEUROSENSORY - ADULT  Goal: Achieves stable or improved neurological status  Description  INTERVENTIONS  - Monitor and report changes in neurological status  - Initiate measures to prevent increased intracranial pressure  - Maintain blood pressure and fluid volume within ordered parameters to optimize cerebral perfusion  - Monitor temperature, glucose, and sodium or any other associated labs   Initiate appropriate interventions as ordered  - Monitor for seizure activity   - Administer anti-seizure medications as ordered  Outcome: Progressing     Problem: RESPIRATORY - ADULT  Goal: Achieves optimal ventilation and oxygenation  Description  INTERVENTIONS:  - Assess for changes in respiratory status  - Assess for changes in mentation and behavior  - Position to facilitate oxygenation and minimize respiratory effort  - Oxygen administration by appropriate delivery method based on oxygen saturation (per order) or ABGs  - Initiate smoking cessation education as indicated  - Encourage broncho-pulmonary hygiene including cough, deep breathe, Incentive Spirometry  - Assess the need for suctioning and aspirate as needed  - Assess and instruct to report SOB or any respiratory difficulty  - Respiratory Therapy support as indicated  Outcome: Progressing     Problem: GASTROINTESTINAL - ADULT  Goal: Maintains adequate nutritional intake  Description  INTERVENTIONS:  - Monitor percentage of each meal consumed  - Identify factors contributing to decreased intake, treat as appropriate  - Assist with meals as needed  - Monitor I&O, WT and lab values  - Obtain nutrition services referral as needed  Outcome: Progressing     Problem: GENITOURINARY - ADULT  Goal: Maintains or returns to baseline urinary function  Description  INTERVENTIONS:  - Assess urinary function  - Encourage oral fluids to ensure adequate hydration  - Administer IV fluids as ordered to ensure adequate hydration  - Administer ordered medications as needed  - Offer frequent toileting  - Follow urinary retention protocol if ordered  Outcome: Progressing     Problem: METABOLIC, FLUID AND ELECTROLYTES - ADULT  Goal: Electrolytes maintained within normal limits  Description  INTERVENTIONS:  - Monitor labs and assess patient for signs and symptoms of electrolyte imbalances  - Administer electrolyte replacement as ordered  - Monitor response to electrolyte replacements, including repeat lab results as appropriate  - Instruct patient on fluid and nutrition as appropriate  Outcome: Progressing  Goal: Fluid balance maintained  Description  INTERVENTIONS:  - Monitor labs and assess for signs and symptoms of volume excess or deficit  - Monitor I/O and WT  - Instruct patient on fluid and nutrition as appropriate  Outcome: Progressing  Goal: Glucose maintained within target range  Description  INTERVENTIONS:  - Monitor Blood Glucose as ordered  - Assess for signs and symptoms of hyperglycemia and hypoglycemia  - Administer ordered medications to maintain glucose within target range  - Assess nutritional intake and initiate nutrition service referral as needed  Outcome: Progressing     Problem: SKIN/TISSUE INTEGRITY - ADULT  Goal: Skin integrity remains intact  Description  INTERVENTIONS  - Identify patients at risk for skin breakdown  - Assess and monitor skin integrity  - Assess and monitor nutrition and hydration status  - Monitor labs (i e  albumin)  - Assess for incontinence   - Turn and reposition patient  - Assist with mobility/ambulation  - Relieve pressure over bony prominences  - Avoid friction and shearing  - Provide appropriate hygiene as needed including keeping skin clean and dry  - Evaluate need for skin moisturizer/barrier cream  - Collaborate with interdisciplinary team (i e  Nutrition, Rehabilitation, etc )   - Patient/family teaching  Outcome: Progressing  Goal: Incision(s), wounds(s) or drain site(s) healing without S/S of infection  Description  INTERVENTIONS  - Assess and document risk factors for skin impairment   - Assess and document dressing, incision, wound bed, drain sites and surrounding tissue  - Initiate Nutrition services consult and/or wound management as needed  Outcome: Progressing     Problem: COPING  Goal: Pt/Family able to verbalize concerns and demonstrate effective coping strategies  Description  INTERVENTIONS:  - Assist patient/family to identify coping skills, available support systems and cultural and spiritual values  - Provide emotional support, including active listening and acknowledgement of concerns of patient and caregivers  - Reduce environmental stimuli, as able  - Provide patient education  - Assess for spiritual pain/suffering and initiate spiritual care, including notification of Pastoral Care or helen based community as needed  - Assess effectiveness of coping strategies  Outcome: Progressing     Problem: DEATH & DYING  Goal: Pt/Family communicate acceptance of impending death and expresses psychological comfort and peace  Description  INTERVENTIONS:  - Assess patient/family anxiety and grief process related to end of life issues  - Provide emotional, spiritual and psychosocial support  - Provide information about the patient's health status with consideration of family and cultural values  - Communicate willingness to discuss death and facilitate grief process  with patient/family as appropriate  - Emphasize sustaining relationships within family system and community, or helen/spiritual traditions  - Initiate Spiritual Care, Pastoral care or other ancillary consults as needed  - Refer to community support groups as appropriate   Outcome: Progressing     Problem: DECISION MAKING  Goal: Pt/Family able to effectively weigh alternatives and participate in decision making related to treatment and care  Description  INTERVENTIONS:  - Identify decision maker  - Determine when there are differences among patient's view, family's view, and healthcare provider's view of patient condition and care goals  - Facilitate patient/family articulation of goals for care  - Help patient/family identify pros/cons of alternative solutions  - Provide information as requested by patient/family  - Respect patient/family rights related to privacy and sharing information   - Serve as a liaison between patient, family and health care team  - Initiate consults as appropriate (Ethics Team, Palliative Care, Family Care Conference, etc )  Outcome: Progressing     Problem: SPIRITUAL CARE  Goal: Pt/Family able to move forward in process of forgiving self, others and/or higher power  Description  INTERVENTIONS:  - Assist patient with any spiritual needs/requests such as communion, confession, anointing, etc  - Explore guilt and help patient/family identify possible spiritual/cultural beliefs and values  - Explore possibilities of making amends & reconciliation with self, others, and/or a greater power  - Guide patient/family in identifying painful feelings  - Help patient explore and identify spiritual beliefs, cultural understandings or values that may help or hinder letting go of issue  - Help patient explore feelings of anger, bitterness, resentment, anxiety   Help patient/family identify and examine the situation in which these feelings are experienced  - Help patient/family identify destructive displacement of feelings onto other individuals  - Refer patient to formal counseling and/or to helen community for further support as needed or per request  Outcome: Progressing  Goal: Patient feels balance and connection with others and/or higher power that empowers the self during times of loss, guilt and fear  Description  INTERVENTIONS:  - Create safety for patient through empathic presence and non-judgmental listening  - Encourage patient to explore his/her values, beliefs and/or spiritual images and practices  - Encourage use of breath work, imagery, meditation, relaxation, reiki to ease distress and provide healing  - Encourage use of cultural and spiritual celebrations and rituals  - Facilitate discussion that helps patient sort out spiritual concerns  - Help patient identify where meaning/hope/comfort & strength are in his/her life  - Refer patient to helen community for assistance, as appropriate  - Respond to patient/family need for prayer/ritual/sacrament/ceremony  Outcome: Progressing     Problem: Nutrition/Hydration-ADULT  Goal: Nutrient/Hydration intake appropriate for improving, restoring or maintaining nutritional needs  Description  Monitor and assess patient's nutrition/hydration status for malnutrition (ex- brittle hair, bruises, dry skin, pale skin and conjunctiva, muscle wasting, smooth red tongue, and disorientation)  Collaborate with interdisciplinary team and initiate plan and interventions as ordered  Monitor patient's weight and dietary intake as ordered or per policy  Utilize nutrition screening tool and intervene per policy  Determine patient's food preferences and provide high-protein, high-caloric foods as appropriate       INTERVENTIONS:  - Monitor oral intake, urinary output, labs, and treatment plans  - Assess nutrition and hydration status and recommend course of action  - Evaluate amount of meals eaten  - Assist patient with eating if necessary   - Allow adequate time for meals  - Recommend/ encourage appropriate diets, oral nutritional supplements, and vitamin/mineral supplements  - Order, calculate, and assess calorie counts as needed  - Recommend, monitor, and adjust tube feedings and TPN/PPN based on assessed needs  - Assess need for intravenous fluids  - Provide specific nutrition/hydration education as appropriate  - Include patient/family/caregiver in decisions related to nutrition  Outcome: Progressing

## 2019-07-01 NOTE — BRIEF OP NOTE (RAD/CATH)
{[facility] Procedure Note    PATIENT NAME: López Dwyer  : 1945  MRN: [id number]     Pre-op Diagnosis:   1  Altered mental status    2  Metastasis to brain ([facility] )    3  Multiple lesions of metastatic malignancy ([facility] )    4  Sepsis, due to unspecified organism [facility])      Post-op Diagnosis:   1  Altered mental status    2  Metastasis to brain ([facility] )    3  Multiple lesions of metastatic malignancy ([place] [address] )    4   Sepsis, due to unspecified organism [facility])        Surgeon:   Audra Lane MD  Assistants:     No qualified resident was available, Resident is only observing    Estimated Blood Loss: none  Findings: 4200 ml of clear, yellow ascites    Specimens: none    Complications:  none    Anesthesia: Local    Audra Lane MD     Date: 2019  Time: 10:51 AM

## 2019-07-01 NOTE — PROGRESS NOTES
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 ([facility] )    Personal history of breast cancer    Personal history of pancreatic cancer    Personal history of colon cancer, stage II    Localized edema    Sepsis ([facility] )    ANNE (acute kidney injury) ([place] [address] )    Anemia of acute infection    Leukocytosis    Ascites    Bacterial liver abscess    Bacteremia    Chronic diastolic CHF (congestive heart failure) ([place] [address] )    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    Monoallelic mutation of CHEK2 gene    Cavernous hemangioma of intracranial structure ([place] [address] )    Pancreatic insufficiency    S/P partial colectomy    Lymphadenopathy, retroperitoneal    Hip pain    Closed fracture of right hip (HCC)    Renal insufficiency    Edema    Elevated LFTs    Bilateral lower extremity edema    Acute blood loss anemia    Hypokalemia    Peritonitis (HCC)    Obstructive jaundice    Lymphadenopathy    Ovarian cancer (HCC)    CKD (chronic kidney disease), stage III (HCC)    Hypocalcemia    Hyponatremia    Increased anion gap metabolic acidosis    Multiple lesions of metastatic malignancy ([place] [address] )      Plan:  1  Symptom management - "generalized discomfort" identified by patient at this time  - dexamethasone 4mg PO BID   - oxyIR 5mg liquid PRN mod pain   - hydromorphone 0 5mg available if unable to PO    2  Goals - full cares, with limits   - Pt not entirely competent  Son leads family    - Pt will not be coded, as this is not ethical   Family have been informed of this limit  - Will gather blood labs tomorrow as a pretext to transfer of care to an accepting institution    - Family have requested third opinion, with systemic and intrathecal chemo    We feel this is not indicated and unethical   She is far too medically unstable to support this plan of care  Family may have to coordinate transfer of care to another institution  At this time, [facility] qualifies chemo for this lady as non-beneficial treatment, and it will not be offered  Importantly, her primary oncology team at [facility] have advised us NOT to administer oral systemic chemotherapy  Code Status: DNR/DNI - Level 3   Decisional apparatus:  Patient is not competent on my exam today  If competence is lost, patient's substitute decision maker would default to son Tio by formal Marks Brian  Advance Directive / Living Will / POLST:  Reviewed and inform our plan  Jessy Mitchell MD  Palliative and Supportive Care  Pager: [phone]         Interval history:   Since last visit, she has made few improvements, but is indeed more alert  Her appetite is poor, and her meal completions negligible  She has a poor understanding of what her son is arranging for chemotherapy, but she can articulate "We have to try, because you don't know for sure that this new therapy won't help" and "I know it's experimental, but I don't mind being a guinea pig"  She is informed that family will have to find an accepting facility to administer chemo from this outside organization  [facility] may be a reasonable first step, [facility]  After making reasonable efforts to transfer to an accepting institution, the medical team may begin discharge planning with comfort care, and the family will be under their own responsibility to arrange to patient's cares afterwards  A phone call was placed to [facility], which routed to an answering machine for "[employer]"  A non-detailed message was left, instructing Killian to call back to our office to discuss the situation  Later, [facility] showed up on hospital floors, and confirmed that his personal phone picks up as [employer]    He provided the following contact info for outside chemo organization:   [employer] -    - Duane Mcclure, pt advisor - [phone]     A phone called was placed to [facility], which was not answered  I asked him to reach out to our offices to coordinate cares  From our previous consideration of medicoethicolegal items in this pt's case:     "   the family has found a recommendation from a non-hospital based 'consultant' company, who has promised them systemic and intrathecal chemotherapy if the family will find a neurosurgeon to place an [place] reservoir, and if a hospital will supervise her chemotherapy  The family have made formal requests for pt's labs and data to be sent to this company, so named [employer]'  "In our experience, provision of chemo to a patient in renal failure, with an ECOG of 4, with a bili >3, at Stage IV disease with brain mets and an underlying genetic predilection towards cancer is completely unethical, entirely without medical indication, and likely immediately dangerous to the patient  Dr An Lo and I agree that the patient has a terminal illness, and this has been identified numerous times to the family by myself personally, with a prognosis quoted to them of weeks to months  Numerous staff from the ICU and my team have recommended hospice to the family instead of hospital cares and chemo  "At this time, we would categorize placement of an [place] reservoir and/or provision of systemic IV chemotherapy as non-beneficial treatment  It will not improve this patient's health, and places her at significant risk of harm, pain, and even death  We support Dr An Lo and the primary team in refusing to comply with the family's request to use [facility] resources for such unethical cares, which appear to be orchestrated by an external and unlicensed company, and arranged exclusively for profit, rather than patient benefits      MEDICATIONS / ALLERGIES:     all current active meds have been reviewed    Allergies   Allergen Reactions    Gadolinium Derivatives Anaphylaxis     MRI dye- hot flashes/ flushing    Iodine Anaphylaxis     ? ??SHORTNESS OF BREATH    Morphine Delerium     Contraindicated based on poor renal function    Acetaminophen     Acetaminophen-Codeine Edema    Aspirin Hives     RASH    Cephalexin      Annotation - [id number]: rash  Patient reported tolerance in the past during her May 2019 admission     Gadolinium     Iohexol     Nickel Hives    Penicillin G      Tolerated Ampicillin on May 2019 admission     Penicillins Hives     Tolerated Ampicillin on May 2019 admission  Tolerated Amoxicillin in past      Sulfa Antibiotics Hives    Sulfamethoxazole-Trimethoprim Diarrhea       OBJECTIVE:    Physical Exam  Physical Exam   Constitutional: No distress  Frail, skeletal, and anasarcic   HENT:   Head: Normocephalic and atraumatic  Right Ear: External ear normal    Left Ear: External ear normal    Mouth/Throat: Oropharyngeal exudate (mucous membranes tacky) present  Eyes: Pupils are equal, round, and reactive to light  Conjunctivae and EOM are normal  Right eye exhibits no discharge  Left eye exhibits no discharge  Neck: No tracheal deviation present  Cardiovascular: Regular rhythm  tachycardic   Pulmonary/Chest: Effort normal  No stridor  No respiratory distress  Abdominal: Soft  She exhibits distension  Distended, softer after paracentesis   Musculoskeletal: She exhibits edema (3+ anasarca)  Dramatic muscle wasting throughout   Neurological: She is alert  Orientation poor to situation, place, time   Skin: Skin is warm and dry  No rash noted  She is not diaphoretic  No erythema  There is pallor  Psychiatric:   Cannot participate meaningfully in exam   Judgment limited, insight poor  Lab Results: I have personally reviewed pertinent labs  , CBC: No results found for: WBC, HGB, HCT, MCV, PLT, ADJUSTEDWBC, MCH, MCHC, RDW, MPV, NRBC, CMP: No results found for: SODIUM, K, CL, CO2, ANIONGAP, BUN, CREATININE, GLUCOSE, CALCIUM, AST, ALT, ALKPHOS, PROT, BILITOT, EGFR    Elevated sugars are noted on steroids  No blood labs to suggest any new/improving disease  Imaging Studies: none new today  EKG, Pathology, and Other Studies: none new today    Counseling / Coordination of Care  Total floor / unit time spent today 35+ minutes  Greater than 50% of total time was spent with the patient and / or family counseling and / or coordination of care   A description of the counseling / coordination of care: review and assessment of decisional apparatus and capacity, applicable legal codes and extant documents; consideration of hospice; symptom review and chart review

## 2019-07-01 NOTE — PROGRESS NOTES
[facility] Progress Note  Patient: Marily Duckworth 75 y o  female   MRN: [id number]  PCP: Fausto Salazar DO  Unit/Bed#: E5 MS Sim[facility]-13 Encounter: [id number]  Date Of Visit: 07/01/19    Assessment:  Patient known to me from most recent admission have reviewed Dr Molina note and again discussed findings with patient noting stage IV pancreatic cancer now with apparent metastasis to liver, kidneys and brain suspect on hemorrhagic changes seen on CT imaging brain  Patient's acute kidney injury precluding further documentation of MRI     Patient for IR axis PICC line has had no IV access for last 5 days and thusly no update on kidney status or leukocytosis which was significant on presentation she has continued ascites will require further paracentesis and 3 separate hepatic drains in relation recent Enterococcus peritonitis    Principal Problem:    Personal history of pancreatic cancer  Active Problems:    Benign essential hypertension    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 [facility])    Personal history of breast cancer    ANNE (acute kidney injury) ([address] )    Anemia of acute infection    Chronic diastolic CHF (congestive heart failure) (HCC)    Pancreatic insufficiency    Closed fracture of right hip (HCC)    CKD (chronic kidney disease), stage III (HCC)    Hyponatremia    Increased anion gap metabolic acidosis    Multiple lesions of metastatic malignancy ([facility] [address] )      Plan:  1-acute kidney injury superimposed on chronic kidney disease stage 3:  Patient's history of chronic kidney disease stage 3 with baseline creatinine 1 0-1 2               -patient presented with acute kidney injury with a creatinine of 1 57 which peaked at 1 9               -patient was placed on IV fluids with improvement in her creatinine of 1 71 when most recently checked on 06/29:  Obtain yesterday July 1st with creatinine down to 1 13 BUN remains elevated 83 sodium is depressed at 127               -have been unable to get labs despite multiple attempts  Labs were initially discontinued as patient had elected hospice measures  Patient and her son currently wish aggressive medical therapy, lab work, interventions, and chemotherapy  -PICC line r placed to obtain lab work, albumin markedly depressed probably explaining her diffuse edema /transaminases have normalized PT and INR remains elevated 2 0 would discontinue DVT prophylaxis as result with enoxaparin/leukocytosis remains elevated trending upward     2-stage IV pancreatic cancer:  Patient follows at [facility] and had previously been on chemotherapy   She had a prior Whipple procedure   Patient's malignancy has progressed  Conchita Man has a probable new renal malignancy, as well as a 2 5 cm right hepatic lobe mass     On her recent admission 05/2019t she had EUS with positive biopsies   Patient's CT scan of the brain was concerning for possible hemorrhagic metastatic lesions as well, although MRI confirmation was recommended has been held thus far acute kidney injury creatinine has improved hour BUN remains significantly elevated sees CEA elevated 19 8 presume ongoing leukocytosis in relation diffuse tumor burden              -Patient's care was collaborated by the ICU team with her [facility] oncologist who determined that there was no future chemotherapy that would be beneficial or indicated   Palliative care was recommended, especially of light of her progressive disease               -KLAUDIA and her family note that they do not wish hospice or comfort measures  They wish aggressive therapy and are investigating a private non-health system affiliated company for a 2nd opinion regarding intrathecal and systemic chemotherapy   The have requested records be sent               -LZ was previously noted by [facility] if her bili remains elevated, chemo would be precluded      Is further documented by both the palliative care team who has been on consultation and the primary care team as follows and I quote Dr Jamari owusu from the palliative care team as follows and I am in full agreement that:  "   the family has found a recommendation from a non-hospital based 'consultant' company, who has promised them systemic and intrathecal chemotherapy if the family will find a neurosurgeon to place an [place] reservoir, and if a hospital will supervise her chemotherapy  The family have made formal requests for pt's labs and data to be sent to this company, so named [employer]'  "In our experience, provision of chemo to a patient in renal failure, with an ECOG of 4, with a bili >3, at Stage IV disease with brain mets and an underlying genetic predilection towards cancer is completely unethical, entirely without medical indication, and likely immediately dangerous to the patient  Dr Collette High and I agree that the patient has a terminal illness, and this has been identified numerous times to the family by myself personally, with a prognosis quoted to them of weeks to months  Numerous staff from the ICU and my team have recommended hospice to the family instead of hospital cares and chemo  "At this time, we would categorize placement of an [place] reservoir and/or provision of systemic IV chemotherapy as non-beneficial treatment  It will not improve this patient's health, and places her at significant risk of harm, pain, and even death    We support Dr Collette High and the primary team in refusing to comply with the family's request to use  [facility] resources for such unethical cares, which appear to be orchestrated by an external and unlicensed company, and arranged exclusively for profit, rather than patient benefits         3-possible hemorrhagic metastatic brain lesions:  Possible, noted on CT scan   Would need MRI with IV contrast for confirmation   Contrasted test postponed due to acute kidney injury we cannot update a acute injury status can possibly pursue contrasted MRI although apparently will not  change treatment plan if it of further evidence of hemorrhagic change however would discontinue DVT prophylaxis continue dexamethasone 4 mg b i d      4-diabetes type 2:  Secondary to pancreatectomy:  Continue Accu-Cheks and sliding scale insulin              -patient had previously been on Lantus   She was discharged on sliding scale insulin alone               -current blood sugars remain elevated in the 300's, likely secondary to the dexamethasone:                -ILID increase Lantus further to 18 units at bedtime, and increased pre meal NovoLog to 14 units t i d   Continue Accu-Cheks and sliding scale insulin      5-benign hypertension:  Patient has a prior history of benign hypertension  Blake Machado is currently normotensive on no medication                 -blood pressure today currently ranging a systolic of [phone] predominantly  Accelerated at times due to pain    Cont to monitor     6-chronic diastolic congestive heart failure:  Patient's most recent echocardiogram revealed a left ventricular ejection fraction of 60% and grade 1 diastolic dysfunction   Patient is on torsemide previously   Held for acute kidney injury     7-pancreatic insufficiency:  With chronic diarrhea:  Patient now tolerating p o , and was restarted on pancrelipase   Patient has a history of chronic diarrhea              -cont creon     8-history of Enterococcus peritonitis:  Patient was previously admitted for enterococcal peritonitis and hepatic abscesses requiring drains  Inna Mehta still in place   Followed by IR   Completed antibiotic course she presently has no febrile course or significant abdominal pain drains all still in place     9-history of right hip fracture and fall:  Supportive measures     10-recurrent ascites:  Requiring intermittent paracentesis:                -Will consult IR for paracentesis/underwent paracentesis with removal 4200 cc today     11-no iv access: consult for picc line placement performed today along with paracentesis of 4200 cc of clear ascites removed          Grace An has been consulted in regards to further chemotherapy and have advise termination of all chemotherapy     Anticipate pt will be d/c to  long-term care facility due to deconditioning and terminal course  Pt and family wish to pursue second opinion for chemotherapy with [employer]  Arrangements of this will have to be made by the son, palliative care team and primary care team have advised both patient and son of this       ·       VTE Pharmacologic Prophylaxis:   Pharmacologic: Enoxaparin (Lovenox)  Mechanical VTE Prophylaxis in Place: Yes    Discussions with Specialists or Other Care Team Provider:     Time Spent for Care: 45 minutes  More than 50% of total time spent on counseling and coordination of care as described above  Subjective:   Patient is emaciated looking cachectic still having some periods of diarrhea daily but more concerned with the her history of constipation in relation treatment for diarrhea  Tolerating her diet without emesis tolerated paracentesis without significant hypotension    Objective:     Vitals:   Temp (24hrs), Av °F (36 1 °C), Min:96 3 °F (35 7 °C), Max:97 5 °F (36 4 °C)    Temp:  [96 3 °F (35 7 °C)-97 5 °F (36 4 °C)] 96 3 °F (35 7 °C)  HR:  [58-66] 66  Resp:  [18] 18  BP: (129-155)/(66-80) 129/72  SpO2:  [99 %] 99 %  Body mass index is 32 54 kg/m²  Input and Output Summary (last 24 hours):        Intake/Output Summary (Last 24 hours) at 2019 0802  Last data filed at 2019 0601  Gross per 24 hour   Intake 480 ml   Output 775 ml   Net -295 ml       Physical Exam:     Physical Exam:   General appearance: alert, cachectic, fatigued, appears stated age and cooperative  Head: Normocephalic, without obvious abnormality, atraumatic  Lungs: diminished breath sounds  Heart: regular rate and rhythm  Abdomen: 3 drains with varying rates of return positive ascites  Back: negative  Extremities: extremities normal, atraumatic, no cyanosis or edema  Neurologic: Grossly normal      Additional Data:     Labs:    Results from last 7 days   Lab Units 06/26/19  0510   WBC Thousand/uL 30 48*   HEMOGLOBIN g/dL 9 8*   HEMATOCRIT % 30 1*   PLATELETS Thousands/uL 326   NEUTROS PCT % 87*   LYMPHS PCT % 6*   MONOS PCT % 6   EOS PCT % 0     Results from last 7 days   Lab Units 06/26/19  0510   POTASSIUM mmol/L 4 4   CHLORIDE mmol/L 101   CO2 mmol/L 15*   BUN mg/dL 89*   CREATININE mg/dL 1 71*   CALCIUM mg/dL 8 0*           * I Have Reviewed All Lab Data Listed Above  * Additional Pertinent Lab Tests Reviewed: [facility] Admission Reviewed    Imaging:  Ct Head Wo Contrast    Result Date: 6/26/2019  Narrative: CT BRAIN - WITHOUT CONTRAST INDICATION:   Intracranial hemorrhage  Breast cancer, colon cancer, pancreatic cancer, ovarian cancer  COMPARISON:  Head CT from June 22, 2019 performed at 3:13 PM  TECHNIQUE:  CT examination of the brain was performed  In addition to axial images, coronal 2D reformatted images were created and submitted for interpretation  Radiation dose length product (DLP) for this visit:  864 mGy-cm   This examination, like all CT scans performed in the [facility], was performed utilizing techniques to minimize radiation dose exposure, including the use of iterative reconstruction and automated exposure control  IMAGE QUALITY:  Diagnostic  FINDINGS: PARENCHYMA:  Redemonstrated are the multiple foci of hyperattenuation within the left cerebral hemisphere and the bilateral kaye  None of these lesions are associated with surrounding parenchymal edema or mass effect  There is no associated parenchymal  calcification either  Stable arachnoid cyst in the left parasagittal frontal vertex  Mild periventricular hypoattenuation suggestive of chronic microangiopathic disease   VENTRICLES AND EXTRA-AXIAL SPACES:  Mild ventriculomegaly, commensurate with the degree of generalized cerebral and cerebellar sulcal enlargement  VISUALIZED ORBITS AND PARANASAL SINUSES:  Unremarkable  CALVARIUM AND EXTRACRANIAL SOFT TISSUES:  Stable nonspecific subcentimeter lucencies within the frontal and parietal calvarium, particularly at the vertex  Impression: Stable foci of hyperattenuation within the left cerebral hemisphere and the brainstem  While metastatic disease is a consideration, given the history of multiple malignancies, the lack of associated edema favors an alternative consideration such as cavernous angiomas  Nonemergent MR evaluation could be utilized for confirmation  Left parasagittal frontal arachnoid cyst  Workstation performed: [id number]     Ct Head Without Contrast    Result Date: 6/22/2019  Narrative: CT BRAIN - WITHOUT CONTRAST INDICATION:   altered mental status  History of breast cancer  COMPARISON:  None  TECHNIQUE:  CT examination of the brain was performed  In addition to axial images, coronal 2D reformatted images were created and submitted for interpretation  Radiation dose length product (DLP) for this visit:  921 mGy-cm   This examination, like all CT scans performed in the [facility], was performed utilizing techniques to minimize radiation dose exposure, including the use of iterative reconstruction and automated exposure control  IMAGE QUALITY:  Diagnostic  FINDINGS: PARENCHYMA:  There is a 15 x 10 mm (series 2, image 18) hyperdense lesion lateral to the left thalamus; a 7 mm hyperdense lesion at the left parietal lobe (series 2, image 21 is (; a 7 mm hypodense lesion at the medial left frontal lobe (series 2, image 19); a 7 mm hyperdense lesion at the left hemipons  A 6 mm hyperdense lesion at the right hemipons  No mass effect or midline shift  No CT signs of acute infarction  No acute parenchymal hemorrhage    There is mild periventricular white matter low attenuation which is nonspecific and most likely related to chronic small vessel ischemic changes  VENTRICLES AND EXTRA-AXIAL SPACES:  There is prominence of the ventricles and sulci related to mild volume loss  VISUALIZED ORBITS AND PARANASAL SINUSES:  Unremarkable  CALVARIUM AND EXTRACRANIAL SOFT TISSUES:  Normal      Impression: A few scattered pontine and left supratentorial lesions suspicious for hemorrhagic metastases  An enhanced MRI of the brain is recommended for further evaluation  No mass effect or midline shift  Mild chronic small vessel ischemic changes and volume loss  Workstation performed: [id number]     Us Right Upper Quadrant    Result Date: 6/26/2019  Narrative: RIGHT UPPER QUADRANT ULTRASOUND INDICATION:     Abnormal bilirubin  Whipple procedure  Metastatic disease  COMPARISON:  Abdominal MRI performed May 19, 2019  TECHNIQUE:   Real-time ultrasound of the right upper quadrant was performed with a curvilinear transducer with both volumetric sweeps and still imaging techniques  Examination is unfortunately quite technically limited as this patient was unable to cooperate with breath-hold imaging and abdominal dressings and drains obscured imaging windows  FINDINGS: PANCREAS:  Portions of the pancreas are obscured by bowel gas  Visualized portions of the pancreas are unremarkable  AORTA AND IVC:  Obscured by bowel gas  LIVER: Hypoechoic right hepatic lobe mass measuring 2 6 cm is noted, probably representing a metastatic lesion, but is incompletely characterized  No other definite hepatic mass is identified  Prominence of central intrahepatic biliary tree is noted suggesting intrahepatic biliary dilatation  Perihepatic ascites is noted  BILIARY: Neobladder is surgically absent  No intrahepatic biliary dilatation  CBD measures 3 mm  No choledocholithiasis  KIDNEY: Again noted is somewhat atrophic upper pole right kidney    Right kidney is estimated at approximately 7 cm and is poorly evaluated on this examination with normal renal architecture not well seen  ASCITES:   Moderate volume of complex ascites  Impression: Markedly limited examination demonstrating appears to represent 2 6 cm periportal right hepatic lobe metastatic lesion, as well as mild central intrahepatic biliary dilatation  Moderate volume of complex ascites  Cross-sectional imaging would likely provide for more comprehensive evaluation if clinically warranted  Workstation performed: [id number]     Imaging Reports Reviewed Today Include:   Imaging Personally Reviewed by Myself Includes:    No results found  Recent Cultures (last 7 days):     Results from last 7 days   Lab Units 06/26/19  0517 06/26/19  0511   BLOOD CULTURE  No Growth After 4 Days  No Growth After 4 Days  Last 24 Hours Medication List:     Current Facility-Administered Medications:  al mag oxide-diphenhydramine-lidocaine viscous 10 mL Swish & Swallow 4x Daily Simona Pang MD    dexamethasone 4 mg Oral BID (AM & Afternoon) Rebekah Hare MD    diphenhydrAMINE-zinc acetate  Topical TID PRN Sol Grebe, CRNP    enoxaparin 30 mg Subcutaneous Q24H Jessica Gonsalves MD    insulin glargine 14 Units Subcutaneous HS Simona Pang MD    insulin lispro 1-6 Units Subcutaneous TID AC Sol Grebe, CRNP    insulin lispro 1-6 Units Subcutaneous HS Sol Grebe, CRNP    insulin lispro 10 Units Subcutaneous TID With Meals Simona Pang MD    lidocaine 1 patch Topical Daily Sol Grebe, CRNP    lidocaine  Topical 4x Daily PRN Simona Pang MD    oxyCODONE 5 mg Oral Q4H PRN Simona Pang MD    pancrelipase (Lip-Prot-Amyl) 48,000 Units Oral TID With Meals Simona Pang MD    sodium chloride 50 mL/hr Intravenous Continuous Sol Grebe, CRNP Last Rate: Stopped (06/30/19 7661)        Today, Patient Was Seen By: Blaire Lowery MD    ** Please Note: Dragon 360 Dictation voice to text software may have been used in the creation of this document   **

## 2019-07-01 NOTE — PROGRESS NOTES
07/01/19 1400   Clinical Encounter Type   Visited With Patient   Routine Visit Follow-up   Patient Spiritual Encounters   Spiritual Encounter Notes Visited with patient who since last visit has elected to refuse hospice and palliative care, and said she would like to continue aggressive treatment of her CA while seeking a 2nd opinion on restarting chemotherapy   spoke with patient who expressed her frustration on not hearing any word about her treatment plan as of yet  Patient said its a struggle not knowing where things stand   continued to offer comfort and support

## 2019-07-01 NOTE — PROCEDURES
Insert PICC line  Date/Time: 7/1/2019 3:35 PM  Performed by: Benjamín Moseley RN  Authorized by: Marc Patel MD     Patient location:  Bedside  Consent:     Consent obtained:  Written    Consent given by:  Dearkillian Galeano (patients son Mark Higgins)    Procedural risks discussed: consent obtained by physician  [place] protocol:     Procedure explained and questions answered to patient or proxy's satisfaction: yes      Relevant documents present and verified: yes      Test results available and properly labeled: yes      Radiology Images displayed and confirmed  If images not available, report reviewed: yes      Required blood products, implants, devices, and special equipment available: yes      Site/side marked: yes      Immediately prior to procedure, a time out was called: yes      Patient identity confirmed:  Verbally with patient, arm band, provided demographic data and hospital-assigned identification number  Pre-procedure details:     Hand hygiene: Hand hygiene performed prior to insertion      Sterile barrier technique: All elements of maximal sterile technique followed      Skin preparation:  ChloraPrep    Skin preparation agent: Skin preparation agent completely dried prior to procedure    Indications:     PICC line indications: new site      PICC line indications comment:  No iv access  Anesthesia (see MAR for exact dosages):      Anesthesia method:  Local infiltration (3ml)    Local anesthetic:  Lidocaine 1% w/o epi  Procedure details:     Location:  Brachial    Vessel type: vein      Laterality:  Left    Site selection rationale:  Right limb alert masectomy    Approach: percutaneous technique used      Patient position:  Flat    Procedural supplies:  Double lumen    Catheter size:  5 Fr    Landmarks identified: yes      Ultrasound guidance: yes      Sterile ultrasound techniques: Sterile gel and sterile probe covers were used      Number of attempts:  1    Successful placement: yes      Vessel of catheter tip end:  Sherlock 3CG confirmed (sherlock 3cg procedure record confirmed placement sent to medical records)    Total catheter length (cm):  35    Catheter out on skin (cm):  1    Max flow rate:  999ml/hr    Arm circumference:  29cm  Post-procedure details:     Post-procedure:  Dressing applied and securement device placed    Assessment:  Blood return through all ports and free fluid flow (sherlock 3cg procedure record confirmed placement)    Post-procedure complications: none      Patient tolerance of procedure:   Tolerated well, no immediate complications  Comments:      BC negative 4 days,   Lot # [id number] 2020-04-30

## 2019-07-01 NOTE — DISCHARGE INSTRUCTIONS
Epidural Blood Patch   WHAT YOU NEED TO KNOW:   An epidural blood patch is a procedure used to relieve a headache caused by spinal fluid leak after a dural puncture  A healthcare provider will inject a sample of your own blood into your back, near the dural puncture site  The blood will clot, which may patch the leak  An epidural blood patch may also help reduce other spinal fluid leak symptoms, such as nausea, vomiting, hearing or vision trouble, or a stiff neck  WHILE YOU ARE HERE:   Before the procedure:   · Informed consent is a legal document that explains the tests, treatments, or procedures that you may need  Informed consent means you understand what will be done and can make decisions about what you want  You give your permission when you sign the consent form  You can have someone sign this form for you if you are not able to sign it  You have the right to understand your medical care in words you know  Before you sign the consent form, understand the risks and benefits of what will be done  Make sure all your questions are answered  · You may be given caffeine to drink or as a medicine through an IV  Caffeine causes your veins to constrict (narrow), which could help improve your symptoms  · You may be given medicine to decrease pain and nausea  During the procedure: Your healthcare provider will ask you to sit down, lie on your side, or lie on your stomach  He will then locate the area of your spine where he will inject the blood  This will likely be right below your dural puncture site, in between the vertebrae  You may need an x-ray to show the right area to place the epidural blood patch  Your healthcare provider will draw blood from a vein in your arm  He will then slowly inject the blood into the area of your spine near the puncture site  Tell your healthcare provider if you feel back or neck pain, or pain that spreads down your legs   Also tell your healthcare provider if your headache gets worse or you feel pressure  After the procedure:   · You may need a spinal CT scan to check the blood patch  You will need to lie still and flat on your back for 2 to 24 hours after your procedure  You may also be directed to elevate your legs  Do not get up to walk until healthcare providers tell you it is okay  · Your headache may improve immediately or within a few days  You may have mild back, neck, or leg pain, or a higher temperature for 1 to 2 days after your procedure  Ask your healthcare provider if you may use NSAIDs for pain and fever  RISKS:   Rarely, you may be at risk for an infection in the injection site  Without treatment, your spinal fluid may continue to leak  This may increase your risk of infection  Your headache may lead to a blood clot near your brain  A severe leak could lead to seizures and may be life-threatening  You may need a second blood patch procedure if a large amount of spinal fluid has leaked  You may need surgery to repair your dural damage    CARE AGREEMENT:

## 2019-07-02 LAB
CANCER AG125 SERPL-ACNC: 14.7 U/ML (ref 0–30)
CANCER AG19-9 SERPL-ACNC: 179 U/ML (ref 0–35)
GLUCOSE SERPL-MCNC: 244 MG/DL (ref 65–140)
GLUCOSE SERPL-MCNC: 288 MG/DL (ref 65–140)
GLUCOSE SERPL-MCNC: 289 MG/DL (ref 65–140)
GLUCOSE SERPL-MCNC: 322 MG/DL (ref 65–140)

## 2019-07-02 PROCEDURE — 82948 REAGENT STRIP/BLOOD GLUCOSE: CPT

## 2019-07-02 RX ORDER — INSULIN GLARGINE 100 [IU]/ML
18 INJECTION, SOLUTION SUBCUTANEOUS
Status: DISCONTINUED | OUTPATIENT
Start: 2019-07-02 | End: 2019-07-05 | Stop reason: HOSPADM

## 2019-07-02 RX ADMIN — INSULIN LISPRO 3 UNITS: 100 INJECTION, SOLUTION INTRAVENOUS; SUBCUTANEOUS at 15:58

## 2019-07-02 RX ADMIN — LIDOCAINE HYDROCHLORIDE 10 ML: 20 SOLUTION ORAL; TOPICAL at 21:44

## 2019-07-02 RX ADMIN — INSULIN LISPRO 5 UNITS: 100 INJECTION, SOLUTION INTRAVENOUS; SUBCUTANEOUS at 12:14

## 2019-07-02 RX ADMIN — PANCRELIPASE 48000 UNITS: 24000; 76000; 120000 CAPSULE, DELAYED RELEASE PELLETS ORAL at 13:35

## 2019-07-02 RX ADMIN — OXYCODONE HYDROCHLORIDE 5 MG: 5 TABLET ORAL at 21:50

## 2019-07-02 RX ADMIN — DEXAMETHASONE 4 MG: 4 TABLET ORAL at 13:36

## 2019-07-02 RX ADMIN — PANCRELIPASE 48000 UNITS: 24000; 76000; 120000 CAPSULE, DELAYED RELEASE PELLETS ORAL at 15:56

## 2019-07-02 RX ADMIN — INSULIN GLARGINE 18 UNITS: 100 INJECTION, SOLUTION SUBCUTANEOUS at 21:43

## 2019-07-02 RX ADMIN — DEXAMETHASONE 4 MG: 4 TABLET ORAL at 10:15

## 2019-07-02 RX ADMIN — INSULIN LISPRO 4 UNITS: 100 INJECTION, SOLUTION INTRAVENOUS; SUBCUTANEOUS at 07:50

## 2019-07-02 RX ADMIN — PANCRELIPASE 48000 UNITS: 24000; 76000; 120000 CAPSULE, DELAYED RELEASE PELLETS ORAL at 07:51

## 2019-07-02 RX ADMIN — INSULIN LISPRO 4 UNITS: 100 INJECTION, SOLUTION INTRAVENOUS; SUBCUTANEOUS at 21:43

## 2019-07-02 NOTE — UTILIZATION REVIEW
Continued Stay Review    Date:    7/2/2019                          Current Patient Class:    IP  Current Level of Care:   M/S    HPI:74 y o  female initially admitted on   6/22/2019     Assessment/Plan:   Pt  Initially  Adm with  End stage pancreatic  CA with mets  Pt/family  Adamantly  req   Cont  Full treatment, ref   Palliative   Or hospice care  Had  Paracentesis   On  7/1  For    4200 cc  PICC    Inserted   7/1 for   Cont  Lab draws  Need   To cont  IP  Acute  LOC   D/T   Pain control,  Lab  Monitoring   And  Additional treatment  Plans      Pertinent Labs/Diagnostic Results:   Results from last 7 days   Lab Units 07/01/19  1617 06/26/19  0510   WBC Thousand/uL 32 21* 30 48*   HEMOGLOBIN g/dL 10 6* 9 8*   HEMATOCRIT % 33 8* 30 1*   PLATELETS Thousands/uL 230 326   NEUTROS ABS Thousands/µL  --  26 41*         Results from last 7 days   Lab Units 07/01/19  1617 06/26/19  0510 06/25/19  1734   SODIUM mmol/L 127* 131* 130*   POTASSIUM mmol/L 5 1 4 4 4 3   CHLORIDE mmol/L 98* 101 100   CO2 mmol/L 18* 15* 16*   ANION GAP mmol/L 11 15* 14*   BUN mg/dL 83* 89* 91*   CREATININE mg/dL 1 13 1 71* 1 66*   EGFR ml/min/1 73sq m 48 29 30   CALCIUM mg/dL 8 5 8 0* 8 1*   MAGNESIUM mg/dL 2 1 2 0  --    PHOSPHORUS mg/dL 3 4  --   --      Results from last 7 days   Lab Units 07/01/19  1617   AST U/L 16   ALT U/L 18   ALK PHOS U/L 180*   TOTAL PROTEIN g/dL 4 7*   ALBUMIN g/dL 1 5*   TOTAL BILIRUBIN mg/dL 2 22*     Results from last 7 days   Lab Units 07/02/19  1120 07/02/19  0705 07/01/19  2136 07/01/19  1755 07/01/19  1052 07/01/19  0741 06/30/19  2115 06/30/19  1613 06/30/19  1152 06/30/19  0752   POC GLUCOSE mg/dl 322* 289* 335* 367* 366* 294* 309* 337* 335* 357*     Results from last 7 days   Lab Units 07/01/19  1617 06/26/19  0510 06/25/19  1734   GLUCOSE RANDOM mg/dL 352* 154* 156*           Results from last 7 days   Lab Units 07/01/19  1617   PROTIME seconds 23 3*   INR  2 03*           Results from last 7 days Lab Units 07/01/19  1617   CEA ng/mL 19 8*           Results from last 7 days   Lab Units 06/26/19  0517 06/26/19  0511   BLOOD CULTURE  No Growth After 5 Days  No Growth After 5 Days  Vital Signs:   07/02/19 0656  97 3 °F (36 3 °C)Abnormal   74  20  158/69  97 %  None (Room air)  Lying   07/01/19 2241  97 4 °F (36 3 °C)Abnormal   60  18  140/64  99 %    Lying   07/01/19 1604  97 °F (36 1 °C)Abnormal   58  18  142/68  98 %  None (Room air)  Lying         Medications:   Scheduled Meds:   Current Facility-Administered Medications:  al mag oxide-diphenhydramine-lidocaine viscous 10 mL Swish & Swallow 4x Daily   dexamethasone 4 mg Oral BID (AM & Afternoon)   diphenhydrAMINE-zinc acetate  Topical TID PRN   insulin glargine 18 Units Subcutaneous HS   insulin lispro 1-6 Units Subcutaneous TID AC   insulin lispro 1-6 Units Subcutaneous HS   insulin lispro 14 Units Subcutaneous TID With Meals   lidocaine 1 patch Topical Daily   lidocaine  Topical 4x Daily PRN   oxyCODONE 5 mg Oral Q4H PRN   pancrelipase (Lip-Prot-Amyl) 48,000 Units Oral TID With Meals   sodium chloride 75 mL/hr Intravenous Continuous       Discharge Plan:    TBD    Network Utilization Review Department  Phone: [phone]; Fax [phone]  [email or web address]  ATTENTION: Please call with any questions or concerns to [phone]  and carefully listen to the prompts so that you are directed to the right person  Send all requests for admission clinical reviews, approved or denied determinations and any other requests to fax [phone]   All voicemails are confidential

## 2019-07-02 NOTE — SOCIAL WORK
Discharge on this patient is pending, " At this time, [facility] qualifies chemo for this lady as non-beneficial treatment, and it will not be offered    Importantly, her primary oncology team at [facility] have advised us NOT to administer oral systemic chemotherapy"  This worker to follow,

## 2019-07-02 NOTE — TREATMENT PLAN
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  This may indicate a recurrence of her original pancreas tumor that has -- since it was her first illness -- evolved through ALL of her treatments, and would likely be a very aggressive and highly resistant cell line  In further discussion with Dr DIANA Toney of Neurosurgery that pt's brain lesions on CT scan are too deep in the parenchyma to be reached by intrathecal chemo or Omaya reservoir agents  Per his assessment as a Neurosurgeon, placement of a reservoir is not indicated at this time  Family continue to decline recommendations from myself, from internal medicine, and from [facility] to use hospice services    In accordance with family wishes, we will fax most recent lab trends, and a few notes, to [employer] [employer], c/o Nessa Luna and his assistant Trish Roche:      - Main - [phone]              - Nessa Luna, pt advisor - [address] - [phone]   - fax         Yanci Nielsen MD  Palliative and Supportive Care  Pager: [phone]

## 2019-07-03 LAB
ANION GAP SERPL CALCULATED.3IONS-SCNC: 9 MMOL/L (ref 4–13)
BUN SERPL-MCNC: 71 MG/DL (ref 5–25)
CALCIUM SERPL-MCNC: 8.6 MG/DL (ref 8.3–10.1)
CHLORIDE SERPL-SCNC: 103 MMOL/L (ref 100–108)
CO2 SERPL-SCNC: 19 MMOL/L (ref 21–32)
CREAT SERPL-MCNC: 0.9 MG/DL (ref 0.6–1.3)
GFR SERPL CREATININE-BSD FRML MDRD: 63 ML/MIN/1.73SQ M
GLUCOSE SERPL-MCNC: 175 MG/DL (ref 65–140)
GLUCOSE SERPL-MCNC: 224 MG/DL (ref 65–140)
GLUCOSE SERPL-MCNC: 239 MG/DL (ref 65–140)
GLUCOSE SERPL-MCNC: 240 MG/DL (ref 65–140)
POTASSIUM SERPL-SCNC: 5.1 MMOL/L (ref 3.5–5.3)
SODIUM SERPL-SCNC: 131 MMOL/L (ref 136–145)

## 2019-07-03 PROCEDURE — G8978 MOBILITY CURRENT STATUS: HCPCS

## 2019-07-03 PROCEDURE — 97167 OT EVAL HIGH COMPLEX 60 MIN: CPT

## 2019-07-03 PROCEDURE — 97530 THERAPEUTIC ACTIVITIES: CPT

## 2019-07-03 PROCEDURE — G8988 SELF CARE GOAL STATUS: HCPCS

## 2019-07-03 PROCEDURE — G8987 SELF CARE CURRENT STATUS: HCPCS

## 2019-07-03 PROCEDURE — 99232 SBSQ HOSP IP/OBS MODERATE 35: CPT | Performed by: INTERNAL MEDICINE

## 2019-07-03 PROCEDURE — 97163 PT EVAL HIGH COMPLEX 45 MIN: CPT

## 2019-07-03 PROCEDURE — 82948 REAGENT STRIP/BLOOD GLUCOSE: CPT

## 2019-07-03 PROCEDURE — G8979 MOBILITY GOAL STATUS: HCPCS

## 2019-07-03 PROCEDURE — 80048 BASIC METABOLIC PNL TOTAL CA: CPT | Performed by: INTERNAL MEDICINE

## 2019-07-03 RX ADMIN — DEXAMETHASONE 4 MG: 4 TABLET ORAL at 10:25

## 2019-07-03 RX ADMIN — LIDOCAINE 1 PATCH: 50 PATCH TOPICAL at 09:19

## 2019-07-03 RX ADMIN — INSULIN LISPRO 1 UNITS: 100 INJECTION, SOLUTION INTRAVENOUS; SUBCUTANEOUS at 21:23

## 2019-07-03 RX ADMIN — INSULIN GLARGINE 18 UNITS: 100 INJECTION, SOLUTION SUBCUTANEOUS at 21:34

## 2019-07-03 RX ADMIN — OXYCODONE HYDROCHLORIDE 5 MG: 5 TABLET ORAL at 19:20

## 2019-07-03 RX ADMIN — INSULIN LISPRO 3 UNITS: 100 INJECTION, SOLUTION INTRAVENOUS; SUBCUTANEOUS at 07:40

## 2019-07-03 RX ADMIN — PANCRELIPASE 48000 UNITS: 24000; 76000; 120000 CAPSULE, DELAYED RELEASE PELLETS ORAL at 16:15

## 2019-07-03 RX ADMIN — INSULIN LISPRO 3 UNITS: 100 INJECTION, SOLUTION INTRAVENOUS; SUBCUTANEOUS at 12:20

## 2019-07-03 RX ADMIN — PANCRELIPASE 48000 UNITS: 24000; 76000; 120000 CAPSULE, DELAYED RELEASE PELLETS ORAL at 12:20

## 2019-07-03 RX ADMIN — PANCRELIPASE 48000 UNITS: 24000; 76000; 120000 CAPSULE, DELAYED RELEASE PELLETS ORAL at 07:41

## 2019-07-03 RX ADMIN — INSULIN LISPRO 2 UNITS: 100 INJECTION, SOLUTION INTRAVENOUS; SUBCUTANEOUS at 16:14

## 2019-07-03 RX ADMIN — LIDOCAINE HYDROCHLORIDE 10 ML: 20 SOLUTION ORAL; TOPICAL at 23:10

## 2019-07-03 RX ADMIN — DEXAMETHASONE 4 MG: 4 TABLET ORAL at 13:47

## 2019-07-03 NOTE — TREATMENT PLAN
7/3/2019 2:13 PM - Phone calls placed to both Eber Hernandez and Juvencio Thakur of [employer] after faxing records yesterday  No answer; messages are left  We continue to find this patient inappropriate for rehab, and the family have begun to request home placement/discharge  It may be most reasonable to D/C pt to home, with instructions to f/up at a [facility] for chemo as family may prefer  [facility] of any sort would be appropriate for this lady, for medical reasons summarized previously, and reiterated below:     Reviewed pt's labs; there are several ongoing indicators to my assessment that suggest pt is a very poor candidate for chemotherapy, even aside from or in addition to her ECOG of 4:     - Hyperbilirubinemia >2   - ANNE   - Severe protein calorie malnutrition with alb <2, prealb <10, INR >2 on NO anticoagulation   - Leukemoid rxn with WBC>30, lacking any other clinical indicators of infection      To my limited oncologic assessment, the CA19 9 and CEA elevations suggest a cancer that 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 is currently active  This may indicate a recurrence of her original pancreas tumor that has -- since it was her first illness -- evolved through ALL of her treatments, and would likely be a very aggressive and highly resistant cell line  In further discussion with Dr DIANA Swenson of Neurosurgery (on 7/2) that pt's brain lesions on CT scan are too deep in the parenchyma to be reached by intrathecal chemo or Omaya reservoir agents  Per his assessment as a Neurosurgeon, placement of a reservoir is not indicated at this time  Family continue to decline recommendations from myself, from internal medicine, and from [facility] to use hospice services    In accordance with family wishes, we faxed most recent lab trends, and a few notes, to [facility], c/o Wayne Rios and his assistant Eber Hernandez on 7/2:      - Main - [address], pt advisor - Poli Lerma - [phone]   - fax         Jung Weber MD  Palliative and Supportive Care  Pager: [phone]

## 2019-07-03 NOTE — PHYSICAL THERAPY NOTE
PT EVALUATION  Time-In: 8407  Time-Out: 0810  Total Time: 15 minutes    75 y o     [id number]    Altered mental status [R41 82]  Metastasis to brain ([facility] [address] ) [C79 31]    Length of Stay: 11    Past Medical History:   Diagnosis Date    Bacteremia 2/18/2019    Cancer [facility])     Breast; Last Assessed: 8/29/2016    Carpal tunnel syndrome     unspecified laterality; Onset: 4/23/2012    Cellulitis     Last Assessesd: 8/30/2012    Diabetes mellitus out of control ([facility] [address] )     Last Assessed: 11/3/2014    Fatigue     Last Assessed: 2/11/014    Fracture of toe     Last Assessed: 11/17/2014    Pancreatic insufficiency 2/21/2019    Rectal cancer [facility])          Past Surgical History:   Procedure Laterality Date    COLON SURGERY      IR IMAGE GUIDED ASPIRATION / DRAINAGE  2/9/2019    IR IMAGE GUIDED DRAINAGE W TUBE PLACEMENT  4/20/2019    IR PARACENTESIS  5/2/2019    IR PARACENTESIS  7/1/2019    IR PICC LINE  4/20/2019    IR TUBE PLACEMENT  5/3/2019    MASTECTOMY      bilateral    PANCREATECTOMY  2010    Proximal Subtotal (whipple procedure)    HI PARTIAL HIP REPLACEMENT Right 4/11/2019    Procedure: HEMIARTHROPLASTY HIP (BIPOLAR); Surgeon: Gaetano Penn MD;  Location: [facility];  Service: Orthopedics          07/03/19 7297   Note Type   Note type Eval/Treat   Pain Assessment   Pain Assessment No/denies pain   Pain Score No Pain   Home Living   Type of [address] Two level;Performs ADLs on one level; Able to live on main level with bedroom/bathroom;Stairs to enter without rails  (2 MAU no railing)   Bathroom Shower/Tub Walk-in shower   Bathroom Toilet Standard   Bathroom Equipment Grab bars in shower;Built-in shower seat;Grab bars around toilet   [address] Walker;Cane;Wheelchair-manual;Other (Comment)  (rollator)   Prior Function   Level of [place] Needs assistance with ADLs and functional mobility; Needs assistance with IADLs   Lives With Son  (son works during the day)   Receives Help From Family   ADL Assistance Needs assistance   IADLs Needs assistance   Falls in the last 6 months 1 to 4  (4 falls)   Vocational Retired  (retired bilingual psycho therapist)   Comments Pt with recent admission at [address] 4/10-6/3  Prior to admission in April pt was independent with functional mobility w/ use of RW and able to perform her own ADLS  Since being discharged from [address] 6/3 and going to rehab she has needed assistance with all mobility and use of RW/wheelchair  For the few days that she was home after discharging rehab she reports that she spent most of her time "laying around" in bed  She reports that she would only get up to the commode  pt noted to provide questionalble time-frame and time line of previous stays   Restrictions/Precautions   Weight Bearing Precautions Per Order No   Other Precautions Fall Risk;Multiple lines   General   Additional Pertinent History Pt had previous R hemiarthroplasty 4/11/2019 after experiencing a fracture from mechanical fall  Family/Caregiver Present No   Cognition   Overall Cognitive Status Impaired   Arousal/Participation Alert   Orientation Level Oriented X4   Memory Decreased short term memory;Decreased recall of recent events;Decreased recall of precautions   Following Commands Follows one step commands with increased time or repetition   Comments Pt needing increased encouragement for participation  RUE Assessment   RUE Assessment WFL  (see OT eval for details)   LUE Assessment   LUE Assessment WFL  (see OT eval for details)   RLE Assessment   RLE Assessment X   Strength RLE   RLE Overall Strength 3/5   LLE Assessment   LLE Assessment X   Strength LLE   LLE Overall Strength 3/5   Coordination   Movements are Fluid and Coordinated 1   Sensation WFL   Light Touch   RLE Light Touch Grossly intact   LLE Light Touch Grossly intact   Bed Mobility   Supine to Sit 2  Maximal assistance   Additional items Assist x 2; Increased time required;Verbal cues;LE management; Bedrails   Additional Comments Increased time to perform and increased encouragement  Transfers   Sit to Stand 2  Maximal assistance   Additional items Assist x 2; Increased time required;Verbal cues  (height of bed slightly elevated)   Stand to Sit 2  Maximal assistance   Additional items Assist x 2; Increased time required;Verbal cues   Additional Comments Verbal cueing and education for hand placement to push  up and reach back for supporting surface  Pt able to clear bottom fully from bed, but noted increased forward flexed posture at hips  Pt able to tolerate standing x10 seconds before onset of fatigue and shakiness  Balance   Static Sitting Fair   Dynamic Sitting Fair -   Static Standing Poor +   Dynamic Standing Poor   Endurance Deficit   Endurance Deficit Yes   Endurance Deficit Description fatigue and deconditioning   Activity Tolerance   Activity Tolerance Patient limited by fatigue   Medical Staff [address] Day[address] De Oliveira, OT   Nurse Lia Martel RN   Assessment   Prognosis Good   Problem List Decreased strength;Decreased endurance; Impaired balance;Decreased mobility; Decreased safety awareness;Decreased cognition;Obesity   Assessment PT consult received and evaluation complete  Pt is 75 y o  Female admitted from home w/ son presenting to hospital for increased lethargy  Pt with recent admission 4/10-6/3 and s/p R hemiarthroplasty 4/11/2019  Following admission; pt went to short term rehab before returning home for a short period of time  Pt agreeable to participate with extra encouragement provided w/ therapy and identified by 2 patient identifiers: name and birth date  Precautions include: obesity, cognitive deficits, fall risk and multiple lines  Pt presents supine in bed w/ multiple lines and HOB elevated  Pt has orders for up with assistance  Prior to last admission in April pt was (I) w/ use of RW   Since admission and rehab stay patient reports that she needs assistance for transfers w/ use of RW and assisted with ADLS  She reports that at home she would just lay around and get up just to use the commode  She has had 4 recent falls in past 6 months  Pt presents w/ RLE MMT 3/5, LLE MMT 3/5, supine>sit maxAx2, sit<>stand maxAx2 w/ RW and height of bed elevated, sitting EOB x10 minutes, and denies pain  Pt only able to tolerate standing position x10 seconds before fatigue  Pt needing increased time and extra encouragement  Pt would benefit from continued skilled PT for deficits in strength, balance, locomotion, functional endurance, functional mobility and safety awareness with mobility At this time recommend d/c short term rehab History: co-morbidities, fall risk, mult-level home, MAU, multiple lines, assisted for ADLS, assisted for IADLS, recent admission and impaired cognition Exam: strength, balance, functional endurance, functional mobility and safety awareness with mobility Barthel Index: 20 Modified [place]:4 Clinical: unstable/unpredictable: fall risk, obesity, impaired cognition, decreased safety awareness, use of AD and 2 person assist Complexity: high   Barriers to Discharge Inaccessible home environment   Barriers to Discharge Comments MAU   Goals   Patient Goals "to get up to the commode"   LTG Expiration Date 07/17/19   Long Term Goal #1 In 14 days pt will demonstrate: bed mobility minAx1 to to promote getting up to the commode, sit<>stand and functional transfers minAx2 w/ RW to promote getting up to commode, improve BLE by 1/2 grade strength to optimize functional mobility, improve balance by 1 grade to decrease fall risk, improve activity tolerance to >45 minutes w/o rest to improve functional endurance for home, pt and family education on PT risk, role, benefits, POC, goals, and recommendations to optimize patient outcomes, patient functional, optimize LOS and promote discharge to least restrictive environment     Treatment Day 1   Plan Treatment/Interventions LE strengthening/ROM; Functional transfer training; Therapeutic exercise; Endurance training;Patient/family training;Equipment eval/education; Bed mobility;Gait training;Spoke to nursing;OT;Family   PT Frequency Other (Comment)  (3-5x/wk)   Recommendation   Recommendation Other (Comment)  (short term rehab)   Equipment Recommended [place]; Wheelchair   PT - OK to Discharge Yes  (yes to rehab; no to home once medically stable)   Modified Leigh Scale   Modified Leigh Scale 4   Barthel Index   Feeding 5   Bathing 0   Grooming Score 0   Dressing Score 0   Bladder Score 5   Bowels Score 5   Toilet Use Score 0   Transfers (Bed/Chair) Score 5   Mobility (Level Surface) Score 0   Stairs Score 0   Barthel Index Score 20     Tamara Rush, PT ,DPT     PT TREATMENT IMMEDIATELY FOLLOWING EVALUATION    Time in: 0910  Time out: 0923  Total Time: 13 minutes      S: Pt fatigued, but willing to work on bed mobility immediately following evaluation  O: Following stand>sit back to EOB pt assisted back to bed  Pt performed sit>supine dependent x2  To reposition towards [facility] pt dependent assist x2 person in trendelenburg  For positioning and promote improved function patient worked on rolling to the R and L  Pt required maxAx2 to roll to both sides, but noted easier time rolling to the L compared to R  Pt tolerated treatment, but overall limited endurance 2* fatigue and deconditioning  At end of PT treatment pt left supine in bed w/ HOB elevated, lines intact, all needs in reach, call bell and phone in hand, and RN aware of patient status  A: Pt would benefit from continued skilled PT to progress strength, balance, mobility, and education  Gait not appropriate at this time and will progress with further progression in current PT goals as appropriate  Pt needing increased cueing for safety and technique with all function  Pt needing extra encouragement for participation and to optimize effort    P: Continued PT POC as written in initial evaluation  Recommending short term rehab      Raheem Conde, PT,DPT

## 2019-07-03 NOTE — PROGRESS NOTES
07/03/19 1000   Clinical Encounter Type   Visited With Patient and family together   Routine Visit Follow-up   Patient Spiritual Encounters   Spiritual Encounter Notes Visited with patient and son who was at bedside  Manoj Atkins did not want to discuss his mother's ongoing medical treatment but did discuss his efforts to organize his mother's affairs  He was insistent though that his mother "must come home to help fsort this all out "  continued to offer comfort and support

## 2019-07-03 NOTE — OCCUPATIONAL THERAPY NOTE
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 ([place] [address] )    Personal history of breast cancer    Personal history of pancreatic cancer    Personal history of colon cancer, stage II    Localized edema    Sepsis ([place] [address] )    ANNE (acute kidney injury) ([place] [address] )    Anemia of acute infection    Leukocytosis    Ascites    Bacterial liver abscess    Bacteremia    Chronic diastolic CHF (congestive heart failure) ([place] [address] )    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    Monoallelic mutation of CHEK2 gene    Cavernous hemangioma of intracranial structure ([place] [address] )    Pancreatic insufficiency    S/P partial colectomy    Lymphadenopathy, retroperitoneal    Hip pain    Closed fracture of right hip (HCC)    Renal insufficiency    Edema    Elevated LFTs    Bilateral lower extremity edema    Acute blood loss anemia    Hypokalemia    Peritonitis (HCC)    Obstructive jaundice    Lymphadenopathy    Ovarian cancer (HCC)    CKD (chronic kidney disease), stage III (HCC)    Hypocalcemia    Hyponatremia    Increased anion gap metabolic acidosis    Multiple lesions of metastatic malignancy [facility])     Past Medical History  Past Medical History:   Diagnosis Date    Bacteremia 2/18/2019    Cancer ([place] [address] )     Breast; Last Assessed: 8/29/2016    Carpal tunnel syndrome     unspecified laterality;  Onset: 4/23/2012    Cellulitis     Last Assessesd: 8/30/2012    Diabetes mellitus out of control ([place] [address] )     Last Assessed: 11/3/2014    Fatigue     Last Assessed: 2/11/014    Fracture of toe     Last Assessed: 11/17/2014    Pancreatic insufficiency 2/21/2019    Rectal cancer [facility])      Past Surgical History  Past Surgical History:   Procedure Laterality Date    COLON SURGERY      IR IMAGE GUIDED ASPIRATION / DRAINAGE  2/9/2019    IR IMAGE GUIDED DRAINAGE W TUBE PLACEMENT  4/20/2019    IR PARACENTESIS  5/2/2019    IR PARACENTESIS  7/1/2019    IR PICC LINE  4/20/2019    IR TUBE PLACEMENT  5/3/2019    MASTECTOMY      bilateral    PANCREATECTOMY  2010    Proximal Subtotal (whipple procedure)    MT PARTIAL HIP REPLACEMENT Right 4/11/2019    Procedure: HEMIARTHROPLASTY HIP (BIPOLAR); Surgeon: Landon Montana MD;  Location: [facility];  Service: Orthopedics           07/03/19 0924   Note Type   Note type Eval/Treat   Restrictions/Precautions   Weight Bearing Precautions Per Order No   Other Precautions Cognitive; Chair Alarm; Bed Alarm; Fall Risk;Multiple lines   Pain Assessment   Pain Assessment No/denies pain   Pain Score No Pain   Home Living   Type of Home House   Home Layout Two level; Able to live on main level with bedroom/bathroom;Stairs to enter without rails  (2 MAU)   Bathroom Shower/Tub Walk-in shower   Bathroom Toilet Standard   Bathroom Equipment Grab bars in shower;Built-in shower seat;Grab bars around toilet;Commode   Bathroom Accessibility Accessible   Home Equipment Walker;Cane;Wheelchair-manual;Other (Comment)  (Rollator)   Additional Comments Pt lives with son in a two level house with 2 MAU and 1st floor setup  Prior Function   Level of [place] Needs assistance with ADLs and functional mobility; Needs assistance with IADLs   Lives With Son   Receives Help From Family   ADL Assistance Needs assistance   IADLs Needs assistance   Falls in the last 6 months 1 to 4  (4 per pt report)   Vocational Retired   Comments Pt with recent admission at [facility] from 4/10-6/3 and recently returned home from rehab  At baseline, pt required assist w/ ADLs, IADLs, and functional mobility/transfers w/ use of RW (however reports primarily bed bound since returning home from rehab)     Lifestyle   Autonomy At baseline, pt required assist w/ ADLs, IADLs, and functional mobility/transfers w/ use of RW (however reports primarily bed bound since returning home from rehab)  Reciprocal Relationships Lives with son   Service to Others Retired   Intrinsic Gratification Watching TV   Psychosocial   Psychosocial (WDL) WDL   ADL   Where Assessed Edge of bed   Eating Assistance 4  Minimal Assistance   Grooming Assistance 4  [address] [facility] 3  Moderate Assistance   LB [address] 2  Maximal [address] 3  Moderate Assistance   LB Dressing Assistance 2  Maximal [address]  2  Maximal Assistance   Functional Assistance 2  Maximal Assistance   Bed Mobility   Rolling R 2  Maximal assistance   Additional items Assist x 2;Bedrails; Increased time required;Verbal cues;LE management   Rolling L 2  Maximal assistance   Additional items Assist x 2;Bedrails; Increased time required;Verbal cues;LE management   Supine to Sit 2  Maximal assistance   Additional items Assist x 2;HOB elevated; Bedrails; Increased time required;Verbal cues;LE management   Sit to Supine 1  Dependent   Additional items Assist x 2; Increased time required;Verbal cues;LE management   Additional Comments Pt lying supine at end of session with bed alarm activated  Call bell and phone within reach  All needs met and pt reports no further questions for OT at this time  Transfers   Sit to Stand 2  Maximal assistance   Additional items Assist x 2; Increased time required;Verbal cues  (bed height elevated)   Stand to Sit 2  Maximal assistance   Additional items Assist x 2; Increased time required;Verbal cues   Additional Comments Cues for safe technique and hand placement; Pt able to clear bottom fully from bed surface, however decreased standing tolerance demonstrated (approx   10 seconds)   Functional Mobility   Additional Comments Not appropriate at this time   Balance   Static Sitting Fair   Dynamic Sitting Fair -   Static Standing Poor +   Dynamic Standing Poor   Activity Tolerance   Activity Tolerance Patient limited by fatigue;Treatment limited secondary to medical complications (Comment)   Medical Staff Made Thanh Gilliam, PT   Nurse Made Aware yes; Reinier Ragsdale, RN   RUE Assessment   RUE Assessment WFL   RUE Strength   RUE Overall Strength Within Functional Limits - able to perform ADL tasks with strength  (3/5 throughout)   LUE Assessment   LUE Assessment WFL   LUE Strength   LUE Overall Strength Within Functional Limits - able to perform ADL tasks with strength  (3/5 throughout)   Hand Function   Gross Motor Coordination Functional   Fine Motor Coordination Functional   Sensation   Light Touch No apparent deficits   Proprioception   Proprioception No apparent deficits   Vision-Basic Assessment   Current Vision Wears glasses all the time   Vision - Complex Assessment   Ocular Range of Motion [facility] Able to read clock/calendar on wall without difficulty   Perception   Inattention/Neglect Appears intact   Cognition   Overall Cognitive Status Impaired   Arousal/Participation Alert; Cooperative   Attention Attends with cues to redirect   Orientation Level Oriented X4   Memory Decreased recall of precautions;Decreased recall of recent events;Decreased short term memory   Following Commands Follows one step commands with increased time or repetition   Assessment   Limitation Decreased ADL status; Decreased UE strength;Decreased Safe judgement during ADL;Decreased cognition;Decreased endurance;Decreased self-care trans;Decreased high-level ADLs   Prognosis Fair   Assessment Pt is a 75 y o  female seen for OT evaluation s/p adm to [address] on 6/22/2019 w/ altered mental status and dx'd w/ end-stage pancreatic cancer, CKD stage 3, and multiple metastatic brain hemorrhages  Comorbidities affecting pts functional performance include a significant PMH of Bacteremia, Cancer, Carpal tunnel syndrome, DM, Rectal cancer, and R femoral neck fx s/p R hip hemiarthroplasty on 4/11/19   Pt with active OT orders and activity orders for Ambulate  Pt lives with son in a two level house with 2 MAU and 1st floor setup  At baseline, pt required assist w/ ADLs, IADLs, and functional mobility/transfers w/ use of RW (however reports primarily bed bound since returning home from rehab)  Upon evaluation, pt currently requires Min A for eating/grooming, Mod A for UB ADLs, Max A for LB ADLs, Max A for toileting, Max A of 2 for bed mobility, and Max A of 2 for functional transfers 2* the following deficits impacting occupational performance: weakness, decreased strength, decreased balance, decreased tolerance, impaired memory, impaired problem solving, decreased safety awareness and increased pain  These impairments, as well at pts steps to enter environment, limited home support, difficulty performing ADLS and limited insight into deficits limit pts ability to safely engage in all baseline areas of occupation  Pt scored overall 20/100 on the Barthel Index  Based on the aforementioned OT evaluation, functional performance deficits, and assessments, pt has been identified as a High complexity evaluation  Pt to continue to benefit from continued acute OT services during hospital stay to address defined deficits and to maximize level of functional independence in the following Occupational Performance areas: eating, grooming, bathing/shower, toilet hygiene, dressing, health maintenance, functional mobility, community mobility, clothing management and social participation  From OT standpoint, recommend STR upon D/C  OT will continue to follow pt 2-3x/wk to address the following goals to  w/in 10-14 days:   Goals   Patient Goals To get up to the commode   LTG Time Frame 10-14   Long Term Goal Please refer to LTGs listed below   Plan   Treatment Interventions ADL retraining;Functional transfer training;UE strengthening/ROM; Endurance training;Patient/family training;Equipment evaluation/education; Compensatory technique education;Continued evaluation; Activityengagement; Energy conservation   Goal Expiration Date 07/17/19   Treatment Day 1   OT Frequency 2-3x/wk   Additional Treatment Session   Start Time 0913   End Time 5744   Treatment Assessment Pt seen for additional treatment session this AM focusing on functional activity tolerance, bed mobility, and ADLs  Pt alert and cooperative throughout session  Pt seated EOB after completion of initial evaluation  Long sit EOB completed to increase core strength, seated balance/tolerance, and overall functional activitity tolerance  Pt demonstrated Fair static seated balance and Fair- dynamic seated balance  Pt able to tolerate approx  10 mins of upright seated position prior to requesting to return to supine position  Pt returned to supine position at a Dependent x2 level  Rolling L/R completed with Max A of 2 for removal of soiled bed pad and placement of new bed pads  Max A required for toileting tasks 2* decreased functional reach demonstrated in side lying position  Pt lying supine at end of session with bed alarm activated  Call bell and phone within reach  All needs met and pt reports no further questions for OT at this time  Continue to recommend STR upon D/C      Additional Treatment Day 1   Recommendation   OT Discharge Recommendation Short Term Rehab   OT - OK to Discharge Yes  (when medically cleared to rehab)   Barthel Index   Feeding 5   Bathing 0   Grooming Score 0   Dressing Score 0   Bladder Score 5   Bowels Score 5   Toilet Use Score 0   Transfers (Bed/Chair) Score 5   Mobility (Level Surface) Score 0   Stairs Score 0   Barthel Index Score 20   Modified [place] Scale   Modified [place] Scale 4        GOALS    1) Pt will improve activity tolerance to G for min 30 min txment sessions for increase engagement in functional tasks    2) Pt will complete bed mobility at a Mod A level w/ G balance/safety demonstrated to decrease caregiver assistance required     3) Pt will complete UB dressing/self care w/ Supervision using adaptive device and DME as needed    4) Pt will complete UB/LB dressing/self care w/ min A using adaptive device and DME as needed    5) Pt will complete toileting w/ min A w/ G hygiene/thoroughness using DME as needed    6) Pt will improve functional transfers to Mod A on/off all surfaces using DME as needed w/ G balance/safety     7) Pt will tolerate continued functional mobility assessment and appropriate goals will be established by OTR as applicable     8) Pt will be attentive 100% of the time during ongoing cognitive assessment w/ G participation to assist w/ safe d/c planning/recommendations    9) Pt will demonstrate G carryover of pt/caregiver education and training as appropriate w/o cues w/ good tolerance to increase safety during functional tasks    10) Pt will demonstrate 100% carryover of energy conservation techniques t/o functional I/ADL/leisure tasks w/o cues s/p skilled education to increase endurance during functional tasks     11) Pt will increase BUE strength by 1MM grade to increase independence in ADLs and transfers      Mary Baig OTR/CORDELL

## 2019-07-03 NOTE — PLAN OF CARE
Problem: PHYSICAL THERAPY ADULT  Goal: Performs mobility at highest level of function for planned discharge setting  See evaluation for individualized goals  Description  Treatment/Interventions: LE strengthening/ROM, Functional transfer training, Therapeutic exercise, Endurance training, Patient/family training, Equipment eval/education, Bed mobility, Gait training, Spoke to nursing, OT, Family  Equipment Recommended: [place], Wheelchair       See flowsheet documentation for full assessment, interventions and recommendations  Outcome: Progressing  Note:   Prognosis: Good  Problem List: Decreased strength, Decreased endurance, Impaired balance, Decreased mobility, Decreased safety awareness, Decreased cognition, Obesity  Assessment: PT consult received and evaluation complete  Pt is 75 y o  Female admitted from home w/ son presenting to hospital for increased lethargy  Pt with recent admission 4/10-6/3 and s/p R hemiarthroplasty 4/11/2019  Following admission; pt went to short term rehab before returning home for a short period of time  Pt agreeable to participate with extra encouragement provided w/ therapy and identified by 2 patient identifiers: name and birth date  Precautions include: obesity, cognitive deficits, fall risk and multiple lines  Pt presents supine in bed w/ multiple lines and HOB elevated  Pt has orders for up with assistance  Prior to last admission in April pt was (I) w/ use of RW  Since admission and rehab stay patient reports that she needs assistance for transfers w/ use of RW and assisted with ADLS  She reports that at home she would just lay around and get up just to use the commode  She has had 4 recent falls in past 6 months  Pt presents w/ RLE MMT 3/5, LLE MMT 3/5, supine>sit maxAx2, sit<>stand maxAx2 w/ RW and height of bed elevated, sitting EOB x10 minutes, and denies pain  Pt only able to tolerate standing position x10 seconds before fatigue   Pt needing increased time and extra encouragement  Pt would benefit from continued skilled PT for deficits in strength, balance, locomotion, functional endurance, functional mobility and safety awareness with mobility At this time recommend d/c short term rehab History: co-morbidities, fall risk, mult-level home, MAU, multiple lines, assisted for ADLS, assisted for IADLS, recent admission and impaired cognition Exam: strength, balance, functional endurance, functional mobility and safety awareness with mobility Barthel Index: 20 Modified Leigh:4 Clinical: unstable/unpredictable: fall risk, obesity, impaired cognition, decreased safety awareness, use of AD and 2 person assist Complexity: high  Barriers to Discharge: Inaccessible home environment  Barriers to Discharge Comments: MAU  Recommendation: Other (Comment)(short term rehab)     PT - OK to Discharge: Yes(yes to rehab; no to home once medically stable)    See flowsheet documentation for full assessment        Ainsley Betancourt, PT, DPT, GCS

## 2019-07-03 NOTE — PLAN OF CARE
Problem: OCCUPATIONAL THERAPY ADULT  Goal: Performs self-care activities at highest level of function for planned discharge setting  See evaluation for individualized goals  Description  Treatment Interventions: ADL retraining, Functional transfer training, UE strengthening/ROM, Endurance training, Patient/family training, Equipment evaluation/education, Compensatory technique education, Continued evaluation, Activityengagement, Energy conservation          See flowsheet documentation for full assessment, interventions and recommendations  Outcome: Progressing  Note:   Limitation: Decreased ADL status, Decreased UE strength, Decreased Safe judgement during ADL, Decreased cognition, Decreased endurance, Decreased self-care trans, Decreased high-level ADLs  Prognosis: Fair  Assessment: Pt is a 75 y o  female seen for OT evaluation s/p adm to [facility] [address] on 6/22/2019 w/ altered mental status and dx'd w/ end-stage pancreatic cancer, CKD stage 3, and multiple metastatic brain hemorrhages  Comorbidities affecting pts functional performance include a significant PMH of Bacteremia, Cancer, Carpal tunnel syndrome, DM, Rectal cancer, and R femoral neck fx s/p R hip hemiarthroplasty on 4/11/19  Pt with active OT orders and activity orders for Ambulate  Pt lives with son in a two level house with 2 MAU and 1st floor setup  At baseline, pt required assist w/ ADLs, IADLs, and functional mobility/transfers w/ use of RW (however reports primarily bed bound since returning home from rehab)  Upon evaluation, pt currently requires Min A for eating/grooming, Mod A for UB ADLs, Max A for LB ADLs, Max A for toileting, Max A of 2 for bed mobility, and Max A of 2 for functional transfers 2* the following deficits impacting occupational performance: weakness, decreased strength, decreased balance, decreased tolerance, impaired memory, impaired problem solving, decreased safety awareness and increased pain   These impairments, as well at pts steps to enter environment, limited home support, difficulty performing ADLS and limited insight into deficits limit pts ability to safely engage in all baseline areas of occupation  Pt scored overall 20/100 on the Barthel Index  Based on the aforementioned OT evaluation, functional performance deficits, and assessments, pt has been identified as a High complexity evaluation  Pt to continue to benefit from continued acute OT services during hospital stay to address defined deficits and to maximize level of functional independence in the following Occupational Performance areas: eating, grooming, bathing/shower, toilet hygiene, dressing, health maintenance, functional mobility, community mobility, clothing management and social participation  From OT standpoint, recommend STR upon D/C   OT will continue to follow pt 2-3x/wk to address the following goals to  w/in 10-14 days:     OT Discharge Recommendation: Short Term Rehab  OT - OK to Discharge: Yes(when medically cleared to rehab)

## 2019-07-03 NOTE — PROGRESS NOTES
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)    Personal history of breast cancer    ANNE (acute kidney injury) ([facility] )    Anemia of acute infection    Chronic diastolic CHF (congestive heart failure) (HCC)    Pancreatic insufficiency    Closed fracture of right hip (HCC)    CKD (chronic kidney disease), stage III (HCC)    Hyponatremia    Increased anion gap metabolic acidosis    Multiple lesions of metastatic malignancy ([facility] )      Plan:  1-acute kidney injury superimposed on chronic kidney disease stage 3:  Patient's history of chronic kidney disease stage 3 with baseline creatinine 1 0-1 2               -patient presented with acute kidney injury with a creatinine of 1 57 which peaked at 1 9               -patient was placed on IV fluids with improvement in her creatinine of 1 71 when most recently checked on 06/29:  Obtain yesterday July 1st with creatinine down to 1 13 BUN remains elevated 83 sodium is depressed at 127               -have been unable to get labs despite multiple attempts  Labs were initially discontinued as patient had elected hospice measures  Patient and her son currently wish aggressive medical therapy, lab work, interventions, and chemotherapy                -PICC line r placed to obtain lab work, albumin markedly depressed probably explaining her diffuse edema /transaminases have normalized PT and INR remains elevated 2 0 would discontinue DVT prophylaxis as result with enoxaparin/leukocytosis remains elevated trending upward     2-stage IV pancreatic cancer:  Patient follows at [facility] and had previously been on chemotherapy   She had a prior Whipple procedure   Patient's malignancy has progressed  Paulett Dies has a probable new renal malignancy, as well as a 2 5 cm right hepatic lobe mass     On her recent admission 05/2019t she had EUS with positive biopsies   Patient's CT scan of the brain was concerning for possible hemorrhagic metastatic lesions as well, although MRI confirmation was recommended has been held thus far acute kidney injury creatinine has improved hour BUN remains significantly elevated sees CEA elevated 19 8 along with CA 19 9 presume in relation to recurrence of her already known previous pancreatic CA her ongoing leukocytosis in relation diffuse tumor burden              -Patient's care was collaborated by the ICU team with her [facility] oncologist who determined that there was no future chemotherapy that would be beneficial or indicated   Palliative care was recommended, especially of light of her progressive disease               -KALE and her family note that they do not wish hospice or comfort measures  They wish aggressive therapy and are investigating a private non-health system affiliated company for a 2nd opinion regarding intrathecal and systemic chemotherapy   The have requested records be sent               -WC was previously noted by Shasha Guptagle if her bili remains elevated, chemo would be precluded  Is further documented by both the palliative care team who has been on consultation and the primary care team as follows and I quote Dr Beth owusu from the palliative care team as follows and I am in full agreement that:  "   the family has found a recommendation from a non-hospital based 'consultant' company, who has promised them systemic and intrathecal chemotherapy if the family will find a neurosurgeon to place an [place] reservoir, and if a hospital will supervise her chemotherapy  The family have made formal requests for pt's labs and data to be sent to this company, so named [employer]'  "In our experience, provision of chemo to a patient in renal failure, with an ECOG of 4, with a bili >3, at Stage IV disease with brain mets and an underlying genetic predilection towards cancer is completely unethical, entirely without medical indication, and likely immediately dangerous to the patient  Dr Sam eLos and I agree that the patient has a terminal illness, and this has been identified numerous times to the family by myself personally, with a prognosis quoted to them of weeks to months  Numerous staff from the ICU and my team have recommended hospice to the family instead of hospital cares and chemo  "At this time, we would categorize placement of an [place] reservoir and/or provision of systemic IV chemotherapy as non-beneficial treatment  It will not improve this patient's health, and places her at significant risk of harm, pain, and even death  We support Dr Sam Leos and the primary team in refusing to comply with the family's request to use [facility] resources for such unethical cares, which appear to be orchestrated by an external and unlicensed company, and arranged exclusively for profit, rather than patient benefits         3-possible hemorrhagic metastatic brain lesions:  Possible, noted on CT scan   Would need MRI with IV contrast for confirmation   Contrasted test postponed due to acute kidney injury we cannot update a acute injury status can possibly pursue contrasted MRI although apparently will not  change treatment plan if it of further evidence of hemorrhagic change however would discontinue DVT prophylaxis continue dexamethasone 4 mg b i d    Dr Fawn owusu has consulted with the neurosurgery group Dr Arua Goldberg in regards to suggested [facility] reservoir agents and intrathecal chemotherapy they felt would be contraindicated with this presentation in that brain lesions are too deep in the parenchyma to be reach by intrathecal chemotherapy      4-diabetes type 2:  Secondary to pancreatectomy:  Continue Accu-Cheks and sliding scale insulin              -patient had previously been on Lantus   She was discharged on sliding scale insulin alone               -current blood sugars remain elevated in the 300's, likely secondary to the dexamethasone:                -will increase Lantus further to 18 units at bedtime, and increased pre meal NovoLog to 14 units t i d   Continue Accu-Cheks and sliding scale insulin      5-benign hypertension:  Patient has a prior history of benign hypertension  Keke Gonzales is currently normotensive on no medication                 -blood pressure today currently ranging a systolic of 413-265 predominantly  Accelerated at times due to pain    Cont to monitor     6-chronic diastolic congestive heart failure:  Patient's most recent echocardiogram revealed a left ventricular ejection fraction of 60% and grade 1 diastolic dysfunction   Patient is on torsemide previously   Held for acute kidney injury     7-pancreatic insufficiency:  With chronic diarrhea:  Patient now tolerating p o , and was restarted on pancrelipase   Patient has a history of chronic diarrhea              -cont creon     8-history of Enterococcus peritonitis:  Patient was previously admitted for enterococcal peritonitis and hepatic abscesses requiring drains  Marianne Dowse still in place   Followed by IR   Completed antibiotic course she presently has no febrile course or significant abdominal pain drains all still in place     9-history of right hip fracture and fall:  Supportive measures     10-recurrent ascites:  Requiring intermittent paracentesis:                -Will consult IR for paracentesis/underwent paracentesis with removal 4200 cc today     11-no iv access: consult for picc line placement performed today along with paracentesis of 4200 cc of clear ascites removed          Grace Hernandez has been consulted in regards to further chemotherapy and have advise termination of all chemotherapy     Anticipate pt will be d/c to  long-term care facility due to deconditioning and terminal course  Pt and family wish to pursue second opinion for chemotherapy with [employer]  Arrangements of this will have to be made by the son, palliative care team and primary care team have advised both patient and son of this and have a fax of labs and notes to [employer] patient advisor Jem Toney fax [phone]/[facility] [phone]/XA this point will have to go ahead with discharge planning for home cares as doubt rehab facility will take in this presentation       ·       VTE Pharmacologic Prophylaxis:   Pharmacologic: Enoxaparin (Lovenox)  Mechanical VTE Prophylaxis in Place: Yes    Discussions with Specialists or Other Care Team Provider:     Time Spent for Care: 45 minutes  More than 50% of total time spent on counseling and coordination of care as described above  Subjective:   Patient is emaciated looking cachectic still having some periods of diarrhea daily but more concerned with the her history of constipation in relation treatment for diarrhea  Tolerating her diet without emesis tolerated paracentesis without significant hypotension    Objective:     Vitals:   Temp (24hrs), Av 9 °F (36 1 °C), Min:96 4 °F (35 8 °C), Max:97 2 °F (36 2 °C)    Temp:  [96 4 °F (35 8 °C)-97 2 °F (36 2 °C)] 97 °F (36 1 °C)  HR:  [58-60] 60  Resp:  [18-20] 18  BP: (131-175)/(77-84) 131/77  SpO2:  [98 %-100 %] 100 %  Body mass index is 32 54 kg/m²  Input and Output Summary (last 24 hours):        Intake/Output Summary (Last 24 hours) at 7/3/2019 1104  Last data filed at 7/3/2019 0730  Gross per 24 hour   Intake    Output 1010 ml   Net -1010 ml       Physical Exam:     Physical Exam: General appearance: alert, cachectic, fatigued, appears stated age and cooperative  Head: Normocephalic, without obvious abnormality, atraumatic  Lungs: diminished breath sounds  Heart: regular rate and rhythm  Abdomen: 3 drains with varying rates of return positive ascites  Back: negative  Extremities: extremities normal, atraumatic, no cyanosis or edema  Neurologic: Grossly normal      Additional Data:     Labs:    Results from last 7 days   Lab Units 07/01/19  1617   WBC Thousand/uL 32 21*   HEMOGLOBIN g/dL 10 6*   HEMATOCRIT % 33 8*   PLATELETS Thousands/uL 230     Results from last 7 days   Lab Units 07/03/19  0619 07/01/19  1617   POTASSIUM mmol/L 5 1 5 1   CHLORIDE mmol/L 103 98*   CO2 mmol/L 19* 18*   BUN mg/dL 71* 83*   CREATININE mg/dL 0 90 1 13   CALCIUM mg/dL 8 6 8 5   ALK PHOS U/L  --  180*   ALT U/L  --  18   AST U/L  --  16     Results from last 7 days   Lab Units 07/01/19  1617   INR  2 03*       * I Have Reviewed All Lab Data Listed Above  * Additional Pertinent Lab Tests Reviewed: [facility] Admission Reviewed    Imaging:  Ct Head Wo Contrast    Result Date: 6/26/2019  Narrative: CT BRAIN - WITHOUT CONTRAST INDICATION:   Intracranial hemorrhage  Breast cancer, colon cancer, pancreatic cancer, ovarian cancer  COMPARISON:  Head CT from June 22, 2019 performed at 3:13 PM  TECHNIQUE:  CT examination of the brain was performed  In addition to axial images, coronal 2D reformatted images were created and submitted for interpretation  Radiation dose length product (DLP) for this visit:  864 mGy-cm   This examination, like all CT scans performed in the [facility], was performed utilizing techniques to minimize radiation dose exposure, including the use of iterative reconstruction and automated exposure control  IMAGE QUALITY:  Diagnostic   FINDINGS: PARENCHYMA:  Redemonstrated are the multiple foci of hyperattenuation within the left cerebral hemisphere and the bilateral kaye  None of these lesions are associated with surrounding parenchymal edema or mass effect  There is no associated parenchymal  calcification either  Stable arachnoid cyst in the left parasagittal frontal vertex  Mild periventricular hypoattenuation suggestive of chronic microangiopathic disease  VENTRICLES AND EXTRA-AXIAL SPACES:  Mild ventriculomegaly, commensurate with the degree of generalized cerebral and cerebellar sulcal enlargement  VISUALIZED ORBITS AND PARANASAL SINUSES:  Unremarkable  CALVARIUM AND EXTRACRANIAL SOFT TISSUES:  Stable nonspecific subcentimeter lucencies within the frontal and parietal calvarium, particularly at the vertex  Impression: Stable foci of hyperattenuation within the left cerebral hemisphere and the brainstem  While metastatic disease is a consideration, given the history of multiple malignancies, the lack of associated edema favors an alternative consideration such as cavernous angiomas  Nonemergent MR evaluation could be utilized for confirmation  Left parasagittal frontal arachnoid cyst  Workstation performed: [id number]     Ct Head Without Contrast    Result Date: 6/22/2019  Narrative: CT BRAIN - WITHOUT CONTRAST INDICATION:   altered mental status  History of breast cancer  COMPARISON:  None  TECHNIQUE:  CT examination of the brain was performed  In addition to axial images, coronal 2D reformatted images were created and submitted for interpretation  Radiation dose length product (DLP) for this visit:  921 mGy-cm   This examination, like all CT scans performed in the [facility], was performed utilizing techniques to minimize radiation dose exposure, including the use of iterative reconstruction and automated exposure control  IMAGE QUALITY:  Diagnostic   FINDINGS: PARENCHYMA:  There is a 15 x 10 mm (series 2, image 18) hyperdense lesion lateral to the left thalamus; a 7 mm hyperdense lesion at the left parietal lobe (series 2, image 21 is (; a 7 mm hypodense lesion at the medial left frontal lobe (series 2, image 19); a 7 mm hyperdense lesion at the left hemipons  A 6 mm hyperdense lesion at the right hemipons  No mass effect or midline shift  No CT signs of acute infarction  No acute parenchymal hemorrhage  There is mild periventricular white matter low attenuation which is nonspecific and most likely related to chronic small vessel ischemic changes  VENTRICLES AND EXTRA-AXIAL SPACES:  There is prominence of the ventricles and sulci related to mild volume loss  VISUALIZED ORBITS AND PARANASAL SINUSES:  Unremarkable  CALVARIUM AND EXTRACRANIAL SOFT TISSUES:  Normal      Impression: A few scattered pontine and left supratentorial lesions suspicious for hemorrhagic metastases  An enhanced MRI of the brain is recommended for further evaluation  No mass effect or midline shift  Mild chronic small vessel ischemic changes and volume loss  Workstation performed: [id number]     Us Right Upper Quadrant    Result Date: 6/26/2019  Narrative: RIGHT UPPER QUADRANT ULTRASOUND INDICATION:     Abnormal bilirubin  Whipple procedure  Metastatic disease  COMPARISON:  Abdominal MRI performed May 19, 2019  TECHNIQUE:   Real-time ultrasound of the right upper quadrant was performed with a curvilinear transducer with both volumetric sweeps and still imaging techniques  Examination is unfortunately quite technically limited as this patient was unable to cooperate with breath-hold imaging and abdominal dressings and drains obscured imaging windows  FINDINGS: PANCREAS:  Portions of the pancreas are obscured by bowel gas  Visualized portions of the pancreas are unremarkable  AORTA AND IVC:  Obscured by bowel gas  LIVER: Hypoechoic right hepatic lobe mass measuring 2 6 cm is noted, probably representing a metastatic lesion, but is incompletely characterized  No other definite hepatic mass is identified    Prominence of central intrahepatic biliary tree is noted suggesting intrahepatic biliary dilatation  Perihepatic ascites is noted  BILIARY: Neobladder is surgically absent  No intrahepatic biliary dilatation  CBD measures 3 mm  No choledocholithiasis  KIDNEY: Again noted is somewhat atrophic upper pole right kidney  Right kidney is estimated at approximately 7 cm and is poorly evaluated on this examination with normal renal architecture not well seen  ASCITES:   Moderate volume of complex ascites  Impression: Markedly limited examination demonstrating appears to represent 2 6 cm periportal right hepatic lobe metastatic lesion, as well as mild central intrahepatic biliary dilatation  Moderate volume of complex ascites  Cross-sectional imaging would likely provide for more comprehensive evaluation if clinically warranted  Workstation performed: [id number]     Imaging Reports Reviewed Today Include:   Imaging Personally Reviewed by Myself Includes:    No results found       Recent Cultures (last 7 days):           Last 24 Hours Medication List:     Current Facility-Administered Medications:  al mag oxide-diphenhydramine-lidocaine viscous 10 mL Swish & Swallow 4x Daily Marc Patel MD   dexamethasone 4 mg Oral BID (AM & Afternoon) Ashley Angeles MD   diphenhydrAMINE-zinc acetate  Topical TID PRN Loralyn Kellytle, CRNP   insulin glargine 18 Units Subcutaneous HS Parris Boyle MD   insulin lispro 1-6 Units Subcutaneous TID AC Loralyn Mandy, CRNP   insulin lispro 1-6 Units Subcutaneous HS Loralyn Kettle, CRNP   insulin lispro 14 Units Subcutaneous TID With Meals Parris Boyle MD   lidocaine 1 patch Topical Daily Lordayanan Mandy, BILLYNP   lidocaine  Topical 4x Daily PRN Marc Patel MD   oxyCODONE 5 mg Oral Q4H PRN Marc Patel MD   pancrelipase (Lip-Prot-Amyl) 48,000 Units Oral TID With John Bradshaw MD        Today, Patient Was Seen By: Parris Boyle MD    ** Please Note: Dragon 360 Dictation voice to text software may have been used in the creation of this document   **

## 2019-07-04 LAB
GLUCOSE SERPL-MCNC: 106 MG/DL (ref 65–140)
GLUCOSE SERPL-MCNC: 123 MG/DL (ref 65–140)
GLUCOSE SERPL-MCNC: 174 MG/DL (ref 65–140)
GLUCOSE SERPL-MCNC: 175 MG/DL (ref 65–140)

## 2019-07-04 PROCEDURE — 82948 REAGENT STRIP/BLOOD GLUCOSE: CPT

## 2019-07-04 PROCEDURE — 99232 SBSQ HOSP IP/OBS MODERATE 35: CPT | Performed by: INTERNAL MEDICINE

## 2019-07-04 RX ADMIN — INSULIN GLARGINE 18 UNITS: 100 INJECTION, SOLUTION SUBCUTANEOUS at 22:17

## 2019-07-04 RX ADMIN — INSULIN LISPRO 1 UNITS: 100 INJECTION, SOLUTION INTRAVENOUS; SUBCUTANEOUS at 08:39

## 2019-07-04 RX ADMIN — PANCRELIPASE 48000 UNITS: 24000; 76000; 120000 CAPSULE, DELAYED RELEASE PELLETS ORAL at 08:40

## 2019-07-04 RX ADMIN — LIDOCAINE 1 PATCH: 50 PATCH TOPICAL at 08:41

## 2019-07-04 RX ADMIN — LIDOCAINE HYDROCHLORIDE 10 ML: 20 SOLUTION ORAL; TOPICAL at 22:18

## 2019-07-04 RX ADMIN — OXYCODONE HYDROCHLORIDE 5 MG: 5 TABLET ORAL at 19:48

## 2019-07-04 RX ADMIN — DEXAMETHASONE 4 MG: 4 TABLET ORAL at 08:40

## 2019-07-04 RX ADMIN — INSULIN LISPRO 1 UNITS: 100 INJECTION, SOLUTION INTRAVENOUS; SUBCUTANEOUS at 11:43

## 2019-07-04 RX ADMIN — DEXAMETHASONE 4 MG: 4 TABLET ORAL at 13:40

## 2019-07-04 RX ADMIN — OXYCODONE HYDROCHLORIDE 5 MG: 5 TABLET ORAL at 13:41

## 2019-07-04 RX ADMIN — PANCRELIPASE 48000 UNITS: 24000; 76000; 120000 CAPSULE, DELAYED RELEASE PELLETS ORAL at 16:16

## 2019-07-04 RX ADMIN — PANCRELIPASE 48000 UNITS: 24000; 76000; 120000 CAPSULE, DELAYED RELEASE PELLETS ORAL at 11:43

## 2019-07-04 NOTE — PROGRESS NOTES
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 [facility])    Personal history of breast cancer    ANNE (acute kidney injury) ([facility] [address] )    Anemia of acute infection    Chronic diastolic CHF (congestive heart failure) (HCC)    Pancreatic insufficiency    Closed fracture of right hip (HCC)    CKD (chronic kidney disease), stage III (HCC)    Hyponatremia    Increased anion gap metabolic acidosis    Multiple lesions of metastatic malignancy ([facility] )      Plan:  1-acute kidney injury superimposed on chronic kidney disease stage 3:  Patient's history of chronic kidney disease stage 3 with baseline creatinine 1 0-1 2               -patient presented with acute kidney injury with a creatinine of 1 57 which peaked at 1 9               -patient was placed on IV fluids with improvement in her creatinine of 1 71 when most recently checked on 06/29:  Obtain yesterday July 1st with creatinine down to 1 13 BUN remains elevated 83 sodium is depressed at 127               -have been unable to get labs despite multiple attempts  Labs were initially discontinued as patient had elected hospice measures  Patient and her son currently wish aggressive medical therapy, lab work, interventions, and chemotherapy                -PICC line r placed to obtain lab work, albumin markedly depressed probably explaining her diffuse edema /transaminases have normalized PT and INR remains elevated 2 0 would discontinue DVT prophylaxis as result with enoxaparin/leukocytosis remains elevated trending upward     2-stage IV pancreatic cancer:  Patient follows at [facility] and had previously been on chemotherapy   She had a prior Whipple procedure   Patient's malignancy has progressed  Moody Harp has a probable new renal malignancy, as well as a 2 5 cm right hepatic lobe mass     On her recent admission 05/2019t she had EUS with positive biopsies   Patient's CT scan of the brain was concerning for possible hemorrhagic metastatic lesions as well, although MRI confirmation was recommended has been held thus far acute kidney injury creatinine has improved hour BUN remains significantly elevated sees CEA elevated 19 8 along with CA 19 9 presume in relation to recurrence of her already known previous pancreatic CA her ongoing leukocytosis in relation diffuse tumor burden              -Patient's care was collaborated by the ICU team with her [facility] oncologist who determined that there was no future chemotherapy that would be beneficial or indicated   Palliative care was recommended, especially of light of her progressive disease               -ANTHONY and her family note that they do not wish hospice or comfort measures  They wish aggressive therapy and are investigating a private non-health system affiliated company for a 2nd opinion regarding intrathecal and systemic chemotherapy   The have requested records be sent               -JM was previously noted by [facility] if her bili remains elevated, chemo would be precluded  Is further documented by both the palliative care team who has been on consultation and the primary care team as follows and I quote Dr Clemencia Denver stone from the palliative care team as follows and I am in full agreement that:  "   the family has found a recommendation from a non-hospital based 'consultant' company, who has promised them systemic and intrathecal chemotherapy if the family will find a neurosurgeon to place an [place] reservoir, and if a hospital will supervise her chemotherapy  The family have made formal requests for pt's labs and data to be sent to this company, so named [employer]'  "In our experience, provision of chemo to a patient in renal failure, with an ECOG of 4, with a bili >3, at Stage IV disease with brain mets and an underlying genetic predilection towards cancer is completely unethical, entirely without medical indication, and likely immediately dangerous to the patient  Dr Collette High and I agree that the patient has a terminal illness, and this has been identified numerous times to the family by myself personally, with a prognosis quoted to them of weeks to months  Numerous staff from the ICU and my team have recommended hospice to the family instead of hospital cares and chemo  "At this time, we would categorize placement of an [place] reservoir and/or provision of systemic IV chemotherapy as non-beneficial treatment  It will not improve this patient's health, and places her at significant risk of harm, pain, and even death  We support Dr Collette High and the primary team in refusing to comply with the family's request to use [employer] resources for such unethical cares, which appear to be orchestrated by an external and unlicensed company, and arranged exclusively for profit, rather than patient benefits         3-possible hemorrhagic metastatic brain lesions:  Possible, noted on CT scan   Would need MRI with IV contrast for confirmation   Contrasted test postponed due to acute kidney injury we cannot update a acute injury status can possibly pursue contrasted MRI although apparently will not  change treatment plan if it of further evidence of hemorrhagic change however would discontinue DVT prophylaxis continue dexamethasone 4 mg b i d    Dr Jamari owusu has consulted with the neurosurgery group Dr Chris Handley in regards to suggested [facility] reservoir agents and intrathecal chemotherapy they felt would be contraindicated with this presentation in that brain lesions are too deep in the parenchyma to be reach by intrathecal chemotherapy      4-diabetes type 2:  Secondary to pancreatectomy:  Continue Accu-Cheks and sliding scale insulin              -patient had previously been on Lantus   She was discharged on sliding scale insulin alone               -current blood sugars remain elevated in the 300's, likely secondary to the dexamethasone:                -will increase Lantus further to 18 units at bedtime, and increased pre meal NovoLog to 14 units t i d   Continue Accu-Cheks and sliding scale insulin      5-benign hypertension:  Patient has a prior history of benign hypertension  Venkatesh  is currently normotensive on no medication                 -blood pressure today currently ranging a systolic of 275-601 predominantly  Accelerated at times due to pain    Cont to monitor     6-chronic diastolic congestive heart failure:  Patient's most recent echocardiogram revealed a left ventricular ejection fraction of 60% and grade 1 diastolic dysfunction   Patient is on torsemide previously   Held for acute kidney injury     7-pancreatic insufficiency:  With chronic diarrhea:  Patient now tolerating p o , and was restarted on pancrelipase   Patient has a history of chronic diarrhea              -cont creon     8-history of Enterococcus peritonitis:  Patient was previously admitted for enterococcal peritonitis and hepatic abscesses requiring drains  Durene Addie still in place   Followed by IR   Completed antibiotic course she presently has no febrile course or significant abdominal pain drains all still in place     9-history of right hip fracture and fall:  Supportive measures     10-recurrent ascites:  Requiring intermittent paracentesis:                -Will consult IR for paracentesis/underwent paracentesis with removal 4200 cc today     11-no iv access: consult for picc line placement performed today along with paracentesis of 4200 cc of clear ascites removed          Dispo Angelo Nageotte has been consulted in regards to further chemotherapy and have advise termination of all chemotherapy     Anticipate pt will be d/c to to home due to deconditioning and terminal course and would need VNA as they are refusing hospice  Pt and family wish to pursue second opinion for chemotherapy with [employer]  Palliative care team and primary care team have advised both patient and son of consensus opinion that chemotherapy at this point is not only not indicated but on ethical and are regard and patient's sonLuis has acknowledge this opinion and at his request we have  fax of labs and notes to [employer] patient advisor Terence Tran fax [phone]/RIPPE [phone]/[place] the  patient's son acknowledge that company received this information and at this point will have to go ahead with discharge planning for home cares as doubt rehab facility will take in this presentation  He requested that we try and arrange for discharge on  and set up VNA services and understands that further therapies that he wishes would have to be set up by himself       ·       VTE Pharmacologic Prophylaxis:   Pharmacologic: Enoxaparin (Lovenox)  Mechanical VTE Prophylaxis in Place: Yes    Discussions with Specialists or Other Care Team Provider:     Time Spent for Care: 45 minutes  More than 50% of total time spent on counseling and coordination of care as described above  Subjective:   Patient is emaciated looking cachectic still having some periods of diarrhea daily but more concerned with the her history of constipation in relation treatment for diarrhea    Tolerating her diet without emesis tolerated paracentesis without significant hypotension    Objective:     Vitals:   Temp (24hrs), Av 5 °F (36 4 °C), Min:97 3 °F (36 3 °C), Max:97 6 °F (36 4 °C)    Temp: [97 3 °F (36 3 °C)-97 6 °F (36 4 °C)] 97 6 °F (36 4 °C)  HR:  [69-75] 75  Resp:  [18-20] 20  BP: (125-143)/(66-79) 143/67  SpO2:  [99 %-100 %] 100 %  Body mass index is 32 8 kg/m²  Input and Output Summary (last 24 hours): Intake/Output Summary (Last 24 hours) at 7/4/2019 0785  Last data filed at 7/3/2019 2200  Gross per 24 hour   Intake 120 ml   Output 310 ml   Net -190 ml       Physical Exam:     Physical Exam:   General appearance: alert, cachectic, fatigued, appears stated age and cooperative  Head: Normocephalic, without obvious abnormality, atraumatic  Lungs: diminished breath sounds  Heart: regular rate and rhythm  Abdomen: 3 drains with varying rates of return positive ascites  Back: negative  Extremities: extremities normal, atraumatic, no cyanosis or edema  Neurologic: Grossly normal      Additional Data:     Labs:    Results from last 7 days   Lab Units 07/01/19  1617   WBC Thousand/uL 32 21*   HEMOGLOBIN g/dL 10 6*   HEMATOCRIT % 33 8*   PLATELETS Thousands/uL 230     Results from last 7 days   Lab Units 07/03/19  0619 07/01/19  1617   POTASSIUM mmol/L 5 1 5 1   CHLORIDE mmol/L 103 98*   CO2 mmol/L 19* 18*   BUN mg/dL 71* 83*   CREATININE mg/dL 0 90 1 13   CALCIUM mg/dL 8 6 8 5   ALK PHOS U/L  --  180*   ALT U/L  --  18   AST U/L  --  16     Results from last 7 days   Lab Units 07/01/19  1617   INR  2 03*       * I Have Reviewed All Lab Data Listed Above  * Additional Pertinent Lab Tests Reviewed: [facility] Admission Reviewed    Imaging:  Ct Head Wo Contrast    Result Date: 6/26/2019  Narrative: CT BRAIN - WITHOUT CONTRAST INDICATION:   Intracranial hemorrhage  Breast cancer, colon cancer, pancreatic cancer, ovarian cancer  COMPARISON:  Head CT from June 22, 2019 performed at 3:13 PM  TECHNIQUE:  CT examination of the brain was performed  In addition to axial images, coronal 2D reformatted images were created and submitted for interpretation    Radiation dose length product (DLP) for this visit:  864 mGy-cm   This examination, like all CT scans performed in the [facility], was performed utilizing techniques to minimize radiation dose exposure, including the use of iterative reconstruction and automated exposure control  IMAGE QUALITY:  Diagnostic  FINDINGS: PARENCHYMA:  Redemonstrated are the multiple foci of hyperattenuation within the left cerebral hemisphere and the bilateral kaye  None of these lesions are associated with surrounding parenchymal edema or mass effect  There is no associated parenchymal  calcification either  Stable arachnoid cyst in the left parasagittal frontal vertex  Mild periventricular hypoattenuation suggestive of chronic microangiopathic disease  VENTRICLES AND EXTRA-AXIAL SPACES:  Mild ventriculomegaly, commensurate with the degree of generalized cerebral and cerebellar sulcal enlargement  VISUALIZED ORBITS AND PARANASAL SINUSES:  Unremarkable  CALVARIUM AND EXTRACRANIAL SOFT TISSUES:  Stable nonspecific subcentimeter lucencies within the frontal and parietal calvarium, particularly at the vertex  Impression: Stable foci of hyperattenuation within the left cerebral hemisphere and the brainstem  While metastatic disease is a consideration, given the history of multiple malignancies, the lack of associated edema favors an alternative consideration such as cavernous angiomas  Nonemergent MR evaluation could be utilized for confirmation  Left parasagittal frontal arachnoid cyst  Workstation performed: [id number]     Ct Head Without Contrast    Result Date: 6/22/2019  Narrative: CT BRAIN - WITHOUT CONTRAST INDICATION:   altered mental status  History of breast cancer  COMPARISON:  None  TECHNIQUE:  CT examination of the brain was performed  In addition to axial images, coronal 2D reformatted images were created and submitted for interpretation  Radiation dose length product (DLP) for this visit:  921 mGy-cm     This examination, like all CT scans performed in the [facility], was performed utilizing techniques to minimize radiation dose exposure, including the use of iterative reconstruction and automated exposure control  IMAGE QUALITY:  Diagnostic  FINDINGS: PARENCHYMA:  There is a 15 x 10 mm (series 2, image 18) hyperdense lesion lateral to the left thalamus; a 7 mm hyperdense lesion at the left parietal lobe (series 2, image 21 is (; a 7 mm hypodense lesion at the medial left frontal lobe (series 2, image 19); a 7 mm hyperdense lesion at the left hemipons  A 6 mm hyperdense lesion at the right hemipons  No mass effect or midline shift  No CT signs of acute infarction  No acute parenchymal hemorrhage  There is mild periventricular white matter low attenuation which is nonspecific and most likely related to chronic small vessel ischemic changes  VENTRICLES AND EXTRA-AXIAL SPACES:  There is prominence of the ventricles and sulci related to mild volume loss  VISUALIZED ORBITS AND PARANASAL SINUSES:  Unremarkable  CALVARIUM AND EXTRACRANIAL SOFT TISSUES:  Normal      Impression: A few scattered pontine and left supratentorial lesions suspicious for hemorrhagic metastases  An enhanced MRI of the brain is recommended for further evaluation  No mass effect or midline shift  Mild chronic small vessel ischemic changes and volume loss  Workstation performed: [id number]     Us Right Upper Quadrant    Result Date: 6/26/2019  Narrative: RIGHT UPPER QUADRANT ULTRASOUND INDICATION:     Abnormal bilirubin  Whipple procedure  Metastatic disease  COMPARISON:  Abdominal MRI performed May 19, 2019  TECHNIQUE:   Real-time ultrasound of the right upper quadrant was performed with a curvilinear transducer with both volumetric sweeps and still imaging techniques    Examination is unfortunately quite technically limited as this patient was unable to cooperate with breath-hold imaging and abdominal dressings and drains obscured imaging windows  FINDINGS: PANCREAS:  Portions of the pancreas are obscured by bowel gas  Visualized portions of the pancreas are unremarkable  AORTA AND IVC:  Obscured by bowel gas  LIVER: Hypoechoic right hepatic lobe mass measuring 2 6 cm is noted, probably representing a metastatic lesion, but is incompletely characterized  No other definite hepatic mass is identified  Prominence of central intrahepatic biliary tree is noted suggesting intrahepatic biliary dilatation  Perihepatic ascites is noted  BILIARY: Neobladder is surgically absent  No intrahepatic biliary dilatation  CBD measures 3 mm  No choledocholithiasis  KIDNEY: Again noted is somewhat atrophic upper pole right kidney  Right kidney is estimated at approximately 7 cm and is poorly evaluated on this examination with normal renal architecture not well seen  ASCITES:   Moderate volume of complex ascites  Impression: Markedly limited examination demonstrating appears to represent 2 6 cm periportal right hepatic lobe metastatic lesion, as well as mild central intrahepatic biliary dilatation  Moderate volume of complex ascites  Cross-sectional imaging would likely provide for more comprehensive evaluation if clinically warranted  Workstation performed: [id number]     Imaging Reports Reviewed Today Include:   Imaging Personally Reviewed by Myself Includes:    No results found       Recent Cultures (last 7 days):           Last 24 Hours Medication List:     Current Facility-Administered Medications:  al mag oxide-diphenhydramine-lidocaine viscous 10 mL Swish & Swallow 4x Daily Lisa Campbell MD   dexamethasone 4 mg Oral BID (AM & Afternoon) Isadora Troncoso MD   diphenhydrAMINE-zinc acetate  Topical TID PRN ESE Sherman   insulin glargine 18 Units Subcutaneous HS Mildred Gonzalez MD   insulin lispro 1-6 Units Subcutaneous TID [facility] ESE Sherman   insulin lispro 1-6 Units Subcutaneous HS ESE Sherman   insulin lispro 14 Units Subcutaneous TID With Meals Dianne Snyder MD   lidocaine 1 patch Topical Daily ESE Schwartz   lidocaine  Topical 4x Daily PRN Tejas Chi MD   oxyCODONE 5 mg Oral Q4H PRN Tejas Chi MD   pancrelipase (Lip-Prot-Amyl) 48,000 Units Oral TID With Rahulean Sanchez MD        Today, Patient Was Seen By: Dianne Snyder MD    ** Please Note: Dragon 360 Dictation voice to text software may have been used in the creation of this document   **

## 2019-07-05 VITALS
RESPIRATION RATE: 16 BRPM | WEIGHT: 197.09 LBS | SYSTOLIC BLOOD PRESSURE: 134 MMHG | BODY MASS INDEX: 32.84 KG/M2 | DIASTOLIC BLOOD PRESSURE: 64 MMHG | TEMPERATURE: 96.3 F | HEIGHT: 65 IN | OXYGEN SATURATION: 99 % | HEART RATE: 61 BPM

## 2019-07-05 LAB
GLUCOSE SERPL-MCNC: 104 MG/DL (ref 65–140)
GLUCOSE SERPL-MCNC: 108 MG/DL (ref 65–140)
GLUCOSE SERPL-MCNC: 91 MG/DL (ref 65–140)

## 2019-07-05 PROCEDURE — 82948 REAGENT STRIP/BLOOD GLUCOSE: CPT

## 2019-07-05 RX ORDER — DEXAMETHASONE 4 MG/1
4 TABLET ORAL
Qty: 30 TABLET | Refills: 0 | Status: SHIPPED | OUTPATIENT
Start: 2019-07-05

## 2019-07-05 RX ADMIN — OXYCODONE HYDROCHLORIDE 5 MG: 5 TABLET ORAL at 16:43

## 2019-07-05 RX ADMIN — LIDOCAINE 1 PATCH: 50 PATCH TOPICAL at 08:25

## 2019-07-05 RX ADMIN — OXYCODONE HYDROCHLORIDE 5 MG: 5 TABLET ORAL at 08:38

## 2019-07-05 RX ADMIN — DEXAMETHASONE 4 MG: 4 TABLET ORAL at 14:13

## 2019-07-05 RX ADMIN — PANCRELIPASE 48000 UNITS: 24000; 76000; 120000 CAPSULE, DELAYED RELEASE PELLETS ORAL at 16:43

## 2019-07-05 RX ADMIN — PANCRELIPASE 48000 UNITS: 24000; 76000; 120000 CAPSULE, DELAYED RELEASE PELLETS ORAL at 11:49

## 2019-07-05 RX ADMIN — DEXAMETHASONE 4 MG: 4 TABLET ORAL at 08:30

## 2019-07-05 RX ADMIN — PANCRELIPASE 48000 UNITS: 24000; 76000; 120000 CAPSULE, DELAYED RELEASE PELLETS ORAL at 08:33

## 2019-07-05 NOTE — DISCHARGE SUMMARY
Discharge Summary - 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 [facility])    Personal history of breast cancer    ANNE (acute kidney injury) ([facility] )    Anemia of acute infection    Chronic diastolic CHF (congestive heart failure) ([facility] )    Pancreatic insufficiency    Closed fracture of right hip (HCC)    CKD (chronic kidney disease), stage III (HCC)    Hyponatremia    Increased anion gap metabolic acidosis    Multiple lesions of metastatic malignancy ([facility] )  Resolved Problems:    * No resolved hospital problems  *      Consultations During Hospital Stay:  Palliative care    Procedures Performed:     Ct Head Wo Contrast    Result Date: 6/26/2019  Narrative: CT BRAIN - WITHOUT CONTRAST INDICATION:   Intracranial hemorrhage  Breast cancer, colon cancer, pancreatic cancer, ovarian cancer  COMPARISON:  Head CT from June 22, 2019 performed at 3:13 PM  TECHNIQUE:  CT examination of the brain was performed  In addition to axial images, coronal 2D reformatted images were created and submitted for interpretation  Radiation dose length product (DLP) for this visit:  864 mGy-cm   This examination, like all CT scans performed in the [facility], was performed utilizing techniques to minimize radiation dose exposure, including the use of iterative reconstruction and automated exposure control  IMAGE QUALITY:  Diagnostic   FINDINGS: PARENCHYMA:  Redemonstrated are the multiple foci of hyperattenuation within the left cerebral hemisphere and the bilateral kaye  None of these lesions are associated with surrounding parenchymal edema or mass effect  There is no associated parenchymal  calcification either  Stable arachnoid cyst in the left parasagittal frontal vertex  Mild periventricular hypoattenuation suggestive of chronic microangiopathic disease  VENTRICLES AND EXTRA-AXIAL SPACES:  Mild ventriculomegaly, commensurate with the degree of generalized cerebral and cerebellar sulcal enlargement  VISUALIZED ORBITS AND PARANASAL SINUSES:  Unremarkable  CALVARIUM AND EXTRACRANIAL SOFT TISSUES:  Stable nonspecific subcentimeter lucencies within the frontal and parietal calvarium, particularly at the vertex  Impression: Stable foci of hyperattenuation within the left cerebral hemisphere and the brainstem  While metastatic disease is a consideration, given the history of multiple malignancies, the lack of associated edema favors an alternative consideration such as cavernous angiomas  Nonemergent MR evaluation could be utilized for confirmation  Left parasagittal frontal arachnoid cyst  Workstation performed: [id number]     Ct Head Without Contrast    Result Date: 6/22/2019  Narrative: CT BRAIN - WITHOUT CONTRAST INDICATION:   altered mental status  History of breast cancer  COMPARISON:  None  TECHNIQUE:  CT examination of the brain was performed  In addition to axial images, coronal 2D reformatted images were created and submitted for interpretation  Radiation dose length product (DLP) for this visit:  921 mGy-cm   This examination, like all CT scans performed in the [facility], was performed utilizing techniques to minimize radiation dose exposure, including the use of iterative reconstruction and automated exposure control  IMAGE QUALITY:  Diagnostic   FINDINGS: PARENCHYMA:  There is a 15 x 10 mm (series 2, image 18) hyperdense lesion lateral to the left thalamus; a 7 mm hyperdense lesion at the left parietal lobe (series 2, image 21 is (; a 7 mm hypodense lesion at the medial left frontal lobe (series 2, image 19); a 7 mm hyperdense lesion at the left hemipons  A 6 mm hyperdense lesion at the right hemipons  No mass effect or midline shift  No CT signs of acute infarction  No acute parenchymal hemorrhage  There is mild periventricular white matter low attenuation which is nonspecific and most likely related to chronic small vessel ischemic changes  VENTRICLES AND EXTRA-AXIAL SPACES:  There is prominence of the ventricles and sulci related to mild volume loss  VISUALIZED ORBITS AND PARANASAL SINUSES:  Unremarkable  CALVARIUM AND EXTRACRANIAL SOFT TISSUES:  Normal      Impression: A few scattered pontine and left supratentorial lesions suspicious for hemorrhagic metastases  An enhanced MRI of the brain is recommended for further evaluation  No mass effect or midline shift  Mild chronic small vessel ischemic changes and volume loss  Workstation performed: [id number]     Ir Paracentesis    Result Date: 7/1/2019  Narrative: Ultrasound-guided paracentesis Clinical History: Recurrent Ascites Procedure: After explaining the risks and benefits of the procedure to the patient, informed consent was obtained  Ultrasound used to localize the left upper quadrant ascites  The overlying skin was prepped and draped in usual sterile fashion and local anesthesia was obtained with the 1% lidocaine solution  A 5 Western Karey Yueh needle was advanced until fluid was aspirated  Approximately 4200 cc' s of clear, yellow fluid was aspirated  The patient tolerated the procedure well and left the department in stable condition  Impression: Impression: Successful ultrasound-guided paracentesis  Workstation performed: [id number]     Us Right Upper Quadrant    Result Date: 6/26/2019  Narrative: RIGHT UPPER QUADRANT ULTRASOUND INDICATION:     Abnormal bilirubin  Whipple procedure  Metastatic disease   COMPARISON:  Abdominal MRI performed May 19, 2019  TECHNIQUE:   Real-time ultrasound of the right upper quadrant was performed with a curvilinear transducer with both volumetric sweeps and still imaging techniques  Examination is unfortunately quite technically limited as this patient was unable to cooperate with breath-hold imaging and abdominal dressings and drains obscured imaging windows  FINDINGS: PANCREAS:  Portions of the pancreas are obscured by bowel gas  Visualized portions of the pancreas are unremarkable  AORTA AND IVC:  Obscured by bowel gas  LIVER: Hypoechoic right hepatic lobe mass measuring 2 6 cm is noted, probably representing a metastatic lesion, but is incompletely characterized  No other definite hepatic mass is identified  Prominence of central intrahepatic biliary tree is noted suggesting intrahepatic biliary dilatation  Perihepatic ascites is noted  BILIARY: Neobladder is surgically absent  No intrahepatic biliary dilatation  CBD measures 3 mm  No choledocholithiasis  KIDNEY: Again noted is somewhat atrophic upper pole right kidney  Right kidney is estimated at approximately 7 cm and is poorly evaluated on this examination with normal renal architecture not well seen  ASCITES:   Moderate volume of complex ascites  Impression: Markedly limited examination demonstrating appears to represent 2 6 cm periportal right hepatic lobe metastatic lesion, as well as mild central intrahepatic biliary dilatation  Moderate volume of complex ascites  Cross-sectional imaging would likely provide for more comprehensive evaluation if clinically warranted   Workstation performed: [id number]         Significant Findings:     -acute kidney injury superimposed on chronic kidney disease stage 3:  Patient's history of chronic kidney disease stage 3 with baseline creatinine 1 0-1 2               -patient presented with acute kidney injury with a creatinine of 1 57 which peaked at 1 9               -patient was placed on IV fluids with improvement in her creatinine of 1 71 when most recently checked on 06/29:  Obtain yesterday July 1st with creatinine down to 1 13 BUN remains elevated 83 sodium is depressed at 127               -have been unable to get labs despite multiple attempts   Labs were initially discontinued as patient had elected hospice measures   Patient and her son currently wish aggressive medical therapy, lab work, interventions, and chemotherapy               -PICC line r placed to obtain lab work, albumin markedly depressed probably explaining her diffuse edema /transaminases have normalized PT and INR remains elevated 2 0 would discontinue DVT prophylaxis as result with enoxaparin/leukocytosis remains elevated trending upward     2-stage IV pancreatic cancer:  Patient follows at [facility] and had previously been on chemotherapy   She had a prior Whipple procedure   Patient's malignancy has progressed  Arturo Medina has a probable new renal malignancy, as well as a 2 5 cm right hepatic lobe mass     On her recent admission 05/2019t she had EUS with positive biopsies   Patient's CT scan of the brain was concerning for possible hemorrhagic metastatic lesions as well, although MRI confirmation was recommended has been held thus far acute kidney injury creatinine has improved hour BUN remains significantly elevated sees CEA elevated 19 8 along with CA 19 9 presume in relation to recurrence of her already known previous pancreatic CA her ongoing leukocytosis in relation diffuse tumor burden              -Patient's care was collaborated by the ICU team with her [facility] oncologist who determined that there was no future chemotherapy that would be beneficial or indicated   Palliative care was recommended, especially of light of her progressive disease               -[id number] and her family note that they do not wish hospice or comfort measures       They wish aggressive therapy and are investigating a private non-health system affiliated company for a 2nd opinion regarding intrathecal and systemic chemotherapy   The have requested records be sent  And these were received by the representative is of that [facility]               -CO was previously noted by August Tirado if her bili remains elevated, chemo would be precluded  Is further documented by both the palliative care team who has been on consultation and the primary care team as follows and I quote Dr Rosa Wheeler from the palliative care team as follows and I am in full agreement that:    "   the family has found a recommendation from a non-hospital based 'consultant' company, who has promised them systemic and intrathecal chemotherapy if the family will find a neurosurgeon to place an Omaya reservoir, and if a hospital will supervise her chemotherapy   The family have made formal requests for pt's labs and data to be sent to this company, so named [employer]'                "In our experience, provision of chemo to a patient in renal failure, with an ECOG of 4, with a bili >3, at Stage IV disease with brain mets and an underlying genetic predilection towards cancer is completely unethical, entirely without medical indication, and likely immediately dangerous to the patient   Dr Gloria Condon and I agree that the patient has a terminal illness, and this has been identified numerous times to the family by myself personally, with a prognosis quoted to them of weeks to months   Numerous staff from the ICU and my team have recommended hospice to the family instead of hospital cares and chemo                "At this time, we would categorize placement of an Omaya reservoir and/or provision of systemic IV chemotherapy as non-beneficial treatment    We have also consulted with the neuro surgical team of Dr Joaquin Isidro who concurs with above  Evelio [place] will not improve this patient's health, and places her at significant risk of harm, pain, and even death   We support Dr Gloria Condon and the primary team in refusing to comply with the family's request to use [facility] resources for such unethical cares, which appear to be orchestrated by an external and unlicensed company, and arranged exclusively for profit, rather than patient benefits         3-possible hemorrhagic metastatic brain lesions:  Possible, noted on CT scan   Would need MRI with IV contrast for confirmation   Contrasted test postponed due to acute kidney injury we cannot update a acute injury status can possibly pursue contrasted MRI although apparently will not  change treatment plan if it of further evidence of hemorrhagic change however would discontinue DVT prophylaxis continue dexamethasone 4 mg b i d  Dr Ba owusu has consulted with the neurosurgery group Dr Anton Andujar in regards to suggested Omaya reservoir agents and intrathecal chemotherapy they felt would be contraindicated with this presentation in that brain lesions are too deep in the parenchyma to be reach by intrathecal chemotherapy      4-diabetes type 2:  Secondary to pancreatectomy:  Continue Accu-Cheks and sliding scale insulin              -patient had previously been on Lantus   She was discharged on sliding scale insulin alone               -current blood sugars remain elevated in the 300's, likely secondary to the dexamethasone:                -will increase Lantus further to 18 units at bedtime, and increased pre meal NovoLog to 14 units t i d  Troy Carrizales  Continue Accu-Cheks and sliding scale insulin      5-benign hypertension:  Patient has a prior history of benign hypertension   She is currently normotensive on no medication                 -blood pressure today currently ranging a systolic of [phone] predominantly   Accelerated at times due to pain   Cont to monitor     6-chronic diastolic congestive heart failure:  Patient's most recent echocardiogram revealed a left ventricular ejection fraction of 60% and grade 1 diastolic dysfunction   Patient is on torsemide previously but was held during hospitalization and at discharge  [facility] for acute kidney injury     7-pancreatic insufficiency:  With chronic diarrhea:  Patient now tolerating p o , and was restarted on pancrelipase   Patient has a history of chronic diarrhea              -cont creon     8-history of Enterococcus peritonitis:  Patient was previously admitted for enterococcal peritonitis and hepatic abscesses requiring drains  Fran Barges still in place   Followed by IR   Completed antibiotic course she presently has no febrile course or significant abdominal pain drains all still in place     9-history of right hip fracture and fall:  Supportive measures     10-recurrent ascites:  Requiring intermittent paracentesis:                - paracentesis/underwent paracentesis with removal 4200 cc [facility] has been consulted in regards to further chemotherapy and have advise termination of all chemotherapy     Anticipate pt will be d/c to to home due to deconditioning and terminal course and would need VNA as they are refusing hospice   Pt and family wish to pursue second opinion for chemotherapy with [facility]    Palliative care team and primary care team have advised both patient and son of consensus opinion that chemotherapy at this point is not only not indicated but on ethical and are regard and patient's sonLuis has acknowledge this opinion and at his request we have  fax of labs and notes to [employer]. patient advisor Khoi Lei fax [phone]/DWMKS [phone]/LR the July 3rd patient's son acknowledge that company received this information and at this point will have to go ahead with discharge planning for home cares as doubt rehab facility will take in this presentation    He requested that we try and arrange for discharge on July 5th and set up VNA services and understands that further therapies that he wishes would have to be set up by himself   Porfirio Yap  ·      Incidental Findings: · 1 of 2 positive blood cultures of coag-negative staph felt to be contaminant patient is afebrile throughout course of this admission and does not have signs of systemic bacteremic infection  · Patient required PICC line insertion for IV access and blood draws and will be discontinued at time of discharge     Test Results Pending at Discharge (will require follow up): · None     Outpatient Tests Requested:  ·     Complications:      Hospital Course:     Arline Ly is a 75 y o  female patient who originally presented to the hospital on 6/22/2019 due to acute kidney injury malaise and fatigue  Please see above significant findings hospital course and treatment plan as outlined above  Condition at Discharge: poor     Discharge Day Visit / Exam:     * Please refer to separate progress for these details *    Discharge instructions/Information to patient and family:   See after visit summary for information provided to patient and family  Provisions for Follow-Up Care:  See after visit summary for information related to follow-up care and any pertinent home health orders  Disposition:     Home with VNA Services (Reminder: Complete face to face encounter)    For Discharges to [facility]:   · Not Applicable to this Patient - Not Applicable to this Patient      Discharge Statement:  I spent 56 minutes discharging the patient  This time was spent on the day of discharge  I had direct contact with the patient on the day of discharge  Greater than 50% of the total time was spent examining patient, answering all patient questions, arranging and discussing plan of care with patient as well as directly providing post-discharge instructions  Additional time then spent on discharge activities  Discharge Medications:  See after visit summary for reconciled discharge medications provided to patient and family        ** Please Note: Dragon 360 Dictation voice to text software may have been used in the creation of this document   **

## 2019-07-05 NOTE — SOCIAL WORK
As requested by patient's son clinical information faxed via [employer] to [address] [place] [address], [phone]  (FRANCISCO completed)

## 2019-07-05 NOTE — SOCIAL WORK
Informed by  Attending patient scheduled for discharge today, home care order for wound care  Skilled RN  Patient agreed with services from  [facility]  Referral completed via ecin  Patient needs BLS transport, bed bound  Patient's son was contact informed about discharge plans, Killian requested for this worker to contact [facility] ONCOLOGY/HEMATOLY and fax all medical records to [facility]  [phone],  They are to review clinical information in order to determine if patient is a candidate for their research program   With patient's authorization, (FRANCISCO) records faxed via Epic

## 2019-07-05 NOTE — NURSING NOTE
Pt discharged at 1930hrs  All belongings and discharge instructions with patient   Transported via [facility] transport 2 person assist

## 2019-08-27 NOTE — ASSESSMENT & PLAN NOTE
A: Met with patient to explain  role and to discuss aftercare options. R: Patient signed an MELL for her PCP. She also signed one for her therapist at [facility]. She is aware of aftercare options here at [facility].   P: Aftercare plan to be completed by time of discharge in collaboration with both the patient and treatment team.  Yumiko Harmon RN Per [facility], she is not a candidate for oral chemotherapy agents until she is discharged and reevaluated  Leukocytosis noted, however no tachycardia, remains afebrile   US RUQ negative for abscess   Repeat CTH with stable foci of hyperattenuation within the L cerebral hemisphere and the brainstem  Dr Ulysses Punter with palliative medicine following with family at the bedside   Lab holiday observed today  Diet advanced per patient's preference, bedside swallow passed   [address] spoke with patient and family today and will f/u tomorrow -- she is not currently a candidate for [facility], but she will continue to reevaluate

## 2021-08-20 NOTE — PROGRESS NOTES
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  · We will monitor her the patient's neuro status closely  3  Acute kidney injury on chronic kidney disease stage III  · We will monitor her renal indices and urine output closely  4  Chronic congestive heart failure with preserved EF  · Will continue to monitor her eyes and nose closely  · May be able to restart her home to toresemide dosing when she is more awake and able to take p o   5  Chronic hyponatremia, likely multifactorial related to 1 , 3 , and number 4  · We will monitor her sodium level and avoid over rapid correction  6  Diabetes mellitus type 2  · We will continue her on sliding scale insulin with a goal to maintain her blood glucose between 140 and 180  · Once the patient is taking a p o  Diet we can resume her home Lantus and mealtime insulin dosing  7  One of 2 blood cultures is positive for gram-positive cocci in clusters  · We will wait for the reporting of the 2nd blood culture which is still negative  Would hold off on antibiotic administration pending the family's decision regarding hospice    Critical Care Time:   Documented critical care time excludes any procedures documented elsewhere  It also excludes any family updates    _____________________________________________________________________    HPI/24hr events:   No events overnight    The patient does not complain of any pain at present    Medications:    Current Facility-Administered Medications:  chlorhexidine 15 mL Swish & Spit Q12H [address] Garrel Pique, CRNP    diphenhydrAMINE-zinc acetate  Topical TID PRN Garrel Pique, CRNP    insulin lispro 1-5 Units Subcutaneous Q6H [address] Garlon Bias Spatzer, CRNP    lidocaine 1 patch Topical Daily Garrel Pique, CRNP    morphine injection 1 mg Intravenous Q4H PRN Garrel Pique, CRNP    sodium chloride 50 mL/hr Intravenous Continuous Garrel Pique, CRNP Last Rate: 50 mL/hr (06/23/19 1526)         sodium chloride 50 mL/hr Last Rate: 50 mL/hr (06/23/19 1526)         Physical exam:  Vitals: Body mass index is 31 66 kg/m²  Blood pressure 95/52, pulse 82, temperature 98 4 °F (36 9 °C), temperature source Temporal, resp  rate (!) 8, height 5' 5" (1 651 m), weight 86 3 kg (190 lb 4 1 oz), SpO2 96 %  ,  Temp  Min: 97 1 °F (36 2 °C)  Max: 99 7 °F (37 6 °C)  IBW: 57 kg    SpO2: 96 %  SpO2 Activity: At Rest  O2 Device: None (Room air)      Intake/Output Summary (Last 24 hours) at 6/24/2019 2971  Last data filed at 6/23/2019 1803  Gross per 24 hour   Intake 700 ml   Output 225 ml   Net 475 ml       Invasive/non-invasive ventilation settings:   Respiratory    Lab Data (Last 4 hours)    None         O2/Vent Data (Last 4 hours)    None              Invasive Devices     Peripheral Intravenous Line            Peripheral IV 06/22/19 Right Antecubital 1 day    Peripheral IV 06/23/19 Left;Proximal;Ventral (anterior) Forearm less than 1 day          Drain            Open Drain Right;Lateral Abdomen 39 days    Biliary Tube  10 2 Fr  LUQ 25 days    Biliary Tube  10 2 Fr  RUQ 25 days                  Physical Exam:  Gen:  Sleepy but arousable  HEENT:  Pupils are equal round reactive to light    The oropharynx is dry but otherwise without erythema  Neck:  Supple negative for lymphadenopathy  Chest:  Essentially clear to auscultation bilaterally  Cor:  Regular rate and rhythm  Abd:  Soft and nontender  Ext:  There is no significant edema clubbing or Detail Level: Zone cyanosis  Neuro:  Sleepy but arousable, able to move her extremities without difficulty  Skin:  Warm and dry      Diagnostic Data:  Lab: I have personally reviewed pertinent lab results  CBC:   Results from last 7 days   Lab Units 06/24/19  0549 06/22/19  1455   WBC Thousand/uL 28 15* 24 04*   HEMOGLOBIN g/dL 9 0* 9 3*   HEMATOCRIT % 28 3* 29 6*   PLATELETS Thousands/uL 324 276       CMP:   Results from last 7 days   Lab Units 06/23/19  1812 06/23/19  1218 06/23/19  0445  06/22/19  1455   SODIUM mmol/L 128* 124* 127*   < > 129*   POTASSIUM mmol/L  --   --  4 9  --  4 5   CHLORIDE mmol/L  --   --  97*  --  97*   CO2 mmol/L  --   --  17*  --  21   BUN mg/dL  --   --  79*  --  63*   CREATININE mg/dL  --   --  1 67*  --  1 57*   CALCIUM mg/dL  --   --  8 6  --  8 6   ALK PHOS U/L  --   --   --   --  168*   ALT U/L  --   --   --   --  35   AST U/L  --   --   --   --  38    < > = values in this interval not displayed  PT/INR:   No results found for: PT, INR,   Magnesium:     Phosphorous:       Microbiology:  Results from last 7 days   Lab Units 06/22/19  1455   GRAM STAIN RESULT  Gram positive cocci in clusters*       Imaging:      Cardiac lab/EKG/telemetry/ECHO:       VTE Prophylaxis:  SCDs    Code Status: Level 3 - DNAR and DNI    Thora Cam CRNP    Portions of the record may have been created with voice recognition software  Occasional wrong word or "sound a like" substitutions may have occurred due to the inherent limitations of voice recognition software  Read the chart carefully and recognize, using context, where substitutions have occurred

## 2022-06-23 NOTE — TELEPHONE ENCOUNTER
I spoke with patient regarding following 3 issues:  1)  Patient is requesting a standard walker prescription due to ambulatory/gait dysfunction  Will mail Rx    2)  Patient with ongoing lower extremity bilateral edema  Will send in Rx for furosemide 20 mg, 1-2 tablets daily as needed  Call if further problems    3)  Patient with ongoing loose stools despite taking Creon, 2 tabs qd  No recent dietary changes  Patient is getting adequate fiber    Will refer to GI due to complicated medical history Private car Discharged

## 2023-05-05 NOTE — OCCUPATIONAL THERAPY NOTE
Occupational Therapy Treatment Note:         02/14/19 1050   Restrictions/Precautions   Weight Bearing Precautions Per Order No   Other Precautions Fall Risk;Pain;Cognitive  (duc drain in place)   Pain Assessment   Pain Assessment No/denies pain   Pain Score No Pain   ADL   Where Assessed Chair   Grooming Assistance 4  Minimal Assistance   Grooming Deficit Setup;Steadying;Verbal cueing;Supervision/safety; Increased time to complete;Wash/dry hands   Grooming Comments hands on A provided due to slight LOB in RLE at sink inside RW perimeter  LB Bathing Assistance 4  Minimal Assistance   LB Bathing Deficit Setup;Steadying;Verbal cueing;Supervision/safety; Increased time to complete;Perineal area; Buttocks; Left lower leg including foot;Right lower leg including foot   LB Bathing Comments Increased fatigue noted with functional reach  UB Dressing Assistance 5  Supervision/Setup   UB Dressing Deficit Setup   LB Dressing Assistance 4  Minimal Assistance   LB Dressing Deficit Setup;Steadying; Requires assistive device for steadying;Verbal cueing;Supervision/safety; Increased time to complete; Thread LLE into underwear; Thread RLE into underwear;Pull up over hips   LB Dressing Comments Pt with need for increased A to don over feet  Toileting Assistance  4  Minimal Assistance   Toileting Deficit Steadying;Setup;Supervison/safety;Verbal cueing; Increased time to complete;Grab bar use;Clothing management up;Clothing management down;Perineal hygiene   Toileting Comments Hands on A provided due to inconsistent strength in RLE  Functional Standing Tolerance   Time 3 mins   Activity dynamic stand balance activity  Comments Increased fatigue and LOB noted with activity  Hands on A required      Transfers   Sit to Stand 4  Minimal assistance   Additional items Assist x 1   Stand to Sit 4  Minimal assistance   Additional items Assist x 1   Stand pivot 4  Minimal assistance   Additional items Assist x 1   Toilet transfer 4 Minimal assistance   Additional items Assist x 1   Additional Comments Pt displayed improved safety with use of RW with appropriate techs  RLE weakness noted    Functional Mobility   Functional Mobility 4  Minimal assistance   Additional Comments x1   Additional items Rolling walker   Toilet Transfers   Toilet Transfer From   (chair)   Toilet Transfer Type To and from   Toilet Transfer to Standard toilet   Toilet Transfer Technique Stand pivot; Ambulating   Toilet Transfers Verbal cues; Minimal assistance   Toilet Transfers Comments Pt may benefit from use of bedside commode  Cognition   Overall Cognitive Status Impaired   Arousal/Participation Alert; Responsive   Attention Attends with cues to redirect   Orientation Level Oriented X4   Memory Decreased recall of precautions;Decreased short term memory   Following Commands Follows multistep commands with increased time or repetition   Comments Pt with slow perseverating behaviors through out tx session  Additional Activities   Additional Activities Other (Comment)  (reviewed current plan of care  )   Additional Activities Comments Pt reports having good understanding of need for strengthening and increased A at this time  Activity Tolerance   Activity Tolerance Patient limited by fatigue   Medical Staff Made Aware Reported all findings to nursing staff  Assessment   Assessment Pt was seen for skilled OT with focus on completion of self care tasks, functional mobility, review of fall prevention and review of current plan of care  Pt continues with slow, perseverating behaviors with completion of functional tasks  Difficulty noted to terminate activity with appropriate advancement of next step in a timely manor  Pt's son reports, "She's always been like that"  See above levels of A required for all functional tasks  Pt will benefit from further rehab due to noted deficits to return to previous levels of functioning as well as continued family support   Luis Rooney BRANDON Plascencia   Plan   Treatment Interventions ADL retraining;Functional transfer training; Endurance training;Cognitive reorientation;Patient/family training   Goal Expiration Date 02/17/19   Treatment Day 3   OT Frequency 3-5x/wk   Recommendation   OT Discharge Recommendation Short Term Rehab   Equipment Recommended   (Pt prefers to go directly home )   Barthel Index   Feeding 10   Bathing 0   Grooming Score 5   Dressing Score 5   Bladder Score 10   Bowels Score 10   Toilet Use Score 5   Transfers (Bed/Chair) Score 10   Mobility (Level Surface) Score  em   0   Stairs Score 0   Barthel Index Score 55   Modified [place] Scale   Modified [place] Scale 4   Divina Rojas, [address] HTN (hypertension)

## 2023-06-12 NOTE — ED NOTES
ICU RN aware of repeat lactic at [address] [facility]  02/05/19 1293 Symptomatic anemia

## 2023-07-17 NOTE — TREATMENT PLAN
6/26/2019 8:19 AM -  Reviewed charts, including this AMs labs confirming renal function significantly worse than baseline, and RN and ICU provider reports of worse pain, only partially relieved by lower dose oxyIR and hydromorphone IV  Agree with one-time hydromorphone at 0 5mg now, with 0 5mg q3hrs PRN  Also increased PRN oxyIR to 5mg  Will continue to follow, and discuss with fam and ICU team later today  Appreciate the results from CT head, showing no evolution of possible hemorrhagic conversion of brain lesions      Jacqui Mendoza MD  Palliative and Supportive Care  Pager: [phone] Mustarde Flap Text: The defect edges were debeveled with a #15 scalpel blade.  Given the size, depth and location of the defect and the proximity to free margins a Mustarde flap was deemed most appropriate.  Using a sterile surgical marker, an appropriate flap was drawn incorporating the defect. The area thus outlined was incised with a #15 scalpel blade.  The skin margins were undermined to an appropriate distance in all directions utilizing iris scissors.

## 2023-09-20 NOTE — PROGRESS NOTES
Discussed with IR Dr Ubaldo Frankel is elevated at 2--will attempt aspiration of the cyst tomorrow  In the interim will give vitamin K 5 mg today and if the INR is not adequately down by tomorrow will give 2 units of FFP as well  Fall with Harm Risk

## 2024-07-25 NOTE — ED NOTES
Pt called RN into room yelling "You're starving a diabetic! This is torture!" Pt informed of impeding discharge  Pt Stated she refuses to be discharged unless we order her a lunch tray  Lunch tray ordered for pt          Keara Moctezuma RN  02/26/19 1950 36.6

## (undated) DEVICE — GLOVE SRG BIOGEL 7.5

## (undated) DEVICE — STRL ALLENTOWN HIP SHOULDER PK: Brand: CARDINAL HEALTH

## (undated) DEVICE — PROXIMATE SKIN STAPLERS (35 WIDE) CONTAINS 35 STAINLESS STEEL STAPLES (FIXED HEAD): Brand: PROXIMATE

## (undated) DEVICE — 3M™ IOBAN™ 2 ANTIMICROBIAL INCISE DRAPE 6648EZ: Brand: IOBAN™ 2

## (undated) DEVICE — IMPERVIOUS STOCKINETTE: Brand: DEROYAL

## (undated) DEVICE — GLOVE INDICATOR PI UNDERGLOVE SZ 8.5 BLUE

## (undated) DEVICE — CHLORAPREP HI-LITE 26ML ORANGE

## (undated) DEVICE — SURGICAL GOWN, XL SMARTSLEEVE: Brand: CONVERTORS

## (undated) DEVICE — PLUMEPEN PRO 10FT

## (undated) DEVICE — TIBURON HIP DRAPE WITH POUCHES: Brand: CONVERTORS

## (undated) DEVICE — 3000CC GUARDIAN II: Brand: GUARDIAN

## (undated) DEVICE — BLADE SAGITTAL 63.0 X 19.5MM

## (undated) DEVICE — SUT ETHIBOND 5 V-37 30 IN MB66G

## (undated) DEVICE — GLOVE SRG BIOGEL 8

## (undated) DEVICE — SUT VICRYL 1 CTX 36 IN J977H

## (undated) DEVICE — 3M™ STERI-DRAPE™ U-DRAPE 1015: Brand: STERI-DRAPE™

## (undated) DEVICE — NEEDLE 18 G X 1 1/2

## (undated) DEVICE — MEDI-VAC TUBING CONNECTOR 6-IN-1 STRAIGHT: Brand: CARDINAL HEALTH

## (undated) DEVICE — SCD SEQUENTIAL COMPRESSION COMFORT SLEEVE MEDIUM KNEE LENGTH: Brand: KENDALL SCD

## (undated) DEVICE — THE SIMPULSE SOLO SYSTEM WITH ULTREX RETRACTABLE SPLASH SHIELD TIP: Brand: SIMPULSE SOLO

## (undated) DEVICE — COBAN 6 IN STERILE

## (undated) DEVICE — GLOVE SRG BIOGEL 6.5

## (undated) DEVICE — GLOVE INDICATOR PI UNDERGLOVE SZ 7 BLUE

## (undated) DEVICE — ELECTRODE BLADE E-Z CLEAN 6.5IN -0014

## (undated) DEVICE — SYRINGE 50ML LL

## (undated) DEVICE — TUBING SUCTION 5MM X 12 FT

## (undated) DEVICE — INTENDED FOR TISSUE SEPARATION, AND OTHER PROCEDURES THAT REQUIRE A SHARP SURGICAL BLADE TO PUNCTURE OR CUT.: Brand: BARD-PARKER ® CARBON RIB-BACK BLADES

## (undated) DEVICE — INTENDED FOR TISSUE SEPARATION, AND OTHER PROCEDURES THAT REQUIRE A SHARP SURGICAL BLADE TO PUNCTURE OR CUT.: Brand: BARD-PARKER SAFETY BLADES SIZE 10, STERILE

## (undated) DEVICE — DRESSING MEPILEX AG BORDER 4 X 8 IN

## (undated) DEVICE — DRAPE EQUIPMENT RF WAND

## (undated) DEVICE — SUT VICRYL 2-0 CT-2 27 IN J269H

## (undated) DEVICE — HOOD: Brand: FLYTE